# Patient Record
Sex: FEMALE | Race: BLACK OR AFRICAN AMERICAN | Employment: UNEMPLOYED | URBAN - METROPOLITAN AREA
[De-identification: names, ages, dates, MRNs, and addresses within clinical notes are randomized per-mention and may not be internally consistent; named-entity substitution may affect disease eponyms.]

---

## 2020-08-13 ENCOUNTER — HOSPITAL ENCOUNTER (EMERGENCY)
Age: 45
Discharge: HOME | End: 2020-08-13
Attending: EMERGENCY MEDICINE
Payer: MEDICAID

## 2020-08-13 ENCOUNTER — APPOINTMENT (OUTPATIENT)
Dept: GENERAL RADIOLOGY | Age: 45
End: 2020-08-13
Payer: MEDICAID

## 2020-08-13 VITALS
WEIGHT: 120 LBS | RESPIRATION RATE: 22 BRPM | TEMPERATURE: 99.1 F | DIASTOLIC BLOOD PRESSURE: 91 MMHG | SYSTOLIC BLOOD PRESSURE: 139 MMHG | HEIGHT: 66 IN | HEART RATE: 109 BPM | OXYGEN SATURATION: 100 % | BODY MASS INDEX: 19.29 KG/M2

## 2020-08-13 LAB
ANION GAP SERPL CALCULATED.3IONS-SCNC: 13 MMOL/L (ref 9–17)
BUN BLDV-MCNC: 8 MG/DL (ref 6–20)
BUN/CREAT BLD: 12 (ref 9–20)
CALCIUM SERPL-MCNC: 8.8 MG/DL (ref 8.6–10.4)
CHLORIDE BLD-SCNC: 104 MMOL/L (ref 98–107)
CO2: 22 MMOL/L (ref 20–31)
CREAT SERPL-MCNC: 0.65 MG/DL (ref 0.5–0.9)
D-DIMER QUANTITATIVE: 0.34 MG/L FEU (ref 0–0.59)
EKG ATRIAL RATE: 110 BPM
EKG P AXIS: 59 DEGREES
EKG P-R INTERVAL: 138 MS
EKG Q-T INTERVAL: 342 MS
EKG QRS DURATION: 64 MS
EKG QTC CALCULATION (BAZETT): 462 MS
EKG R AXIS: 20 DEGREES
EKG T AXIS: 46 DEGREES
EKG VENTRICULAR RATE: 110 BPM
GFR AFRICAN AMERICAN: >60 ML/MIN
GFR NON-AFRICAN AMERICAN: >60 ML/MIN
GFR SERPL CREATININE-BSD FRML MDRD: NORMAL ML/MIN/{1.73_M2}
GFR SERPL CREATININE-BSD FRML MDRD: NORMAL ML/MIN/{1.73_M2}
GLUCOSE BLD-MCNC: 90 MG/DL (ref 70–99)
HCG QUALITATIVE: NEGATIVE
POTASSIUM SERPL-SCNC: 3.9 MMOL/L (ref 3.7–5.3)
SODIUM BLD-SCNC: 139 MMOL/L (ref 135–144)
TROPONIN INTERP: NORMAL
TROPONIN T: NORMAL NG/ML
TROPONIN, HIGH SENSITIVITY: <6 NG/L (ref 0–14)

## 2020-08-13 PROCEDURE — 85379 FIBRIN DEGRADATION QUANT: CPT

## 2020-08-13 PROCEDURE — 80048 BASIC METABOLIC PNL TOTAL CA: CPT

## 2020-08-13 PROCEDURE — 84703 CHORIONIC GONADOTROPIN ASSAY: CPT

## 2020-08-13 PROCEDURE — 84484 ASSAY OF TROPONIN QUANT: CPT

## 2020-08-13 PROCEDURE — 93005 ELECTROCARDIOGRAM TRACING: CPT | Performed by: EMERGENCY MEDICINE

## 2020-08-13 PROCEDURE — 93010 ELECTROCARDIOGRAM REPORT: CPT | Performed by: INTERNAL MEDICINE

## 2020-08-13 PROCEDURE — 99285 EMERGENCY DEPT VISIT HI MDM: CPT

## 2020-08-13 PROCEDURE — 71045 X-RAY EXAM CHEST 1 VIEW: CPT

## 2020-08-13 PROCEDURE — 6370000000 HC RX 637 (ALT 250 FOR IP): Performed by: EMERGENCY MEDICINE

## 2020-08-13 RX ORDER — 0.9 % SODIUM CHLORIDE 0.9 %
1000 INTRAVENOUS SOLUTION INTRAVENOUS ONCE
Status: DISCONTINUED | OUTPATIENT
Start: 2020-08-13 | End: 2020-08-13 | Stop reason: HOSPADM

## 2020-08-13 RX ORDER — LORAZEPAM 1 MG/1
1 TABLET ORAL ONCE
Status: COMPLETED | OUTPATIENT
Start: 2020-08-13 | End: 2020-08-13

## 2020-08-13 RX ORDER — IBUPROFEN 800 MG/1
800 TABLET ORAL ONCE
Status: COMPLETED | OUTPATIENT
Start: 2020-08-13 | End: 2020-08-13

## 2020-08-13 RX ORDER — HYDROXYZINE HYDROCHLORIDE 25 MG/1
25 TABLET, FILM COATED ORAL EVERY 8 HOURS PRN
Qty: 20 TABLET | Refills: 0 | Status: SHIPPED | OUTPATIENT
Start: 2020-08-13 | End: 2020-08-23

## 2020-08-13 RX ADMIN — LORAZEPAM 1 MG: 1 TABLET ORAL at 16:03

## 2020-08-13 RX ADMIN — IBUPROFEN 800 MG: 800 TABLET, FILM COATED ORAL at 16:03

## 2020-08-13 ASSESSMENT — ENCOUNTER SYMPTOMS
SHORTNESS OF BREATH: 0
ABDOMINAL PAIN: 0
TROUBLE SWALLOWING: 0

## 2020-08-13 ASSESSMENT — PAIN SCALES - GENERAL
PAINLEVEL_OUTOF10: 10
PAINLEVEL_OUTOF10: 10
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
## 2020-12-31 ENCOUNTER — APPOINTMENT (OUTPATIENT)
Dept: CT IMAGING | Age: 45
DRG: 192 | End: 2020-12-31
Payer: MEDICAID

## 2020-12-31 ENCOUNTER — HOSPITAL ENCOUNTER (INPATIENT)
Age: 45
LOS: 6 days | Discharge: HOME | DRG: 192 | End: 2021-01-06
Attending: EMERGENCY MEDICINE | Admitting: INTERNAL MEDICINE
Payer: MEDICAID

## 2020-12-31 ENCOUNTER — APPOINTMENT (OUTPATIENT)
Dept: GENERAL RADIOLOGY | Age: 45
DRG: 192 | End: 2020-12-31
Payer: MEDICAID

## 2020-12-31 DIAGNOSIS — J81.0 ACUTE PULMONARY EDEMA (HCC): Primary | ICD-10-CM

## 2020-12-31 DIAGNOSIS — R07.9 CHEST PAIN, UNSPECIFIED TYPE: ICD-10-CM

## 2020-12-31 PROBLEM — I50.1 PULMONARY EDEMA, ACUTE, WITH CONGESTIVE HEART FAILURE (HCC): Status: ACTIVE | Noted: 2020-12-31

## 2020-12-31 PROBLEM — D50.9 MICROCYTIC ANEMIA: Status: ACTIVE | Noted: 2020-12-31

## 2020-12-31 PROBLEM — F19.11 HISTORY OF DRUG ABUSE (HCC): Status: ACTIVE | Noted: 2020-12-31

## 2020-12-31 PROBLEM — F41.9 ANXIETY: Status: ACTIVE | Noted: 2020-12-31

## 2020-12-31 LAB
-: ABNORMAL
ABSOLUTE EOS #: 0.06 K/UL (ref 0–0.44)
ABSOLUTE IMMATURE GRANULOCYTE: <0.03 K/UL (ref 0–0.3)
ABSOLUTE LYMPH #: 1.94 K/UL (ref 1.1–3.7)
ABSOLUTE MONO #: 0.3 K/UL (ref 0.1–1.2)
ADENOVIRUS PCR: NOT DETECTED
ALBUMIN SERPL-MCNC: 3.8 G/DL (ref 3.5–5.2)
ALBUMIN/GLOBULIN RATIO: 1.3 (ref 1–2.5)
ALP BLD-CCNC: 56 U/L (ref 35–104)
ALT SERPL-CCNC: 6 U/L (ref 5–33)
AMORPHOUS: ABNORMAL
AMPHETAMINE SCREEN URINE: NEGATIVE
ANION GAP SERPL CALCULATED.3IONS-SCNC: 10 MMOL/L (ref 9–17)
AST SERPL-CCNC: 13 U/L
BACTERIA: ABNORMAL
BARBITURATE SCREEN URINE: NEGATIVE
BASOPHILS # BLD: 1 % (ref 0–2)
BASOPHILS ABSOLUTE: 0.06 K/UL (ref 0–0.2)
BENZODIAZEPINE SCREEN, URINE: NEGATIVE
BILIRUB SERPL-MCNC: 0.46 MG/DL (ref 0.3–1.2)
BILIRUBIN DIRECT: 0.08 MG/DL
BILIRUBIN URINE: NEGATIVE
BNP INTERPRETATION: ABNORMAL
BORDETELLA PARAPERTUSSIS: NOT DETECTED
BORDETELLA PERTUSSIS PCR: NOT DETECTED
BUN BLDV-MCNC: 8 MG/DL (ref 6–20)
BUN/CREAT BLD: ABNORMAL (ref 9–20)
BUPRENORPHINE URINE: NORMAL
C-REACTIVE PROTEIN: <3 MG/L (ref 0–5)
CALCIUM SERPL-MCNC: 8.3 MG/DL (ref 8.6–10.4)
CANNABINOID SCREEN URINE: NEGATIVE
CASTS UA: ABNORMAL /LPF (ref 0–8)
CHLAMYDIA PNEUMONIAE BY PCR: NOT DETECTED
CHLORIDE BLD-SCNC: 108 MMOL/L (ref 98–107)
CO2: 24 MMOL/L (ref 20–31)
COCAINE METABOLITE, URINE: NEGATIVE
COLOR: YELLOW
CORONAVIRUS 229E PCR: NOT DETECTED
CORONAVIRUS HKU1 PCR: NOT DETECTED
CORONAVIRUS NL63 PCR: NOT DETECTED
CORONAVIRUS OC43 PCR: NOT DETECTED
CREAT SERPL-MCNC: 0.67 MG/DL (ref 0.5–0.9)
CRYSTALS, UA: ABNORMAL /HPF
D-DIMER QUANTITATIVE: 0.56 MG/L FEU
DIFFERENTIAL TYPE: ABNORMAL
EOSINOPHILS RELATIVE PERCENT: 1 % (ref 1–4)
EPITHELIAL CELLS UA: ABNORMAL /HPF (ref 0–5)
FERRITIN: 12 UG/L (ref 13–150)
FOLATE: 9.6 NG/ML
GFR AFRICAN AMERICAN: >60 ML/MIN
GFR NON-AFRICAN AMERICAN: >60 ML/MIN
GFR SERPL CREATININE-BSD FRML MDRD: ABNORMAL ML/MIN/{1.73_M2}
GFR SERPL CREATININE-BSD FRML MDRD: ABNORMAL ML/MIN/{1.73_M2}
GLUCOSE BLD-MCNC: 99 MG/DL (ref 70–99)
GLUCOSE URINE: NEGATIVE
HCG QUALITATIVE: NEGATIVE
HCT VFR BLD CALC: 34.8 % (ref 36.3–47.1)
HEMOGLOBIN: 10.7 G/DL (ref 11.9–15.1)
HUMAN METAPNEUMOVIRUS PCR: NOT DETECTED
IMMATURE GRANULOCYTES: 0 %
INFLUENZA A BY PCR: NOT DETECTED
INFLUENZA A H1 (2009) PCR: NORMAL
INFLUENZA A H1 PCR: NORMAL
INFLUENZA A H3 PCR: NORMAL
INFLUENZA B BY PCR: NOT DETECTED
IRON SATURATION: 8 % (ref 20–55)
IRON: 28 UG/DL (ref 37–145)
KETONES, URINE: NEGATIVE
LEUKOCYTE ESTERASE, URINE: ABNORMAL
LIPASE: 19 U/L (ref 13–60)
LYMPHOCYTES # BLD: 41 % (ref 24–43)
MCH RBC QN AUTO: 22.6 PG (ref 25.2–33.5)
MCHC RBC AUTO-ENTMCNC: 30.7 G/DL (ref 28.4–34.8)
MCV RBC AUTO: 73.4 FL (ref 82.6–102.9)
MDMA URINE: NORMAL
METHADONE SCREEN, URINE: NEGATIVE
METHAMPHETAMINE, URINE: NORMAL
MONOCYTES # BLD: 6 % (ref 3–12)
MUCUS: ABNORMAL
MYCOPLASMA PNEUMONIAE PCR: NOT DETECTED
NITRITE, URINE: NEGATIVE
NRBC AUTOMATED: 0 PER 100 WBC
OPIATES, URINE: NEGATIVE
OTHER OBSERVATIONS UA: ABNORMAL
OXYCODONE SCREEN URINE: NEGATIVE
PARAINFLUENZA 1 PCR: NOT DETECTED
PARAINFLUENZA 2 PCR: NOT DETECTED
PARAINFLUENZA 3 PCR: NOT DETECTED
PARAINFLUENZA 4 PCR: NOT DETECTED
PDW BLD-RTO: 15.8 % (ref 11.8–14.4)
PH UA: 5.5 (ref 5–8)
PHENCYCLIDINE, URINE: NEGATIVE
PLATELET # BLD: 430 K/UL (ref 138–453)
PLATELET ESTIMATE: ABNORMAL
PMV BLD AUTO: 9.9 FL (ref 8.1–13.5)
POTASSIUM SERPL-SCNC: 3.8 MMOL/L (ref 3.7–5.3)
PRO-BNP: 911 PG/ML
PROPOXYPHENE, URINE: NORMAL
PROTEIN UA: NEGATIVE
RBC # BLD: 4.74 M/UL (ref 3.95–5.11)
RBC # BLD: ABNORMAL 10*6/UL
RBC UA: ABNORMAL /HPF (ref 0–4)
RENAL EPITHELIAL, UA: ABNORMAL /HPF
RESP SYNCYTIAL VIRUS PCR: NOT DETECTED
RHEUMATOID FACTOR: 18.8 IU/ML
RHINO/ENTEROVIRUS PCR: NOT DETECTED
SARS-COV-2, PCR: NOT DETECTED
SARS-COV-2, RAPID: NOT DETECTED
SARS-COV-2: NORMAL
SARS-COV-2: NORMAL
SEG NEUTROPHILS: 51 % (ref 36–65)
SEGMENTED NEUTROPHILS ABSOLUTE COUNT: 2.35 K/UL (ref 1.5–8.1)
SODIUM BLD-SCNC: 142 MMOL/L (ref 135–144)
SOURCE: NORMAL
SPECIFIC GRAVITY UA: 1.01 (ref 1–1.03)
SPECIMEN DESCRIPTION: NORMAL
TEST INFORMATION: NORMAL
TOTAL IRON BINDING CAPACITY: 361 UG/DL (ref 250–450)
TOTAL PROTEIN: 6.8 G/DL (ref 6.4–8.3)
TRICHOMONAS: ABNORMAL
TRICYCLIC ANTIDEPRESSANTS, UR: NORMAL
TROPONIN INTERP: NORMAL
TROPONIN INTERP: NORMAL
TROPONIN T: NORMAL NG/ML
TROPONIN T: NORMAL NG/ML
TROPONIN, HIGH SENSITIVITY: 8 NG/L (ref 0–14)
TROPONIN, HIGH SENSITIVITY: 9 NG/L (ref 0–14)
TSH SERPL DL<=0.05 MIU/L-ACNC: 3.3 MIU/L (ref 0.3–5)
TURBIDITY: CLEAR
UNSATURATED IRON BINDING CAPACITY: 333 UG/DL (ref 112–347)
URINE HGB: NEGATIVE
UROBILINOGEN, URINE: NORMAL
VITAMIN B-12: 324 PG/ML (ref 232–1245)
WBC # BLD: 4.7 K/UL (ref 3.5–11.3)
WBC # BLD: ABNORMAL 10*3/UL
WBC UA: ABNORMAL /HPF (ref 0–5)
YEAST: ABNORMAL

## 2020-12-31 PROCEDURE — 86038 ANTINUCLEAR ANTIBODIES: CPT

## 2020-12-31 PROCEDURE — 82607 VITAMIN B-12: CPT

## 2020-12-31 PROCEDURE — 84484 ASSAY OF TROPONIN QUANT: CPT

## 2020-12-31 PROCEDURE — 85025 COMPLETE CBC W/AUTO DIFF WBC: CPT

## 2020-12-31 PROCEDURE — 81001 URINALYSIS AUTO W/SCOPE: CPT

## 2020-12-31 PROCEDURE — 82746 ASSAY OF FOLIC ACID SERUM: CPT

## 2020-12-31 PROCEDURE — 1200000000 HC SEMI PRIVATE

## 2020-12-31 PROCEDURE — 99284 EMERGENCY DEPT VISIT MOD MDM: CPT

## 2020-12-31 PROCEDURE — 84703 CHORIONIC GONADOTROPIN ASSAY: CPT

## 2020-12-31 PROCEDURE — 82248 BILIRUBIN DIRECT: CPT

## 2020-12-31 PROCEDURE — 80307 DRUG TEST PRSMV CHEM ANLYZR: CPT

## 2020-12-31 PROCEDURE — 0202U NFCT DS 22 TRGT SARS-COV-2: CPT

## 2020-12-31 PROCEDURE — 6370000000 HC RX 637 (ALT 250 FOR IP): Performed by: STUDENT IN AN ORGANIZED HEALTH CARE EDUCATION/TRAINING PROGRAM

## 2020-12-31 PROCEDURE — 2580000003 HC RX 258: Performed by: STUDENT IN AN ORGANIZED HEALTH CARE EDUCATION/TRAINING PROGRAM

## 2020-12-31 PROCEDURE — 86140 C-REACTIVE PROTEIN: CPT

## 2020-12-31 PROCEDURE — 6360000002 HC RX W HCPCS: Performed by: STUDENT IN AN ORGANIZED HEALTH CARE EDUCATION/TRAINING PROGRAM

## 2020-12-31 PROCEDURE — 71045 X-RAY EXAM CHEST 1 VIEW: CPT

## 2020-12-31 PROCEDURE — 6360000004 HC RX CONTRAST MEDICATION: Performed by: STUDENT IN AN ORGANIZED HEALTH CARE EDUCATION/TRAINING PROGRAM

## 2020-12-31 PROCEDURE — 80053 COMPREHEN METABOLIC PANEL: CPT

## 2020-12-31 PROCEDURE — 83036 HEMOGLOBIN GLYCOSYLATED A1C: CPT

## 2020-12-31 PROCEDURE — 82728 ASSAY OF FERRITIN: CPT

## 2020-12-31 PROCEDURE — 83550 IRON BINDING TEST: CPT

## 2020-12-31 PROCEDURE — 93005 ELECTROCARDIOGRAM TRACING: CPT | Performed by: STUDENT IN AN ORGANIZED HEALTH CARE EDUCATION/TRAINING PROGRAM

## 2020-12-31 PROCEDURE — U0002 COVID-19 LAB TEST NON-CDC: HCPCS

## 2020-12-31 PROCEDURE — 83690 ASSAY OF LIPASE: CPT

## 2020-12-31 PROCEDURE — 83540 ASSAY OF IRON: CPT

## 2020-12-31 PROCEDURE — 85379 FIBRIN DEGRADATION QUANT: CPT

## 2020-12-31 PROCEDURE — 99223 1ST HOSP IP/OBS HIGH 75: CPT | Performed by: INTERNAL MEDICINE

## 2020-12-31 PROCEDURE — 83880 ASSAY OF NATRIURETIC PEPTIDE: CPT

## 2020-12-31 PROCEDURE — 71260 CT THORAX DX C+: CPT

## 2020-12-31 PROCEDURE — 93005 ELECTROCARDIOGRAM TRACING: CPT | Performed by: EMERGENCY MEDICINE

## 2020-12-31 PROCEDURE — 84443 ASSAY THYROID STIM HORMONE: CPT

## 2020-12-31 PROCEDURE — 86431 RHEUMATOID FACTOR QUANT: CPT

## 2020-12-31 RX ORDER — ACETAMINOPHEN 500 MG
1000 TABLET ORAL ONCE
Status: COMPLETED | OUTPATIENT
Start: 2020-12-31 | End: 2020-12-31

## 2020-12-31 RX ORDER — ACETAMINOPHEN 325 MG/1
650 TABLET ORAL EVERY 6 HOURS PRN
Status: DISCONTINUED | OUTPATIENT
Start: 2020-12-31 | End: 2021-01-05

## 2020-12-31 RX ORDER — SODIUM CHLORIDE 0.9 % (FLUSH) 0.9 %
10 SYRINGE (ML) INJECTION PRN
Status: DISCONTINUED | OUTPATIENT
Start: 2020-12-31 | End: 2021-01-06 | Stop reason: HOSPADM

## 2020-12-31 RX ORDER — FLUOXETINE 10 MG/1
10 CAPSULE ORAL DAILY
Status: DISCONTINUED | OUTPATIENT
Start: 2020-12-31 | End: 2021-01-06 | Stop reason: HOSPADM

## 2020-12-31 RX ORDER — FUROSEMIDE 10 MG/ML
20 INJECTION INTRAMUSCULAR; INTRAVENOUS ONCE
Status: COMPLETED | OUTPATIENT
Start: 2020-12-31 | End: 2020-12-31

## 2020-12-31 RX ORDER — SODIUM CHLORIDE 0.9 % (FLUSH) 0.9 %
10 SYRINGE (ML) INJECTION EVERY 12 HOURS SCHEDULED
Status: DISCONTINUED | OUTPATIENT
Start: 2020-12-31 | End: 2021-01-06 | Stop reason: HOSPADM

## 2020-12-31 RX ORDER — ONDANSETRON 2 MG/ML
4 INJECTION INTRAMUSCULAR; INTRAVENOUS EVERY 6 HOURS PRN
Status: DISCONTINUED | OUTPATIENT
Start: 2020-12-31 | End: 2021-01-06 | Stop reason: HOSPADM

## 2020-12-31 RX ORDER — POLYETHYLENE GLYCOL 3350 17 G/17G
17 POWDER, FOR SOLUTION ORAL DAILY PRN
Status: DISCONTINUED | OUTPATIENT
Start: 2020-12-31 | End: 2021-01-06 | Stop reason: HOSPADM

## 2020-12-31 RX ORDER — PROMETHAZINE HYDROCHLORIDE 12.5 MG/1
12.5 TABLET ORAL EVERY 6 HOURS PRN
Status: DISCONTINUED | OUTPATIENT
Start: 2020-12-31 | End: 2021-01-06 | Stop reason: HOSPADM

## 2020-12-31 RX ORDER — FLUOXETINE 10 MG/1
CAPSULE ORAL DAILY
COMMUNITY
End: 2021-03-17

## 2020-12-31 RX ORDER — 0.9 % SODIUM CHLORIDE 0.9 %
1000 INTRAVENOUS SOLUTION INTRAVENOUS ONCE
Status: COMPLETED | OUTPATIENT
Start: 2020-12-31 | End: 2020-12-31

## 2020-12-31 RX ORDER — ACETAMINOPHEN 650 MG/1
650 SUPPOSITORY RECTAL EVERY 6 HOURS PRN
Status: DISCONTINUED | OUTPATIENT
Start: 2020-12-31 | End: 2021-01-06 | Stop reason: HOSPADM

## 2020-12-31 RX ADMIN — SODIUM CHLORIDE 1000 ML: 9 INJECTION, SOLUTION INTRAVENOUS at 05:14

## 2020-12-31 RX ADMIN — ACETAMINOPHEN 1000 MG: 500 TABLET ORAL at 12:08

## 2020-12-31 RX ADMIN — LURASIDONE HYDROCHLORIDE 40 MG: 40 TABLET, FILM COATED ORAL at 14:15

## 2020-12-31 RX ADMIN — FUROSEMIDE 20 MG: 10 INJECTION, SOLUTION INTRAMUSCULAR; INTRAVENOUS at 14:05

## 2020-12-31 RX ADMIN — IOPAMIDOL 75 ML: 755 INJECTION, SOLUTION INTRAVENOUS at 06:24

## 2020-12-31 RX ADMIN — ACETAMINOPHEN 650 MG: 325 TABLET ORAL at 23:13

## 2020-12-31 RX ADMIN — ENOXAPARIN SODIUM 40 MG: 40 INJECTION SUBCUTANEOUS at 14:05

## 2020-12-31 RX ADMIN — FLUOXETINE 10 MG: 10 CAPSULE ORAL at 14:14

## 2020-12-31 ASSESSMENT — PAIN DESCRIPTION - PAIN TYPE: TYPE: ACUTE PAIN

## 2020-12-31 ASSESSMENT — PAIN SCALES - GENERAL
PAINLEVEL_OUTOF10: 8
PAINLEVEL_OUTOF10: 6

## 2020-12-31 ASSESSMENT — PAIN DESCRIPTION - LOCATION: LOCATION: HEAD

## 2020-12-31 ASSESSMENT — PAIN DESCRIPTION - PROGRESSION: CLINICAL_PROGRESSION: GRADUALLY IMPROVING

## 2020-12-31 NOTE — ED PROVIDER NOTES
[facility] ED     Emergency Department     Faculty Attestation        I performed a history and physical examination of the patient and discussed management with the resident. I reviewed the residents note and agree with the documented findings and plan of care. Any areas of disagreement are noted on the chart. I was personally present for the key portions of any procedures. I have documented in the chart those procedures where I was not present during the key portions. I have reviewed the emergency nurses triage note. I agree with the chief complaint, past medical history, past surgical history, allergies, medications, social and family history as documented unless otherwise noted below. For mid-level providers such as nurse practitioners as well as physicians assistants:    I have personally seen and evaluated the patient. I find the patient's history and physical exam are consistent with NP/PA documentation. I agree with the care provided, treatment rendered, disposition, & follow-up plan. Additional findings are as noted. Vital Signs: BP (!) 144/107   Pulse 102   Resp 15   Ht 5' 6\" (1.676 m)   Wt 155 lb (70.3 kg)   SpO2 98%   BMI 25.02 kg/m²   PCP:  Sakina Gomez, APRN - CNP    Pertinent Comments:     Patient presents the emergency department for evaluation of chest pain she has had intermittently for 2 weeks she describes vague left-sided chest pain that sometimes will radiate to her left upper extremity. Some shortness of breath associated with it. Is nonpleuritic in nature she has been around other people who have tested positive for Covid.   Exam she is afebrile nontoxic slightly tachycardic will check EKG, CBC, BMP, D-dimer, troponin, chest x-ray      Critical Care  None          Harper Rome MD  Attending Emergency Medicine Physician              Randy Escobar MD  12/31/20 7876

## 2020-12-31 NOTE — ED PROVIDER NOTES
[address]  ED  Emergency Department  Emergency Medicine Resident Sign-out     Care of Mackenzie Saez was assumed from Dr. Lupe Mukherjee and is being seen for Chest Pain (x2wks intermittent) and Shortness of Breath  . The patient's initial evaluation and plan have been discussed with the prior provider who initially evaluated the patient. EMERGENCY DEPARTMENT COURSE / MEDICAL DECISION MAKING:       MEDICATIONS GIVEN:  Orders Placed This Encounter   Medications    0.9 % sodium chloride bolus    iopamidol (ISOVUE-370) 76 % injection 75 mL       LABS / RADIOLOGY:     Labs Reviewed   CBC WITH AUTO DIFFERENTIAL - Abnormal; Notable for the following components:       Result Value    Hemoglobin 10.7 (*)     Hematocrit 34.8 (*)     MCV 73.4 (*)     MCH 22.6 (*)     RDW 15.8 (*)     All other components within normal limits   COMPREHENSIVE METABOLIC PANEL - Abnormal; Notable for the following components:    Calcium 8.3 (*)     Chloride 108 (*)     All other components within normal limits   URINALYSIS WITH MICROSCOPIC - Abnormal; Notable for the following components:    Leukocyte Esterase, Urine TRACE (*)     All other components within normal limits   BRAIN NATRIURETIC PEPTIDE - Abnormal; Notable for the following components:    Pro- (*)     All other components within normal limits   LIPASE   D-DIMER, QUANTITATIVE   TROPONIN   TROPONIN   COVID-19   HCG, SERUM, QUALITATIVE       Ct Chest Pulmonary Embolism W Contrast    Result Date: 12/31/2020  EXAMINATION: CTA OF THE CHEST 12/31/2020 6:23 am TECHNIQUE: CTA of the chest was performed after the administration of intravenous contrast.  Multiplanar reformatted images are provided for review. MIP images are provided for review. Dose modulation, iterative reconstruction, and/or weight based adjustment of the mA/kV was utilized to reduce the radiation dose to as low as reasonably achievable.  COMPARISON: Chest single view August 13, 2020 HISTORY: ORDERING SYSTEM PROVIDED HISTORY: r/o PE TECHNOLOGIST PROVIDED HISTORY: r/o PE Reason for Exam: r/o PE FINDINGS: Pulmonary Arteries: Pulmonary arteries are suboptimally opacified for evaluation. No definitive evidence of intraluminal filling defect to suggest pulmonary embolism. Main pulmonary artery is normal in caliber. Mediastinum: No evidence of mediastinal lymphadenopathy. The heart is moderately enlarged with otherwise unremarkable configuration. No evidence of pericardial effusion is identified. .  There is no acute abnormality of the thoracic aorta. Lungs/pleura: Scattered interlobular septal thickening consistent with pulmonary interstitial edema is noted. Mild multifocal ill-defined consolidation is seen scattered throughout the bilateral lungs. Layering right-sided pleural effusion is noted which measures up to 7 mm in diameter. Francie Savant Upper Abdomen: Limited images of the upper abdomen are unremarkable. Soft Tissues/Bones: No acute bone or soft tissue abnormality. 1. Note: Study is limited by suboptimal volume of intravenous iodinated contrast within the pulmonary arterial system. 2. No definitive evidence of acute pulmonary embolism. 3. Moderate cardiomegaly with moderate pulmonary interstitial edema and suspected mild multifocal bilateral lung overlying alveolar edema. 4. Mild right-sided layering posterior pleural effusions. 5. Constellation of findings suggests congestive heart failure (CHF). RECENT VITALS:     Temp: 98.7 °F (37.1 °C),  Pulse: 118, Resp: 24, BP: (!) 110/91, SpO2: 92 %    This patient is a 39 y.o. Female with chest pain and shortness of breath. Had an increasingly having progressive chest pain shortness of breath approximate last week. Woke up this morning with severe chest pain her chest and shortness of breath primary care to the emergency department. Here work-up shows new pulmonary edema, cardiomegaly, and pleural effusion concerning for new onset CHF.   She did have an episode here where she came tachycardic as well as hypoxic down to the 70s but popped up after nasal cannula. Plan is for admission, internal medicine has been contacted and will come down to assess the patient. Plan is for admission to either observation unit or to medicine at the discretion of internal medicine. Medicine evaluated the patient, on evaluation there is concern for Covid despite the negative rapid swab based on the CT chest findings. Infectious disease consult who recommended the respiratory viral PCR panel for further differentiation. Panel came back Covid negative, infectious disease signed off. Internal medicine agreed to admit. OUTSTANDING TASKS / RECOMMENDATIONS:    1. Awaiting bed     FINAL IMPRESSION:     1. Acute pulmonary edema (HCC)    2. Chest pain, unspecified type        DISPOSITION:         DISPOSITION:  []  Discharge   []  Transfer -    [x]  Admission -   internal medicine   []  Against Medical Advice   []  Eloped   FOLLOW-UP: No follow-up provider specified.    DISCHARGE MEDICATIONS: New Prescriptions    No medications on file           Jennifer Tee MD  Emergency Medicine Resident  [facility]        Jennifer Tee MD  Resident  12/31/20 6501

## 2020-12-31 NOTE — ED PROVIDER NOTES
Food insecurity     Worry: Not on file     Inability: Not on file    Transportation needs     Medical: Not on file     Non-medical: Not on file   Tobacco Use    Smoking status: Current Every Day Smoker     Packs/day: 0.50     Types: Cigarettes    Smokeless tobacco: Never Used   Substance and Sexual Activity    Alcohol use: Not Currently     Comment: in rehab for ETOH abuse    Drug use: Not on file    Sexual activity: Not on file   Lifestyle    Physical activity     Days per week: Not on file     Minutes per session: Not on file    Stress: Not on file   Relationships    Social connections     Talks on phone: Not on file     Gets together: Not on file     Attends [employer] service: Not on file     Active member of club or organization: Not on file     Attends meetings of clubs or organizations: Not on file     Relationship status: Not on file    Intimate partner violence     Fear of current or ex partner: Not on file     Emotionally abused: Not on file     Physically abused: Not on file     Forced sexual activity: Not on file   Other Topics Concern    Not on file   Social History Narrative    Not on file       History reviewed. No pertinent family history. Allergies:  Antihistamines, chlorpheniramine-type and Hydroxyzine    Home Medications:  Prior to Admission medications    Medication Sig Start Date End Date Taking? Authorizing Provider   lurasidone (LATUDA) 40 MG TABS tablet Take by mouth daily   Yes Historical Provider, MD   FLUoxetine (PROZAC) 10 MG capsule Take by mouth daily   Yes Historical Provider, MD       REVIEW OF SYSTEMS    (2-9 systems for level 4, 10 or more for level 5)      Review of Systems   Constitutional: Negative for chills and fever. HENT: Negative for rhinorrhea and sore throat. Eyes: Negative for redness and itching. Respiratory: Positive for shortness of breath. Negative for cough. Cardiovascular: Positive for chest pain. Negative for leg swelling. Gastrointestinal: Positive for abdominal pain and diarrhea. Negative for blood in stool, nausea and vomiting. Genitourinary: Positive for dysuria and vaginal discharge. Negative for frequency, hematuria and vaginal bleeding. Musculoskeletal: Negative for arthralgias and myalgias. Skin: Negative for rash and wound. Allergic/Immunologic: Negative for environmental allergies and food allergies. Neurological: Positive for headaches. Negative for weakness and numbness. PHYSICAL EXAM   (up to 7 for level 4, 8 or more for level 5)      INITIAL VITALS:   BP (!) 110/91   Pulse 118   Temp 98.7 °F (37.1 °C) (Oral)   Resp 24   Ht 5' 6\" (1.676 m)   Wt 155 lb (70.3 kg)   SpO2 92%   BMI 25.02 kg/m²     Physical Exam  Vitals signs and nursing note reviewed. Constitutional:       General: She is not in acute distress. Appearance: Normal appearance. She is not ill-appearing, toxic-appearing or diaphoretic. HENT:      Head: Normocephalic and atraumatic. Eyes:      General: No scleral icterus. Conjunctiva/sclera: Conjunctivae normal.   Neck:      Musculoskeletal: Neck supple. Cardiovascular:      Rate and Rhythm: Normal rate and regular rhythm. Pulmonary:      Effort: Pulmonary effort is normal. No respiratory distress. Breath sounds: Normal breath sounds. No stridor. No wheezing, rhonchi or rales. Abdominal:      General: There is no distension. Palpations: Abdomen is soft. There is no mass. Tenderness: There is no abdominal tenderness (Minimal, diffuse). There is no right CVA tenderness, left CVA tenderness, guarding or rebound. Musculoskeletal:      Right lower leg: No edema. Left lower leg: No edema. Skin:     General: Skin is warm and dry. Findings: No rash (over exposed skin). Neurological:      General: No focal deficit present. Mental Status: She is alert and oriented to person, place, and time.    Psychiatric:         Mood and Affect: Mood normal. Behavior: Behavior normal.         DIAGNOSTICS     PLAN (LABS / IMAGING / EKG):  Orders Placed This Encounter   Procedures    CT CHEST PULMONARY EMBOLISM W CONTRAST    CBC WITH AUTO DIFFERENTIAL    COMPREHENSIVE METABOLIC PANEL    LIPASE    D-DIMER, QUANTITATIVE    Urinalysis with microscopic    Troponin    COVID-19    HCG Qualitative, Serum    Brain Natriuretic Peptide    Inpatient consult to Internal Medicine    EKG 12 Lead       MEDICATIONS ORDERED:  Orders Placed This Encounter   Medications    0.9 % sodium chloride bolus    iopamidol (ISOVUE-370) 76 % injection 75 mL       DIAGNOSTIC RESULTS / EMERGENCYDEPARTMENT COURSE / MDM     LABS:  Results for orders placed or performed during the hospital encounter of 12/31/20   CBC WITH AUTO DIFFERENTIAL   Result Value Ref Range    WBC 4.7 3.5 - 11.3 k/uL    RBC 4.74 3.95 - 5.11 m/uL    Hemoglobin 10.7 (L) 11.9 - 15.1 g/dL    Hematocrit 34.8 (L) 36.3 - 47.1 %    MCV 73.4 (L) 82.6 - 102.9 fL    MCH 22.6 (L) 25.2 - 33.5 pg    MCHC 30.7 28.4 - 34.8 g/dL    RDW 15.8 (H) 11.8 - 14.4 %    Platelets 713 845 - 282 k/uL    MPV 9.9 8.1 - 13.5 fL    NRBC Automated 0.0 0.0 per 100 WBC    Differential Type NOT REPORTED     Seg Neutrophils 51 36 - 65 %    Lymphocytes 41 24 - 43 %    Monocytes 6 3 - 12 %    Eosinophils % 1 1 - 4 %    Basophils 1 0 - 2 %    Immature Granulocytes 0 0 %    Segs Absolute 2.35 1.50 - 8.10 k/uL    Absolute Lymph # 1.94 1.10 - 3.70 k/uL    Absolute Mono # 0.30 0.10 - 1.20 k/uL    Absolute Eos # 0.06 0.00 - 0.44 k/uL    Basophils Absolute 0.06 0.00 - 0.20 k/uL    Absolute Immature Granulocyte <0.03 0.00 - 0.30 k/uL    WBC Morphology NOT REPORTED     RBC Morphology ANISOCYTOSIS PRESENT     Platelet Estimate NOT REPORTED    COMPREHENSIVE METABOLIC PANEL   Result Value Ref Range    Glucose 99 70 - 99 mg/dL    BUN 8 6 - 20 mg/dL    CREATININE 0.67 0.50 - 0.90 mg/dL    Bun/Cre Ratio NOT REPORTED 9 - 20    Calcium 8.3 (L) 8.6 - 10.4 mg/dL Other   Result Value Ref Range    SARS-CoV-2          SARS-CoV-2, Rapid Not Detected Not Detected    Source . NASOPHARYNGEAL SWAB     SARS-CoV-2         HCG Qualitative, Serum   Result Value Ref Range    hCG Qual NEGATIVE NEGATIVE   Brain Natriuretic Peptide   Result Value Ref Range    Pro- (H) <300 pg/mL    BNP Interpretation Pro-BNP Reference Range:    EKG 12 Lead   Result Value Ref Range    Ventricular Rate 97 BPM    Atrial Rate 97 BPM    P-R Interval 138 ms    QRS Duration 70 ms    Q-T Interval 370 ms    QTc Calculation (Bazett) 469 ms    P Axis 52 degrees    R Axis -4 degrees    T Axis 48 degrees       RADIOLOGY:  CT CHEST PULMONARY EMBOLISM W CONTRAST   Final Result   1. Note: Study is limited by suboptimal volume of intravenous iodinated   contrast within the pulmonary arterial system. 2. No definitive evidence of acute pulmonary embolism. 3. Moderate cardiomegaly with moderate pulmonary interstitial edema and   suspected mild multifocal bilateral lung overlying alveolar edema. 4. Mild right-sided layering posterior pleural effusions. 5. Constellation of findings suggests congestive heart failure (CHF). EKG  Rhythm: normal sinus   Rate: normal  Axis: normal  Ectopy: premature ventricular contractions (infrequent)  Conduction: normal  ST Segments: no acute change  T Waves: no acute change  Q Waves: none    Clinical Impression: Poor R wave progression. When compared to EKG dated 8/13/2020, no acute changes and non-specific EKG    Normal Interval Reference:  P-wave <110 ms  -200 ms  QRS <100 ms  QT <420 ms  QTc 330-470 ms    All EKG's are interpreted by the Emergency Department Physician who either signs or Co-signsthis chart in the absence of a cardiologist.    EMERGENCY DEPARTMENT COURSE:         MDM: 79-year-old female presenting with acute onset of chest pain shortness of breath. On exam she is nontoxic-appearing no acute distress. Vitals unremarkable.   Heart tachycardic rate rhythm, lungs are clear auscultation bilaterally. Abdomen soft with some mild tenderness to palpation throughout her abdomen. No rebound or guarding noted. No lower extremity edema noted. Differential diagnose includes ACS, CHF, pneumonia, pneumothorax, pulmonary embolism, UTI, among others. Plan is for cardiac work-up, dimer, belly labs, urinalysis. Offer patient a pelvic exam she did report that she has some vaginal discharge however she declined seeing that she would rather follow-up with her primary care physician for evaluation of this. During patient stay she did have episode where she suddenly came hypoxic down to the 70s as well as significantly more tachycardic in the 120s. She was placed on nasal cannula with resolution of her hypoxia. CT scan was negative for any PE but does show some new cardiomegaly, pulmonary edema and pleural effusions concerning for possible CHF. Renal blood work is essentially unremarkable. Discussed with internal medicine who will come and evaluate the patient prior to possible admission. Patient signed out to Dr. Quinn Hernández pending internal medicine evaluation. PROCEDURES:  None    CONSULTS:  IP CONSULT TO INTERNAL MEDICINE    FINAL IMPRESSION      1. Acute pulmonary edema (HCC)    2. Chest pain, unspecified type          DISPOSITION / PLAN     DISPOSITION Decision To Admit 12/31/2020 07:22:54 AM      PATIENT REFERRED TO:  No follow-up provider specified.     DISCHARGE MEDICATIONS:  New Prescriptions    No medications on file       Minh Smith DO  Emergency Medicine Resident  [facility]    (Please note that portions of this note were completed with a voice recognition program.  Efforts were made to edit thedictations but occasionally words are mis-transcribed.)      Minh Smith DO  Resident  12/31/20 0154

## 2020-12-31 NOTE — ED TRIAGE NOTES
Pt arrived via EMS ambulatory with even and steady gait, RR even and unlabored, NAD noted. Pt c/o chest pain intermittently for 2wks, reports the only reason she came in tonight was d/t SOB that awoke her from rest. 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 Pt placed on cardiac telemetry, SpO2, and BP monitor at this time. Pt resting in bed RR even and unlabored with personal belongings and call light in reach, NAD noted at this time. Will continue to monitor.

## 2020-12-31 NOTE — ED NOTES
Message sent to Pharmacy to send medication to ED     Michelle Calderón RN  12/31/20 [address] [facility], RN  12/31/20 4483

## 2020-12-31 NOTE — PLAN OF CARE
Continue to monitor your symptoms. If worse, seek further attention.   Flonase over the counter for symptoms.    covid PCR neg- Ct chest more like CHF  Not covid related illness    pls admit to non covid floors    Lakshmi Gottlieb MD. Infectious Diseases

## 2020-12-31 NOTE — ED NOTES
The following labs labeled with pt sticker and tubed:     [x] Lavender   [] on ice   [x] Blue   [x] Green/yellow  [x] Green/black [] on ice  [] Pink  [] Red  [x] Yellow     [] COVID-19 swab    [] Rapid     [] Urine Sample  [] Pelvic Cultures    [] Blood Cultures              [address] [facility]  12/31/20 1804

## 2020-12-31 NOTE — ED NOTES
Report given to Sami Locke RN. All questions answered at this time.       [address], RN  12/31/20 7915

## 2020-12-31 NOTE — ED NOTES
Pt reported sudden chest pain and SOB, upon entering room pt HR was elevated to 130's and O2 decreased. Writer placed pt on 3L NC and encouraged deep breathing, O2 increased to 95-98%. EKG obtained. Dr Karina Garvey and Dr Hina Liu updated. Pt now resting in bed, RR even and unlabored, NAD noted. Call light within reach. Will con't to monitor.      Cathy German RN  12/31/20 [id number]

## 2020-12-31 NOTE — H&P
[address]     Department of Internal Medicine - Staff Internal Medicine Teaching Service          ADMISSION NOTE/HISTORY AND PHYSICAL EXAMINATION   Date: 12/31/2020  Patient Name: Tammie Guallpa  Date of admission: 12/31/2020  4:12 AM  YOB: 1975  PCP: MAYRA Grissom CNP  History Obtained From:  patient    CHIEF COMPLAINT     Chief complaint: chest pain    HISTORY OF PRESENTING ILLNESS     The patient is a pleasant 39 y.o. female past medical history of anxiety presented to hospital with sudden onset severe left chest pain and SOB when she woke up in the morning. Patient reports that she is having intermittent chest pain for the past 1 week and was seen earlier this month at [facility] for similar complaint which was thought to be secondary to anxiety as the chest x-ray was normal.  On presentation to ED, patient was saturation on room air, BP elevated to 144/107, tachycardic /min but while in the ED she dropped saturation to 80s which came up with nasal cannula oxygen. Troponins 8 and 9 at 2 occurrences, proBNP 911, COVID-19 negative x2 (rapid and PCR). CT PE was negative for PE but did show moderate cardiomegaly with moderate pulmonary interstitial edema, suspected mild multifocal bilateral lung overlying alveolar edema, mild right-sided layering posteriorly pleural effusions suggestive of CHF. Patient received 1 L IV fluid bolus in the ED. Patient complained of some watery diarrhea with 1-2 episodes per day for the past 1 week associated with some nonspecific abdominal pain though denied nausea/vomiting. She reported that she does snore at night, have some morning headaches and usually needs afternoon naps. She also reported some morning joint stiffness especially in knee joints. She denied headache, vision changes, loss of smell/taste, palpitations, dysuria or burning micturition but have frequency of micturition.   She smokes 5-6 cigarette per PHYSICAL EXAMINATION:  Constitutional: This is a well developed, well nourished, 25-29.9 - Overweight 39y.o. year old female who is alert, oriented, cooperative and in no apparent distress. Head:normocephalic and atraumatic. EENT:  PERRLA. No conjunctival injections. Septum was midline, mucosa was without erythema, exudates or cobblestoning. No thrush was noted. Neck: Supple without thyromegaly. No elevated JVP. Trachea was midline. Respiratory: Chest was symmetrical without dullness to percussion. Breath sounds bilaterally were clear to auscultation. There were no wheezes, rhonchi or rales. There is no intercostal retraction or use of accessory muscles. No egophony noted. Cardiovascular: Regular without murmur, clicks, gallops or rubs. Abdomen: Slightly rounded and soft without organomegaly. Some lower abdominal tenderness  Lymphatic: No lymphadenopathy. Musculoskeletal: Normal curvature of the spine. No gross muscle weakness. Extremities:  No lower extremity edema, ulcerations, tenderness, varicosities or erythema. Muscle size, tone and strength are normal.  No involuntary movements are noted. Skin:  Warm and dry. Good color, turgor and pigmentation. No lesions or scars.   No cyanosis or clubbing  Neurological/Psychiatric: The patient's general behavior, level of consciousness, thought content and emotional status is normal.          INVESTIGATIONS     Laboratory Testing:     Recent Results (from the past 24 hour(s))   EKG 12 Lead    Collection Time: 12/31/20  4:23 AM   Result Value Ref Range    Ventricular Rate 97 BPM    Atrial Rate 97 BPM    P-R Interval 138 ms    QRS Duration 70 ms    Q-T Interval 370 ms    QTc Calculation (Bazett) 469 ms    P Axis 52 degrees    R Axis -4 degrees    T Axis 48 degrees   CBC WITH AUTO DIFFERENTIAL    Collection Time: 12/31/20  5:05 AM   Result Value Ref Range    WBC 4.7 3.5 - 11.3 k/uL    RBC 4.74 3.95 - 5.11 m/uL    Hemoglobin 10.7 (L) 11.9 - 15.1 g/dL    Hematocrit 34.8 (L) 36.3 - 47.1 %    MCV 73.4 (L) 82.6 - 102.9 fL    MCH 22.6 (L) 25.2 - 33.5 pg    MCHC 30.7 28.4 - 34.8 g/dL    RDW 15.8 (H) 11.8 - 14.4 %    Platelets 915 683 - 142 k/uL    MPV 9.9 8.1 - 13.5 fL    NRBC Automated 0.0 0.0 per 100 WBC    Differential Type NOT REPORTED     Seg Neutrophils 51 36 - 65 %    Lymphocytes 41 24 - 43 %    Monocytes 6 3 - 12 %    Eosinophils % 1 1 - 4 %    Basophils 1 0 - 2 %    Immature Granulocytes 0 0 %    Segs Absolute 2.35 1.50 - 8.10 k/uL    Absolute Lymph # 1.94 1.10 - 3.70 k/uL    Absolute Mono # 0.30 0.10 - 1.20 k/uL    Absolute Eos # 0.06 0.00 - 0.44 k/uL    Basophils Absolute 0.06 0.00 - 0.20 k/uL    Absolute Immature Granulocyte <0.03 0.00 - 0.30 k/uL    WBC Morphology NOT REPORTED     RBC Morphology ANISOCYTOSIS PRESENT     Platelet Estimate NOT REPORTED    COMPREHENSIVE METABOLIC PANEL    Collection Time: 12/31/20  5:05 AM   Result Value Ref Range    Glucose 99 70 - 99 mg/dL    BUN 8 6 - 20 mg/dL    CREATININE 0.67 0.50 - 0.90 mg/dL    Bun/Cre Ratio NOT REPORTED 9 - 20    Calcium 8.3 (L) 8.6 - 10.4 mg/dL    Sodium 142 135 - 144 mmol/L    Potassium 3.8 3.7 - 5.3 mmol/L    Chloride 108 (H) 98 - 107 mmol/L    CO2 24 20 - 31 mmol/L    Anion Gap 10 9 - 17 mmol/L    Alkaline Phosphatase 56 35 - 104 U/L    ALT 6 5 - 33 U/L    AST 13 <32 U/L    Total Bilirubin 0.46 0.3 - 1.2 mg/dL    Total Protein 6.8 6.4 - 8.3 g/dL    Alb 3.8 3.5 - 5.2 g/dL    Albumin/Globulin Ratio 1.3 1.0 - 2.5    GFR Non-African American >60 >60 mL/min    GFR African American >60 >60 mL/min    GFR Comment          GFR Staging NOT REPORTED    LIPASE    Collection Time: 12/31/20  5:05 AM   Result Value Ref Range    Lipase 19 13 - 60 U/L   D-DIMER, QUANTITATIVE    Collection Time: 12/31/20  5:05 AM   Result Value Ref Range    D-Dimer, Quant 0.56 mg/L FEU   Troponin    Collection Time: 12/31/20  5:05 AM   Result Value Ref Range    Troponin, High Sensitivity 9 0 - 14 ng/L    Troponin T NOT REPORTED <0.03 ng/mL    Troponin Interp NOT REPORTED    HCG Qualitative, Serum    Collection Time: 12/31/20  5:05 AM   Result Value Ref Range    hCG Qual NEGATIVE NEGATIVE   Brain Natriuretic Peptide    Collection Time: 12/31/20  5:05 AM   Result Value Ref Range    Pro- (H) <300 pg/mL    BNP Interpretation Pro-BNP Reference Range:    EKG 12 Lead    Collection Time: 12/31/20  5:49 AM   Result Value Ref Range    Ventricular Rate 116 BPM    Atrial Rate 116 BPM    P-R Interval 130 ms    QRS Duration 68 ms    Q-T Interval 344 ms    QTc Calculation (Bazett) 478 ms    P Axis 53 degrees    R Axis -6 degrees    T Axis 57 degrees   Troponin    Collection Time: 12/31/20  5:58 AM   Result Value Ref Range    Troponin, High Sensitivity 8 0 - 14 ng/L    Troponin T NOT REPORTED <0.03 ng/mL    Troponin Interp NOT REPORTED    C-Reactive Protein    Collection Time: 12/31/20  5:58 AM   Result Value Ref Range    CRP <3.0 0.0 - 5.0 mg/L   Urinalysis with microscopic    Collection Time: 12/31/20  6:23 AM   Result Value Ref Range    Color, UA YELLOW YELLOW    Turbidity UA CLEAR CLEAR    Glucose, Ur NEGATIVE NEGATIVE    Bilirubin Urine NEGATIVE NEGATIVE    Ketones, Urine NEGATIVE NEGATIVE    Specific Gravity, UA 1.011 1.005 - 1.030    Urine Hgb NEGATIVE NEGATIVE    pH, UA 5.5 5.0 - 8.0    Protein, UA NEGATIVE NEGATIVE    Urobilinogen, Urine Normal Normal    Nitrite, Urine NEGATIVE NEGATIVE    Leukocyte Esterase, Urine TRACE (A) NEGATIVE    -          WBC, UA 2 TO 5 0 - 5 /HPF    RBC, UA 2 TO 5 0 - 4 /HPF    Casts UA  0 - 8 /LPF     0 TO 2 HYALINE Reference range defined for non-centrifuged specimen. Crystals, UA NOT REPORTED None /HPF    Epithelial Cells UA 5 TO 10 0 - 5 /HPF    Renal Epithelial, UA NOT REPORTED 0 /HPF    Bacteria, UA NOT REPORTED None    Mucus, UA NOT REPORTED None    Trichomonas, UA NOT REPORTED None    Amorphous, UA NOT REPORTED None    Other Observations UA NOT REPORTED NOT REQ.     Yeast, UA NOT REPORTED None Urine Drug Screen    Collection Time: 12/31/20  6:23 AM   Result Value Ref Range    Amphetamine Screen, Ur NEGATIVE NEGATIVE    Barbiturate Screen, Ur NEGATIVE NEGATIVE    Benzodiazepine Screen, Urine NEGATIVE NEGATIVE    Cocaine Metabolite, Urine NEGATIVE NEGATIVE    Methadone Screen, Urine NEGATIVE NEGATIVE    Opiates, Urine NEGATIVE NEGATIVE    Phencyclidine, Urine NEGATIVE NEGATIVE    Propoxyphene, Urine NOT REPORTED NEGATIVE    Cannabinoid Scrn, Ur NEGATIVE NEGATIVE    Oxycodone Screen, Ur NEGATIVE NEGATIVE    Methamphetamine, Urine NOT REPORTED NEGATIVE    Tricyclic Antidepressants, Urine NOT REPORTED NEGATIVE    MDMA, Urine NOT REPORTED NEGATIVE    Buprenorphine Urine NOT REPORTED NEGATIVE    Test Information       Assay provides medical screening only. The absence of expected drug(s) and/or metabolite(s) may indicate diluted or adulterated urine, limitations of testing or timing of collection. COVID-19    Collection Time: 12/31/20  6:50 AM    Specimen: Other   Result Value Ref Range    SARS-CoV-2          SARS-CoV-2, Rapid Not Detected Not Detected    Source . NASOPHARYNGEAL SWAB     SARS-CoV-2         Respiratory Panel, Molecular, with COVID-19    Collection Time: 12/31/20 11:16 AM    Specimen: Nasopharyngeal Swab   Result Value Ref Range    Specimen Description . NASOPHARYNGEAL SWAB     Adenovirus PCR Not Detected Not Detected    Coronavirus 229E PCR Not Detected Not Detected    Coronavirus HKU1 PCR Not Detected Not Detected    Coronavirus NL63 PCR Not Detected Not Detected    Coronavirus OC43 PCR Not Detected Not Detected    SARS-CoV-2, PCR Not Detected Not Detected    Human Metapneumovirus PCR Not Detected Not Detected    Rhino/Enterovirus PCR Not Detected Not Detected    Influenza A by PCR Not Detected Not Detected    Influenza A H1 PCR NOT REPORTED Not Detected    Influenza A H1 (2009) PCR NOT REPORTED Not Detected    Influenza A H3 PCR NOT REPORTED Not Detected    Influenza B by PCR Not Detected Not Detected    Parainfluenza 1 PCR Not Detected Not Detected    Parainfluenza 2 PCR Not Detected Not Detected    Parainfluenza 3 PCR Not Detected Not Detected    Parainfluenza 4 PCR Not Detected Not Detected    Resp Syncytial Virus PCR Not Detected Not Detected    Bordetella Parapertussis Not Detected Not Detected    B Pertussis by PCR Not Detected Not Detected    Chlamydia pneumoniae By PCR Not Detected Not Detected    Mycoplasma pneumo by PCR Not Detected Not Detected       Imaging:   Ct Chest Pulmonary Embolism W Contrast    Result Date: 12/31/2020  1. Note: Study is limited by suboptimal volume of intravenous iodinated contrast within the pulmonary arterial system. 2. No definitive evidence of acute pulmonary embolism. 3. Moderate cardiomegaly with moderate pulmonary interstitial edema and suspected mild multifocal bilateral lung overlying alveolar edema. 4. Mild right-sided layering posterior pleural effusions. 5. Constellation of findings suggests congestive heart failure (CHF). ASSESSMENT & PLAN     Principal Problem:    Pulmonary edema, acute, with congestive heart failure (HCC)  Active Problems:    Anxiety    Microcytic anemia    History of drug abuse ([facility] [address]  Resolved Problems:    * No resolved hospital problems. *      IMPRESSION  This is a 39 y.o. female who presented with worsening left chest pain and shortness of breath and found to have interstitial pulmonary edema and trace pleural effusions on CT chest suggestive of CHF. PLAN:    1. Acute interstitial pulmonary edema - likely secondary to new onset CHF as suggested by CTPE. ProBNP 911. Received 1 L of IV fluid bolus in the ED. Will give a dose of Lasix 20 mg. We will continue to assess for volume status and diurese accordingly. Will follow up with ECHO tomorrow morning. Will do autoimmune work-up with NASRIN, RA, TSH, CRP. 2. Acute respiratory failure - resolved with nasal cannula oxygen. Likely secondary to #1 and #6  3.  Although no definite evidence of PE on CT scan but because study was limited by suboptimal volume of intravenous contrast, we will do lower extremity venous duplex to rule out DVT  4. Microcytic anemia: Check iron studies, folate and B12 levels. Check one-time LFTs, LDH and haptoglobin. Need to rule out vaginal bleed  5. Possibility of obstructive sleep apnea with STOP-BANG score of 3. Will follow with outpatient sleep study. Check nocturnal pulse oximetry. 6. Anxiety: Continue Prozac 10 and Latuda 40 daily    DVT ppx: Lovenox  GI ppx: None    PT/OT/SW: On board  Discharge Planning: On board    Francesco Courtney MD  Internal Medicine Resident, PGY-1  [facility]; [place], [place]  12/31/2020, 5:26 PM  Attending Physician Statement  I have discussed the care of [address] [place] [address] and I have examined the patient myselft and taken ros and hpi , including pertinent history and exam findings,  with the resident. I have reviewed the key elements of all parts of the encounter with the resident. I agree with the assessment, plan and orders as documented by the resident.       Electronically signed by Gardenia Reddy MD

## 2020-12-31 NOTE — ED NOTES
Labeled Respiratory Panel with COVID swab sent to lab via tube system     Loren Washington RN  12/31/20 0285

## 2020-12-31 NOTE — ED PROVIDER NOTES
[facility]  FACULTY HANDOFF       Handoff taken on the following patient from prior Attending Physician:  Pt Name: Addie Gordon  PCP:  MAYRA Fields CNP    Attestation  I was available and discussed any additional care issues that arose and coordinated the management plans with the resident(s) caring for the patient during my duty period. Any areas of disagreement with resident's documentation of care or procedures are noted on the chart. I was personally present for the key portions of any/all procedures during my duty period. I have documented in the chart those procedures where I was not present during the key portions.              Amos Gallegos MD  12/31/20 [id number]

## 2021-01-01 LAB
ABSOLUTE EOS #: 0.05 K/UL (ref 0–0.44)
ABSOLUTE IMMATURE GRANULOCYTE: <0.03 K/UL (ref 0–0.3)
ABSOLUTE LYMPH #: 1.38 K/UL (ref 1.1–3.7)
ABSOLUTE MONO #: 0.33 K/UL (ref 0.1–1.2)
ANION GAP SERPL CALCULATED.3IONS-SCNC: 9 MMOL/L (ref 9–17)
BASOPHILS # BLD: 1 % (ref 0–2)
BASOPHILS ABSOLUTE: 0.06 K/UL (ref 0–0.2)
BUN BLDV-MCNC: 8 MG/DL (ref 6–20)
BUN/CREAT BLD: ABNORMAL (ref 9–20)
CALCIUM SERPL-MCNC: 8.4 MG/DL (ref 8.6–10.4)
CHLORIDE BLD-SCNC: 103 MMOL/L (ref 98–107)
CO2: 25 MMOL/L (ref 20–31)
CREAT SERPL-MCNC: 0.59 MG/DL (ref 0.5–0.9)
DIFFERENTIAL TYPE: ABNORMAL
EKG ATRIAL RATE: 116 BPM
EKG ATRIAL RATE: 97 BPM
EKG P AXIS: 52 DEGREES
EKG P AXIS: 53 DEGREES
EKG P-R INTERVAL: 130 MS
EKG P-R INTERVAL: 138 MS
EKG Q-T INTERVAL: 344 MS
EKG Q-T INTERVAL: 370 MS
EKG QRS DURATION: 68 MS
EKG QRS DURATION: 70 MS
EKG QTC CALCULATION (BAZETT): 469 MS
EKG QTC CALCULATION (BAZETT): 478 MS
EKG R AXIS: -4 DEGREES
EKG R AXIS: -6 DEGREES
EKG T AXIS: 48 DEGREES
EKG T AXIS: 57 DEGREES
EKG VENTRICULAR RATE: 116 BPM
EKG VENTRICULAR RATE: 97 BPM
EOSINOPHILS RELATIVE PERCENT: 1 % (ref 1–4)
ESTIMATED AVERAGE GLUCOSE: 131 MG/DL
GFR AFRICAN AMERICAN: >60 ML/MIN
GFR NON-AFRICAN AMERICAN: >60 ML/MIN
GFR SERPL CREATININE-BSD FRML MDRD: ABNORMAL ML/MIN/{1.73_M2}
GFR SERPL CREATININE-BSD FRML MDRD: ABNORMAL ML/MIN/{1.73_M2}
GLUCOSE BLD-MCNC: 112 MG/DL (ref 70–99)
HBA1C MFR BLD: 6.2 % (ref 4–6)
HCT VFR BLD CALC: 32.9 % (ref 36.3–47.1)
HEMOGLOBIN: 10.5 G/DL (ref 11.9–15.1)
IMMATURE GRANULOCYTES: 0 %
LYMPHOCYTES # BLD: 33 % (ref 24–43)
MAGNESIUM: 1.9 MG/DL (ref 1.6–2.6)
MCH RBC QN AUTO: 23.1 PG (ref 25.2–33.5)
MCHC RBC AUTO-ENTMCNC: 31.9 G/DL (ref 28.4–34.8)
MCV RBC AUTO: 72.5 FL (ref 82.6–102.9)
MONOCYTES # BLD: 8 % (ref 3–12)
NRBC AUTOMATED: 0 PER 100 WBC
PDW BLD-RTO: 15.4 % (ref 11.8–14.4)
PLATELET # BLD: 388 K/UL (ref 138–453)
PLATELET ESTIMATE: ABNORMAL
PMV BLD AUTO: 9.4 FL (ref 8.1–13.5)
POTASSIUM SERPL-SCNC: 3.3 MMOL/L (ref 3.7–5.3)
RBC # BLD: 4.54 M/UL (ref 3.95–5.11)
RBC # BLD: ABNORMAL 10*6/UL
SEG NEUTROPHILS: 57 % (ref 36–65)
SEGMENTED NEUTROPHILS ABSOLUTE COUNT: 2.38 K/UL (ref 1.5–8.1)
SODIUM BLD-SCNC: 137 MMOL/L (ref 135–144)
WBC # BLD: 4.2 K/UL (ref 3.5–11.3)
WBC # BLD: ABNORMAL 10*3/UL

## 2021-01-01 PROCEDURE — 1200000000 HC SEMI PRIVATE

## 2021-01-01 PROCEDURE — 6370000000 HC RX 637 (ALT 250 FOR IP): Performed by: STUDENT IN AN ORGANIZED HEALTH CARE EDUCATION/TRAINING PROGRAM

## 2021-01-01 PROCEDURE — 6360000002 HC RX W HCPCS: Performed by: STUDENT IN AN ORGANIZED HEALTH CARE EDUCATION/TRAINING PROGRAM

## 2021-01-01 PROCEDURE — 2580000003 HC RX 258: Performed by: STUDENT IN AN ORGANIZED HEALTH CARE EDUCATION/TRAINING PROGRAM

## 2021-01-01 PROCEDURE — 93010 ELECTROCARDIOGRAM REPORT: CPT | Performed by: INTERNAL MEDICINE

## 2021-01-01 PROCEDURE — 85025 COMPLETE CBC W/AUTO DIFF WBC: CPT

## 2021-01-01 PROCEDURE — 80048 BASIC METABOLIC PNL TOTAL CA: CPT

## 2021-01-01 PROCEDURE — 99233 SBSQ HOSP IP/OBS HIGH 50: CPT | Performed by: INTERNAL MEDICINE

## 2021-01-01 PROCEDURE — 83735 ASSAY OF MAGNESIUM: CPT

## 2021-01-01 RX ORDER — MAGNESIUM SULFATE 1 G/100ML
1 INJECTION INTRAVENOUS ONCE
Status: COMPLETED | OUTPATIENT
Start: 2021-01-01 | End: 2021-01-01

## 2021-01-01 RX ORDER — POTASSIUM CHLORIDE 20 MEQ/1
40 TABLET, EXTENDED RELEASE ORAL ONCE
Status: COMPLETED | OUTPATIENT
Start: 2021-01-01 | End: 2021-01-01

## 2021-01-01 RX ADMIN — ACETAMINOPHEN 650 MG: 325 TABLET ORAL at 16:59

## 2021-01-01 RX ADMIN — SODIUM CHLORIDE, PRESERVATIVE FREE 10 ML: 5 INJECTION INTRAVENOUS at 00:01

## 2021-01-01 RX ADMIN — MAGNESIUM SULFATE HEPTAHYDRATE 1 G: 1 INJECTION, SOLUTION INTRAVENOUS at 10:22

## 2021-01-01 RX ADMIN — ACETAMINOPHEN 650 MG: 325 TABLET ORAL at 08:53

## 2021-01-01 RX ADMIN — IRON SUCROSE 300 MG: 20 INJECTION, SOLUTION INTRAVENOUS at 13:16

## 2021-01-01 RX ADMIN — SODIUM CHLORIDE, PRESERVATIVE FREE 10 ML: 5 INJECTION INTRAVENOUS at 08:43

## 2021-01-01 RX ADMIN — FLUOXETINE 10 MG: 10 CAPSULE ORAL at 10:01

## 2021-01-01 RX ADMIN — SODIUM CHLORIDE, PRESERVATIVE FREE 10 ML: 5 INJECTION INTRAVENOUS at 22:56

## 2021-01-01 RX ADMIN — ACETAMINOPHEN 650 MG: 325 TABLET ORAL at 22:56

## 2021-01-01 RX ADMIN — POTASSIUM CHLORIDE 40 MEQ: 1500 TABLET, EXTENDED RELEASE ORAL at 08:48

## 2021-01-01 RX ADMIN — LURASIDONE HYDROCHLORIDE 40 MG: 40 TABLET, FILM COATED ORAL at 10:01

## 2021-01-01 ASSESSMENT — PAIN SCALES - GENERAL
PAINLEVEL_OUTOF10: 6
PAINLEVEL_OUTOF10: 3

## 2021-01-01 NOTE — ED NOTES
Pt resting on cot. No signs of distress noted. Respirations unlabored. Pt denies needs at this time. Pt remains on O2 monitor. Call light within reach.  Will continue to monitor     Griselda Elise RN  01/01/21 4001

## 2021-01-01 NOTE — ED NOTES
Report from Cecy SILVEIRA.  No questions at this time     stephen [facility]  12/31/20 2059

## 2021-01-01 NOTE — PROGRESS NOTES
[facility]  Internal Medicine Teaching Residency Program  Inpatient Daily Progress Note  ______________________________________________________________________________    Patient: Jose Mascorro  YOB: 1975   GLW:[id number]    Acct: [de-identified]     Room: 32/29  Admit date: 12/31/2020  Today's date: 01/01/21  Number of days in the hospital: 1    SUBJECTIVE   Admitting Diagnosis: Pulmonary edema, acute, with congestive heart failure ([address]  CC: chest pain  Pt examined at bedside. Chart & results reviewed. No acute events overnight. Patient remained on room air, saturating well. Denies further episodes of shortness of breath or any chest pain. Hemodynamically stable. Labs with potassium 3.3, Hb 10.5 but otherwise stable. ROS:  Constitutional:  negative for chills, fevers, sweats  Respiratory:  negative for cough, dyspnea on exertion, hemoptysis, shortness of breath, wheezing  Cardiovascular:  negative for chest pain, chest pressure/discomfort, lower extremity edema, palpitations  Gastrointestinal:  negative for abdominal pain, constipation, diarrhea, nausea, vomiting  Neurological:  negative for dizziness, headache  BRIEF HISTORY     The patient is a pleasant 39 y.o. female past medical history of anxiety presented to hospital with sudden onset severe left chest pain and SOB when she woke up in the morning. Patient reports that she is having intermittent chest pain for the past 1 week and was seen earlier this month at [facility] for similar complaint which was thought to be secondary to anxiety as the chest x-ray was normal.  On presentation to ED, patient was saturation on room air, BP elevated to 144/107, tachycardic /min but while in the ED she dropped saturation to 80s which came up with nasal cannula oxygen. Troponins 8 and 9 at 2 occurrences, proBNP 911, COVID-19 negative x2 (rapid and PCR).   CT PE was negative for PE but did show moderate cardiomegaly with moderate pulmonary interstitial edema, suspected mild multifocal bilateral lung overlying alveolar edema, mild right-sided layering posteriorly pleural effusions suggestive of CHF. Patient received 1 L IV fluid bolus in the ED. Patient complained of some watery diarrhea with 1-2 episodes per day for the past 1 week associated with some nonspecific abdominal pain though denied nausea/vomiting. She reported that she does snore at night, have some morning headaches and usually needs afternoon naps. She also reported some morning joint stiffness especially in knee joints. She denied headache, vision changes, loss of smell/taste, palpitations, dysuria or burning micturition but have frequency of micturition. She smokes 5-6 cigarette per day, does not drink alcohol, but was using drugs in the past with most recent use in August when she use meth. She has used cocaine in the past, last use couple of years ago. OBJECTIVE     Vital Signs:  /69   Pulse 95   Temp 98.7 °F (37.1 °C) (Oral)   Resp 20   Ht 5' 6\" (1.676 m)   Wt 155 lb (70.3 kg)   SpO2 98%   BMI 25.02 kg/m²     Temp (24hrs), Av.7 °F (37.1 °C), Min:98.7 °F (37.1 °C), Max:98.7 °F (37.1 °C)    No intake/output data recorded. PHYSICAL EXAMINATION:  Constitutional: This is a well developed, well nourished, 25-29.9 - Overweight 39y.o. year old female who is alert, oriented, cooperative and in no apparent distress. Head:normocephalic and atraumatic. EENT:  PERRLA. No conjunctival injections. Septum was midline, mucosa was without erythema, exudates or cobblestoning. No thrush was noted. Neck: Supple without thyromegaly. No elevated JVP. Trachea was midline. Respiratory: Chest was symmetrical without dullness to percussion. Breath sounds bilaterally were clear to auscultation. There were no wheezes, rhonchi or rales. There is no intercostal retraction or use of accessory muscles. No egophony noted. Cardiovascular: Regular without murmur, clicks, gallops or rubs. Abdomen: Slightly rounded and soft without organomegaly. Some lower abdominal tenderness  Lymphatic: No lymphadenopathy. Musculoskeletal: Normal curvature of the spine. No gross muscle weakness. Extremities:  No lower extremity edema, ulcerations, tenderness, varicosities or erythema. Muscle size, tone and strength are normal.  No involuntary movements are noted. Skin:  Warm and dry. Good color, turgor and pigmentation. No lesions or scars. No cyanosis or clubbing  Neurological/Psychiatric: The patient's general behavior, level of consciousness, thought content and emotional status is normal.            Medications:  Scheduled Medications:    FLUoxetine  10 mg Oral Daily    lurasidone  40 mg Oral Daily    sodium chloride flush  10 mL Intravenous 2 times per day    enoxaparin  40 mg Subcutaneous Daily    iron sucrose  300 mg Intravenous Q24H     Continuous Infusions:   PRN Medications    sodium chloride flush, 10 mL, PRN      promethazine, 12.5 mg, Q6H PRN    Or      ondansetron, 4 mg, Q6H PRN      polyethylene glycol, 17 g, Daily PRN      acetaminophen, 650 mg, Q6H PRN    Or      acetaminophen, 650 mg, Q6H PRN        Diagnostic Labs:  CBC:   Recent Labs     12/31/20  0505   WBC 4.7   RBC 4.74   HGB 10.7*   HCT 34.8*   MCV 73.4*   RDW 15.8*        BMP:   Recent Labs     12/31/20  0505      K 3.8   *   CO2 24   BUN 8   CREATININE 0.67     BNP: No results for input(s): BNP in the last 72 hours. PT/INR: No results for input(s): PROTIME, INR in the last 72 hours. APTT: No results for input(s): APTT in the last 72 hours. CARDIAC ENZYMES: No results for input(s): CKMB, CKMBINDEX, TROPONINI in the last 72 hours.     Invalid input(s): CKTOTAL;3  FASTING LIPID PANEL:No results found for: CHOL, HDL, TRIG  LIVER PROFILE:   Recent Labs     12/31/20  0505   AST 13   ALT 6   BILIDIR 0.08   BILITOT 0.46   ALKPHOS 56 outpatient sleep study. Check nocturnal pulse oximetry. 6. Anxiety: Continue Prozac 10 and Latuda 40 daily     DVT ppx: Lovenox  GI ppx: None     PT/OT/SW: On board  Discharge Planning: On board    Beatriz Malloy MD  Internal Medicine Resident, PGY-1  [address]  1/1/2021, 2:04 AM    Attending Physician Statement  I have discussed the care of [address] [place] [address] and I have examined the patient myselft and taken ros and hpi , including pertinent history and exam findings,  with the resident. I have reviewed the key elements of all parts of the encounter with the resident. I agree with the assessment, plan and orders as documented by the resident.   Likely acute on chronic diastolic congestive heart failure  High-output congestive heart failure secondary to anemia echocardiogram to confirm the ejection fraction pulmonary edema due to CHF IV diuretics  Cardiology input    Electronically signed by You Camarena MD

## 2021-01-01 NOTE — ED NOTES
Pt resting on cot. No signs of distress noted. Respirations unlabored. Pt denies needs at this time. Pt remains on O2 monitor. Call light within reach.  Will continue to monitor     Bettye Barber RN  01/01/21 6845

## 2021-01-01 NOTE — ED NOTES
Pt resting on cot. No signs of distress noted. Respirations unlabored. Pt denies needs at this time. Pt remains on BP and O2 monitor. Call light within reach.  Will continue to monitor     Tea Loza RN  01/01/21 5660

## 2021-01-01 NOTE — ED NOTES
Pt resting comfortably in stretcher, no acute distress noted. Pt provided pillow for comfort.        Mellisa Vallejo RN  12/31/20 1940

## 2021-01-01 NOTE — ED NOTES
Pt resting on cot. No signs of distress noted. Respirations unlabored. Pt denies needs at this time. Pt remains on O2 monitor. Call light within reach.  Will continue to monitor     Isaac Walker RN  01/01/21 [id number]

## 2021-01-01 NOTE — ED NOTES
Pt resting on cot. No signs of distress noted. Respirations unlabored. Pt denies needs at this time. Pt remains on O2 monitor. Call light within reach.  Will continue to monitor     Shannon Clarke RN  12/31/20 3900

## 2021-01-01 NOTE — ED NOTES
Bed: 35  Expected date: 12/31/20  Expected time: 8:48 PM  Means of arrival:   Comments:  [address] Venkatesh Stanley RN  12/31/20 2050

## 2021-01-01 NOTE — PROGRESS NOTES
This is a 39 y.o. female admitted 12/31/2020 for Pulmonary edema, acute, with congestive heart failure ([facility] [I50.1]. See H&P of admitting/intern resident for more details. Patient is a 68-year-old female with history of anxiety, bipolar disorder on Latuda and Prozac  presented to the ER with complaints of chest pain and shortness of breath. Patient reported that she has been having chest pain since the past 1 week mainly left-sided, intermittent, no radiation, associated with nausea, no emesis, no diaphoresis, no relationship with food intake. She woke up this morning with shortness of breath. She also reported having diarrhea with 1-2 episodes since the past 1 week. Denies any fever, chills, cough, abdominal pain. Resides at a facility where few others have been tested Covid positive. Reports snoring at night, having daytime sleepiness, mild headaches. Reports using IV drugs last in July 2020. Smokes 5 to 6 cigarettes/day, denies any alcohol usage    The ER her blood pressure was 135/100, tachycardic in 100s, respiratory rate in 20s, saturating 98% on room air. CT PE-no definitive evidence of acute pulmonary embolism, moderate cardiomegaly with moderate pulmonary interstitial edema and suspected mild multifocal bilateral lung overlying alveolar edema. Mild right-sided layering posterior pleural effusion. COVID- negative  proBNP 911, EKG sinus tachycardia, troponin 8, 9, TSH 3.3, U tox negative, hemoglobin 10. 7( iron deficiency anemia from iron studies), UA negative for infection, LFT - WNL      BMP:   Recent Labs     12/31/20  0505      K 3.8   *   CO2 24   BUN 8   CREATININE 0.67   GLUCOSE 99     CBC: )  Recent Labs     12/31/20  0505   WBC 4.7   HGB 10.7*   HCT 34.8*             Assessment    Principal Problem:    Pulmonary edema, acute, with congestive heart failure (HCC)  Active Problems:    Anxiety    Microcytic anemia    History of drug abuse ([facility]  Resolved Problems:    * No resolved hospital problems. *        Plan     -Acute CHF? F/U echo. Received 1 dose of Lasix. Monitor intake and output.   Repeat chest x-ray in the a.m.    tsh- 3.3, utox- negative,    -Atypical chest pain-likely from anxiety, resume home meds  -Iron deficiency anemia- Iv venofer  -? CESILIA- stop bang score 2-3 - sleep study as outpatient      Bill Field MD            Department of Internal Medicine  [facility], [facility]         12/31/2020, 9:39 PM

## 2021-01-02 LAB
ABSOLUTE EOS #: 0.08 K/UL (ref 0–0.44)
ABSOLUTE IMMATURE GRANULOCYTE: <0.03 K/UL (ref 0–0.3)
ABSOLUTE LYMPH #: 1.68 K/UL (ref 1.1–3.7)
ABSOLUTE MONO #: 0.42 K/UL (ref 0.1–1.2)
ANION GAP SERPL CALCULATED.3IONS-SCNC: 8 MMOL/L (ref 9–17)
BASOPHILS # BLD: 1 % (ref 0–2)
BASOPHILS ABSOLUTE: 0.06 K/UL (ref 0–0.2)
BUN BLDV-MCNC: 12 MG/DL (ref 6–20)
BUN/CREAT BLD: ABNORMAL (ref 9–20)
CALCIUM SERPL-MCNC: 8.3 MG/DL (ref 8.6–10.4)
CHLORIDE BLD-SCNC: 108 MMOL/L (ref 98–107)
CO2: 23 MMOL/L (ref 20–31)
CREAT SERPL-MCNC: 0.65 MG/DL (ref 0.5–0.9)
DIFFERENTIAL TYPE: ABNORMAL
EOSINOPHILS RELATIVE PERCENT: 2 % (ref 1–4)
GFR AFRICAN AMERICAN: >60 ML/MIN
GFR NON-AFRICAN AMERICAN: >60 ML/MIN
GFR SERPL CREATININE-BSD FRML MDRD: ABNORMAL ML/MIN/{1.73_M2}
GFR SERPL CREATININE-BSD FRML MDRD: ABNORMAL ML/MIN/{1.73_M2}
GLUCOSE BLD-MCNC: 101 MG/DL (ref 70–99)
HCT VFR BLD CALC: 32.2 % (ref 36.3–47.1)
HEMOGLOBIN: 10.1 G/DL (ref 11.9–15.1)
IMMATURE GRANULOCYTES: 0 %
LV EF: 53 %
LVEF MODALITY: NORMAL
LYMPHOCYTES # BLD: 36 % (ref 24–43)
MCH RBC QN AUTO: 23.1 PG (ref 25.2–33.5)
MCHC RBC AUTO-ENTMCNC: 31.4 G/DL (ref 28.4–34.8)
MCV RBC AUTO: 73.5 FL (ref 82.6–102.9)
MONOCYTES # BLD: 9 % (ref 3–12)
NRBC AUTOMATED: 0 PER 100 WBC
PDW BLD-RTO: 15.8 % (ref 11.8–14.4)
PLATELET # BLD: 378 K/UL (ref 138–453)
PLATELET ESTIMATE: ABNORMAL
PMV BLD AUTO: 9.3 FL (ref 8.1–13.5)
POTASSIUM SERPL-SCNC: 3.8 MMOL/L (ref 3.7–5.3)
RBC # BLD: 4.38 M/UL (ref 3.95–5.11)
RBC # BLD: ABNORMAL 10*6/UL
SEG NEUTROPHILS: 52 % (ref 36–65)
SEGMENTED NEUTROPHILS ABSOLUTE COUNT: 2.4 K/UL (ref 1.5–8.1)
SODIUM BLD-SCNC: 139 MMOL/L (ref 135–144)
WBC # BLD: 4.7 K/UL (ref 3.5–11.3)
WBC # BLD: ABNORMAL 10*3/UL

## 2021-01-02 PROCEDURE — 6370000000 HC RX 637 (ALT 250 FOR IP): Performed by: STUDENT IN AN ORGANIZED HEALTH CARE EDUCATION/TRAINING PROGRAM

## 2021-01-02 PROCEDURE — 85025 COMPLETE CBC W/AUTO DIFF WBC: CPT

## 2021-01-02 PROCEDURE — 6360000002 HC RX W HCPCS: Performed by: STUDENT IN AN ORGANIZED HEALTH CARE EDUCATION/TRAINING PROGRAM

## 2021-01-02 PROCEDURE — 93306 TTE W/DOPPLER COMPLETE: CPT

## 2021-01-02 PROCEDURE — 36415 COLL VENOUS BLD VENIPUNCTURE: CPT

## 2021-01-02 PROCEDURE — 80048 BASIC METABOLIC PNL TOTAL CA: CPT

## 2021-01-02 PROCEDURE — 1200000000 HC SEMI PRIVATE

## 2021-01-02 PROCEDURE — 2580000003 HC RX 258: Performed by: STUDENT IN AN ORGANIZED HEALTH CARE EDUCATION/TRAINING PROGRAM

## 2021-01-02 PROCEDURE — 99239 HOSP IP/OBS DSCHRG MGMT >30: CPT | Performed by: INTERNAL MEDICINE

## 2021-01-02 PROCEDURE — 93970 EXTREMITY STUDY: CPT

## 2021-01-02 RX ORDER — FUROSEMIDE 10 MG/ML
20 INJECTION INTRAMUSCULAR; INTRAVENOUS ONCE
Status: COMPLETED | OUTPATIENT
Start: 2021-01-02 | End: 2021-01-02

## 2021-01-02 RX ADMIN — IRON SUCROSE 300 MG: 20 INJECTION, SOLUTION INTRAVENOUS at 08:24

## 2021-01-02 RX ADMIN — SODIUM CHLORIDE, PRESERVATIVE FREE 10 ML: 5 INJECTION INTRAVENOUS at 20:03

## 2021-01-02 RX ADMIN — FLUOXETINE 10 MG: 10 CAPSULE ORAL at 08:24

## 2021-01-02 RX ADMIN — ACETAMINOPHEN 650 MG: 325 TABLET ORAL at 10:36

## 2021-01-02 RX ADMIN — SODIUM CHLORIDE, PRESERVATIVE FREE 10 ML: 5 INJECTION INTRAVENOUS at 08:24

## 2021-01-02 RX ADMIN — LURASIDONE HYDROCHLORIDE 40 MG: 40 TABLET, FILM COATED ORAL at 08:24

## 2021-01-02 RX ADMIN — ENOXAPARIN SODIUM 40 MG: 40 INJECTION SUBCUTANEOUS at 08:24

## 2021-01-02 RX ADMIN — FUROSEMIDE 20 MG: 10 INJECTION, SOLUTION INTRAMUSCULAR; INTRAVENOUS at 11:57

## 2021-01-02 ASSESSMENT — PAIN SCALES - GENERAL: PAINLEVEL_OUTOF10: 3

## 2021-01-02 NOTE — PROGRESS NOTES
Pt c/o headache all day. Last received tylenol at 1700. Pt also has low grade fever of 100. Medicine resident notified.

## 2021-01-02 NOTE — PROGRESS NOTES
Physical Therapy   DATE: 2021    NAME: Ike Pineda  MRN: [id number]   : 1975    Patient not seen this date for Physical Therapy due to:  [] Blood transfusion in progress  [] Hemodialysis  [] Patient Declined  [] Spine Precautions   [] Strict Bedrest  [] Surgery/ Procedure  [] Testing      [] Other        [] PT is being discontinued at this time. Per nurse, patient independent. No further needs. [] PT is being discontinued at this time due to declining physical/ medical status. Therapy is not appropriate at this time.     Farhat Pablo, PT

## 2021-01-02 NOTE — PROGRESS NOTES
COVID-19 negative x2 (rapid and PCR).  CT PE was negative for PE but did show moderate cardiomegaly with moderate pulmonary interstitial edema, suspected mild multifocal bilateral lung overlying alveolar edema, mild right-sided layering posteriorly pleural effusions suggestive of CHF.  Patient received 1 L IV fluid bolus in the ED.  Patient complained of some watery diarrhea with 1-2 episodes per day for the past 1 week associated with some nonspecific abdominal pain though denied nausea/vomiting.  She reported that she does snore at night, have some morning headaches and usually needs afternoon naps.  She also reported some morning joint stiffness especially in knee joints. She denied headache, vision changes, loss of smell/taste, palpitations, dysuria or burning micturition but have frequency of micturition.  She smokes 5-6 cigarette per day, does not drink alcohol, but was using drugs in the past with most recent use in August when she use meth. Jesusita Mendez has used cocaine in the past, last use couple of years ago. OBJECTIVE     Vital Signs:  BP (!) 134/90   Pulse 98   Temp 100 °F (37.8 °C) (Oral)   Resp 18   Ht 5' 6\" (1.676 m)   Wt 155 lb (70.3 kg)   SpO2 98%   BMI 25.02 kg/m²     Temp (24hrs), Av.8 °F (37.1 °C), Min:98.2 °F (36.8 °C), Max:100 °F (37.8 °C)    In: 800   Out: -     Physical Exam:  Constitutional: This is a well developed, well nourished, 18.5-24.9 - Normal 39y.o. year old female who is alert, oriented, cooperative and in no apparent distress. Head:normocephalic and atraumatic. EENT:  PERRLA. No conjunctival injections. Septum was midline, mucosa was without erythema, exudates or cobblestoning. No thrush was noted. Neck: Supple without thyromegaly. No elevated JVP. Trachea was midline. Respiratory: Chest was symmetrical without dullness to percussion. Breath sounds bilaterally were clear to auscultation. There were no wheezes, rhonchi or rales.  There is no intercostal retraction or use of accessory muscles. No egophony noted. Cardiovascular: Regular without murmur, clicks, gallops or rubs. Abdomen: Slightly rounded and soft without organomegaly. No rebound, rigidity or guarding was appreciated. Lymphatic: No lymphadenopathy. Musculoskeletal: Normal curvature of the spine. No gross muscle weakness. Extremities:  No lower extremity edema, ulcerations, tenderness, varicosities or erythema. Muscle size, tone and strength are normal.  No involuntary movements are noted. Skin:  Warm and dry. Good color, turgor and pigmentation. No lesions or scars.   No cyanosis or clubbing  Neurological/Psychiatric: The patient's general behavior, level of consciousness, thought content and emotional status is normal.        Medications:  Scheduled Medications:    FLUoxetine  10 mg Oral Daily    lurasidone  40 mg Oral Daily    sodium chloride flush  10 mL Intravenous 2 times per day    enoxaparin  40 mg Subcutaneous Daily    iron sucrose  300 mg Intravenous Q24H     Continuous Infusions:   PRN Medications    sodium chloride flush, 10 mL, PRN      promethazine, 12.5 mg, Q6H PRN    Or      ondansetron, 4 mg, Q6H PRN      polyethylene glycol, 17 g, Daily PRN      acetaminophen, 650 mg, Q6H PRN    Or      acetaminophen, 650 mg, Q6H PRN        Diagnostic Labs:  CBC:   Recent Labs     12/31/20  0505 01/01/21  0613 01/02/21  0512   WBC 4.7 4.2 4.7   RBC 4.74 4.54 4.38   HGB 10.7* 10.5* 10.1*   HCT 34.8* 32.9* 32.2*   MCV 73.4* 72.5* 73.5*   RDW 15.8* 15.4* 15.8*    388 378     BMP:   Recent Labs     12/31/20  0505 01/01/21  0613 01/02/21  0512    137 139   K 3.8 3.3* 3.8   * 103 108*   CO2 24 25 23   BUN 8 8 12   CREATININE 0.67 0.59 0.65     LIVER PROFILE:   Recent Labs     12/31/20  0505   AST 13   ALT 6   BILIDIR 0.08   BILITOT 0.46   ALKPHOS 56      Imaging:    Xr Chest Portable    Result Date: 12/31/2020  Mild interstitial prominence may be related to crowding from low lung volumes versus mild pulmonary edema or an atypical infection. Ct Chest Pulmonary Embolism W Contrast    Result Date: 12/31/2020  1. Note: Study is limited by suboptimal volume of intravenous iodinated contrast within the pulmonary arterial system. 2. No definitive evidence of acute pulmonary embolism. 3. Moderate cardiomegaly with moderate pulmonary interstitial edema and suspected mild multifocal bilateral lung overlying alveolar edema. 4. Mild right-sided layering posterior pleural effusions. 5. Constellation of findings suggests congestive heart failure (CHF). ASSESSMENT & PLAN     ASSESSMENT / PLAN:     Principal Problem:    Pulmonary edema, acute, with congestive heart failure ([facility] [address]    Plan:   1. Acute interstitial pulmonary edema - likely secondary to new onset CHF as suggested by CTPE.  ProBNP 911. Not in overt fluid overload at this time. Continue assess volume status and diurese as needed. Follow up with ECHO. Normal TSH and CRP. Follow NASRIN. 2. Acute respiratory failure - resolved with nasal cannula oxygen.  Likely secondary to #1 and #6  3. Although no definite evidence of PE on CT scan but because study was limited by suboptimal volume of intravenous contrast, we will do lower extremity venous duplex to rule out DVT   4. Microcytic anemia due to iron deficiency: Continue IV Venofer 200 mg for 3 doses  5. Possibility of obstructive sleep apnea with STOP-BANG score of 2. Will follow with outpatient sleep study.  Check nocturnal pulse oximetry. 6. Anxiety: Continue Prozac 10 and Latuda 40 daily     DVT ppx: Lovenox  GI ppx: None     PT/OT/SW: On board  Discharge Planning: On board    Skyler Whitley MD  Internal Medicine Resident, PGY-1  [address], [place]  1/2/2021, 7:09 AM  Attending Physician Statement  I have discussed the care of Mackenzie Saez and I have examined the patient myselft and taken ros and hpi , including pertinent history and exam findings,  with the resident. I have reviewed the key elements of all parts of the encounter with the resident. I agree with the assessment, plan and orders as documented by the resident.       Electronically signed by Shameka Yang MD

## 2021-01-02 NOTE — PROGRESS NOTES
Perfect served medicine resident- patient states she is having chest tightening like she did when she came in. She states she is not SOB.

## 2021-01-02 NOTE — PROGRESS NOTES
Perfect Served medicine resident and cardiology to notify- Prelimiary results from Echo tech- mild to severe mitral valve regurgitation. Writer will continue to monitor.

## 2021-01-02 NOTE — FLOWSHEET NOTE
Assessment: Patient was reclining in her chair when  visited. Family was present at the time. Patient was in good spirit and seemed to appreciate the treatment and care she was receiving. When asked how she was feeling, patient responded; \"I am feeling better. \" Patient was raised [place] and a member of [employer]. Intervention:  maintain listening presence, offered support and prayed with patient. Outcome: Patient was very appreciative of the visit and support she received. Follow up visits recommended for more spiritual and emotional support. 01/02/21 1334   Encounter Summary   Services provided to: Patient   Support System Family members   Place of [address] [place] [address] Visiting   (01/02/2021)   Complexity of Encounter Moderate   Length of Encounter 30 minutes   Spiritual Assessment Completed Yes   Routine   Type Initial   Assessment Calm; Approachable; Hopeful   Intervention Active listening;Nurtured hope;Prayer;[place];Empowerment   Outcome Expressed gratitude   Spiritual/Orthodoxy   Type Spiritual support

## 2021-01-03 LAB
ABSOLUTE EOS #: 0.09 K/UL (ref 0–0.44)
ABSOLUTE IMMATURE GRANULOCYTE: <0.03 K/UL (ref 0–0.3)
ABSOLUTE LYMPH #: 1.79 K/UL (ref 1.1–3.7)
ABSOLUTE MONO #: 0.52 K/UL (ref 0.1–1.2)
ANION GAP SERPL CALCULATED.3IONS-SCNC: 10 MMOL/L (ref 9–17)
BASOPHILS # BLD: 1 % (ref 0–2)
BASOPHILS ABSOLUTE: 0.07 K/UL (ref 0–0.2)
BUN BLDV-MCNC: 10 MG/DL (ref 6–20)
BUN/CREAT BLD: ABNORMAL (ref 9–20)
CALCIUM SERPL-MCNC: 8.3 MG/DL (ref 8.6–10.4)
CHLORIDE BLD-SCNC: 105 MMOL/L (ref 98–107)
CO2: 24 MMOL/L (ref 20–31)
CREAT SERPL-MCNC: 0.65 MG/DL (ref 0.5–0.9)
DIFFERENTIAL TYPE: ABNORMAL
EOSINOPHILS RELATIVE PERCENT: 2 % (ref 1–4)
GFR AFRICAN AMERICAN: >60 ML/MIN
GFR NON-AFRICAN AMERICAN: >60 ML/MIN
GFR SERPL CREATININE-BSD FRML MDRD: ABNORMAL ML/MIN/{1.73_M2}
GFR SERPL CREATININE-BSD FRML MDRD: ABNORMAL ML/MIN/{1.73_M2}
GLUCOSE BLD-MCNC: 99 MG/DL (ref 70–99)
HCT VFR BLD CALC: 31.6 % (ref 36.3–47.1)
HEMOGLOBIN: 9.8 G/DL (ref 11.9–15.1)
IMMATURE GRANULOCYTES: 0 %
LYMPHOCYTES # BLD: 33 % (ref 24–43)
MCH RBC QN AUTO: 23 PG (ref 25.2–33.5)
MCHC RBC AUTO-ENTMCNC: 31 G/DL (ref 28.4–34.8)
MCV RBC AUTO: 74 FL (ref 82.6–102.9)
MONOCYTES # BLD: 10 % (ref 3–12)
NRBC AUTOMATED: 0 PER 100 WBC
PDW BLD-RTO: 15.8 % (ref 11.8–14.4)
PLATELET # BLD: 361 K/UL (ref 138–453)
PLATELET ESTIMATE: ABNORMAL
PMV BLD AUTO: 9.5 FL (ref 8.1–13.5)
POTASSIUM SERPL-SCNC: 3.8 MMOL/L (ref 3.7–5.3)
RBC # BLD: 4.27 M/UL (ref 3.95–5.11)
RBC # BLD: ABNORMAL 10*6/UL
SEG NEUTROPHILS: 54 % (ref 36–65)
SEGMENTED NEUTROPHILS ABSOLUTE COUNT: 2.88 K/UL (ref 1.5–8.1)
SODIUM BLD-SCNC: 139 MMOL/L (ref 135–144)
WBC # BLD: 5.4 K/UL (ref 3.5–11.3)
WBC # BLD: ABNORMAL 10*3/UL

## 2021-01-03 PROCEDURE — 6360000002 HC RX W HCPCS: Performed by: STUDENT IN AN ORGANIZED HEALTH CARE EDUCATION/TRAINING PROGRAM

## 2021-01-03 PROCEDURE — 1200000000 HC SEMI PRIVATE

## 2021-01-03 PROCEDURE — 99232 SBSQ HOSP IP/OBS MODERATE 35: CPT | Performed by: INTERNAL MEDICINE

## 2021-01-03 PROCEDURE — 85025 COMPLETE CBC W/AUTO DIFF WBC: CPT

## 2021-01-03 PROCEDURE — 6370000000 HC RX 637 (ALT 250 FOR IP): Performed by: STUDENT IN AN ORGANIZED HEALTH CARE EDUCATION/TRAINING PROGRAM

## 2021-01-03 PROCEDURE — 80048 BASIC METABOLIC PNL TOTAL CA: CPT

## 2021-01-03 PROCEDURE — 2580000003 HC RX 258: Performed by: STUDENT IN AN ORGANIZED HEALTH CARE EDUCATION/TRAINING PROGRAM

## 2021-01-03 PROCEDURE — 36415 COLL VENOUS BLD VENIPUNCTURE: CPT

## 2021-01-03 RX ORDER — FUROSEMIDE 20 MG/1
20 TABLET ORAL DAILY
Status: DISCONTINUED | OUTPATIENT
Start: 2021-01-03 | End: 2021-01-06 | Stop reason: HOSPADM

## 2021-01-03 RX ADMIN — SODIUM CHLORIDE, PRESERVATIVE FREE 10 ML: 5 INJECTION INTRAVENOUS at 12:14

## 2021-01-03 RX ADMIN — IRON SUCROSE 300 MG: 20 INJECTION, SOLUTION INTRAVENOUS at 12:14

## 2021-01-03 RX ADMIN — SODIUM CHLORIDE, PRESERVATIVE FREE 10 ML: 5 INJECTION INTRAVENOUS at 20:09

## 2021-01-03 RX ADMIN — ACETAMINOPHEN 650 MG: 325 TABLET ORAL at 08:50

## 2021-01-03 RX ADMIN — LURASIDONE HYDROCHLORIDE 40 MG: 40 TABLET, FILM COATED ORAL at 08:50

## 2021-01-03 RX ADMIN — ACETAMINOPHEN 650 MG: 325 TABLET ORAL at 20:09

## 2021-01-03 RX ADMIN — FLUOXETINE 10 MG: 10 CAPSULE ORAL at 08:50

## 2021-01-03 RX ADMIN — FUROSEMIDE 20 MG: 20 TABLET ORAL at 20:09

## 2021-01-03 ASSESSMENT — PAIN SCALES - GENERAL: PAINLEVEL_OUTOF10: 3

## 2021-01-03 NOTE — PROGRESS NOTES
Occupational Therapy    Occupational Therapy Not Seen Note    DATE: 1/3/2021  Name: Zander Patino  : 1975  MRN: [id number]    Patient not available for Occupational Therapy due to:    Pt independent with functional tasks and functional mobility. Pt with no OT acute care needs at this time, will defer OT eval. Pt reports no concerns with independently completing ADLs/IADLs or functional mobility at this time. Per RN, pt has been walking off unit to smoke. Pt encouraged to notify RN or MD if any concerns arise throughout hospitalization.     Electronically signed by PEG Ojeda on 1/3/2021 at 1:03 PM

## 2021-01-03 NOTE — CONSULTS
[facility]   Consult Note         Today's Date: 1/3/2021  Patient Name: Kiara Billingsley  Date of admission: 12/31/2020  4:12 AM  Patient's age: 39 y.o., 1975  Admission Dx: Pulmonary edema, acute, with congestive heart failure ([facility] [I50.1]    Reason for Consult:  Cardiac evaluation    Requesting Physician: Isabel Jaimes MD    REASON FOR CONSULT: Chest discomfort, pulmonary edema, new onset CHF    History Obtained From:  Patient, chart, staff, records    HISTORY OF PRESENT ILLNESS:      The patient is a 39 y.o. female with PMH significant for hypertension presented with complaint of sudden onset left-sided chest discomfort and tightness associated with shortness of breath. According to the patient, she has been having similar chest discomfort this past month, was earlier seen at [facility] for similar complaints which was thought to be an anxiety attack. Patient denied any dizziness, lightheadedness, diaphoresis. She denied smoking, alcohol, drug use. She denied family history of sudden cardiac death. CT PE was negative for PE but showed moderate by cardiomegaly with moderate pulmonary interstitial edema right-sided pleural effusion suggestive of CHF. Troponins 8->9, proBNP 911. CXR showed cardiomegaly but no pleural effusion. EKG was significant for sinus tachycardia with biatrial enlargement. Echo completed yesterday shows LVEF 50 to 55%, normal LV size and wall thickness, normal RV size and function. Moderate to severe MR, mild to moderate TR. Dilated LA and RA. Past Medical History:   has a past medical history of Anxiety and Hypertension. Past Surgical History:   has a past surgical history that includes hernia repair. Home Medications:    Prior to Admission medications    Medication Sig Start Date End Date Taking?  Authorizing Provider   lurasidone (LATUDA) 40 MG TABS tablet Take by mouth daily   Yes Historical Provider, MD   FLUoxetine (PROZAC) 10 MG capsule Take by mouth daily   Yes Historical Provider, MD       FLUoxetine, 10 mg, Oral, Daily    lurasidone, 40 mg, Oral, Daily    sodium chloride flush, 10 mL, Intravenous, 2 times per day    enoxaparin, 40 mg, Subcutaneous, Daily    iron sucrose, 300 mg, Intravenous, Q24H      Allergies:  Antihistamines, chlorpheniramine-type and Hydroxyzine    Social History:   reports that she has been smoking cigarettes. She has been smoking about 0.50 packs per day. She has never used smokeless tobacco. She reports previous alcohol use. Family History: family history is not on file. No h/o sudden cardiac death. REVIEW OF SYSTEMS:    · Constitutional: there has been no unanticipated weight loss. There's been No change in energy level, No change in activity level. · Eyes: No visual changes or diplopia. No scleral icterus. · ENT: No Headaches, hearing loss or vertigo. No mouth sores or sore throat. · Cardiovascular: per HPI  · Respiratory: per HPI  · Gastrointestinal: No abdominal pain, appetite loss, blood in stools. No change in bowel or bladder habits. · Genitourinary: No dysuria, trouble voiding, or hematuria. · Musculoskeletal:  No gait disturbance, No weakness or joint complaints. · Integumentary: No rash or pruritis. · Neurological: No headache, diplopia, change in muscle strength, numbness or tingling. No change in gait, balance, coordination, mood, affect, memory, mentation, behavior. · Psychiatric: No anxiety, or depression. · Endocrine: No temperature intolerance. No excessive thirst, fluid intake, or urination. No tremor. · Hematologic/Lymphatic: No abnormal bruising or bleeding, blood clots or swollen lymph nodes. · Allergic/Immunologic: No nasal congestion or hives.       PHYSICAL EXAM:      /72   Pulse 77   Temp 98 °F (36.7 °C) (Oral)   Resp 18   Ht 5' 6\" (1.676 m)   Wt 155 lb (70.3 kg)   SpO2 96%   BMI 25.02 kg/m²    Constitutional and General Appearance: alert, cooperative, no distress and appears stated age  HEENT: PERRL, no cervical lymphadenopathy. No masses palpable. Normal oral mucosa  Respiratory:  · Normal excursion and expansion without use of accessory muscles  · Resp Auscultation: Good respiratory effort. No for increased work of breathing. On auscultation: clear to auscultation bilaterally  Cardiovascular:  · The apical impulse is not displaced  · Heart tones are crisp and normal. regular S1 and S2.  · Jugular venous pulsation Normal  · The carotid upstroke is normal in amplitude and contour without delay or bruit  · Peripheral pulses are symmetrical and full   Abdomen:   · No masses or tenderness  · Bowel sounds present  Extremities:  ·  No Cyanosis or Clubbing  ·  Lower extremity edema: No  ·  Skin: Warm and dry  Neurological:  · Alert and oriented. · Moves all extremities well  · No abnormalities of mood, affect, memory, mentation, or behavior are noted        EKG:    Date: 01/03/21  Reading: Sinus tachycardia with biatrial enlargement. LAST ECHO:  Date: 01/02/21  Findings Summary: Normal LV size and wall thickness. Normal RV size and function. LVEF 50 to 55%. Moderately dilated LA and mildly dilated RA. Moderate to severe MR, mild to moderate TR. LAST Stress Test:   none    LAST Cardiac Angiography:.  none      Labs:     CBC:   Recent Labs     01/02/21  0512 01/03/21  0559   WBC 4.7 5.4   HGB 10.1* 9.8*   HCT 32.2* 31.6*    361     BMP:   Recent Labs     01/02/21  0512 01/03/21  0559    139   K 3.8 3.8   CO2 23 24   BUN 12 10   CREATININE 0.65 0.65   LABGLOM >60 >60   GLUCOSE 101* 99     BNP: No results for input(s): BNP in the last 72 hours. PT/INR: No results for input(s): PROTIME, INR in the last 72 hours. APTT:No results for input(s): APTT in the last 72 hours. CARDIAC ENZYMES:No results for input(s): CKTOTAL, CKMB, CKMBINDEX, TROPONINI in the last 72 hours.   FASTING LIPID PANEL:No results found for: HDL, LDLDIRECT, LDLCALC, TRIG  LIVER PROFILE:No results for input(s): AST, ALT, LABALBU in the last 72 hours. Troponins: Invalid input(s): TROPONIN     Other Current Problems  Patient Active Problem List   Diagnosis    Pulmonary edema, acute, with congestive heart failure (HCC)    Anxiety    Microcytic anemia    History of drug abuse ([facility]           IMPRESSION & Recommendations:    -Acute CHF   - Moderate to severe mitral regurgitation-secondary to rheumatic heart disease/ CAD/ infectious etiology? 1.  Plan for GEOVANY tomorrow to evaluate for MR.  2.  N.p.o. starting midnight  3. If patient becomes hypertensive, can start on losartan 25 mg once daily to reduce afterload. Currently SBP 100s. Discussed with patient, family, and Nurse. Electronically signed by Tate Orr MD on 1/3/2021 at 12:20 PM    Tate Orr MD  PGY-1, [address] [facility]. Attending Physician Statement  I have discussed the care of [facility], including pertinent history and exam findings,  with the cardiology fellow/resident. I have seen and examined the patient and the key elements of all parts of the encounter have been performed by me. I agree with the assessment, plan and orders as documented by the resident with additional recommendations as below:       New onset congestive heart failure, significantly improved after iv diuresis, no angina symptoms. TTE shows preserved LV systolic function with normal diastolic compliance but moderate to severe eccentric MR with LAE. Mitral leaflets appears to be thckened at the tips without prolapse. Recommend further evaluation with GEOVANY. Start po lasix 20 mg qd. Afterload reduction with low dose ARB if bp tolerates. Risks, benefits and alternatives of procedure discussed with patient in detail, she verbalizes understanding and willing to proceed.        Lavern Koch MD, [facility]

## 2021-01-03 NOTE — PROGRESS NOTES
ED she dropped saturation to 80s which came up with nasal cannula oxygen.  Troponins 8 and 9 at 2 occurrences, proBNP 911, COVID-19 negative x2 (rapid and PCR).  CT PE was negative for PE but did show moderate cardiomegaly with moderate pulmonary interstitial edema, suspected mild multifocal bilateral lung overlying alveolar edema, mild right-sided layering posteriorly pleural effusions suggestive of CHF.  Patient received 1 L IV fluid bolus in the ED.  Patient complained of some watery diarrhea with 1-2 episodes per day for the past 1 week associated with some nonspecific abdominal pain though denied nausea/vomiting.  She reported that she does snore at night, have some morning headaches and usually needs afternoon naps.  She also reported some morning joint stiffness especially in knee joints. She denied headache, vision changes, loss of smell/taste, palpitations, dysuria or burning micturition but have frequency of micturition.  She smokes 5-6 cigarette per day, does not drink alcohol, but was using drugs in the past with most recent use in August when she use meth. Arlette Stubbs has used cocaine in the past, last use couple of years ago. OBJECTIVE     Vital Signs:  /63   Pulse 94   Temp 97.9 °F (36.6 °C) (Oral)   Resp 18   Ht 5' 6\" (1.676 m)   Wt 155 lb (70.3 kg)   SpO2 98%   BMI 25.02 kg/m²     Temp (24hrs), Av.1 °F (36.7 °C), Min:97.9 °F (36.6 °C), Max:98.2 °F (36.8 °C)    No intake/output data recorded. Physical Exam:  Constitutional: This is a well developed, well nourished, 18.5-24.9 - Normal 39y.o. year old female who is alert, oriented, cooperative and in no apparent distress. Head:normocephalic and atraumatic. EENT:  PERRLA. No conjunctival injections. Septum was midline, mucosa was without erythema, exudates or cobblestoning. No thrush was noted. Neck: Supple without thyromegaly. No elevated JVP. Trachea was midline.   Respiratory: Chest was symmetrical without dullness to percussion. Breath sounds bilaterally were clear to auscultation. There were no wheezes, rhonchi or rales. There is no intercostal retraction or use of accessory muscles. No egophony noted. Cardiovascular: Regular without murmur, clicks, gallops or rubs. Abdomen: Slightly rounded and soft without organomegaly. No rebound, rigidity or guarding was appreciated. Lymphatic: No lymphadenopathy. Musculoskeletal: Normal curvature of the spine. No gross muscle weakness. Extremities:  No lower extremity edema, ulcerations, tenderness, varicosities or erythema. Muscle size, tone and strength are normal.  No involuntary movements are noted. Skin:  Warm and dry. Good color, turgor and pigmentation. No lesions or scars.   No cyanosis or clubbing  Neurological/Psychiatric: The patient's general behavior, level of consciousness, thought content and emotional status is normal.        Medications:  Scheduled Medications:    FLUoxetine  10 mg Oral Daily    lurasidone  40 mg Oral Daily    sodium chloride flush  10 mL Intravenous 2 times per day    enoxaparin  40 mg Subcutaneous Daily    iron sucrose  300 mg Intravenous Q24H     Continuous Infusions:   PRN Medications    sodium chloride flush, 10 mL, PRN      promethazine, 12.5 mg, Q6H PRN    Or      ondansetron, 4 mg, Q6H PRN      polyethylene glycol, 17 g, Daily PRN      acetaminophen, 650 mg, Q6H PRN    Or      acetaminophen, 650 mg, Q6H PRN        Diagnostic Labs:  CBC:   Recent Labs     01/01/21  0613 01/02/21  0512 01/03/21  0559   WBC 4.2 4.7 5.4   RBC 4.54 4.38 4.27   HGB 10.5* 10.1* 9.8*   HCT 32.9* 32.2* 31.6*   MCV 72.5* 73.5* 74.0*   RDW 15.4* 15.8* 15.8*    378 361     BMP:   Recent Labs     01/01/21  0613 01/02/21  0512 01/03/21  0559    139 139   K 3.3* 3.8 3.8    108* 105   CO2 25 23 24   BUN 8 12 10   CREATININE 0.59 0.65 0.65       Imaging:    Xr Chest Portable    Result Date: 12/31/2020  Mild interstitial prominence may be related to crowding from low lung volumes versus mild pulmonary edema or an atypical infection. Ct Chest Pulmonary Embolism W Contrast    Result Date: 12/31/2020  1. Note: Study is limited by suboptimal volume of intravenous iodinated contrast within the pulmonary arterial system. 2. No definitive evidence of acute pulmonary embolism. 3. Moderate cardiomegaly with moderate pulmonary interstitial edema and suspected mild multifocal bilateral lung overlying alveolar edema. 4. Mild right-sided layering posterior pleural effusions. 5. Constellation of findings suggests congestive heart failure (CHF). ASSESSMENT & PLAN     ASSESSMENT / PLAN:     Principal Problem:    Pulmonary edema, acute, with congestive heart failure ([facility] [address]    Plan:   1. Acute interstitial pulmonary edema - likely secondary to new onset CHF as suggested by CTPE.  ProBNP 911. Not in overt fluid overload at this time. Continue assess volume status and diurese as needed. Normal TSH and CRP. Follow NASRIN. Echo show low normal systolic function 29-26%. Moderate to severe MR. Moderately dilated LA. Plan to get GEOVANY   2. Acute respiratory failure - resolved with nasal cannula oxygen.  Likely secondary to #1 and #6  3. Although no definite evidence of PE on CT scan but because study was limited by suboptimal volume of intravenous contrast, we will do lower extremity venous duplex to rule out DVT   4. Microcytic anemia due to iron deficiency: Continue IV Venofer 200 mg for 3 doses  5. Possibility of obstructive sleep apnea with STOP-BANG score of 2. Will follow with outpatient sleep study.  Check nocturnal pulse oximetry. 6. Anxiety: Continue Prozac 10 and Latuda 40 daily     DVT ppx: Lovenox  GI ppx: None     PT/OT/SW: On board  Discharge Planning: On board    Telma Brown MD  Internal Medicine Resident, PGY-1  [facility]  1/3/2021, 7:28 AM    Attending Physician Statement  I have discussed the care of

## 2021-01-03 NOTE — CARE COORDINATION
Case Management Initial Discharge Plan  [facility],             Met with:patient to discuss discharge plans. Information verified: address, contacts, phone number, , insurance Yes    Emergency Contact/Next of Kin name & number: Mother, Rajni Mendez at [phone] or Genny Hicks, friend at [phone]    PCP: Lieutenant Justin, APRN - CNP  Date of last visit: 2 weeks ago, but wants a different provider. Given [facility] PCP list.     Insurance Provider: [address] [facility]    Discharge Planning    Living Arrangements:  Friends   Support Systems:  Parent, Twelve Step Group    Home has 2 stories  14 stairs to climb to get into front door,   Location of bedroom/bathroom in home main    Patient able to perform ADL's:Independent    Current Services (outpatient & in home) none  DME equipment: n/a  DME provider: n/a    Receiving oral anticoagulation therapy? No    If indicated:   Physician managing anticoagulation treatment: n/a  Where does patient obtain lab work for ATC treatment? n/a      Potential Assistance Needed:  Other (Comment)(Given [facility] PMD list of providers)    Patient agreeable to home care: No  Commercial Point of choice provided:  n/a    Prior SNF/Rehab Placement and Facility: [facility]  Agreeable to SNF/Rehab: Yes  Commercial Point of choice provided: no     Evaluation: no    Expected Discharge date:  21    Patient expects to be discharged to:  Back to inpatient drug rehab  Follow Up Appointment: Best Day/ Time:      Transportation provider: Worker from [facility]  Transportation arrangements needed for discharge: No    Readmission Risk              Risk of Unplanned Readmission:        14             Does patient have a readmission risk score greater than 14?: No  If yes, follow-up appointment must be made within 7 days of discharge. Goals of Care:       Discharge Plan: Back to [facility] rehab, address on demographic sheet.    Given PCP [facility] list.           Electronically signed by Florinda Colby RN on 1/3/21 at 5:45 PM EST

## 2021-01-04 ENCOUNTER — APPOINTMENT (OUTPATIENT)
Dept: CARDIAC CATH/INVASIVE PROCEDURES | Age: 46
DRG: 192 | End: 2021-01-04
Payer: MEDICAID

## 2021-01-04 PROBLEM — J81.0 ACUTE PULMONARY EDEMA (HCC): Status: ACTIVE | Noted: 2020-12-31

## 2021-01-04 LAB
ABSOLUTE EOS #: 0.07 K/UL (ref 0–0.44)
ABSOLUTE IMMATURE GRANULOCYTE: <0.03 K/UL (ref 0–0.3)
ABSOLUTE LYMPH #: 1.68 K/UL (ref 1.1–3.7)
ABSOLUTE MONO #: 0.47 K/UL (ref 0.1–1.2)
ANION GAP SERPL CALCULATED.3IONS-SCNC: 10 MMOL/L (ref 9–17)
ANTI-NUCLEAR ANTIBODY (ANA): NEGATIVE
BASOPHILS # BLD: 1 % (ref 0–2)
BASOPHILS ABSOLUTE: 0.06 K/UL (ref 0–0.2)
BUN BLDV-MCNC: 10 MG/DL (ref 6–20)
BUN/CREAT BLD: ABNORMAL (ref 9–20)
CALCIUM SERPL-MCNC: 8.4 MG/DL (ref 8.6–10.4)
CHLORIDE BLD-SCNC: 106 MMOL/L (ref 98–107)
CO2: 23 MMOL/L (ref 20–31)
CREAT SERPL-MCNC: 0.59 MG/DL (ref 0.5–0.9)
DIFFERENTIAL TYPE: ABNORMAL
EOSINOPHILS RELATIVE PERCENT: 2 % (ref 1–4)
GFR AFRICAN AMERICAN: >60 ML/MIN
GFR NON-AFRICAN AMERICAN: >60 ML/MIN
GFR SERPL CREATININE-BSD FRML MDRD: ABNORMAL ML/MIN/{1.73_M2}
GFR SERPL CREATININE-BSD FRML MDRD: ABNORMAL ML/MIN/{1.73_M2}
GLUCOSE BLD-MCNC: 90 MG/DL (ref 70–99)
HCT VFR BLD CALC: 31.8 % (ref 36.3–47.1)
HEMOGLOBIN: 9.8 G/DL (ref 11.9–15.1)
IMMATURE GRANULOCYTES: 0 %
LV EF: 45 %
LVEF MODALITY: NORMAL
LYMPHOCYTES # BLD: 36 % (ref 24–43)
MCH RBC QN AUTO: 23.1 PG (ref 25.2–33.5)
MCHC RBC AUTO-ENTMCNC: 30.8 G/DL (ref 28.4–34.8)
MCV RBC AUTO: 74.8 FL (ref 82.6–102.9)
MONOCYTES # BLD: 10 % (ref 3–12)
NRBC AUTOMATED: 0 PER 100 WBC
PDW BLD-RTO: 16.2 % (ref 11.8–14.4)
PLATELET # BLD: 363 K/UL (ref 138–453)
PLATELET ESTIMATE: ABNORMAL
PMV BLD AUTO: 9.6 FL (ref 8.1–13.5)
POTASSIUM SERPL-SCNC: 3.9 MMOL/L (ref 3.7–5.3)
RBC # BLD: 4.25 M/UL (ref 3.95–5.11)
RBC # BLD: ABNORMAL 10*6/UL
SEG NEUTROPHILS: 51 % (ref 36–65)
SEGMENTED NEUTROPHILS ABSOLUTE COUNT: 2.39 K/UL (ref 1.5–8.1)
SODIUM BLD-SCNC: 139 MMOL/L (ref 135–144)
WBC # BLD: 4.7 K/UL (ref 3.5–11.3)
WBC # BLD: ABNORMAL 10*3/UL

## 2021-01-04 PROCEDURE — 6370000000 HC RX 637 (ALT 250 FOR IP): Performed by: STUDENT IN AN ORGANIZED HEALTH CARE EDUCATION/TRAINING PROGRAM

## 2021-01-04 PROCEDURE — 1200000000 HC SEMI PRIVATE

## 2021-01-04 PROCEDURE — 76377 3D RENDER W/INTRP POSTPROCES: CPT

## 2021-01-04 PROCEDURE — 93325 DOPPLER ECHO COLOR FLOW MAPG: CPT

## 2021-01-04 PROCEDURE — 93320 DOPPLER ECHO COMPLETE: CPT

## 2021-01-04 PROCEDURE — 2580000003 HC RX 258: Performed by: STUDENT IN AN ORGANIZED HEALTH CARE EDUCATION/TRAINING PROGRAM

## 2021-01-04 PROCEDURE — 99233 SBSQ HOSP IP/OBS HIGH 50: CPT | Performed by: INTERNAL MEDICINE

## 2021-01-04 PROCEDURE — 85025 COMPLETE CBC W/AUTO DIFF WBC: CPT

## 2021-01-04 PROCEDURE — 2709999900 HC NON-CHARGEABLE SUPPLY

## 2021-01-04 PROCEDURE — 6360000002 HC RX W HCPCS

## 2021-01-04 PROCEDURE — 80048 BASIC METABOLIC PNL TOTAL CA: CPT

## 2021-01-04 PROCEDURE — 93312 ECHO TRANSESOPHAGEAL: CPT

## 2021-01-04 PROCEDURE — 36415 COLL VENOUS BLD VENIPUNCTURE: CPT

## 2021-01-04 PROCEDURE — B24BZZ4 ULTRASONOGRAPHY OF HEART WITH AORTA, TRANSESOPHAGEAL: ICD-10-PCS | Performed by: INTERNAL MEDICINE

## 2021-01-04 PROCEDURE — 6360000002 HC RX W HCPCS: Performed by: STUDENT IN AN ORGANIZED HEALTH CARE EDUCATION/TRAINING PROGRAM

## 2021-01-04 RX ADMIN — ACETAMINOPHEN 650 MG: 325 TABLET ORAL at 14:20

## 2021-01-04 RX ADMIN — FUROSEMIDE 20 MG: 20 TABLET ORAL at 14:16

## 2021-01-04 RX ADMIN — ENOXAPARIN SODIUM 40 MG: 40 INJECTION SUBCUTANEOUS at 14:15

## 2021-01-04 RX ADMIN — SODIUM CHLORIDE, PRESERVATIVE FREE 10 ML: 5 INJECTION INTRAVENOUS at 23:16

## 2021-01-04 RX ADMIN — FLUOXETINE 10 MG: 10 CAPSULE ORAL at 14:15

## 2021-01-04 RX ADMIN — SODIUM CHLORIDE, PRESERVATIVE FREE 10 ML: 5 INJECTION INTRAVENOUS at 14:16

## 2021-01-04 RX ADMIN — LURASIDONE HYDROCHLORIDE 40 MG: 40 TABLET, FILM COATED ORAL at 14:15

## 2021-01-04 ASSESSMENT — PAIN DESCRIPTION - LOCATION: LOCATION: HEAD

## 2021-01-04 ASSESSMENT — PAIN SCALES - GENERAL: PAINLEVEL_OUTOF10: 5

## 2021-01-04 NOTE — PROGRESS NOTES
Patient admitted, consent signed and questions answered. Patient ready for procedure. Call light to reach with side rails up 2 of 2  History and physical completed.

## 2021-01-04 NOTE — DISCHARGE INSTR - COC
Continuity of Care Form    Patient Name: Megan Saldana   :  1975  MRN:  [id number]    Admit date:  2020  Discharge date:  ***    Code Status Order: Full Code   Advance Directives:      Admitting Physician:  Josephine Arellano MD  PCP: MAYRA Dowd CNP    Discharging Nurse: [facility] Unit/Room#: 5433/1824-86  Discharging Unit Phone Number: ***    Emergency Contact:   Extended Emergency Contact Information  Primary Emergency Contact: Hu Govea  Home Phone: [phone]  Relation: Parent  Secondary Emergency Contact: Chad Howard 38. Phone: [phone]  Relation: Other    Past Surgical History:  Past Surgical History:   Procedure Laterality Date    HERNIA REPAIR         Immunization History: There is no immunization history on file for this patient.     Active Problems:  Patient Active Problem List   Diagnosis Code    Pulmonary edema, acute, with congestive heart failure (HCC) I50.1    Anxiety F41.9    Microcytic anemia D50.9    History of drug abuse ([facility] [address] F19.11       Isolation/Infection:   Isolation            No Isolation          Patient Infection Status       Infection Onset Added Last Indicated Last Indicated By Review Planned Expiration Resolved Resolved By    None active    Resolved    COVID-19 Rule Out 20 Respiratory Panel, Molecular, with COVID-19 (Ordered)   20 Rule-Out Test Resulted    COVID-19 Rule Out 20 COVID-19 (Ordered)   20 Rule-Out Test Resulted            Nurse Assessment:  Last Vital Signs: /66   Pulse 106   Temp 98.3 °F (36.8 °C) (Oral)   Resp 18   Ht 5' 6\" (1.676 m)   Wt 155 lb (70.3 kg)   SpO2 100%   BMI 25.02 kg/m²     Last documented pain score (0-10 scale): Pain Level: 3  Last Weight:   Wt Readings from Last 1 Encounters:   20 155 lb (70.3 kg)     Mental Status:  {IP PT MENTAL STATUS::::0}    IV Access:  { HORTENCIA IV ACCESS:044629668:::0}    Nursing Mobility/ADLs:  Walking   {CHP DME ADLs:277655591:::0}  Transfer  {CHP DME ADLs:280672948:::0}  Bathing  {CHP DME ADLs:367547445:::0}  Dressing  {CHP DME ADLs:193687772:::0}  Toileting  {CHP DME ADLs:064226496:::0}  Feeding  {CHP DME ADLs:936230038:::0}  Med Admin  {CHP DME ADLs:216292083:::0}  Med Delivery   { HORTENCIA MED Delivery:452050769:::0}    Wound Care Documentation and Therapy:        Elimination:  Continence: Bowel: {YES / U}  Bladder: {YES / IQ:56711}  Urinary Catheter: {Urinary Catheter:841865543:::0}   Colostomy/Ileostomy/Ileal Conduit: {YES / RV:32392}       Date of Last BM: ***  No intake or output data in the 24 hours ending 21 1128  No intake/output data recorded.     Safety Concerns:     [address] Safety Concerns:797850514:::0}    Impairments/Disabilities:      [address] Impairments/Disabilities:434662492:::0}    Nutrition Therapy:  Current Nutrition Therapy:   [address] Diet List:058465069:::0}    Routes of Feeding: {CHP DME Other Feedings:258166182:::0}  Liquids: {Slp liquid thickness:85428}  Daily Fluid Restriction: {CHP DME Yes amt example:654357815:::0}  Last Modified Barium Swallow with Video (Video Swallowing Test): {Done Not Done TOUR:716076263:::7}    Treatments at the Time of Hospital Discharge:   Respiratory Treatments: ***  Oxygen Therapy:  {Therapy; copd oxygen:91835:::0}  Ventilator:    { CC Vent List:922133051:::0}    Rehab Therapies: {THERAPEUTIC INTERVENTION:6766604860}  Weight Bearing Status/Restrictions: [address] Weight Bearin:::0}  Other Medical Equipment (for information only, NOT a DME order):  {EQUIPMENT:152422707}  Other Treatments: ***    Patient's personal belongings (please select all that are sent with patient):  {ULISES DME Belongings:549548379:::0}    RN SIGNATURE:  {Esignature:835464377:::0}    CASE MANAGEMENT/SOCIAL WORK SECTION    Inpatient Status Date: ***    Readmission Risk Assessment Score:  Readmission Risk              Risk of Unplanned Readmission:        15           Discharging to Facility/ Agency   Name:   Address:  Phone:  Fax:    Dialysis Facility (if applicable)   Name:  Address:  Dialysis Schedule:  Phone:  Fax:    / signature: {Aixaature:663640746:::0}    PHYSICIAN SECTION    Prognosis: Fair    Condition at Discharge: Stable    Rehab Potential (if transferring to Rehab): Fair    Recommended Labs or Other Treatments After Discharge: follow up with cardiothoracic surgery and cardiology in two weeks. Continue taking lasix and losartan    Physician Certification: I certify the above information and transfer of Natasha Munoz  is necessary for the continuing treatment of the diagnosis listed and that she requires Acute Rehab for greater 30 days.      Update Admission H&P: No change in H&P    PHYSICIAN SIGNATURE:  Electronically signed by Lurdes Carroll MD on 1/4/21 at 11:29 AM EST                                                               Electronically signed by Lurdes Carroll MD on 1/5/21 at 11:33 AM EST

## 2021-01-04 NOTE — PROGRESS NOTES
[facility]  Internal Medicine Teaching Residency Program  Inpatient Daily Progress Note  ______________________________________________________________________________    Patient: Harleen Nolen  YOB: 1975   REM:[id number]    Acct: [de-identified]     Room: 5/0-80  Admit date: 12/31/2020  Today's date: 01/04/21  Number of days in the hospital: 4    SUBJECTIVE   Admitting Diagnosis: Pulmonary edema, acute, with congestive heart failure ([address]  CC: Headache and chest tightness  Pt examined at bedside. Chart & results reviewed. No acute events evernight. Reports improvement. Denies chest pain , shortness of breath, fever, chills, nausea and vomiting     Vitally and hemodynamically stable     Plan to get GEOVANY today. ROS:  Constitutional:  negative for chills, fevers, sweats  Respiratory:  negative for cough, dyspnea on exertion, shortness of breath, wheezing  Cardiovascular:  negative for chest pain, chest pressure/discomfort, lower extremity edema, palpitations  Gastrointestinal:  negative for abdominal pain, constipation, diarrhea, nausea, vomiting  Neurological:  negative for dizziness, headache    BRIEF HISTORY     39 y.o. female PMH of anxiety presented to hospital with sudden onset severe left chest pain and SOB when she woke up in the morning.  Patient reports that she is having intermittent chest pain for the past 1 week and was seen earlier this month at [facility] for similar complaint which was thought to be secondary to anxiety as the chest x-ray was normal.  On presentation to ED, patient was saturation on room air, BP elevated to 144/107, tachycardic /min but while in the ED she dropped saturation to 80s which came up with nasal cannula oxygen.  Troponins 8 and 9 at 2 occurrences, proBNP 911, COVID-19 negative x2 (rapid and PCR).   CT PE was negative for PE but did show moderate cardiomegaly with moderate pulmonary interstitial edema, suspected mild multifocal bilateral lung overlying alveolar edema, mild right-sided layering posteriorly pleural effusions suggestive of CHF.  Patient received 1 L IV fluid bolus in the ED.  Patient complained of some watery diarrhea with 1-2 episodes per day for the past 1 week associated with some nonspecific abdominal pain though denied nausea/vomiting.  She reported that she does snore at night, have some morning headaches and usually needs afternoon naps.  She also reported some morning joint stiffness especially in knee joints. She denied headache, vision changes, loss of smell/taste, palpitations, dysuria or burning micturition but have frequency of micturition.  She smokes 5-6 cigarette per day, does not drink alcohol, but was using drugs in the past with most recent use in August when she use meth. Nilson Navarro has used cocaine in the past, last use couple of years ago. OBJECTIVE     Vital Signs:  /66   Pulse 106   Temp 98.3 °F (36.8 °C) (Oral)   Resp 18   Ht 5' 6\" (1.676 m)   Wt 155 lb (70.3 kg)   SpO2 100%   BMI 25.02 kg/m²     Temp (24hrs), Av.5 °F (36.9 °C), Min:98 °F (36.7 °C), Max:99.1 °F (37.3 °C)    No intake/output data recorded. Physical Exam:  Constitutional: This is a well developed, well nourished, 18.5-24.9 - Normal 39y.o. year old female who is alert, oriented, cooperative and in no apparent distress. Head:normocephalic and atraumatic. EENT:  PERRLA. No conjunctival injections. Septum was midline, mucosa was without erythema, exudates or cobblestoning. No thrush was noted. Neck: Supple without thyromegaly. No elevated JVP. Trachea was midline. Respiratory: Chest was symmetrical without dullness to percussion. Breath sounds bilaterally were clear to auscultation. There were no wheezes, rhonchi or rales. There is no intercostal retraction or use of accessory muscles. No egophony noted. Cardiovascular: Regular without murmur, clicks, gallops or rubs.    Abdomen: Slightly rounded and soft without organomegaly. No rebound, rigidity or guarding was appreciated. Lymphatic: No lymphadenopathy. Musculoskeletal: Normal curvature of the spine. No gross muscle weakness. Extremities:  No lower extremity edema, ulcerations, tenderness, varicosities or erythema. Muscle size, tone and strength are normal.  No involuntary movements are noted. Skin:  Warm and dry. Good color, turgor and pigmentation. No lesions or scars. No cyanosis or clubbing  Neurological/Psychiatric: The patient's general behavior, level of consciousness, thought content and emotional status is normal.        Medications:  Scheduled Medications:    furosemide  20 mg Oral Daily    FLUoxetine  10 mg Oral Daily    lurasidone  40 mg Oral Daily    sodium chloride flush  10 mL Intravenous 2 times per day    enoxaparin  40 mg Subcutaneous Daily     Continuous Infusions:   PRN Medications    sodium chloride flush, 10 mL, PRN      promethazine, 12.5 mg, Q6H PRN    Or      ondansetron, 4 mg, Q6H PRN      polyethylene glycol, 17 g, Daily PRN      acetaminophen, 650 mg, Q6H PRN    Or      acetaminophen, 650 mg, Q6H PRN        Diagnostic Labs:  CBC:   Recent Labs     01/02/21  0512 01/03/21  0559 01/04/21  0652   WBC 4.7 5.4 4.7   RBC 4.38 4.27 4.25   HGB 10.1* 9.8* 9.8*   HCT 32.2* 31.6* 31.8*   MCV 73.5* 74.0* 74.8*   RDW 15.8* 15.8* 16.2*    361 363     BMP:   Recent Labs     01/02/21  0512 01/03/21  0559    139   K 3.8 3.8   * 105   CO2 23 24   BUN 12 10   CREATININE 0.65 0.65       Imaging:    Xr Chest Portable    Result Date: 12/31/2020  Mild interstitial prominence may be related to crowding from low lung volumes versus mild pulmonary edema or an atypical infection. Ct Chest Pulmonary Embolism W Contrast    Result Date: 12/31/2020  1. Note: Study is limited by suboptimal volume of intravenous iodinated contrast within the pulmonary arterial system.  2. No definitive evidence of

## 2021-01-04 NOTE — PROCEDURES
[facility]  Transesophageal Echocardiogram        Today's Date:  1/4/2021  Ordering Physician:  Dr. aRnjeet Hernandez   Indication:   Evaluation of Mitral Regurgitation     Operators:  Primary:   Dr. Yadira Kemp (Attending Physician)  Assistant:   Anaid Villela MD (Cardiovascular Fellow)    Pre Procedure Conscious Sedation Data:    ASA Class:    [] I [] II [x] III [] IV    Mallampati Class:  [] I [] II [x] III [] IV    Patient seen and examined. History and Physical reviewed. Labs reviewed. After informed consent was obtained with explanation of the risks and benefits, the patient was brought to Cath lab. All sedation was administered by the cardiologist. The oropharynx was pre anesthetisized with cetacaine spray. GEOVANY:    Structures:    LA:  Enlarged   AURELIO:  No thrombus  RA:  Normal  RV:   Normal  LV:  Normal  Estimated LVEF:  45 %  Aorta:   Mild atheromatous disease arch  Pericardium: No pericardial effusion  Septum:  No intracardiac shunt via color Doppler. Valves:    Mitral Valve:  Structurally normal.  Severe Mitral  regurgitation is identified At least 3 different jets were noted. MR ERO 0.45, MR volume 72 ml  Aortic Valve: The aortic valve is trileaflet and opens adequately. Mild  regurgitiation is identified. Tricuspid valve: Structurally normal. No  regurgitation is identified. Pulmonary valve: Normal. No significant regurgitation    No valvular vegetations or thrombus identified. GEOVANY Summary:     1. A GEOVANY was performed without complications. 2. Severe Mitral regurgitation. At least 3 different jets noted. MR ERO 0.45, MR volume 72 ml consistent with severe MR.   3. Dilated Left atrium  4. LVEF around 45 %  5.  No thrombus or valvular vegetation identified       Recommendations   - Will plan for [facility] tomorrow to r/o Ischemic etiology for severe MR         Electronically signed by Anaid Villela MD on 1/4/2021 at 1:35 PM  Cardiovascular Diseases Fellow  [facility]      I have reviewed the case / procedure with resident / fellow  I have examined the patient personally  Patient agree with treatment plan as discussed before, final arrangement based on my evaluation and exam.    Risk and benefit of procedure planned were explained in details. Procedure was performed by me personally, with all aspect of the procedure being done using standard protocol. Note was modified based on my own assessment and treatment.     Luis Alberto Curry MD  [facility]

## 2021-01-04 NOTE — CARE COORDINATION
TRANSITIONAL CARE PLANNING/ 2 Rehab Deshawn Day: 4    Reason for Admission: Pulmonary edema, acute, with congestive heart failure ([facility] [address] [I50.1]     Treatment Plan of Care:     Tests/Procedures still needed: Adam today plan [facility] tomorrow r/o ischemic etiology for severe MR on ADAM    Barriers to Discharge: cardiac workup    Readmission Risk              Risk of Unplanned Readmission:        15            Patient goals/Treatment Preferences/Transitional Plan: pt goal is to return to in [facility]    Referrals Made:     Follow Up needed:     [facility]

## 2021-01-05 ENCOUNTER — APPOINTMENT (OUTPATIENT)
Dept: CARDIAC CATH/INVASIVE PROCEDURES | Age: 46
DRG: 192 | End: 2021-01-05
Payer: MEDICAID

## 2021-01-05 PROBLEM — I34.0 SEVERE MITRAL VALVE REGURGITATION: Status: ACTIVE | Noted: 2021-01-05

## 2021-01-05 LAB
ABSOLUTE EOS #: 0.06 K/UL (ref 0–0.44)
ABSOLUTE IMMATURE GRANULOCYTE: <0.03 K/UL (ref 0–0.3)
ABSOLUTE LYMPH #: 1.68 K/UL (ref 1.1–3.7)
ABSOLUTE MONO #: 0.48 K/UL (ref 0.1–1.2)
ANION GAP SERPL CALCULATED.3IONS-SCNC: 12 MMOL/L (ref 9–17)
BASOPHILS # BLD: 2 % (ref 0–2)
BASOPHILS ABSOLUTE: 0.09 K/UL (ref 0–0.2)
BUN BLDV-MCNC: 11 MG/DL (ref 6–20)
BUN/CREAT BLD: ABNORMAL (ref 9–20)
CALCIUM SERPL-MCNC: 8.5 MG/DL (ref 8.6–10.4)
CHLORIDE BLD-SCNC: 105 MMOL/L (ref 98–107)
CHOLESTEROL/HDL RATIO: 3
CHOLESTEROL: 164 MG/DL
CO2: 22 MMOL/L (ref 20–31)
CREAT SERPL-MCNC: 0.57 MG/DL (ref 0.5–0.9)
DIFFERENTIAL TYPE: ABNORMAL
EOSINOPHILS RELATIVE PERCENT: 1 % (ref 1–4)
GFR AFRICAN AMERICAN: >60 ML/MIN
GFR NON-AFRICAN AMERICAN: >60 ML/MIN
GFR SERPL CREATININE-BSD FRML MDRD: ABNORMAL ML/MIN/{1.73_M2}
GFR SERPL CREATININE-BSD FRML MDRD: ABNORMAL ML/MIN/{1.73_M2}
GLUCOSE BLD-MCNC: 92 MG/DL (ref 70–99)
HCT VFR BLD CALC: 31.8 % (ref 36.3–47.1)
HDLC SERPL-MCNC: 55 MG/DL
HEMOGLOBIN: 9.7 G/DL (ref 11.9–15.1)
IMMATURE GRANULOCYTES: 0 %
LDL CHOLESTEROL: 97 MG/DL (ref 0–130)
LYMPHOCYTES # BLD: 34 % (ref 24–43)
MCH RBC QN AUTO: 23.3 PG (ref 25.2–33.5)
MCHC RBC AUTO-ENTMCNC: 30.5 G/DL (ref 28.4–34.8)
MCV RBC AUTO: 76.4 FL (ref 82.6–102.9)
MONOCYTES # BLD: 10 % (ref 3–12)
NRBC AUTOMATED: 0 PER 100 WBC
PDW BLD-RTO: 16.4 % (ref 11.8–14.4)
PLATELET # BLD: 357 K/UL (ref 138–453)
PLATELET ESTIMATE: ABNORMAL
PMV BLD AUTO: 9.9 FL (ref 8.1–13.5)
POTASSIUM SERPL-SCNC: 4 MMOL/L (ref 3.7–5.3)
RBC # BLD: 4.16 M/UL (ref 3.95–5.11)
RBC # BLD: ABNORMAL 10*6/UL
SEG NEUTROPHILS: 53 % (ref 36–65)
SEGMENTED NEUTROPHILS ABSOLUTE COUNT: 2.58 K/UL (ref 1.5–8.1)
SODIUM BLD-SCNC: 139 MMOL/L (ref 135–144)
TRIGL SERPL-MCNC: 59 MG/DL
VLDLC SERPL CALC-MCNC: NORMAL MG/DL (ref 1–30)
WBC # BLD: 4.9 K/UL (ref 3.5–11.3)
WBC # BLD: ABNORMAL 10*3/UL

## 2021-01-05 PROCEDURE — C1769 GUIDE WIRE: HCPCS

## 2021-01-05 PROCEDURE — 93454 CORONARY ARTERY ANGIO S&I: CPT | Performed by: INTERNAL MEDICINE

## 2021-01-05 PROCEDURE — 36415 COLL VENOUS BLD VENIPUNCTURE: CPT

## 2021-01-05 PROCEDURE — 85025 COMPLETE CBC W/AUTO DIFF WBC: CPT

## 2021-01-05 PROCEDURE — 80061 LIPID PANEL: CPT

## 2021-01-05 PROCEDURE — 6360000004 HC RX CONTRAST MEDICATION

## 2021-01-05 PROCEDURE — 99221 1ST HOSP IP/OBS SF/LOW 40: CPT | Performed by: NURSE PRACTITIONER

## 2021-01-05 PROCEDURE — 6370000000 HC RX 637 (ALT 250 FOR IP)

## 2021-01-05 PROCEDURE — 99232 SBSQ HOSP IP/OBS MODERATE 35: CPT | Performed by: INTERNAL MEDICINE

## 2021-01-05 PROCEDURE — C1725 CATH, TRANSLUMIN NON-LASER: HCPCS

## 2021-01-05 PROCEDURE — 2709999900 HC NON-CHARGEABLE SUPPLY

## 2021-01-05 PROCEDURE — 1200000000 HC SEMI PRIVATE

## 2021-01-05 PROCEDURE — 80048 BASIC METABOLIC PNL TOTAL CA: CPT

## 2021-01-05 PROCEDURE — C1894 INTRO/SHEATH, NON-LASER: HCPCS

## 2021-01-05 PROCEDURE — 6370000000 HC RX 637 (ALT 250 FOR IP): Performed by: INTERNAL MEDICINE

## 2021-01-05 PROCEDURE — C1760 CLOSURE DEV, VASC: HCPCS

## 2021-01-05 PROCEDURE — B2111ZZ FLUOROSCOPY OF MULTIPLE CORONARY ARTERIES USING LOW OSMOLAR CONTRAST: ICD-10-PCS | Performed by: INTERNAL MEDICINE

## 2021-01-05 PROCEDURE — 2500000003 HC RX 250 WO HCPCS

## 2021-01-05 PROCEDURE — 2580000003 HC RX 258: Performed by: INTERNAL MEDICINE

## 2021-01-05 PROCEDURE — 6360000002 HC RX W HCPCS

## 2021-01-05 RX ORDER — SODIUM CHLORIDE 0.9 % (FLUSH) 0.9 %
10 SYRINGE (ML) INJECTION EVERY 12 HOURS SCHEDULED
Status: DISCONTINUED | OUTPATIENT
Start: 2021-01-05 | End: 2021-01-06 | Stop reason: HOSPADM

## 2021-01-05 RX ORDER — ACETAMINOPHEN 325 MG/1
650 TABLET ORAL EVERY 4 HOURS PRN
Status: DISCONTINUED | OUTPATIENT
Start: 2021-01-05 | End: 2021-01-06 | Stop reason: HOSPADM

## 2021-01-05 RX ORDER — LOSARTAN POTASSIUM 25 MG/1
25 TABLET ORAL DAILY
Status: DISCONTINUED | OUTPATIENT
Start: 2021-01-05 | End: 2021-01-06 | Stop reason: HOSPADM

## 2021-01-05 RX ORDER — SODIUM CHLORIDE 0.9 % (FLUSH) 0.9 %
10 SYRINGE (ML) INJECTION PRN
Status: DISCONTINUED | OUTPATIENT
Start: 2021-01-05 | End: 2021-01-06 | Stop reason: HOSPADM

## 2021-01-05 RX ADMIN — ACETAMINOPHEN 650 MG: 325 TABLET ORAL at 21:12

## 2021-01-05 RX ADMIN — LOSARTAN POTASSIUM 25 MG: 25 TABLET, FILM COATED ORAL at 16:07

## 2021-01-05 RX ADMIN — Medication 10 ML: at 21:13

## 2021-01-05 ASSESSMENT — PAIN SCALES - GENERAL
PAINLEVEL_OUTOF10: 0
PAINLEVEL_OUTOF10: 2
PAINLEVEL_OUTOF10: 0
PAINLEVEL_OUTOF10: 3

## 2021-01-05 ASSESSMENT — ENCOUNTER SYMPTOMS
SHORTNESS OF BREATH: 0
ABDOMINAL PAIN: 0
COLOR CHANGE: 0
CHEST TIGHTNESS: 0
CONSTIPATION: 0
DIARRHEA: 0

## 2021-01-05 ASSESSMENT — PAIN DESCRIPTION - PROGRESSION: CLINICAL_PROGRESSION: OTHER (COMMENT)

## 2021-01-05 NOTE — PROGRESS NOTES
Attestation and add on       I have discussed the care of Sabas Cole , including pertinent history and exam findings,    1/5/21   with the resident. I have seen and examined the patient and the key elements of all parts of the encounter have been performed by me . I agree with the assessment, plan and orders as documented by the resident. Principal Problem:    Acute pulmonary edema (HCC)  Active Problems:    Anxiety    Microcytic anemia    History of drug abuse (HCC)    Acute on chronic diastolic congestive heart failure (HCC)  Resolved Problems:    * No resolved hospital problems. *        --   CLINICAL COURSE ---          clinical course has improved,    -     Cardiac cath today    Condition    [x] ill ,     [x] high risk , [] critical ,        [] improved but still labile                                        [] delirium ,      [] -----,                 [] I----     Unit  [] ICU           [] PICU       [] MED_SRG             []  Other  Prognosis -              Medications: Allergies: Allergies   Allergen Reactions    Antihistamines, Chlorpheniramine-Type     Hydroxyzine Anxiety       Current Meds:   Scheduled Meds:    furosemide  20 mg Oral Daily    FLUoxetine  10 mg Oral Daily    lurasidone  40 mg Oral Daily    sodium chloride flush  10 mL Intravenous 2 times per day    enoxaparin  40 mg Subcutaneous Daily     Continuous Infusions:   PRN Meds: sodium chloride flush, promethazine **OR** ondansetron, polyethylene glycol, acetaminophen **OR** acetaminophen      ---- ;     [facility] Ezekiel Levin [address] [place] [address], [address] [place] [address].    Phone [phone]   Fax: [phone]  Answering Service: [phone]

## 2021-01-05 NOTE — PROGRESS NOTES
[facility]  Internal Medicine Teaching Residency Program  Inpatient Daily Progress Note  ______________________________________________________________________________    Patient: Jay Nagel  YOB: 1975   QCQ:[id number]    Acct: [de-identified]     Room: 5/0-80  Admit date: 12/31/2020  Today's date: 01/05/21  Number of days in the hospital: 5    SUBJECTIVE   Admitting Diagnosis: Acute pulmonary edema ([address]  CC: Headache and chest tightness    No acute events overnight. Afebrile, hemodynamically stable. Patient underwent GEOVANY yesterday, severe mitral regurgitation, dilated left atrium, LVEF around 45%, no thrombus or valvular vegetation identified. Plan for left heart cath today to rule out ischemic etiology for severe MR        ROS:  Constitutional:  negative for chills, fevers, sweats  Respiratory:  negative for cough, dyspnea on exertion, shortness of breath, wheezing  Cardiovascular:  negative for chest pain, chest pressure/discomfort, lower extremity edema, palpitations  Gastrointestinal:  negative for abdominal pain, constipation, diarrhea, nausea, vomiting  Neurological:  negative for dizziness, headache    BRIEF HISTORY     45 y.o. female PMH of anxiety presented to hospital with sudden onset severe left chest pain and SOB when she woke up in the morning.  Patient reports that she is having intermittent chest pain for the past 1 week and was seen earlier this month at [facility] for similar complaint which was thought to be secondary to anxiety as the chest x-ray was normal.  On presentation to ED, patient was saturation on room air, BP elevated to 144/107, tachycardic /min but while in the ED she dropped saturation to 80s which came up with nasal cannula oxygen.  Troponins 8 and 9 at 2 occurrences, proBNP 911, COVID-19 negative x2 (rapid and PCR).   CT PE was negative for PE but did show moderate cardiomegaly with moderate pulmonary interstitial edema, suspected mild multifocal bilateral lung overlying alveolar edema, mild right-sided layering posteriorly pleural effusions suggestive of CHF.  Patient received 1 L IV fluid bolus in the ED.  Patient complained of some watery diarrhea with 1-2 episodes per day for the past 1 week associated with some nonspecific abdominal pain though denied nausea/vomiting.  She reported that she does snore at night, have some morning headaches and usually needs afternoon naps.  She also reported some morning joint stiffness especially in knee joints. She denied headache, vision changes, loss of smell/taste, palpitations, dysuria or burning micturition but have frequency of micturition.  She smokes 5-6 cigarette per day, does not drink alcohol, but was using drugs in the past with most recent use in August when she use meth. Eleuterio Mejia has used cocaine in the past, last use couple of years ago. OBJECTIVE     Vital Signs:  /69   Pulse 88   Temp 98.3 °F (36.8 °C) (Oral)   Resp 19   Ht 5' 6\" (1.676 m)   Wt 155 lb (70.3 kg)   SpO2 99%   BMI 25.02 kg/m²     Temp (24hrs), Av.6 °F (37 °C), Min:98.3 °F (36.8 °C), Max:99.1 °F (37.3 °C)    No intake/output data recorded. Physical Exam:  Constitutional: This is a well developed, well nourished, 18.5-24.9 - Normal 39y.o. year old female who is alert, oriented, cooperative and in no apparent distress. Head:normocephalic and atraumatic. EENT:  PERRLA. No conjunctival injections. Septum was midline, mucosa was without erythema, exudates or cobblestoning. No thrush was noted. Neck: Supple without thyromegaly. No elevated JVP. Trachea was midline. Respiratory: Chest was symmetrical without dullness to percussion. Breath sounds bilaterally were clear to auscultation. There were no wheezes, rhonchi or rales. There is no intercostal retraction or use of accessory muscles. No egophony noted.    Cardiovascular: Regular without murmur, clicks, gallops or rubs.   Abdomen: Slightly rounded and soft without organomegaly. No rebound, rigidity or guarding was appreciated. Lymphatic: No lymphadenopathy. Musculoskeletal: Normal curvature of the spine. No gross muscle weakness. Extremities:  No lower extremity edema, ulcerations, tenderness, varicosities or erythema. Muscle size, tone and strength are normal.  No involuntary movements are noted. Skin:  Warm and dry. Good color, turgor and pigmentation. No lesions or scars. No cyanosis or clubbing  Neurological/Psychiatric: The patient's general behavior, level of consciousness, thought content and emotional status is normal.        Medications:  Scheduled Medications:    furosemide  20 mg Oral Daily    FLUoxetine  10 mg Oral Daily    lurasidone  40 mg Oral Daily    sodium chloride flush  10 mL Intravenous 2 times per day    enoxaparin  40 mg Subcutaneous Daily     Continuous Infusions:   PRN Medications    sodium chloride flush, 10 mL, PRN      promethazine, 12.5 mg, Q6H PRN    Or      ondansetron, 4 mg, Q6H PRN      polyethylene glycol, 17 g, Daily PRN      acetaminophen, 650 mg, Q6H PRN    Or      acetaminophen, 650 mg, Q6H PRN        Diagnostic Labs:  CBC:   Recent Labs     01/03/21  0559 01/04/21  0652 01/05/21  0556   WBC 5.4 4.7 4.9   RBC 4.27 4.25 4.16   HGB 9.8* 9.8* 9.7*   HCT 31.6* 31.8* 31.8*   MCV 74.0* 74.8* 76.4*   RDW 15.8* 16.2* 16.4*    363 357     BMP:   Recent Labs     01/03/21  0559 01/04/21  0652 01/05/21  0556    139 139   K 3.8 3.9 4.0    106 105   CO2 24 23 22   BUN 10 10 11   CREATININE 0.65 0.59 0.57       Imaging:    Xr Chest Portable    Result Date: 12/31/2020  Mild interstitial prominence may be related to crowding from low lung volumes versus mild pulmonary edema or an atypical infection. Ct Chest Pulmonary Embolism W Contrast    Result Date: 12/31/2020  1.  Note: Study is limited by suboptimal volume of intravenous iodinated contrast within the pulmonary arterial system. 2. No definitive evidence of acute pulmonary embolism. 3. Moderate cardiomegaly with moderate pulmonary interstitial edema and suspected mild multifocal bilateral lung overlying alveolar edema. 4. Mild right-sided layering posterior pleural effusions. 5. Constellation of findings suggests congestive heart failure (CHF). ASSESSMENT & PLAN     ASSESSMENT / PLAN:     Principal Problem:    Pulmonary edema, acute, with congestive heart failure ([address]    Plan:   1. Acute interstitial pulmonary edema - likely secondary to new onset CHF    GEOVANY- severe mitral regurgitation, dilated left atrium, LVEF around 45%, no thrombus or valvular vegetation identified. Plan for left heart cath today to rule out ischemic etiology for severe MR    2. Acute respiratory failure -   Likely secondary to #1   3. PE ruled out. Duplex and CT PE negative  4. Microcytic anemia due to iron deficiency: s/p IV [id number] mg 3 doses. Follow-up with GI and OB/GYN as an outpatient  5. Possibility of obstructive sleep apnea with STOP-BANG score of 2-3. Will follow with outpatient sleep study.    6. Anxiety: Continue Prozac 10 and Latuda 40 daily     DVT ppx: Lovenox  GI ppx: None     PT/OT/SW: On board  Discharge Planning: On board    Kera Dodge MD  Internal Medicine Resident, PGY-3  [facility];  [place], [place]  1/5/2021, 1:20 PM

## 2021-01-05 NOTE — PROGRESS NOTES
Patient admitted, consent signed, all questions answered. Pt ready for procedure. Bed in low position, call light to reach with side rails up 2 of 2. Bilateral groin hair clipped. No family at bedside with patient.

## 2021-01-05 NOTE — PROGRESS NOTES
Disaster charting initiated at 0700. Patient had GEOVANY performed today, plans for Mitral  Clips tomorrow. Patient tolerated procedure well.

## 2021-01-05 NOTE — OP NOTE
[facility]        Date:   1/5/2021  Patient name:  Wade Wilburn  Date of admission:  12/31/2020  4:12 AM  MRN:   [id number]  YOB: 1975    CARDIAC CATHETERIZATION      Operators:    YWYMPRS:KAYLYN Soares MD    CV Fellow:  Omid Flanagan M.D. Procedure performed:       [] Left Heart Catheterization. [] Graft Angiography.  [] Left Ventriculography. [] Right Heart Catheterization. [x] Coronary Angiography. [] Aortic Valve Studies. [] PCI:      [] Other:         Pre Procedure Conscious Sedation Data:    ASA Class:    [] I [x] II [] III [] IV    Mallampati Class:  [] I [x] II [] III [] IV        Indication:  [] STEMI      [] + Stress test  [] ACS      [] + EKG Changes  [] Non Q MI       [] Significant Risk Factors  [] Recurrent Angina             [] Diabetes Mellitus    [] New LBBB      [] Uncontrolled HTN. [] CHF / Low EF changes     [] Abnormal CTA / Ca Score  [x] Other: Severe MR      Procedure:  Access:  [x] Femoral  [x] Radial  artery       [x] Right  [] Left    After informed consent was obtained with explanation of the risks and benefits, patient was brought to the cath lab. The right wrist was prepped and draped in sterile fashion. 1% lidocaine was used for local block. The  right radial artery was cannulated with 6  Fr sheath with brisk arterial blood return. A premixed injection of Verapamil, Heparin and Nitrogycerin was injected through the side port. The side port was frequently flushed and aspirated with normal saline. RCA angiography was performed through R radial access after which patient developed spasm and right femoral artery was used for left coronary angiography      Findings:    LMCA: Normal 0% stenosis.     LAD: Normal 0% stenosis. The D1 is normal.     LCx: Normal 0% stenosis. The OM1 and OM2 are normal.     RCA: Normal 0% stenosis.      Coronary Tree      Dominance: Right  The LV gram was not performed     Estimated blood loss: 5 ml    Conclusions:  1. Normal Coronary arteries    Recommendations:  1. Medical Therapy. 2. Risk Factors Modification. 3. Post Cath Protocol        History and Risk Factors    [x] Hypertension     [] Family history of CAD  [] Hyperlipidemia     [] Cerebrovascular Disease   [] Prior MI       [] Peripheral Vascular disease   [] Prior PCI              [] Diabetes Mellitus    [] Left Main PCI. [] Currently on Dialysis. [] Prior CABG. [x] Currently smoker. [] Cardiac Arrest outside of healthcare facility. [] Yes    [x] No        Witnessed     [] Yes   [] No     Arrest after arrival of EMS  [] Yes   [] No     [] Cardiac Arrest at other Facility. [] Yes   [x] No    Pre-Procedure Information. Heart Failure       [x] Yes    [] No        Class  [] I      [] II  [x] III    [] IV. New Diagnosis    [x] Yes  [] No    HF Type      [] Systolic   [] Diastolic          [] Unknown. Diagnostic Test:   EKG       [x] Normal   [] Abnormal    New antiarrhythmia medications:    [] Yes   [] No   New onset atrial fibrillation / Flutter     [] Yes   [] No   ECG Abnormalities:      [] V. Fib   [] Rosaline V. Tach           [] NS V. T   [] New LBBB           [] T. Inv  []  ST dev > 0.5 mm         [] PVC's freq  [] PVC's infrequent    Stress Test Performed:   [] Yes    [x] No     Type:     [] Stress Echo   [] Exercise Stress Test (no imaging)      [] Stress Nuclear  [] Stress Imaging     Results   [] Negative   [] Positive        [] Indeterminate  [] Unavailable     If Positive/ Risk / Extent of Ischemia:       [] Low  [] Intermediate         [] High  [] Unavailable      Cardiac CTA Performed:  [] Yes    [x] No      Results   [] CAD   [] Non obstructive CAD      [] No CAD   [] Uncertain      [] Unknown   [] Structural Disease.      Pre Procedure Medications: [] Yes    [x] No         [] ASA  [] Beta Blockers      [] Nitrate  [] Ca Channel Blockers      [] Ranolazine  [] Statin       [] Plavix/Others antiplatelets      Mobasser Nico Anthony MD  Fellow, [address]      I was present during entire procedure and performed all critical elements of the procedure.     Stephania Retana MD

## 2021-01-05 NOTE — CARE COORDINATION
Provider called unit to request that post-cath orders in signed & held be released so appropriate protocol can be initiated.      Janeth Latif, APRN - CNP

## 2021-01-05 NOTE — CONSULTS
[facility] Cardioth[facility]acic Surgery  CONSULTATION      CC: Severe mitral regurgitation    HPI:  Ms. Starr Jacinto is a 39 y.o.  female who presents with severe mitral valve regurgitation. Patient reports that she has experienced 3 to 4 episodes of sudden onset shortness of breath accompanied by a frothy/productive cough, wheezing and chest pain. The first episode occurred in early fall. She was seen and evaluated multiple times with no evidence of cardiac causation, the episodes were thought to be anxiety driven. She arrived to the ER on 12/31/2020 and found to have acute pulmonary edema secondary to congestive heart failure. She was admitted for further evaluation and treatment. Pertinent medical history of anxiety, anemia and remote history of drug use (non-intravenous drug use). Cardiac workup has revealed severe mitral regurgitation, ,oderate tricuspid regurgitation and mildly reduced EF of 45% in the absence of CAD. Patient denies shortness of breath and chest pain at this time. She remains supine following cardiac cath. She has been referred for consideration of mitral valve repair vs replacement. PMH:   has a past medical history of Anxiety, Chest pain, Hypertension, and SOB (shortness of breath). PSH:   has a past surgical history that includes hernia repair; transesophageal echocardiogram (01/04/2021); and Cardiac catheterization (01/05/2021). Allergies:     Allergies   Allergen Reactions    Antihistamines, Chlorpheniramine-Type     Hydroxyzine Anxiety       Medications:  Current Facility-Administered Medications:     losartan (COZAAR) tablet 25 mg, 25 mg, Oral, Daily, Vashti Gomez MD    furosemide (LASIX) tablet 20 mg, 20 mg, Oral, Daily, Bobby Ott MD, 20 mg at 01/04/21 1416    FLUoxetine (PROZAC) capsule 10 mg, 10 mg, Oral, Daily, Barrington Hunt MD, 10 mg at 01/04/21 1415    lurasidone (LATUDA) tablet 40 mg, 40 mg, Oral, Daily, Barrington Hunt MD, 40 mg at 01/04/21 1415    sodium chloride flush 0.9 % injection 10 mL, 10 mL, Intravenous, 2 times per day, Hannah Hernandez MD, 10 mL at 01/04/21 2316    sodium chloride flush 0.9 % injection 10 mL, 10 mL, Intravenous, PRN, Hannah Hernandez MD    enoxaparin (LOVENOX) injection 40 mg, 40 mg, Subcutaneous, Daily, Hannah Hernandez MD, 40 mg at 01/04/21 1415    promethazine (PHENERGAN) tablet 12.5 mg, 12.5 mg, Oral, Q6H PRN **OR** ondansetron (ZOFRAN) injection 4 mg, 4 mg, Intravenous, Q6H PRN, Hannah Hernandez MD    polyethylene glycol (GLYCOLAX) packet 17 g, 17 g, Oral, Daily PRN, Hannah Hernandez MD    acetaminophen (TYLENOL) tablet 650 mg, 650 mg, Oral, Q6H PRN, 650 mg at 01/04/21 1420 **OR** acetaminophen (TYLENOL) suppository 650 mg, 650 mg, Rectal, Q6H PRN, Hannah Hernandez MD    Social Hx:   reports that she has been smoking cigarettes. She has been smoking about 0.50 packs per day. She has never used smokeless tobacco.    Family Hx:  family history is not on file. ROS:    Review of Systems   Constitutional: Negative for appetite change, chills, fatigue and fever. HENT: Negative for congestion. Eyes: Negative for visual disturbance. Respiratory: Negative for chest tightness and shortness of breath. Cardiovascular: Negative for chest pain and leg swelling. Gastrointestinal: Negative for abdominal pain, constipation and diarrhea. Genitourinary: Negative for difficulty urinating. Musculoskeletal: Negative for gait problem. Skin: Negative for color change. Neurological: Negative for dizziness, weakness and numbness. Psychiatric/Behavioral: Negative for self-injury. The patient is nervous/anxious. Chronic issues with anxiety and underlying panic disorder       Physical Examination    Vitals:  Vitals:    01/05/21 1030   BP:    Pulse: 88   Resp: 19   Temp:    SpO2:      Physical Exam  Vitals signs and nursing note reviewed.    Constitutional: General: She is not in acute distress. Appearance: Normal appearance. She is normal weight. She is not ill-appearing. HENT:      Head: Normocephalic. Nose: Nose normal.      Mouth/Throat:      Mouth: Mucous membranes are moist.      Pharynx: Oropharynx is clear. Eyes:      Extraocular Movements: Extraocular movements intact. Conjunctiva/sclera: Conjunctivae normal.      Pupils: Pupils are equal, round, and reactive to light. Neck:      Musculoskeletal: Normal range of motion. Cardiovascular:      Rate and Rhythm: Normal rate and regular rhythm. Pulses: Normal pulses. Heart sounds: Normal heart sounds. No murmur. Pulmonary:      Effort: Pulmonary effort is normal. No respiratory distress. Breath sounds: Normal breath sounds. No wheezing. Abdominal:      General: Abdomen is flat. Bowel sounds are normal.      Palpations: Abdomen is soft. Tenderness: There is no abdominal tenderness. Musculoskeletal:      Right lower leg: No edema. Left lower leg: No edema. Comments: Unable to assess d/t post cath restrictions on movement. Skin:     General: Skin is warm and dry. Comments: TR band in place to right wrist, bandaid to right groin site (soft, non-tender, no redness/edema/drainage noted)   Neurological:      Mental Status: She is alert. Labs:   CBC:   Recent Labs     01/03/21  0559 01/04/21  0652 01/05/21  0556   WBC 5.4 4.7 4.9   HGB 9.8* 9.8* 9.7*   HCT 31.6* 31.8* 31.8*   MCV 74.0* 74.8* 76.4*    363 357     BMP:  Recent Labs     01/03/21  0559 01/04/21  0652 01/05/21  0556    139 139   K 3.8 3.9 4.0    106 105   CO2 24 23 22   BUN 10 10 11   CREATININE 0.65 0.59 0.57     Accucheck Glucoses:No results for input(s): POCGLU in the last 72 hours. Cardiac Enzymes: No results for input(s): CKTOTAL, CKMB, CKMBINDEX, TROPONINI in the last 72 hours. PT/INR: No results for input(s): PROTIME, INR in the last 72 hours.   APTT: No results for input(s): APTT in the last 72 hours. Liver Profile:  Lab Results   Component Value Date    AST 13 12/31/2020    ALT 6 12/31/2020    BILIDIR 0.08 12/31/2020    BILITOT 0.46 12/31/2020    ALKPHOS 56 12/31/2020     Lab Results   Component Value Date    CHOL 164 01/05/2021    HDL 55 01/05/2021    TRIG 59 01/05/2021     TSH:   Lab Results   Component Value Date    TSH 3.30 12/31/2020     UA:   Lab Results   Component Value Date    COLORU YELLOW 12/31/2020    PHUR 5.5 12/31/2020    WBCUA 2 TO 5 12/31/2020    RBCUA 2 TO 5 12/31/2020    MUCUS NOT REPORTED 12/31/2020    TRICHOMONAS NOT REPORTED 12/31/2020    YEAST NOT REPORTED 12/31/2020    BACTERIA NOT REPORTED 12/31/2020    SPECGRAV 1.011 12/31/2020    LEUKOCYTESUR TRACE 12/31/2020    UROBILINOGEN Normal 12/31/2020    BILIRUBINUR NEGATIVE 12/31/2020    GLUCOSEU NEGATIVE 12/31/2020    AMORPHOUS NOT REPORTED 12/31/2020         Testing:  Cardiac cath:   Normal coronary arteries. Recommendations      Medical therapy as needed. Risk factor modification. Routine Post Diagnostic Cath orders. Signature      ----------------------------------------------------------------   Electronically signed by Raúl Diaz(Performing Physician)   on 01/05/2021 14:34   ----------------------------------------------------------------      Angiographic Findings: Cardiac Arteries and Lesion Findings     LMCA: Normal 0% stenosis. LAD: Normal 0% stenosis. The D1 is normal.   LCx: Normal 0% stenosis. The OM1 and OM2 are normal.   RCA: Normal 0% stenosis. Coronary Tree Dominance: Right      GEOVANY:  The study was performed by the attending and the sonographer. The study was performed under conscious sedation. Normal left ventricular chamber dimension. Mildly reduced systolic function, estimated EF 45%. Left atrial dilatation. Thickened mitral valve leaflets. MVA by 3D is 6.11cm2.   Severe Mitral regurgitation (multiple jets noted by 2D and 3D.) Pulmonary vein flow reversal mild pulmonary edema or an atypical infection. CT scan:   CTA OF THE CHEST 12/31/2020 6:23 am       TECHNIQUE:   CTA of the chest was performed after the administration of intravenous   contrast.  Multiplanar reformatted images are provided for review. MIP   images are provided for review. Dose modulation, iterative reconstruction,   and/or weight based adjustment of the mA/kV was utilized to reduce the   radiation dose to as low as reasonably achievable. COMPARISON:   Chest single view August 13, 2020       HISTORY:   ORDERING SYSTEM PROVIDED HISTORY: r/o PE   TECHNOLOGIST PROVIDED HISTORY:   r/o PE   Reason for Exam: r/o PE       FINDINGS:   Pulmonary Arteries: Pulmonary arteries are suboptimally opacified for   evaluation. No definitive evidence of intraluminal filling defect to suggest   pulmonary embolism. Main pulmonary artery is normal in caliber. Mediastinum: No evidence of mediastinal lymphadenopathy. The heart is   moderately enlarged with otherwise unremarkable configuration. No evidence   of pericardial effusion is identified. .  There is no acute abnormality of the   thoracic aorta. Lungs/pleura: Scattered interlobular septal thickening consistent with   pulmonary interstitial edema is noted. Mild multifocal ill-defined   consolidation is seen scattered throughout the bilateral lungs. Layering   right-sided pleural effusion is noted which measures up to 7 mm in diameter. Orrie Johns Upper Abdomen: Limited images of the upper abdomen are unremarkable. Soft Tissues/Bones: No acute bone or soft tissue abnormality. Impression   1. Note: Study is limited by suboptimal volume of intravenous iodinated   contrast within the pulmonary arterial system. 2. No definitive evidence of acute pulmonary embolism. 3. Moderate cardiomegaly with moderate pulmonary interstitial edema and   suspected mild multifocal bilateral lung overlying alveolar edema.    4. Mild right-sided layering posterior pleural effusions. 5. Constellation of findings suggests congestive heart failure (CHF). Imaging Studies:  I have personally reviewed the testing/imaging, and agree with the findings listed above. In summary, Ms. Deepali Maier is a 39y.o. year-old female with severe mitral regurgitation. Problem List:    Severe mitral regurgitation  o MR ERO 0.45, MR volume 72 ml   Acute pulmonary edema with congestive heart failure   Acute respiratory failure secondary to above   Microcytic anemia d/t iron deficiency   Suspected sleep apnea   Anxiety   Current everyday smoker     Recommendation:  I believe Ms. Castillo would benefit optimally from mitral valve repair vs replace. I have discussed the proposed procedure, along with its risk, benefits, and therapeutic alternatives. Specific risks discussedincluded death, stroke, mediastinitis, re-operation for bleeding, and atrial fibrillation. We have also discussed the potential need for blood transfusion, along with its risks and benefits. She appears to understand,and consents to proceed. Per discussion with Dr Cj Smallwood, patient to return to clinic in two weeks to meet with surgeon to allow time for Cardiology and Medicine teams to optimize patient medically including aggressive diuresis. Patient also encourage to quit smoking during this time. Thank you for including our team in the care of this patient.     Pat Warren, APRN - CNP

## 2021-01-06 VITALS
TEMPERATURE: 98.2 F | OXYGEN SATURATION: 100 % | DIASTOLIC BLOOD PRESSURE: 67 MMHG | BODY MASS INDEX: 24.65 KG/M2 | HEIGHT: 66 IN | HEART RATE: 89 BPM | RESPIRATION RATE: 16 BRPM | SYSTOLIC BLOOD PRESSURE: 115 MMHG | WEIGHT: 153.38 LBS

## 2021-01-06 LAB
ABSOLUTE EOS #: 0.04 K/UL (ref 0–0.44)
ABSOLUTE IMMATURE GRANULOCYTE: <0.03 K/UL (ref 0–0.3)
ABSOLUTE LYMPH #: 1.49 K/UL (ref 1.1–3.7)
ABSOLUTE MONO #: 0.5 K/UL (ref 0.1–1.2)
ANION GAP SERPL CALCULATED.3IONS-SCNC: 12 MMOL/L (ref 9–17)
BASOPHILS # BLD: 1 % (ref 0–2)
BASOPHILS ABSOLUTE: 0.06 K/UL (ref 0–0.2)
BUN BLDV-MCNC: 13 MG/DL (ref 6–20)
BUN/CREAT BLD: NORMAL (ref 9–20)
CALCIUM SERPL-MCNC: 8.9 MG/DL (ref 8.6–10.4)
CHLORIDE BLD-SCNC: 105 MMOL/L (ref 98–107)
CO2: 21 MMOL/L (ref 20–31)
CREAT SERPL-MCNC: 0.53 MG/DL (ref 0.5–0.9)
DIFFERENTIAL TYPE: ABNORMAL
EOSINOPHILS RELATIVE PERCENT: 1 % (ref 1–4)
GFR AFRICAN AMERICAN: >60 ML/MIN
GFR NON-AFRICAN AMERICAN: >60 ML/MIN
GFR SERPL CREATININE-BSD FRML MDRD: NORMAL ML/MIN/{1.73_M2}
GFR SERPL CREATININE-BSD FRML MDRD: NORMAL ML/MIN/{1.73_M2}
GLUCOSE BLD-MCNC: 99 MG/DL (ref 70–99)
HCT VFR BLD CALC: 32.4 % (ref 36.3–47.1)
HEMOGLOBIN: 10.2 G/DL (ref 11.9–15.1)
IMMATURE GRANULOCYTES: 0 %
LYMPHOCYTES # BLD: 26 % (ref 24–43)
MCH RBC QN AUTO: 23.5 PG (ref 25.2–33.5)
MCHC RBC AUTO-ENTMCNC: 31.5 G/DL (ref 28.4–34.8)
MCV RBC AUTO: 74.7 FL (ref 82.6–102.9)
MONOCYTES # BLD: 9 % (ref 3–12)
NRBC AUTOMATED: 0 PER 100 WBC
PDW BLD-RTO: 16.9 % (ref 11.8–14.4)
PLATELET # BLD: 354 K/UL (ref 138–453)
PLATELET ESTIMATE: ABNORMAL
PMV BLD AUTO: 9.6 FL (ref 8.1–13.5)
POTASSIUM SERPL-SCNC: 4 MMOL/L (ref 3.7–5.3)
RBC # BLD: 4.34 M/UL (ref 3.95–5.11)
RBC # BLD: ABNORMAL 10*6/UL
SEG NEUTROPHILS: 63 % (ref 36–65)
SEGMENTED NEUTROPHILS ABSOLUTE COUNT: 3.69 K/UL (ref 1.5–8.1)
SODIUM BLD-SCNC: 138 MMOL/L (ref 135–144)
WBC # BLD: 5.8 K/UL (ref 3.5–11.3)
WBC # BLD: ABNORMAL 10*3/UL

## 2021-01-06 PROCEDURE — 85025 COMPLETE CBC W/AUTO DIFF WBC: CPT

## 2021-01-06 PROCEDURE — 6370000000 HC RX 637 (ALT 250 FOR IP): Performed by: INTERNAL MEDICINE

## 2021-01-06 PROCEDURE — 6360000002 HC RX W HCPCS: Performed by: INTERNAL MEDICINE

## 2021-01-06 PROCEDURE — 2580000003 HC RX 258: Performed by: INTERNAL MEDICINE

## 2021-01-06 PROCEDURE — 36415 COLL VENOUS BLD VENIPUNCTURE: CPT

## 2021-01-06 PROCEDURE — 80048 BASIC METABOLIC PNL TOTAL CA: CPT

## 2021-01-06 PROCEDURE — 99232 SBSQ HOSP IP/OBS MODERATE 35: CPT | Performed by: INTERNAL MEDICINE

## 2021-01-06 RX ORDER — FUROSEMIDE 20 MG/1
20 TABLET ORAL DAILY
Qty: 60 TABLET | Refills: 3 | Status: ON HOLD | OUTPATIENT
Start: 2021-01-07 | End: 2021-03-04 | Stop reason: HOSPADM

## 2021-01-06 RX ORDER — LOSARTAN POTASSIUM 25 MG/1
25 TABLET ORAL DAILY
Qty: 30 TABLET | Refills: 3 | Status: ON HOLD | OUTPATIENT
Start: 2021-01-07 | End: 2021-03-04 | Stop reason: HOSPADM

## 2021-01-06 RX ADMIN — Medication 10 ML: at 08:54

## 2021-01-06 RX ADMIN — LURASIDONE HYDROCHLORIDE 40 MG: 40 TABLET, FILM COATED ORAL at 08:54

## 2021-01-06 RX ADMIN — FLUOXETINE 10 MG: 10 CAPSULE ORAL at 08:53

## 2021-01-06 RX ADMIN — LOSARTAN POTASSIUM 25 MG: 25 TABLET, FILM COATED ORAL at 08:53

## 2021-01-06 RX ADMIN — FUROSEMIDE 20 MG: 20 TABLET ORAL at 08:53

## 2021-01-06 NOTE — PROGRESS NOTES
[facility]  Progress Note                   Date:   1/6/2021  Patient name: Megan Saldana  Date of admission:  12/31/2020  4:12 AM  MRN:   [id number]  YOB: 1975  PCP: MAYRA Dowd CNP    Reason for Admission: Pulmonary edema, acute, with congestive heart failure ([facility] [address] [I50.1]    Subjective:       Clinical Changes /Abnormalities:  Pt. Seen & examined alone in room after discussing with RN. rosmery cute CV issues/concerns. Awaiting cardiology for discharge. Review of Systems    Medications:   Scheduled Meds:   sodium chloride flush  10 mL Intravenous 2 times per day    losartan  25 mg Oral Daily    sodium chloride flush  10 mL Intravenous 2 times per day    furosemide  20 mg Oral Daily    FLUoxetine  10 mg Oral Daily    lurasidone  40 mg Oral Daily    sodium chloride flush  10 mL Intravenous 2 times per day    enoxaparin  40 mg Subcutaneous Daily     Continuous Infusions:  CBC:   Recent Labs     01/04/21  0652 01/05/21  0556 01/06/21  0654   WBC 4.7 4.9 5.8   HGB 9.8* 9.7* 10.2*    357 354     BMP:    Recent Labs     01/04/21  0652 01/05/21  0556 01/06/21  0654    139 138   K 3.9 4.0 4.0    105 105   CO2 23 22 21   BUN 10 11 13   CREATININE 0.59 0.57 0.53   GLUCOSE 90 92 99     Hepatic:No results for input(s): AST, ALT, ALB, BILITOT, ALKPHOS in the last 72 hours. Troponin: No results for input(s): TROPHS in the last 72 hours. BNP: No results for input(s): BNP in the last 72 hours. Lipids:   Recent Labs     01/05/21  0556   CHOL 164   HDL 55     INR: No results for input(s): INR in the last 72 hours. Objective:   Vitals: /70   Pulse 89   Temp 98.2 °F (36.8 °C) (Oral)   Resp 16   Ht 5' 6\" (1.676 m)   Wt 153 lb 6 oz (69.6 kg)   SpO2 100%   BMI 24.76 kg/m²   General appearance: alert and cooperative with exam  HEENT: Head: Normocephalic, no lesions, without obvious abnormality.   Neck:no JVD, trachea midline, no adenopathy  Lungs: Clear to auscultation throughout  Heart: Regular rate and rhythm, s1/s2 auscultated, + murmurs, SR  Abdomen: soft, non-tender, bowel sounds active  Extremities: no edema  Neurologic: not done    Cardiac Cath 1/5/21  Findings:     LMCA: Normal 0% stenosis.     LAD: Normal 0% stenosis. The D1 is normal.     LCx: Normal 0% stenosis. The OM1 and OM2 are normal.     RCA: Normal 0% stenosis.      Coronary Tree      Dominance: Right  The LV gram was not performed      Estimated blood loss: 5 ml     Conclusions:  1. Normal Coronary arteries     Recommendations:  1. Medical Therapy. 2. Risk Factors Modification. 3. Post Cath Protocol    GEOVANY 1/4/2021:     Structures:     LA:       Enlarged   AURELIO:    No thrombus  RA:      Normal  RV:      Normal  LV:       Normal  Estimated LVEF:  45 %  Aorta:               Mild atheromatous disease arch  Pericardium:    No pericardial effusion  Septum:           No intracardiac shunt via color Doppler.      Valves:     Mitral Valve:    Structurally normal.  Severe Mitral  regurgitation is identified At least 3 different jets were noted. MR ERO 0.45, MR volume 72 ml  Aortic Valve: The aortic valve is trileaflet and opens adequately. Mild  regurgitiation is identified. Tricuspid valve: Structurally normal. No  regurgitation is identified. Pulmonary valve: Normal. No significant regurgitation     No valvular vegetations or thrombus identified.        GEOVANY Summary:      1. A GEOVANY was performed without complications. 2. Severe Mitral regurgitation. At least 3 different jets noted. MR ERO 0.45, MR volume 72 ml consistent with severe MR.   3. Dilated Left atrium  4. LVEF around 45 %  5. No thrombus or valvular vegetation identified        Recommendations   - Will plan for [facility] tomorrow to r/o Ischemic etiology for severe MR     Assessment / Acute Cardiac Problems:   1. Acute pulmonary edema  2. Acute CHF, systolic  3. Non-obstructive CAD  4. Hx of IVDA  5.  Severe MR on GEOVANY    Patient Active Problem List:     Acute pulmonary edema (HCC)     Anxiety     Microcytic anemia     History of drug abuse ([facility] [address]     Acute on chronic diastolic congestive heart failure (HCC)     Severe mitral valve regurgitation      Plan of Treatment:   1. Testing reviewed. CTS recommendations appreciated. Will plan OP follow-up. 2. Stable without pain or SOB. Continue PO Lasix, ARB. No BB d/t hypotension. Reassess as OP. Long discussion regarding med compliance, diet, exercise, and follow-up  3. OK for discharge from CV standpoint. F/U in clinic as scheduled with us and CTS.     Electronically signed by MAYRA Payton CNP on 1/6/2021 at 11:21 AM  [address]  [phone]

## 2021-01-06 NOTE — CARE COORDINATION
Consult received per 2C CM ,pt request to see SS  Met with pt this date states she is presently residing at [facility]. Pt plans to return there after d/c. Pt states she spoke with gemma at  [facility] [phone] this date and she will provide transportation. Pt also states she has been approved for [place] [address] apartment , located on [place] [address]. Property inspection scheduled on Friday 1/8/21. Pt also reports she was recently hired with [employer] for employment and was scheduled to begin orientation. However ,orientation has been postponed until after surgery. Pt reports surgery is scheduled in 2 weeks. Pt inquired about after needs after her surgery. Writer informed pt transitional planning will be evaluated after surgery. 2C CM informed of above and gemma ph# given also. Insurance is [employer].

## 2021-01-06 NOTE — PROGRESS NOTES
Patient given discharge paperwork and all questions answered. Patient discharged with all of her belongings and prescriptions.

## 2021-01-06 NOTE — PROGRESS NOTES
CLINICAL PHARMACY NOTE: MEDS TO [address] Select Patient?: No  Total # of Prescriptions Filled: 2   The following medications were delivered to the patient:  · Losartan 25 mg tab  · Furosemide 20 mg tab  Total # of Interventions Completed: 1  Time Spent (min): 60    Additional Documentation:Medications delivered to the patient in her room (238).   Ruthann Martinez

## 2021-01-06 NOTE — PROGRESS NOTES
[facility]  Internal Medicine Teaching Residency Program  Inpatient Daily Progress Note  ______________________________________________________________________________    Patient: August Hauser  YOB: 1975   W:[id number]    Acct: [de-identified]     Room: 5/0-80  Admit date: 12/31/2020  Today's date: 01/06/21  Number of days in the hospital: 6    SUBJECTIVE   Admitting Diagnosis: Acute pulmonary edema ([address]  CC: Headache and chest tightness    No acute events overnight. Afebrile, hemodynamically stable. Patient underwent left heart cath yesterday, normal coronary arteries. CT surgery consulted for possible mitral valve repair versus replacement. Recommended following up in 2 weeks after optimization of medical therapy. GEOVANY- severe mitral regurgitation, dilated left atrium, LVEF around 45%, no thrombus or valvular vegetation identified.           ROS:  Constitutional:  negative for chills, fevers, sweats  Respiratory:  negative for cough, dyspnea on exertion, shortness of breath, wheezing  Cardiovascular:  negative for chest pain, chest pressure/discomfort, lower extremity edema, palpitations  Gastrointestinal:  negative for abdominal pain, constipation, diarrhea, nausea, vomiting  Neurological:  negative for dizziness, headache    BRIEF HISTORY     45 y.o. female PMH of anxiety presented to hospital with sudden onset severe left chest pain and SOB when she woke up in the morning.  Patient reports that she is having intermittent chest pain for the past 1 week and was seen earlier this month at [facility] for similar complaint which was thought to be secondary to anxiety as the chest x-ray was normal.  On presentation to ED, patient was saturation on room air, BP elevated to 144/107, tachycardic /min but while in the ED she dropped saturation to 80s which came up with nasal cannula oxygen.  Troponins 8 and 9 at 2 occurrences, proBNP 911, COVID-19 negative x2 (rapid and PCR).  CT PE was negative for PE but did show moderate cardiomegaly with moderate pulmonary interstitial edema, suspected mild multifocal bilateral lung overlying alveolar edema, mild right-sided layering posteriorly pleural effusions suggestive of CHF.  Patient received 1 L IV fluid bolus in the ED.  Patient complained of some watery diarrhea with 1-2 episodes per day for the past 1 week associated with some nonspecific abdominal pain though denied nausea/vomiting.  She reported that she does snore at night, have some morning headaches and usually needs afternoon naps.  She also reported some morning joint stiffness especially in knee joints. She denied headache, vision changes, loss of smell/taste, palpitations, dysuria or burning micturition but have frequency of micturition.  She smokes 5-6 cigarette per day, does not drink alcohol, but was using drugs in the past with most recent use in August when she use meth. Ileana Barton has used cocaine in the past, last use couple of years ago. OBJECTIVE     Vital Signs:  /70   Pulse 89   Temp 98.2 °F (36.8 °C) (Oral)   Resp 16   Ht 5' 6\" (1.676 m)   Wt 153 lb 6 oz (69.6 kg)   SpO2 100%   BMI 24.76 kg/m²     Temp (24hrs), Av.4 °F (36.9 °C), Min:98.2 °F (36.8 °C), Max:98.6 °F (37 °C)    No intake/output data recorded. Physical Exam:  Constitutional: This is a well developed, well nourished, 18.5-24.9 - Normal 39y.o. year old female who is alert, oriented, cooperative and in no apparent distress. Head:normocephalic and atraumatic. EENT:  PERRLA. No conjunctival injections. Septum was midline, mucosa was without erythema, exudates or cobblestoning. No thrush was noted. Neck: Supple without thyromegaly. No elevated JVP. Trachea was midline. Respiratory: Chest was symmetrical without dullness to percussion. Breath sounds bilaterally were clear to auscultation. There were no wheezes, rhonchi or rales.  There is no intercostal retraction or use of accessory muscles. No egophony noted. Cardiovascular: Regular, +murmur   Abdomen: Slightly rounded and soft without organomegaly. No rebound, rigidity or guarding was appreciated. Lymphatic: No lymphadenopathy. Musculoskeletal: Normal curvature of the spine. No gross muscle weakness. Extremities:  No lower extremity edema, ulcerations, tenderness, varicosities or erythema. Muscle size, tone and strength are normal.  No involuntary movements are noted. Skin:  Warm and dry. Good color, turgor and pigmentation. No lesions or scars.   No cyanosis or clubbing  Neurological/Psychiatric: The patient's general behavior, level of consciousness, thought content and emotional status is normal.        Medications:  Scheduled Medications:    sodium chloride flush  10 mL Intravenous 2 times per day    losartan  25 mg Oral Daily    sodium chloride flush  10 mL Intravenous 2 times per day    furosemide  20 mg Oral Daily    FLUoxetine  10 mg Oral Daily    lurasidone  40 mg Oral Daily    sodium chloride flush  10 mL Intravenous 2 times per day    enoxaparin  40 mg Subcutaneous Daily     Continuous Infusions:   PRN Medications    sodium chloride flush, 10 mL, PRN      sodium chloride flush, 10 mL, PRN      acetaminophen, 650 mg, Q4H PRN      sodium chloride flush, 10 mL, PRN      promethazine, 12.5 mg, Q6H PRN    Or      ondansetron, 4 mg, Q6H PRN      polyethylene glycol, 17 g, Daily PRN      acetaminophen, 650 mg, Q6H PRN        Diagnostic Labs:  CBC:   Recent Labs     01/04/21  0652 01/05/21  0556 01/06/21  0654   WBC 4.7 4.9 5.8   RBC 4.25 4.16 4.34   HGB 9.8* 9.7* 10.2*   HCT 31.8* 31.8* 32.4*   MCV 74.8* 76.4* 74.7*   RDW 16.2* 16.4* 16.9*    357 354     BMP:   Recent Labs     01/04/21  0652 01/05/21  0556 01/06/21  0654    139 138   K 3.9 4.0 4.0    105 105   CO2 23 22 21   BUN 10 11 13   CREATININE 0.59 0.57 0.53       Imaging:    Xr Chest Portable    Result Date: 12/31/2020  Mild interstitial prominence may be related to crowding from low lung volumes versus mild pulmonary edema or an atypical infection. Ct Chest Pulmonary Embolism W Contrast    Result Date: 12/31/2020  1. Note: Study is limited by suboptimal volume of intravenous iodinated contrast within the pulmonary arterial system. 2. No definitive evidence of acute pulmonary embolism. 3. Moderate cardiomegaly with moderate pulmonary interstitial edema and suspected mild multifocal bilateral lung overlying alveolar edema. 4. Mild right-sided layering posterior pleural effusions. 5. Constellation of findings suggests congestive heart failure (CHF). ASSESSMENT & PLAN     ASSESSMENT / PLAN:     Principal Problem:    Pulmonary edema, acute, with congestive heart failure ([address]    Plan:   1. Acute interstitial pulmonary edema - likely secondary to new onset CHF    GEOVANY- severe mitral regurgitation, dilated left atrium, LVEF around 45%, no thrombus or valvular vegetation identified. Left heart cath 1/5/2021-normal coronary arteries. CT surgery consulted for possible mitral valve repair versus replacement. Plan to follow-up with CTS in 2 weeks    2. Acute respiratory failure -   Likely secondary to #1   3. PE ruled out. Duplex and CT PE negative  4. Microcytic anemia due to iron deficiency: s/p IV [id number] mg 3 doses. Follow-up with GI and OB/GYN as an outpatient  5. Possibility of obstructive sleep apnea with STOP-BANG score of 2-3. Will follow with outpatient sleep study.    6. Anxiety: Continue Prozac 10 and Latuda 40 daily     DVT ppx: Lovenox  GI ppx: None     PT/OT/SW: On board  Discharge Planning: On board    Marielos Knapp MD  Internal Medicine Resident, PGY-3  [facility];  [place], [place]  1/6/2021, 10:39 AM

## 2021-01-09 NOTE — DISCHARGE SUMMARY
[address]     Department of Internal Medicine - Staff Internal Medicine Teaching Service    INPATIENT DISCHARGE SUMMARY      Patient Identification:  Norma Anguiano is a 39 y.o. female. :  1975  MRN: [id number]     Acct: [de-identified]   PCP: MAYRA Carey CNP  Admit Date:  2020  Discharge date and time: 2021  1:47 PM   Attending Provider: No att. providers found                                     [address] Problem Lists:  Principal Problem:    Pulmonary edema, acute, with congestive heart failure ([facility] [address]  Active Problems:    Anxiety    Microcytic anemia    History of drug abuse ([facility] [address]    Acute on chronic diastolic congestive heart failure (HCC)    Severe mitral valve regurgitation  Resolved Problems:    * No resolved hospital problems. *      HOSPITAL STAY     Brief Inpatient course:    39 y.o. female PMH of anxiety presented to hospital with sudden onset severe left CP and SOB when she woke up in the morning. Patient reports that she is having intermittent chest pain for the past 1 week and was seen earlier this month at [facility] for similar complaint which was thought to be secondary to anxiety. On presentation to ED, pt was on room air, /107, tachycardic /min but while in the ED she dropped saturation to 80s which came up with nasal cannula oxygen. Troponins 8 and 9 at 2 occurrences, proBNP 911, COVID-19 negative x2 (rapid and PCR). CT PE was negative for PE but did show moderate cardiomegaly with moderate pulmonary interstitial edema, suspected mild multifocal bilateral lung overlying alveolar edema, mild right-sided layering posteriorly pleural effusions suggestive of CHF. Pt received 1 L IV fluid bolus in the ED. She also reported some morning joint stiffness especially in knee joints.  She denied headache, vision changes, loss of smell/taste, palpitations, dysuria or burning micturition but have frequency of Disposition: home    Patient Instructions:   Discharge Medication List as of 1/6/2021  1:09 PM      START taking these medications    Details   losartan (COZAAR) 25 MG tablet Take 1 tablet by mouth daily, Disp-30 tablet, R-3Normal      furosemide (LASIX) 20 MG tablet Take 1 tablet by mouth daily, Disp-60 tablet, R-3Normal         CONTINUE these medications which have NOT CHANGED    Details   lurasidone (LATUDA) 40 MG TABS tablet Take by mouth dailyHistorical Med      FLUoxetine (PROZAC) 10 MG capsule Take by mouth dailyHistorical Med             Activity: activity as tolerated    Diet: regular diet    Follow-up:    James Lozano MD  [address] [place] [address]  [phone]      microcytic anemia, evaluation for colonoscopy    [employer], [address] [address] [place] [address]  [phone]      Microcytic anemia, H/o heavy mentrual bleeding    Yeny Warner MD  [facility]  [place] [address]  [phone]    Go on 1/20/2021  Please come to our clinic on Wednesday, 1/20/2021 at 9:45 am to meet with Dr Ramon Virgen. Yuval Ansari, MAYRA - NP  [address]  [place] [address] [place] [address]  [phone]    On 1/18/2021  at 11:15  f/u cath no PCI  CTS for MVR TBS      Patient Instructions:   1. Follow up with cardiothoracic surgery and cardiology in 2 weeks. 2.Continue taking Lasix 20 mg once a day, losartan 25 mg once a day  3. Follow-up with OB/GYN and GI specialist as an outpatient to know the caus      Note that over 30 minutes was spent in preparing discharge papers, discussing discharge with patient, medication review, etc.      Thomas Blackwell MD, MD  Internal Medicine Resident, PGY-1  [address], [place]  1/9/2021, 12:18 PM

## 2021-01-18 NOTE — PATIENT INSTRUCTIONS
Patient Education        Mitral Valve Repair: Before Your Surgery  What is mitral valve repair? Mitral valve repair is an open-heart surgery that repairs a mitral valve that is not working as it should. The mitral valve opens and closes to keep blood flowing in the proper direction through your heart. If the mitral valve doesn't close properly, it's called mitral valve regurgitation. If the valve is very tight and narrow, it's called mitral valve stenosis. In both of these cases, blood doesn't flow through the heart the right way. The doctor will make a cut in the skin over your breastbone (sternum). This cut is called an incision. Then the doctor will cut through your sternum to reach your heart. The doctor will connect you to a heart-lung bypass machine. It adds oxygen to your blood and moves the blood through your body. This machine will allow the doctor to stop your heartbeat while working on your heart. How the repair is done depends on how the mitral valve is damaged. Your doctor can tell you how your mitral valve will be repaired. After repairing the valve, the doctor will restart your heartbeat. Then the doctor may use wire to put your sternum back together. Your incision will be closed with stitches or staples. The wire will stay in your chest. The incision will leave a scar that will fade with time. You may stay in the hospital for 3 to 8 days after surgery. How do you prepare for surgery? Surgery can be stressful. This information will help you understand what you can expect. And it will help you safely prepare for surgery. Preparing for surgery    · Be sure you have someone to take you home.  Anesthesia and pain medicine will make it unsafe for you to drive or get home on your own.     · Understand exactly what surgery is planned, along with the risks, benefits, and other options.     · If you take aspirin or some other blood thinner, ask your doctor if you should stop taking it before your surgery. Make sure that you understand exactly what your doctor wants you to do. These medicines increase the risk of bleeding.     · Tell your doctor ALL the medicines, vitamins, supplements, and herbal remedies you take. Some may increase the risk of problems during your surgery. Your doctor will tell you if you should stop taking any of them before the surgery and how soon to do it.     · Make sure your doctor and the hospital have a copy of your advance directive. If you don't have one, you may want to prepare one. It lets others know your health care wishes. It's a good thing to have before any type of surgery or procedure. What happens on the day of surgery? · Follow the instructions exactly about when to stop eating and drinking. If you don't, your surgery may be canceled. If your doctor told you to take your medicines on the day of surgery, take them with only a sip of water.     · Take a bath or shower before you come in for your surgery. Do not apply lotions, perfumes, deodorants, or nail polish.     · Do not shave the surgical site yourself.     · Take off all jewelry and piercings. And take out contact lenses, if you wear them. At the hospital or surgery center   · Bring a picture ID.     · The area for surgery is often marked to make sure there are no errors.     · You will be kept comfortable and safe by your anesthesia provider. You will be asleep during the surgery.     · The surgery will take about 3 to 5 hours. After surgery    · You will go to the intensive care unit (ICU) right after surgery. You will probably stay in the ICU for 1 or 2 days before you go to your regular hospital room.     · You will have a breathing tube down your throat. This is usually removed within 6 hours after surgery. You will not be able to talk or drink liquids while the tube is in your throat. After the tube is removed, your throat will feel dry and scratchy.  Your nurse will tell you when it is safe to drink liquids again.     · As you wake up in the ICU, the nurse will check to be sure you are stable and comfortable. It is important for you to tell your doctor and nurse how you feel and ask questions about any concerns you may have.     · You will have a thin plastic tube in a vein in your neck. This tube is called a catheter. It is used to keep track of how well your heart is working. This is usually removed in 1 to 3 days.     · You will have chest tubes to drain fluid and blood after surgery. The fluid and extra blood are normal. They usually last for only a few days. The chest tubes are usually removed in 1 or 2 days.     · You will have several thin wires coming out of your chest near your incision. These wires can help keep your heartbeat steady after surgery. They will be removed before you go home. When should you call your doctor? · You have questions or concerns.     · You don't understand how to prepare for your surgery.     · You become ill before the surgery (such as fever, flu, or a cold).     · You need to reschedule or have changed your mind about having the surgery. Where can you learn more? Go to https://[employer]. org and sign in to your "[employer] account. Enter D234 in the [employer] box to learn more about \"Mitral Valve Repair: Before Your Surgery. \"     If you do not have an account, please click on the \"Sign Up Now\" link. Current as of: June 2, 2020               Content Version: 12.6  © 7460-4184 [employer]. Care instructions adapted under license by [facility] ([facility]). If you have questions about a medical condition or this instruction, always ask your healthcare professional. [place] [address] any warranty or liability for your use of this information.

## 2021-01-20 ENCOUNTER — OFFICE VISIT (OUTPATIENT)
Dept: CARDIOTHORACIC SURGERY | Age: 46
End: 2021-01-20
Payer: MEDICAID

## 2021-01-20 VITALS
WEIGHT: 149 LBS | OXYGEN SATURATION: 100 % | RESPIRATION RATE: 18 BRPM | DIASTOLIC BLOOD PRESSURE: 82 MMHG | TEMPERATURE: 97 F | BODY MASS INDEX: 23.39 KG/M2 | HEIGHT: 67 IN | HEART RATE: 108 BPM | SYSTOLIC BLOOD PRESSURE: 119 MMHG

## 2021-01-20 DIAGNOSIS — I34.0 SEVERE MITRAL VALVE REGURGITATION: Primary | ICD-10-CM

## 2021-01-20 PROCEDURE — 4004F PT TOBACCO SCREEN RCVD TLK: CPT | Performed by: PHYSICIAN ASSISTANT

## 2021-01-20 PROCEDURE — 99212 OFFICE O/P EST SF 10 MIN: CPT | Performed by: PHYSICIAN ASSISTANT

## 2021-01-20 PROCEDURE — G8484 FLU IMMUNIZE NO ADMIN: HCPCS | Performed by: PHYSICIAN ASSISTANT

## 2021-01-20 PROCEDURE — 1111F DSCHRG MED/CURRENT MED MERGE: CPT | Performed by: PHYSICIAN ASSISTANT

## 2021-01-20 PROCEDURE — G8420 CALC BMI NORM PARAMETERS: HCPCS | Performed by: PHYSICIAN ASSISTANT

## 2021-01-20 PROCEDURE — G8427 DOCREV CUR MEDS BY ELIG CLIN: HCPCS | Performed by: PHYSICIAN ASSISTANT

## 2021-01-20 RX ORDER — CARVEDILOL 3.12 MG/1
3.12 TABLET ORAL DAILY
Status: ON HOLD | COMMUNITY
Start: 2021-01-19 | End: 2021-03-04 | Stop reason: HOSPADM

## 2021-01-20 NOTE — PROGRESS NOTES
Subjective:     Harleen Nolen returns today for mitral valve surgical evaluation. ECHO shows severe mitral valve regurgitation. Patient complains of worsening shortness of breath, chest pain, and lower extremity edema. Denies orthopnea, syncope, or PND. Objective:     /82 (Site: Left Upper Arm, Position: Sitting, Cuff Size: Large Adult)   Pulse 108   Temp 97 °F (36.1 °C) (Temporal)   Resp 18   Ht 5' 7\" (1.702 m)   Wt 149 lb (67.6 kg)   SpO2 100%   BMI 23.34 kg/m²   CV: RR + 2/6 systolic murmur @ LLSB  Lungs: cleat to IPPA posteriorly  ABd: s/nt  Lower extremities: trace bilateral edema    Medications:     Current Outpatient Medications on File Prior to Visit   Medication Sig Dispense Refill    carvedilol (COREG) 3.125 MG tablet Take 3.125 mg by mouth daily      losartan (COZAAR) 25 MG tablet Take 1 tablet by mouth daily 30 tablet 3    furosemide (LASIX) 20 MG tablet Take 1 tablet by mouth daily 60 tablet 3    lurasidone (LATUDA) 40 MG TABS tablet Take by mouth daily      FLUoxetine (PROZAC) 10 MG capsule Take by mouth daily       No current facility-administered medications on file prior to visit. Assessment:     Mitral valve regurgitation with pseudprolapse anterior leaflet. TIBC  Htn     Plan:     Schedule for mitral valve repair possible replacement end of Febuary  Pre op labs and covid ordered.   No CT scan necessary    Erenest Master

## 2021-02-02 ENCOUNTER — TELEPHONE (OUTPATIENT)
Dept: GASTROENTEROLOGY | Age: 46
End: 2021-02-02

## 2021-02-11 RX ORDER — SODIUM CHLORIDE, SODIUM LACTATE, POTASSIUM CHLORIDE, CALCIUM CHLORIDE 600; 310; 30; 20 MG/100ML; MG/100ML; MG/100ML; MG/100ML
1000 INJECTION, SOLUTION INTRAVENOUS CONTINUOUS
Status: CANCELLED | OUTPATIENT
Start: 2021-02-11

## 2021-02-15 ENCOUNTER — HOSPITAL ENCOUNTER (OUTPATIENT)
Dept: PREADMISSION TESTING | Age: 46
Discharge: HOME | End: 2021-02-19
Payer: MEDICAID

## 2021-02-15 ENCOUNTER — HOSPITAL ENCOUNTER (OUTPATIENT)
Dept: GENERAL RADIOLOGY | Age: 46
Discharge: HOME | End: 2021-02-17
Payer: MEDICAID

## 2021-02-15 VITALS
WEIGHT: 143 LBS | RESPIRATION RATE: 18 BRPM | HEART RATE: 114 BPM | BODY MASS INDEX: 22.44 KG/M2 | SYSTOLIC BLOOD PRESSURE: 123 MMHG | DIASTOLIC BLOOD PRESSURE: 87 MMHG | TEMPERATURE: 97.3 F | OXYGEN SATURATION: 99 % | HEIGHT: 67 IN

## 2021-02-15 LAB
-: ABNORMAL
ABSOLUTE EOS #: <0.03 K/UL (ref 0–0.44)
ABSOLUTE IMMATURE GRANULOCYTE: <0.03 K/UL (ref 0–0.3)
ABSOLUTE LYMPH #: 1.03 K/UL (ref 1.1–3.7)
ABSOLUTE MONO #: 0.39 K/UL (ref 0.1–1.2)
ALBUMIN SERPL-MCNC: 4.3 G/DL (ref 3.5–5.2)
ALBUMIN/GLOBULIN RATIO: 1.4 (ref 1–2.5)
ALLEN TEST: POSITIVE
ALP BLD-CCNC: 68 U/L (ref 35–104)
ALT SERPL-CCNC: 35 U/L (ref 5–33)
AMORPHOUS: ABNORMAL
ANION GAP SERPL CALCULATED.3IONS-SCNC: 14 MMOL/L (ref 9–17)
AST SERPL-CCNC: 46 U/L
BACTERIA: ABNORMAL
BASOPHILS # BLD: 1 % (ref 0–2)
BASOPHILS ABSOLUTE: 0.06 K/UL (ref 0–0.2)
BILIRUB SERPL-MCNC: 0.47 MG/DL (ref 0.3–1.2)
BILIRUBIN URINE: NEGATIVE
BUN BLDV-MCNC: 8 MG/DL (ref 6–20)
BUN/CREAT BLD: ABNORMAL (ref 9–20)
CALCIUM SERPL-MCNC: 8.7 MG/DL (ref 8.6–10.4)
CASTS UA: ABNORMAL /LPF (ref 0–2)
CASTS UA: ABNORMAL /LPF (ref 0–2)
CHLORIDE BLD-SCNC: 101 MMOL/L (ref 98–107)
CO2: 21 MMOL/L (ref 20–31)
COLOR: ABNORMAL
COMMENT UA: ABNORMAL
CREAT SERPL-MCNC: 0.56 MG/DL (ref 0.5–0.9)
CRYSTALS, UA: ABNORMAL /HPF
DIFFERENTIAL TYPE: ABNORMAL
DIRECT EXAM: NORMAL
EOSINOPHILS RELATIVE PERCENT: 0 % (ref 1–4)
EPITHELIAL CELLS UA: ABNORMAL /HPF (ref 0–5)
FIO2: 21
GFR AFRICAN AMERICAN: >60 ML/MIN
GFR NON-AFRICAN AMERICAN: >60 ML/MIN
GFR SERPL CREATININE-BSD FRML MDRD: ABNORMAL ML/MIN/{1.73_M2}
GFR SERPL CREATININE-BSD FRML MDRD: ABNORMAL ML/MIN/{1.73_M2}
GLUCOSE BLD-MCNC: 106 MG/DL (ref 70–99)
GLUCOSE URINE: NEGATIVE
HCG(URINE) PREGNANCY TEST: NEGATIVE
HCT VFR BLD CALC: 39.1 % (ref 36.3–47.1)
HEMOGLOBIN: 13.1 G/DL (ref 11.9–15.1)
IMMATURE GRANULOCYTES: 0 %
INR BLD: 0.9
KETONES, URINE: ABNORMAL
LEUKOCYTE ESTERASE, URINE: ABNORMAL
LYMPHOCYTES # BLD: 15 % (ref 24–43)
Lab: NORMAL
MCH RBC QN AUTO: 25.5 PG (ref 25.2–33.5)
MCHC RBC AUTO-ENTMCNC: 33.5 G/DL (ref 28.4–34.8)
MCV RBC AUTO: 76.1 FL (ref 82.6–102.9)
MODE: ABNORMAL
MONOCYTES # BLD: 6 % (ref 3–12)
MUCUS: ABNORMAL
NEGATIVE BASE EXCESS, ART: 3 (ref 0–2)
NITRITE, URINE: NEGATIVE
NRBC AUTOMATED: 0 PER 100 WBC
O2 DEVICE/FLOW/%: ABNORMAL
OTHER OBSERVATIONS UA: ABNORMAL
PARTIAL THROMBOPLASTIN TIME: 22.9 SEC (ref 20.5–30.5)
PATIENT TEMP: ABNORMAL
PDW BLD-RTO: 22.7 % (ref 11.8–14.4)
PH UA: 5.5 (ref 5–8)
PLATELET # BLD: 376 K/UL (ref 138–453)
PLATELET ESTIMATE: ABNORMAL
PMV BLD AUTO: 8.5 FL (ref 8.1–13.5)
POC HCO3: 20.7 MMOL/L (ref 21–28)
POC O2 SATURATION: 97 % (ref 94–98)
POC PCO2 TEMP: ABNORMAL MM HG
POC PCO2: 33.5 MM HG (ref 35–48)
POC PH TEMP: ABNORMAL
POC PH: 7.4 (ref 7.35–7.45)
POC PO2 TEMP: ABNORMAL MM HG
POC PO2: 87.6 MM HG (ref 83–108)
POSITIVE BASE EXCESS, ART: ABNORMAL (ref 0–3)
POTASSIUM SERPL-SCNC: 3.9 MMOL/L (ref 3.7–5.3)
PROTEIN UA: ABNORMAL
PROTHROMBIN TIME: 9.7 SEC (ref 9.1–12.3)
RBC # BLD: 5.14 M/UL (ref 3.95–5.11)
RBC # BLD: ABNORMAL 10*6/UL
RBC UA: ABNORMAL /HPF (ref 0–2)
RENAL EPITHELIAL, UA: ABNORMAL /HPF
SAMPLE SITE: ABNORMAL
SEG NEUTROPHILS: 78 % (ref 36–65)
SEGMENTED NEUTROPHILS ABSOLUTE COUNT: 5.21 K/UL (ref 1.5–8.1)
SODIUM BLD-SCNC: 136 MMOL/L (ref 135–144)
SPECIFIC GRAVITY UA: 1.02 (ref 1–1.03)
SPECIMEN DESCRIPTION: NORMAL
TCO2 (CALC), ART: 22 MMOL/L (ref 22–29)
TOTAL PROTEIN: 7.4 G/DL (ref 6.4–8.3)
TRICHOMONAS: ABNORMAL
TURBIDITY: ABNORMAL
URINE HGB: ABNORMAL
UROBILINOGEN, URINE: NORMAL
WBC # BLD: 6.7 K/UL (ref 3.5–11.3)
WBC # BLD: ABNORMAL 10*3/UL
WBC UA: ABNORMAL /HPF (ref 0–5)
YEAST: ABNORMAL

## 2021-02-15 PROCEDURE — 87086 URINE CULTURE/COLONY COUNT: CPT

## 2021-02-15 PROCEDURE — 36415 COLL VENOUS BLD VENIPUNCTURE: CPT

## 2021-02-15 PROCEDURE — 86901 BLOOD TYPING SEROLOGIC RH(D): CPT

## 2021-02-15 PROCEDURE — 86920 COMPATIBILITY TEST SPIN: CPT

## 2021-02-15 PROCEDURE — 85610 PROTHROMBIN TIME: CPT

## 2021-02-15 PROCEDURE — 71046 X-RAY EXAM CHEST 2 VIEWS: CPT

## 2021-02-15 PROCEDURE — 80053 COMPREHEN METABOLIC PANEL: CPT

## 2021-02-15 PROCEDURE — 82803 BLOOD GASES ANY COMBINATION: CPT

## 2021-02-15 PROCEDURE — 93005 ELECTROCARDIOGRAM TRACING: CPT | Performed by: THORACIC SURGERY (CARDIOTHORACIC VASCULAR SURGERY)

## 2021-02-15 PROCEDURE — 85025 COMPLETE CBC W/AUTO DIFF WBC: CPT

## 2021-02-15 PROCEDURE — 81001 URINALYSIS AUTO W/SCOPE: CPT

## 2021-02-15 PROCEDURE — 87641 MR-STAPH DNA AMP PROBE: CPT

## 2021-02-15 PROCEDURE — 85730 THROMBOPLASTIN TIME PARTIAL: CPT

## 2021-02-15 PROCEDURE — 86850 RBC ANTIBODY SCREEN: CPT

## 2021-02-15 PROCEDURE — 81025 URINE PREGNANCY TEST: CPT

## 2021-02-15 PROCEDURE — 86900 BLOOD TYPING SEROLOGIC ABO: CPT

## 2021-02-15 RX ORDER — SODIUM CHLORIDE 9 MG/ML
INJECTION, SOLUTION INTRAVENOUS PRN
Status: CANCELLED | OUTPATIENT
Start: 2021-02-15

## 2021-02-15 RX ORDER — OMEPRAZOLE 20 MG/1
20 CAPSULE, DELAYED RELEASE ORAL DAILY
COMMUNITY
End: 2022-05-12 | Stop reason: SDUPTHER

## 2021-02-15 NOTE — PROGRESS NOTES
Anesthesia Focused Assessment    Has patient ever tested positive for COVID? No    STOP-BANG Sleep Apnea Questionnaire    SNORE loudly (heard through closed doors)? No  TIRED, fatigued, sleepy during daytime? No  OBSERVED stopping breathing during sleep? No  High blood PRESSURE being treated? Yes    BMI over 35? No  AGE over 48? No  NECK circumference over 16\"? No  GENDER (male)? No             Total 1  High risk 5-8  Intermediate risk 3-4  Low risk 0-2    Obstructive Sleep Apnea: denies  If YES, machine used: no     Type 1 DM:   no  T2DM:  no    Coronary Artery Disease:  No, cath 2021, \"normal coronary arteries\"  Hypertension:  yes    Active smoker:  1/2 ppd for 20 years. Drinks Alcohol:  Quit, history of usage    Dentition: benign    Defib / AICD / Pacemaker: no      Renal Failure/dialysis:  no    Patient was evaluated in PAT & anesthesia guidelines were applied. NPO guidelines, medication instructions and scheduled arrival time were reviewed with patient.     Hx of anesthesia complications:  no  Family hx of anesthesia complications:  no                                                                                                                     Anesthesia contacted:   no  Medical or cardiac clearance ordered: no    PATRICIA GODINEZ PA-C  2/15/21  10:41 AM

## 2021-02-16 LAB
CULTURE: NORMAL
EKG ATRIAL RATE: 101 BPM
EKG P AXIS: 55 DEGREES
EKG P-R INTERVAL: 132 MS
EKG Q-T INTERVAL: 364 MS
EKG QRS DURATION: 70 MS
EKG QTC CALCULATION (BAZETT): 471 MS
EKG R AXIS: 27 DEGREES
EKG T AXIS: 44 DEGREES
EKG VENTRICULAR RATE: 101 BPM
Lab: NORMAL
MRSA, DNA, NASAL: NORMAL
SPECIMEN DESCRIPTION: NORMAL
SPECIMEN DESCRIPTION: NORMAL

## 2021-02-16 PROCEDURE — 93010 ELECTROCARDIOGRAM REPORT: CPT | Performed by: INTERNAL MEDICINE

## 2021-02-22 ENCOUNTER — HOSPITAL ENCOUNTER (OUTPATIENT)
Dept: LAB | Age: 46
Setting detail: SPECIMEN
Discharge: HOME | End: 2021-02-22
Payer: MEDICAID

## 2021-02-22 DIAGNOSIS — Z20.822 COVID-19 RULED OUT BY LABORATORY TESTING: Primary | ICD-10-CM

## 2021-02-22 DIAGNOSIS — N39.0 URINARY TRACT INFECTION WITHOUT HEMATURIA, SITE UNSPECIFIED: Primary | ICD-10-CM

## 2021-02-22 PROCEDURE — U0003 INFECTIOUS AGENT DETECTION BY NUCLEIC ACID (DNA OR RNA); SEVERE ACUTE RESPIRATORY SYNDROME CORONAVIRUS 2 (SARS-COV-2) (CORONAVIRUS DISEASE [COVID-19]), AMPLIFIED PROBE TECHNIQUE, MAKING USE OF HIGH THROUGHPUT TECHNOLOGIES AS DESCRIBED BY CMS-2020-01-R: HCPCS

## 2021-02-22 PROCEDURE — U0005 INFEC AGEN DETEC AMPLI PROBE: HCPCS

## 2021-02-22 RX ORDER — CIPROFLOXACIN 500 MG/1
500 TABLET, FILM COATED ORAL 2 TIMES DAILY
Qty: 10 TABLET | Refills: 0 | Status: ON HOLD | OUTPATIENT
Start: 2021-02-22 | End: 2021-02-26

## 2021-02-22 NOTE — PROGRESS NOTES
I called patient and left a message about picking up her Cipro for preventative UTI. Her urine showed leukoesterase and she has an upcoming valve surgery therefore it is advised to start her on an antibiotic as a preventative measure. I left a voicemail for her to pick it up. I left a number for her to call back. We will check back tomorrow a.m.

## 2021-02-23 ENCOUNTER — TELEPHONE (OUTPATIENT)
Dept: CARDIOTHORACIC SURGERY | Age: 46
End: 2021-02-23

## 2021-02-23 DIAGNOSIS — F41.0 PANIC ATTACK: Primary | ICD-10-CM

## 2021-02-23 LAB
SARS-COV-2: NORMAL
SARS-COV-2: NOT DETECTED
SOURCE: NORMAL

## 2021-02-23 RX ORDER — LORAZEPAM 0.5 MG/1
0.5 TABLET ORAL EVERY 12 HOURS
Qty: 4 TABLET | Refills: 0 | Status: SHIPPED | OUTPATIENT
Start: 2021-02-23 | End: 2021-02-25

## 2021-02-23 NOTE — TELEPHONE ENCOUNTER
Pt called floor having panic attacks regarding surgery Friday. She hasent slept. She feels sick to stomach because of nerves. Some family members told her \"heart surgery is a death sentence\" Patient was in tears when she called. She already has consent on file. I wrote for 4 pills of 0.5mg ativan to help her sleep and relax next few days. I also reminded her to get cipro filled and start asap. I told her not drive while on these meds.

## 2021-02-24 ENCOUNTER — TELEPHONE (OUTPATIENT)
Dept: PRIMARY CARE CLINIC | Age: 46
End: 2021-02-24

## 2021-02-26 ENCOUNTER — ANESTHESIA (OUTPATIENT)
Dept: OPERATING ROOM | Age: 46
DRG: 163 | End: 2021-02-26
Payer: MEDICAID

## 2021-02-26 ENCOUNTER — ANESTHESIA EVENT (OUTPATIENT)
Dept: OPERATING ROOM | Age: 46
DRG: 163 | End: 2021-02-26
Payer: MEDICAID

## 2021-02-26 ENCOUNTER — APPOINTMENT (OUTPATIENT)
Dept: GENERAL RADIOLOGY | Age: 46
DRG: 163 | End: 2021-02-26
Attending: THORACIC SURGERY (CARDIOTHORACIC VASCULAR SURGERY)
Payer: MEDICAID

## 2021-02-26 ENCOUNTER — HOSPITAL ENCOUNTER (INPATIENT)
Age: 46
LOS: 6 days | Discharge: HOME HEALTH | DRG: 163 | End: 2021-03-04
Attending: THORACIC SURGERY (CARDIOTHORACIC VASCULAR SURGERY) | Admitting: THORACIC SURGERY (CARDIOTHORACIC VASCULAR SURGERY)
Payer: MEDICAID

## 2021-02-26 VITALS — TEMPERATURE: 96.8 F | OXYGEN SATURATION: 100 % | DIASTOLIC BLOOD PRESSURE: 86 MMHG | SYSTOLIC BLOOD PRESSURE: 120 MMHG

## 2021-02-26 DIAGNOSIS — I34.0 SEVERE MITRAL VALVE REGURGITATION: Primary | ICD-10-CM

## 2021-02-26 DIAGNOSIS — I34.0 SEVERE MITRAL REGURGITATION BY PRIOR ECHOCARDIOGRAM: ICD-10-CM

## 2021-02-26 LAB
ALLEN TEST: ABNORMAL
ANION GAP SERPL CALCULATED.3IONS-SCNC: 9 MMOL/L (ref 9–17)
ANION GAP: 12 MMOL/L (ref 7–16)
BUN BLDV-MCNC: 4 MG/DL (ref 6–20)
BUN/CREAT BLD: ABNORMAL (ref 9–20)
CALCIUM IONIZED: 1.14 MMOL/L (ref 1.13–1.33)
CALCIUM IONIZED: 1.26 MMOL/L (ref 1.13–1.33)
CALCIUM SERPL-MCNC: 7.9 MG/DL (ref 8.6–10.4)
CHLORIDE BLD-SCNC: 107 MMOL/L (ref 98–107)
CO2: 21 MMOL/L (ref 20–31)
CREAT SERPL-MCNC: 0.41 MG/DL (ref 0.5–0.9)
FIO2: 100
FIO2: 35
FIO2: 35
FIO2: 4
FIO2: 40
FIO2: ABNORMAL
GFR AFRICAN AMERICAN: >60 ML/MIN
GFR NON-AFRICAN AMERICAN: >60 ML/MIN
GFR SERPL CREATININE-BSD FRML MDRD: ABNORMAL ML/MIN/{1.73_M2}
GFR SERPL CREATININE-BSD FRML MDRD: ABNORMAL ML/MIN/{1.73_M2}
GLUCOSE BLD-MCNC: 101 MG/DL (ref 74–100)
GLUCOSE BLD-MCNC: 151 MG/DL (ref 65–105)
GLUCOSE BLD-MCNC: 156 MG/DL (ref 74–100)
GLUCOSE BLD-MCNC: 158 MG/DL (ref 74–100)
GLUCOSE BLD-MCNC: 160 MG/DL (ref 74–100)
GLUCOSE BLD-MCNC: 75 MG/DL (ref 74–100)
GLUCOSE BLD-MCNC: 87 MG/DL (ref 70–99)
GLUCOSE BLD-MCNC: 90 MG/DL (ref 65–105)
GLUCOSE BLD-MCNC: 90 MG/DL (ref 74–100)
GLUCOSE BLD-MCNC: 93 MG/DL (ref 74–100)
GLUCOSE BLD-MCNC: 98 MG/DL (ref 74–100)
HCG(URINE) PREGNANCY TEST: NEGATIVE
HCT VFR BLD CALC: 30.9 % (ref 36.3–47.1)
HEMOGLOBIN: 10.5 G/DL (ref 11.9–15.1)
INR BLD: 1.1
MAGNESIUM: 2.9 MG/DL (ref 1.6–2.6)
MCH RBC QN AUTO: 26.2 PG (ref 25.2–33.5)
MCHC RBC AUTO-ENTMCNC: 34 G/DL (ref 28.4–34.8)
MCV RBC AUTO: 77.1 FL (ref 82.6–102.9)
MODE: ABNORMAL
NEGATIVE BASE EXCESS, ART: 1 (ref 0–2)
NEGATIVE BASE EXCESS, ART: 1 (ref 0–2)
NEGATIVE BASE EXCESS, ART: 2 (ref 0–2)
NEGATIVE BASE EXCESS, ART: 3 (ref 0–2)
NEGATIVE BASE EXCESS, ART: 4 (ref 0–2)
NEGATIVE BASE EXCESS, ART: 4 (ref 0–2)
NRBC AUTOMATED: 0 PER 100 WBC
O2 DEVICE/FLOW/%: ABNORMAL
PARTIAL THROMBOPLASTIN TIME: 29 SEC (ref 20.5–30.5)
PATIENT TEMP: ABNORMAL
PDW BLD-RTO: 22.5 % (ref 11.8–14.4)
PLATELET # BLD: 143 K/UL (ref 138–453)
PMV BLD AUTO: 8.5 FL (ref 8.1–13.5)
POC CHLORIDE: 105 MMOL/L (ref 98–107)
POC HCO3: 20.5 MMOL/L (ref 21–28)
POC HCO3: 21.1 MMOL/L (ref 21–28)
POC HCO3: 21.4 MMOL/L (ref 21–28)
POC HCO3: 21.4 MMOL/L (ref 21–28)
POC HCO3: 21.5 MMOL/L (ref 21–28)
POC HCO3: 21.5 MMOL/L (ref 21–28)
POC HCO3: 21.9 MMOL/L (ref 21–28)
POC HCO3: 22.1 MMOL/L (ref 21–28)
POC HCO3: 22.8 MMOL/L (ref 21–28)
POC HCO3: 23 MMOL/L (ref 21–28)
POC HEMATOCRIT: 26 % (ref 36–46)
POC HEMATOCRIT: 27 % (ref 36–46)
POC HEMATOCRIT: 27 % (ref 36–46)
POC HEMATOCRIT: 31 % (ref 36–46)
POC HEMATOCRIT: 34 % (ref 36–46)
POC HEMATOCRIT: 36 % (ref 36–46)
POC HEMOGLOBIN: 10.4 G/DL (ref 12–16)
POC HEMOGLOBIN: 11.5 G/DL (ref 12–16)
POC HEMOGLOBIN: 12.3 G/DL (ref 12–16)
POC HEMOGLOBIN: 8.7 G/DL (ref 12–16)
POC HEMOGLOBIN: 9.1 G/DL (ref 12–16)
POC HEMOGLOBIN: 9.2 G/DL (ref 12–16)
POC IONIZED CALCIUM: 0.94 MMOL/L (ref 1.15–1.33)
POC IONIZED CALCIUM: 0.94 MMOL/L (ref 1.15–1.33)
POC IONIZED CALCIUM: 1.06 MMOL/L (ref 1.15–1.33)
POC IONIZED CALCIUM: 1.1 MMOL/L (ref 1.15–1.33)
POC IONIZED CALCIUM: 1.22 MMOL/L (ref 1.15–1.33)
POC IONIZED CALCIUM: 1.23 MMOL/L (ref 1.15–1.33)
POC O2 SATURATION: 100 % (ref 94–98)
POC O2 SATURATION: 99 % (ref 94–98)
POC PCO2 TEMP: ABNORMAL MM HG
POC PCO2: 29.2 MM HG (ref 35–48)
POC PCO2: 31.2 MM HG (ref 35–48)
POC PCO2: 32.9 MM HG (ref 35–48)
POC PCO2: 33.4 MM HG (ref 35–48)
POC PCO2: 33.4 MM HG (ref 35–48)
POC PCO2: 34.2 MM HG (ref 35–48)
POC PCO2: 34.6 MM HG (ref 35–48)
POC PCO2: 35.3 MM HG (ref 35–48)
POC PCO2: 40.8 MM HG (ref 35–48)
POC PCO2: 43.2 MM HG (ref 35–48)
POC PH TEMP: ABNORMAL
POC PH: 7.33 (ref 7.35–7.45)
POC PH: 7.33 (ref 7.35–7.45)
POC PH: 7.37 (ref 7.35–7.45)
POC PH: 7.4 (ref 7.35–7.45)
POC PH: 7.42 (ref 7.35–7.45)
POC PH: 7.42 (ref 7.35–7.45)
POC PH: 7.43 (ref 7.35–7.45)
POC PH: 7.44 (ref 7.35–7.45)
POC PH: 7.45 (ref 7.35–7.45)
POC PH: 7.47 (ref 7.35–7.45)
POC PO2 TEMP: ABNORMAL MM HG
POC PO2: 115.7 MM HG (ref 83–108)
POC PO2: 116.7 MM HG (ref 83–108)
POC PO2: 122.7 MM HG (ref 83–108)
POC PO2: 129.6 MM HG (ref 83–108)
POC PO2: 132 MM HG (ref 83–108)
POC PO2: 184.1 MM HG (ref 83–108)
POC PO2: 300.8 MM HG (ref 83–108)
POC PO2: 305.9 MM HG (ref 83–108)
POC PO2: 324.3 MM HG (ref 83–108)
POC PO2: 454.9 MM HG (ref 83–108)
POC POTASSIUM: 3.1 MMOL/L (ref 3.5–4.5)
POC POTASSIUM: 3.2 MMOL/L (ref 3.5–4.5)
POC POTASSIUM: 3.4 MMOL/L (ref 3.5–4.5)
POC POTASSIUM: 3.6 MMOL/L (ref 3.5–4.5)
POC POTASSIUM: 3.8 MMOL/L (ref 3.5–4.5)
POC POTASSIUM: 3.9 MMOL/L (ref 3.5–4.5)
POC SODIUM: 140 MMOL/L (ref 138–146)
POSITIVE BASE EXCESS, ART: ABNORMAL (ref 0–3)
POTASSIUM SERPL-SCNC: 3.3 MMOL/L (ref 3.7–5.3)
POTASSIUM SERPL-SCNC: 4.3 MMOL/L (ref 3.7–5.3)
PROTHROMBIN TIME: 12.1 SEC (ref 9.1–12.3)
RBC # BLD: 4.01 M/UL (ref 3.95–5.11)
SAMPLE SITE: ABNORMAL
SODIUM BLD-SCNC: 137 MMOL/L (ref 135–144)
TCO2 (CALC), ART: 22 MMOL/L (ref 22–29)
TCO2 (CALC), ART: 23 MMOL/L (ref 22–29)
TCO2 (CALC), ART: 24 MMOL/L (ref 22–29)
TCO2 (CALC), ART: 24 MMOL/L (ref 22–29)
WBC # BLD: 10.3 K/UL (ref 3.5–11.3)

## 2021-02-26 PROCEDURE — 82947 ASSAY GLUCOSE BLOOD QUANT: CPT

## 2021-02-26 PROCEDURE — P9041 ALBUMIN (HUMAN),5%, 50ML: HCPCS | Performed by: NURSE PRACTITIONER

## 2021-02-26 PROCEDURE — 6360000002 HC RX W HCPCS: Performed by: NURSE ANESTHETIST, CERTIFIED REGISTERED

## 2021-02-26 PROCEDURE — 2500000003 HC RX 250 WO HCPCS: Performed by: THORACIC SURGERY (CARDIOTHORACIC VASCULAR SURGERY)

## 2021-02-26 PROCEDURE — 88305 TISSUE EXAM BY PATHOLOGIST: CPT

## 2021-02-26 PROCEDURE — 82435 ASSAY OF BLOOD CHLORIDE: CPT

## 2021-02-26 PROCEDURE — 7100000000 HC PACU RECOVERY - FIRST 15 MIN

## 2021-02-26 PROCEDURE — 6370000000 HC RX 637 (ALT 250 FOR IP): Performed by: NURSE PRACTITIONER

## 2021-02-26 PROCEDURE — 85730 THROMBOPLASTIN TIME PARTIAL: CPT

## 2021-02-26 PROCEDURE — 2580000003 HC RX 258: Performed by: ANESTHESIOLOGY

## 2021-02-26 PROCEDURE — C1889 IMPLANT/INSERT DEVICE, NOC: HCPCS | Performed by: THORACIC SURGERY (CARDIOTHORACIC VASCULAR SURGERY)

## 2021-02-26 PROCEDURE — 2580000003 HC RX 258: Performed by: NURSE PRACTITIONER

## 2021-02-26 PROCEDURE — C9113 INJ PANTOPRAZOLE SODIUM, VIA: HCPCS | Performed by: NURSE PRACTITIONER

## 2021-02-26 PROCEDURE — 85390 FIBRINOLYSINS SCREEN I&R: CPT

## 2021-02-26 PROCEDURE — 81025 URINE PREGNANCY TEST: CPT

## 2021-02-26 PROCEDURE — 3700000001 HC ADD 15 MINUTES (ANESTHESIA): Performed by: THORACIC SURGERY (CARDIOTHORACIC VASCULAR SURGERY)

## 2021-02-26 PROCEDURE — 87086 URINE CULTURE/COLONY COUNT: CPT

## 2021-02-26 PROCEDURE — 82803 BLOOD GASES ANY COMBINATION: CPT

## 2021-02-26 PROCEDURE — 7100000001 HC PACU RECOVERY - ADDTL 15 MIN

## 2021-02-26 PROCEDURE — 99222 1ST HOSP IP/OBS MODERATE 55: CPT | Performed by: NURSE PRACTITIONER

## 2021-02-26 PROCEDURE — 85347 COAGULATION TIME ACTIVATED: CPT

## 2021-02-26 PROCEDURE — 3600000008 HC SURGERY OHS BASE: Performed by: THORACIC SURGERY (CARDIOTHORACIC VASCULAR SURGERY)

## 2021-02-26 PROCEDURE — 2780000006 HC MISC HEART VALVE: Performed by: THORACIC SURGERY (CARDIOTHORACIC VASCULAR SURGERY)

## 2021-02-26 PROCEDURE — 94761 N-INVAS EAR/PLS OXIMETRY MLT: CPT

## 2021-02-26 PROCEDURE — 6360000002 HC RX W HCPCS: Performed by: THORACIC SURGERY (CARDIOTHORACIC VASCULAR SURGERY)

## 2021-02-26 PROCEDURE — 2580000003 HC RX 258: Performed by: NURSE ANESTHETIST, CERTIFIED REGISTERED

## 2021-02-26 PROCEDURE — C1713 ANCHOR/SCREW BN/BN,TIS/BN: HCPCS | Performed by: THORACIC SURGERY (CARDIOTHORACIC VASCULAR SURGERY)

## 2021-02-26 PROCEDURE — 83735 ASSAY OF MAGNESIUM: CPT

## 2021-02-26 PROCEDURE — 71045 X-RAY EXAM CHEST 1 VIEW: CPT

## 2021-02-26 PROCEDURE — 6360000002 HC RX W HCPCS: Performed by: ANESTHESIOLOGY

## 2021-02-26 PROCEDURE — 3700000000 HC ANESTHESIA ATTENDED CARE: Performed by: THORACIC SURGERY (CARDIOTHORACIC VASCULAR SURGERY)

## 2021-02-26 PROCEDURE — 2100000001 HC CVICU R&B

## 2021-02-26 PROCEDURE — 33430 REPLACEMENT OF MITRAL VALVE: CPT | Performed by: THORACIC SURGERY (CARDIOTHORACIC VASCULAR SURGERY)

## 2021-02-26 PROCEDURE — 82330 ASSAY OF CALCIUM: CPT

## 2021-02-26 PROCEDURE — 6360000002 HC RX W HCPCS: Performed by: NURSE PRACTITIONER

## 2021-02-26 PROCEDURE — 94002 VENT MGMT INPAT INIT DAY: CPT

## 2021-02-26 PROCEDURE — 6370000000 HC RX 637 (ALT 250 FOR IP): Performed by: NURSE ANESTHETIST, CERTIFIED REGISTERED

## 2021-02-26 PROCEDURE — 2709999900 HC NON-CHARGEABLE SUPPLY: Performed by: THORACIC SURGERY (CARDIOTHORACIC VASCULAR SURGERY)

## 2021-02-26 PROCEDURE — 2720000010 HC SURG SUPPLY STERILE: Performed by: THORACIC SURGERY (CARDIOTHORACIC VASCULAR SURGERY)

## 2021-02-26 PROCEDURE — 6370000000 HC RX 637 (ALT 250 FOR IP): Performed by: THORACIC SURGERY (CARDIOTHORACIC VASCULAR SURGERY)

## 2021-02-26 PROCEDURE — 2500000003 HC RX 250 WO HCPCS: Performed by: NURSE ANESTHETIST, CERTIFIED REGISTERED

## 2021-02-26 PROCEDURE — 85610 PROTHROMBIN TIME: CPT

## 2021-02-26 PROCEDURE — 3600000018 HC SURGERY OHS ADDTL 15MIN: Performed by: THORACIC SURGERY (CARDIOTHORACIC VASCULAR SURGERY)

## 2021-02-26 PROCEDURE — 93005 ELECTROCARDIOGRAM TRACING: CPT | Performed by: NURSE PRACTITIONER

## 2021-02-26 PROCEDURE — 84295 ASSAY OF SERUM SODIUM: CPT

## 2021-02-26 PROCEDURE — 85027 COMPLETE CBC AUTOMATED: CPT

## 2021-02-26 PROCEDURE — 85384 FIBRINOGEN ACTIVITY: CPT

## 2021-02-26 PROCEDURE — 84132 ASSAY OF SERUM POTASSIUM: CPT

## 2021-02-26 PROCEDURE — 80048 BASIC METABOLIC PNL TOTAL CA: CPT

## 2021-02-26 PROCEDURE — 85014 HEMATOCRIT: CPT

## 2021-02-26 PROCEDURE — 2700000000 HC OXYGEN THERAPY PER DAY

## 2021-02-26 PROCEDURE — 2580000003 HC RX 258: Performed by: THORACIC SURGERY (CARDIOTHORACIC VASCULAR SURGERY)

## 2021-02-26 PROCEDURE — 85576 BLOOD PLATELET AGGREGATION: CPT

## 2021-02-26 PROCEDURE — 2500000003 HC RX 250 WO HCPCS: Performed by: NURSE PRACTITIONER

## 2021-02-26 DEVICE — Z INACTIVE USE 2424445 DEVICE OCCL CLP L50MM SM FOOTPRINT 1 HND APPL SUTURELESS W/: Type: IMPLANTABLE DEVICE | Site: HEART | Status: FUNCTIONAL

## 2021-02-26 DEVICE — VALVE MI SZ 27/29 H17.8MM DIA23.4MM TISS ANNULUS DIA27-29MM: Type: IMPLANTABLE DEVICE | Site: HEART | Status: FUNCTIONAL

## 2021-02-26 RX ORDER — SODIUM CHLORIDE 0.9 % (FLUSH) 0.9 %
10 SYRINGE (ML) INJECTION PRN
Status: DISCONTINUED | OUTPATIENT
Start: 2021-02-26 | End: 2021-03-04 | Stop reason: HOSPADM

## 2021-02-26 RX ORDER — SODIUM CHLORIDE, SODIUM LACTATE, POTASSIUM CHLORIDE, CALCIUM CHLORIDE 600; 310; 30; 20 MG/100ML; MG/100ML; MG/100ML; MG/100ML
1000 INJECTION, SOLUTION INTRAVENOUS CONTINUOUS
Status: DISCONTINUED | OUTPATIENT
Start: 2021-02-26 | End: 2021-03-04 | Stop reason: HOSPADM

## 2021-02-26 RX ORDER — CHLORHEXIDINE GLUCONATE 0.12 MG/ML
15 RINSE ORAL 2 TIMES DAILY
Status: DISCONTINUED | OUTPATIENT
Start: 2021-02-26 | End: 2021-03-04 | Stop reason: HOSPADM

## 2021-02-26 RX ORDER — LISINOPRIL 2.5 MG/1
2.5 TABLET ORAL DAILY
Status: DISCONTINUED | OUTPATIENT
Start: 2021-02-27 | End: 2021-02-27

## 2021-02-26 RX ORDER — NICOTINE POLACRILEX 4 MG
15 LOZENGE BUCCAL PRN
Status: DISCONTINUED | OUTPATIENT
Start: 2021-02-26 | End: 2021-03-04 | Stop reason: HOSPADM

## 2021-02-26 RX ORDER — ATORVASTATIN CALCIUM 40 MG/1
40 TABLET, FILM COATED ORAL NIGHTLY
Status: DISCONTINUED | OUTPATIENT
Start: 2021-02-27 | End: 2021-03-02

## 2021-02-26 RX ORDER — ROCURONIUM BROMIDE 10 MG/ML
INJECTION, SOLUTION INTRAVENOUS PRN
Status: DISCONTINUED | OUTPATIENT
Start: 2021-02-26 | End: 2021-02-26 | Stop reason: SDUPTHER

## 2021-02-26 RX ORDER — VANCOMYCIN HYDROCHLORIDE 1 G/200ML
1000 INJECTION, SOLUTION INTRAVENOUS EVERY 12 HOURS
Status: DISCONTINUED | OUTPATIENT
Start: 2021-02-26 | End: 2021-03-04 | Stop reason: HOSPADM

## 2021-02-26 RX ORDER — PROPOFOL 10 MG/ML
5-50 INJECTION, EMULSION INTRAVENOUS
Status: DISCONTINUED | OUTPATIENT
Start: 2021-02-26 | End: 2021-03-02

## 2021-02-26 RX ORDER — PROTAMINE SULFATE 10 MG/ML
INJECTION, SOLUTION INTRAVENOUS
Status: COMPLETED
Start: 2021-02-26 | End: 2021-02-26

## 2021-02-26 RX ORDER — PROPOFOL 10 MG/ML
INJECTION, EMULSION INTRAVENOUS
Status: COMPLETED
Start: 2021-02-26 | End: 2021-02-26

## 2021-02-26 RX ORDER — PROTAMINE SULFATE 10 MG/ML
INJECTION, SOLUTION INTRAVENOUS PRN
Status: DISCONTINUED | OUTPATIENT
Start: 2021-02-26 | End: 2021-02-26 | Stop reason: SDUPTHER

## 2021-02-26 RX ORDER — MIDAZOLAM HYDROCHLORIDE 1 MG/ML
INJECTION INTRAMUSCULAR; INTRAVENOUS PRN
Status: DISCONTINUED | OUTPATIENT
Start: 2021-02-26 | End: 2021-02-26 | Stop reason: SDUPTHER

## 2021-02-26 RX ORDER — ACETAMINOPHEN 650 MG/1
650 SUPPOSITORY RECTAL EVERY 4 HOURS PRN
Status: DISCONTINUED | OUTPATIENT
Start: 2021-02-26 | End: 2021-03-04 | Stop reason: HOSPADM

## 2021-02-26 RX ORDER — FENTANYL CITRATE 0.05 MG/ML
INJECTION, SOLUTION INTRAMUSCULAR; INTRAVENOUS PRN
Status: DISCONTINUED | OUTPATIENT
Start: 2021-02-26 | End: 2021-02-26 | Stop reason: SDUPTHER

## 2021-02-26 RX ORDER — OXYCODONE HYDROCHLORIDE AND ACETAMINOPHEN 5; 325 MG/1; MG/1
2 TABLET ORAL EVERY 4 HOURS PRN
Status: DISCONTINUED | OUTPATIENT
Start: 2021-02-26 | End: 2021-03-04 | Stop reason: HOSPADM

## 2021-02-26 RX ORDER — DIPHENHYDRAMINE HCL 25 MG
25 TABLET ORAL NIGHTLY PRN
Status: DISCONTINUED | OUTPATIENT
Start: 2021-02-27 | End: 2021-02-28

## 2021-02-26 RX ORDER — VANCOMYCIN HYDROCHLORIDE 1 G/20ML
INJECTION, POWDER, LYOPHILIZED, FOR SOLUTION INTRAVENOUS
Status: DISPENSED
Start: 2021-02-26 | End: 2021-02-26

## 2021-02-26 RX ORDER — OXYCODONE HYDROCHLORIDE AND ACETAMINOPHEN 5; 325 MG/1; MG/1
1 TABLET ORAL EVERY 4 HOURS PRN
Status: DISCONTINUED | OUTPATIENT
Start: 2021-02-26 | End: 2021-03-04 | Stop reason: HOSPADM

## 2021-02-26 RX ORDER — POLYETHYLENE GLYCOL 3350 17 G/17G
17 POWDER, FOR SOLUTION ORAL DAILY
Status: DISCONTINUED | OUTPATIENT
Start: 2021-02-26 | End: 2021-03-04 | Stop reason: HOSPADM

## 2021-02-26 RX ORDER — ASPIRIN 81 MG/1
81 TABLET ORAL DAILY
Status: DISCONTINUED | OUTPATIENT
Start: 2021-02-26 | End: 2021-03-04 | Stop reason: HOSPADM

## 2021-02-26 RX ORDER — HYDRALAZINE HYDROCHLORIDE 20 MG/ML
5 INJECTION INTRAMUSCULAR; INTRAVENOUS EVERY 5 MIN PRN
Status: DISCONTINUED | OUTPATIENT
Start: 2021-02-26 | End: 2021-03-04 | Stop reason: HOSPADM

## 2021-02-26 RX ORDER — DEXTROSE MONOHYDRATE 50 MG/ML
100 INJECTION, SOLUTION INTRAVENOUS PRN
Status: DISCONTINUED | OUTPATIENT
Start: 2021-02-26 | End: 2021-03-04 | Stop reason: HOSPADM

## 2021-02-26 RX ORDER — PHENYLEPHRINE HCL IN 0.9% NACL 0.5 MG/5ML
SYRINGE (ML) INTRAVENOUS PRN
Status: DISCONTINUED | OUTPATIENT
Start: 2021-02-26 | End: 2021-02-26 | Stop reason: SDUPTHER

## 2021-02-26 RX ORDER — NOREPINEPHRINE BIT/0.9 % NACL 16MG/250ML
0.01 INFUSION BOTTLE (ML) INTRAVENOUS CONTINUOUS
Status: DISCONTINUED | OUTPATIENT
Start: 2021-02-26 | End: 2021-03-03

## 2021-02-26 RX ORDER — ACETAMINOPHEN 325 MG/1
650 TABLET ORAL EVERY 4 HOURS PRN
Status: DISCONTINUED | OUTPATIENT
Start: 2021-02-26 | End: 2021-03-04 | Stop reason: HOSPADM

## 2021-02-26 RX ORDER — SODIUM CHLORIDE 9 MG/ML
INJECTION, SOLUTION INTRAVENOUS PRN
Status: DISCONTINUED | OUTPATIENT
Start: 2021-02-26 | End: 2021-03-04 | Stop reason: HOSPADM

## 2021-02-26 RX ORDER — FENTANYL CITRATE 50 UG/ML
25 INJECTION, SOLUTION INTRAMUSCULAR; INTRAVENOUS
Status: DISCONTINUED | OUTPATIENT
Start: 2021-02-26 | End: 2021-03-04 | Stop reason: HOSPADM

## 2021-02-26 RX ORDER — ASPIRIN 81 MG/1
81 TABLET ORAL ONCE
Status: COMPLETED | OUTPATIENT
Start: 2021-02-26 | End: 2021-02-26

## 2021-02-26 RX ORDER — PROTAMINE SULFATE 10 MG/ML
50 INJECTION, SOLUTION INTRAVENOUS
Status: ACTIVE | OUTPATIENT
Start: 2021-02-26 | End: 2021-02-26

## 2021-02-26 RX ORDER — PROPOFOL 10 MG/ML
INJECTION, EMULSION INTRAVENOUS PRN
Status: DISCONTINUED | OUTPATIENT
Start: 2021-02-26 | End: 2021-02-26 | Stop reason: SDUPTHER

## 2021-02-26 RX ORDER — DEXTROSE MONOHYDRATE 25 G/50ML
12.5 INJECTION, SOLUTION INTRAVENOUS PRN
Status: DISCONTINUED | OUTPATIENT
Start: 2021-02-26 | End: 2021-03-04 | Stop reason: HOSPADM

## 2021-02-26 RX ORDER — NITROGLYCERIN 20 MG/100ML
INJECTION INTRAVENOUS PRN
Status: DISCONTINUED | OUTPATIENT
Start: 2021-02-26 | End: 2021-02-26 | Stop reason: SDUPTHER

## 2021-02-26 RX ORDER — PROPOFOL 10 MG/ML
INJECTION, EMULSION INTRAVENOUS CONTINUOUS PRN
Status: DISCONTINUED | OUTPATIENT
Start: 2021-02-26 | End: 2021-02-26 | Stop reason: SDUPTHER

## 2021-02-26 RX ORDER — CLOPIDOGREL BISULFATE 75 MG/1
75 TABLET ORAL DAILY
Status: DISCONTINUED | OUTPATIENT
Start: 2021-02-27 | End: 2021-02-27

## 2021-02-26 RX ORDER — AMIODARONE HYDROCHLORIDE 200 MG/1
200 TABLET ORAL 2 TIMES DAILY
Status: DISCONTINUED | OUTPATIENT
Start: 2021-02-26 | End: 2021-03-04 | Stop reason: HOSPADM

## 2021-02-26 RX ORDER — ONDANSETRON 2 MG/ML
4 INJECTION INTRAMUSCULAR; INTRAVENOUS EVERY 8 HOURS PRN
Status: DISCONTINUED | OUTPATIENT
Start: 2021-02-26 | End: 2021-03-04 | Stop reason: HOSPADM

## 2021-02-26 RX ORDER — MIDAZOLAM HYDROCHLORIDE 2 MG/2ML
2 INJECTION, SOLUTION INTRAMUSCULAR; INTRAVENOUS ONCE
Status: COMPLETED | OUTPATIENT
Start: 2021-02-26 | End: 2021-02-26

## 2021-02-26 RX ORDER — SENNA AND DOCUSATE SODIUM 50; 8.6 MG/1; MG/1
1 TABLET, FILM COATED ORAL 2 TIMES DAILY
Status: DISCONTINUED | OUTPATIENT
Start: 2021-02-26 | End: 2021-03-04 | Stop reason: HOSPADM

## 2021-02-26 RX ORDER — SODIUM CHLORIDE 0.9 % (FLUSH) 0.9 %
10 SYRINGE (ML) INJECTION EVERY 12 HOURS SCHEDULED
Status: DISCONTINUED | OUTPATIENT
Start: 2021-02-26 | End: 2021-03-04 | Stop reason: HOSPADM

## 2021-02-26 RX ORDER — MAGNESIUM HYDROXIDE 1200 MG/15ML
LIQUID ORAL CONTINUOUS PRN
Status: COMPLETED | OUTPATIENT
Start: 2021-02-26 | End: 2021-02-26

## 2021-02-26 RX ORDER — PANTOPRAZOLE SODIUM 40 MG/10ML
40 INJECTION, POWDER, LYOPHILIZED, FOR SOLUTION INTRAVENOUS 2 TIMES DAILY
Status: DISCONTINUED | OUTPATIENT
Start: 2021-02-26 | End: 2021-03-04 | Stop reason: HOSPADM

## 2021-02-26 RX ORDER — SODIUM CHLORIDE 9 MG/ML
INJECTION, SOLUTION INTRAVENOUS CONTINUOUS
Status: DISCONTINUED | OUTPATIENT
Start: 2021-02-26 | End: 2021-03-02

## 2021-02-26 RX ORDER — MAGNESIUM SULFATE 1 G/100ML
1000 INJECTION INTRAVENOUS PRN
Status: DISCONTINUED | OUTPATIENT
Start: 2021-02-26 | End: 2021-03-04 | Stop reason: HOSPADM

## 2021-02-26 RX ORDER — FENTANYL CITRATE 50 UG/ML
50 INJECTION, SOLUTION INTRAMUSCULAR; INTRAVENOUS
Status: DISCONTINUED | OUTPATIENT
Start: 2021-02-26 | End: 2021-03-04 | Stop reason: HOSPADM

## 2021-02-26 RX ORDER — MEPERIDINE HYDROCHLORIDE 50 MG/ML
25 INJECTION INTRAMUSCULAR; INTRAVENOUS; SUBCUTANEOUS
Status: ACTIVE | OUTPATIENT
Start: 2021-02-26 | End: 2021-02-26

## 2021-02-26 RX ORDER — LIDOCAINE HYDROCHLORIDE 10 MG/ML
INJECTION, SOLUTION EPIDURAL; INFILTRATION; INTRACAUDAL; PERINEURAL PRN
Status: DISCONTINUED | OUTPATIENT
Start: 2021-02-26 | End: 2021-02-26 | Stop reason: SDUPTHER

## 2021-02-26 RX ORDER — BISACODYL 10 MG
10 SUPPOSITORY, RECTAL RECTAL DAILY PRN
Status: DISCONTINUED | OUTPATIENT
Start: 2021-02-26 | End: 2021-03-04 | Stop reason: HOSPADM

## 2021-02-26 RX ORDER — SODIUM CHLORIDE, SODIUM LACTATE, POTASSIUM CHLORIDE, CALCIUM CHLORIDE 600; 310; 30; 20 MG/100ML; MG/100ML; MG/100ML; MG/100ML
INJECTION, SOLUTION INTRAVENOUS CONTINUOUS PRN
Status: DISCONTINUED | OUTPATIENT
Start: 2021-02-26 | End: 2021-02-26 | Stop reason: SDUPTHER

## 2021-02-26 RX ORDER — INSULIN GLARGINE 100 [IU]/ML
0.15 INJECTION, SOLUTION SUBCUTANEOUS NIGHTLY
Status: DISCONTINUED | OUTPATIENT
Start: 2021-02-27 | End: 2021-03-04

## 2021-02-26 RX ORDER — POTASSIUM CHLORIDE 29.8 MG/ML
20 INJECTION INTRAVENOUS PRN
Status: DISCONTINUED | OUTPATIENT
Start: 2021-02-26 | End: 2021-03-01

## 2021-02-26 RX ORDER — ALBUMIN, HUMAN INJ 5% 5 %
25 SOLUTION INTRAVENOUS PRN
Status: DISCONTINUED | OUTPATIENT
Start: 2021-02-26 | End: 2021-03-04 | Stop reason: HOSPADM

## 2021-02-26 RX ORDER — FLUOXETINE 10 MG/1
10 CAPSULE ORAL DAILY
Status: CANCELLED | OUTPATIENT
Start: 2021-02-27

## 2021-02-26 RX ORDER — SODIUM CHLORIDE 0.9 % (FLUSH) 0.9 %
10 SYRINGE (ML) INJECTION EVERY 12 HOURS SCHEDULED
Status: DISCONTINUED | OUTPATIENT
Start: 2021-02-26 | End: 2021-02-26 | Stop reason: HOSPADM

## 2021-02-26 RX ORDER — SODIUM CHLORIDE 0.9 % (FLUSH) 0.9 %
10 SYRINGE (ML) INJECTION PRN
Status: DISCONTINUED | OUTPATIENT
Start: 2021-02-26 | End: 2021-02-26 | Stop reason: HOSPADM

## 2021-02-26 RX ORDER — M-VIT,TX,IRON,MINS/CALC/FOLIC 27MG-0.4MG
1 TABLET ORAL
Status: DISCONTINUED | OUTPATIENT
Start: 2021-02-27 | End: 2021-03-04 | Stop reason: HOSPADM

## 2021-02-26 RX ORDER — HEPARIN SODIUM 1000 [USP'U]/ML
INJECTION, SOLUTION INTRAVENOUS; SUBCUTANEOUS PRN
Status: DISCONTINUED | OUTPATIENT
Start: 2021-02-26 | End: 2021-02-26 | Stop reason: SDUPTHER

## 2021-02-26 RX ORDER — SODIUM CHLORIDE 9 MG/ML
10 INJECTION INTRAVENOUS 2 TIMES DAILY
Status: DISCONTINUED | OUTPATIENT
Start: 2021-02-26 | End: 2021-03-04 | Stop reason: HOSPADM

## 2021-02-26 RX ORDER — VANCOMYCIN HYDROCHLORIDE 1 G/200ML
1000 INJECTION, SOLUTION INTRAVENOUS EVERY 12 HOURS
Status: COMPLETED | OUTPATIENT
Start: 2021-02-26 | End: 2021-02-27

## 2021-02-26 RX ADMIN — ONDANSETRON 4 MG: 2 INJECTION INTRAMUSCULAR; INTRAVENOUS at 18:48

## 2021-02-26 RX ADMIN — VANCOMYCIN HYDROCHLORIDE 1 G: 1 INJECTION, SOLUTION INTRAVENOUS at 09:17

## 2021-02-26 RX ADMIN — AMINOCAPROIC ACID 10 G/HR: 250 INJECTION, SOLUTION INTRAVENOUS at 08:55

## 2021-02-26 RX ADMIN — OXYCODONE HYDROCHLORIDE AND ACETAMINOPHEN 2 TABLET: 5; 325 TABLET ORAL at 21:50

## 2021-02-26 RX ADMIN — FENTANYL CITRATE 150 MCG: 50 INJECTION INTRAVENOUS at 09:39

## 2021-02-26 RX ADMIN — OXYCODONE HYDROCHLORIDE AND ACETAMINOPHEN 2 TABLET: 5; 325 TABLET ORAL at 13:50

## 2021-02-26 RX ADMIN — ALBUMIN (HUMAN) 25 G: 12.5 INJECTION, SOLUTION INTRAVENOUS at 13:51

## 2021-02-26 RX ADMIN — ROCURONIUM BROMIDE 50 MG: 10 INJECTION INTRAVENOUS at 08:41

## 2021-02-26 RX ADMIN — CHLORHEXIDINE GLUCONATE 0.12% ORAL RINSE 15 ML: 1.2 LIQUID ORAL at 14:38

## 2021-02-26 RX ADMIN — SODIUM CHLORIDE, POTASSIUM CHLORIDE, SODIUM LACTATE AND CALCIUM CHLORIDE: 600; 310; 30; 20 INJECTION, SOLUTION INTRAVENOUS at 08:53

## 2021-02-26 RX ADMIN — Medication 81 MG: at 15:22

## 2021-02-26 RX ADMIN — SODIUM CHLORIDE, POTASSIUM CHLORIDE, SODIUM LACTATE AND CALCIUM CHLORIDE: 600; 310; 30; 20 INJECTION, SOLUTION INTRAVENOUS at 08:59

## 2021-02-26 RX ADMIN — FENTANYL CITRATE 50 MCG: 50 INJECTION, SOLUTION INTRAMUSCULAR; INTRAVENOUS at 13:46

## 2021-02-26 RX ADMIN — CEFAZOLIN 1 G: 10 INJECTION, POWDER, FOR SOLUTION INTRAVENOUS at 09:24

## 2021-02-26 RX ADMIN — FENTANYL CITRATE 250 MCG: 50 INJECTION INTRAVENOUS at 09:03

## 2021-02-26 RX ADMIN — Medication 0.02 MCG/KG/MIN: at 14:45

## 2021-02-26 RX ADMIN — AMIODARONE HYDROCHLORIDE 200 MG: 200 TABLET ORAL at 15:22

## 2021-02-26 RX ADMIN — ALBUMIN (HUMAN) 25 G: 12.5 INJECTION, SOLUTION INTRAVENOUS at 23:13

## 2021-02-26 RX ADMIN — SODIUM CHLORIDE, PRESERVATIVE FREE 10 ML: 5 INJECTION INTRAVENOUS at 20:04

## 2021-02-26 RX ADMIN — LIDOCAINE HYDROCHLORIDE 50 MG: 10 INJECTION, SOLUTION EPIDURAL; INFILTRATION; INTRACAUDAL; PERINEURAL at 08:41

## 2021-02-26 RX ADMIN — ROCURONIUM BROMIDE 50 MG: 10 INJECTION INTRAVENOUS at 11:54

## 2021-02-26 RX ADMIN — MIDAZOLAM HYDROCHLORIDE 2 MG: 1 INJECTION, SOLUTION INTRAMUSCULAR; INTRAVENOUS at 09:14

## 2021-02-26 RX ADMIN — POTASSIUM CHLORIDE 20 MEQ: 400 INJECTION, SOLUTION INTRAVENOUS at 13:11

## 2021-02-26 RX ADMIN — PROPOFOL 110 MG: 10 INJECTION, EMULSION INTRAVENOUS at 08:41

## 2021-02-26 RX ADMIN — FENTANYL CITRATE 100 MCG: 50 INJECTION INTRAVENOUS at 09:37

## 2021-02-26 RX ADMIN — SODIUM CHLORIDE 2 UNITS/HR: 9 INJECTION, SOLUTION INTRAVENOUS at 10:45

## 2021-02-26 RX ADMIN — VANCOMYCIN HYDROCHLORIDE 1000 MG: 1 INJECTION, SOLUTION INTRAVENOUS at 15:41

## 2021-02-26 RX ADMIN — CEFAZOLIN 2000 MG: 10 INJECTION, POWDER, FOR SOLUTION INTRAVENOUS at 16:53

## 2021-02-26 RX ADMIN — FENTANYL CITRATE 250 MCG: 50 INJECTION INTRAVENOUS at 08:41

## 2021-02-26 RX ADMIN — SODIUM CHLORIDE, POTASSIUM CHLORIDE, SODIUM LACTATE AND CALCIUM CHLORIDE 1000 ML: 600; 310; 30; 20 INJECTION, SOLUTION INTRAVENOUS at 08:03

## 2021-02-26 RX ADMIN — DOCUSATE SODIUM 50MG AND SENNOSIDES 8.6MG 1 TABLET: 8.6; 5 TABLET, FILM COATED ORAL at 20:03

## 2021-02-26 RX ADMIN — OXYCODONE HYDROCHLORIDE AND ACETAMINOPHEN 2 TABLET: 5; 325 TABLET ORAL at 17:39

## 2021-02-26 RX ADMIN — FENTANYL CITRATE 50 MCG: 50 INJECTION, SOLUTION INTRAMUSCULAR; INTRAVENOUS at 19:40

## 2021-02-26 RX ADMIN — CALCIUM CHLORIDE 1000 MG: 100 INJECTION, SOLUTION INTRAVENOUS; INTRAVENTRICULAR at 15:34

## 2021-02-26 RX ADMIN — FENTANYL CITRATE 100 MCG: 50 INJECTION INTRAVENOUS at 09:47

## 2021-02-26 RX ADMIN — MIDAZOLAM HYDROCHLORIDE 2 MG: 1 INJECTION, SOLUTION INTRAMUSCULAR; INTRAVENOUS at 08:00

## 2021-02-26 RX ADMIN — Medication 81 MG: at 06:33

## 2021-02-26 RX ADMIN — PROPOFOL 40 MCG/KG/MIN: 10 INJECTION, EMULSION INTRAVENOUS at 12:48

## 2021-02-26 RX ADMIN — FENTANYL CITRATE 250 MCG: 50 INJECTION INTRAVENOUS at 09:20

## 2021-02-26 RX ADMIN — SODIUM BICARBONATE 50 MEQ: 84 INJECTION, SOLUTION INTRAVENOUS at 17:37

## 2021-02-26 RX ADMIN — FENTANYL CITRATE 100 MCG: 50 INJECTION INTRAVENOUS at 11:54

## 2021-02-26 RX ADMIN — FENTANYL CITRATE 50 MCG: 50 INJECTION, SOLUTION INTRAMUSCULAR; INTRAVENOUS at 16:44

## 2021-02-26 RX ADMIN — Medication 100 MCG: at 08:43

## 2021-02-26 RX ADMIN — AMIODARONE HYDROCHLORIDE 200 MG: 200 TABLET ORAL at 20:03

## 2021-02-26 RX ADMIN — POTASSIUM CHLORIDE 20 MEQ: 400 INJECTION, SOLUTION INTRAVENOUS at 14:31

## 2021-02-26 RX ADMIN — PROPOFOL 15 MCG/KG/MIN: 10 INJECTION, EMULSION INTRAVENOUS at 12:03

## 2021-02-26 RX ADMIN — MUPIROCIN: 20 OINTMENT TOPICAL at 20:03

## 2021-02-26 RX ADMIN — ROCURONIUM BROMIDE 25 MG: 10 INJECTION INTRAVENOUS at 09:25

## 2021-02-26 RX ADMIN — ROCURONIUM BROMIDE 25 MG: 10 INJECTION INTRAVENOUS at 09:44

## 2021-02-26 RX ADMIN — ALBUMIN (HUMAN) 25 G: 12.5 INJECTION, SOLUTION INTRAVENOUS at 12:55

## 2021-02-26 RX ADMIN — PANTOPRAZOLE SODIUM 40 MG: 40 INJECTION, POWDER, FOR SOLUTION INTRAVENOUS at 20:03

## 2021-02-26 RX ADMIN — PROTAMINE SULFATE 250 MG: 10 INJECTION, SOLUTION INTRAVENOUS at 11:39

## 2021-02-26 RX ADMIN — POTASSIUM CHLORIDE 20 MEQ: 400 INJECTION, SOLUTION INTRAVENOUS at 18:12

## 2021-02-26 RX ADMIN — HEPARIN SODIUM 23000 UNITS: 1000 INJECTION INTRAVENOUS; SUBCUTANEOUS at 09:49

## 2021-02-26 RX ADMIN — ROCURONIUM BROMIDE 50 MG: 10 INJECTION INTRAVENOUS at 10:47

## 2021-02-26 RX ADMIN — SODIUM BICARBONATE 50 MEQ: 84 INJECTION, SOLUTION INTRAVENOUS at 14:49

## 2021-02-26 RX ADMIN — SODIUM CHLORIDE, POTASSIUM CHLORIDE, SODIUM LACTATE AND CALCIUM CHLORIDE: 600; 310; 30; 20 INJECTION, SOLUTION INTRAVENOUS at 12:00

## 2021-02-26 RX ADMIN — MIDAZOLAM HYDROCHLORIDE 2 MG: 1 INJECTION, SOLUTION INTRAMUSCULAR; INTRAVENOUS at 11:54

## 2021-02-26 RX ADMIN — SODIUM CHLORIDE: 9 INJECTION, SOLUTION INTRAVENOUS at 12:48

## 2021-02-26 RX ADMIN — CEFAZOLIN 2000 MG: 10 INJECTION, POWDER, FOR SOLUTION INTRAVENOUS at 20:50

## 2021-02-26 RX ADMIN — MUPIROCIN: 20 OINTMENT TOPICAL at 14:38

## 2021-02-26 RX ADMIN — METOPROLOL TARTRATE 12.5 MG: 25 TABLET, FILM COATED ORAL at 06:34

## 2021-02-26 RX ADMIN — FENTANYL CITRATE 150 MCG: 50 INJECTION INTRAVENOUS at 09:43

## 2021-02-26 RX ADMIN — Medication 0.01 MCG/KG/MIN: at 23:16

## 2021-02-26 RX ADMIN — NITROGLYCERIN 40 MCG: 20 INJECTION INTRAVENOUS at 09:53

## 2021-02-26 ASSESSMENT — PULMONARY FUNCTION TESTS
PIF_VALUE: 1
PIF_VALUE: 1
PIF_VALUE: 0
PIF_VALUE: 0
PIF_VALUE: 15
PIF_VALUE: 16
PIF_VALUE: 16
PIF_VALUE: 15
PIF_VALUE: 1
PIF_VALUE: 16
PIF_VALUE: 17
PIF_VALUE: 17
PIF_VALUE: 1
PIF_VALUE: 17
PIF_VALUE: 17
PIF_VALUE: 19
PIF_VALUE: 2
PIF_VALUE: 1
PIF_VALUE: 15
PIF_VALUE: 17
PIF_VALUE: 16
PIF_VALUE: 16
PIF_VALUE: 1
PIF_VALUE: 15
PIF_VALUE: 16
PIF_VALUE: 16
PIF_VALUE: 1
PIF_VALUE: 16
PIF_VALUE: 17
PIF_VALUE: 15
PIF_VALUE: 1
PIF_VALUE: 15
PIF_VALUE: 15
PIF_VALUE: 16
PIF_VALUE: 0
PIF_VALUE: 15
PIF_VALUE: 1
PIF_VALUE: 16
PIF_VALUE: 1
PIF_VALUE: 0
PIF_VALUE: 15
PIF_VALUE: 8
PIF_VALUE: 16
PIF_VALUE: 16
PIF_VALUE: 1
PIF_VALUE: 15
PIF_VALUE: 1
PIF_VALUE: 15
PIF_VALUE: 16
PIF_VALUE: 1
PIF_VALUE: 20
PIF_VALUE: 15
PIF_VALUE: 15
PIF_VALUE: 0
PIF_VALUE: 15
PIF_VALUE: 16
PIF_VALUE: 15
PIF_VALUE: 16
PIF_VALUE: 16
PIF_VALUE: 1
PIF_VALUE: 16
PIF_VALUE: 16
PIF_VALUE: 17
PIF_VALUE: 1
PIF_VALUE: 16
PIF_VALUE: 1
PIF_VALUE: 18
PIF_VALUE: 1
PIF_VALUE: 16
PIF_VALUE: 26
PIF_VALUE: 1
PIF_VALUE: 14
PIF_VALUE: 16
PIF_VALUE: 15
PIF_VALUE: 16
PIF_VALUE: 15
PIF_VALUE: 1
PIF_VALUE: 16
PIF_VALUE: 17
PIF_VALUE: 17
PIF_VALUE: 15
PIF_VALUE: 1
PIF_VALUE: 1
PIF_VALUE: 15
PIF_VALUE: 17
PIF_VALUE: 15
PIF_VALUE: 16
PIF_VALUE: 1
PIF_VALUE: 0
PIF_VALUE: 16
PIF_VALUE: 1
PIF_VALUE: 17
PIF_VALUE: 15
PIF_VALUE: 0
PIF_VALUE: 1
PIF_VALUE: 16
PIF_VALUE: 1
PIF_VALUE: 16
PIF_VALUE: 1
PIF_VALUE: 1
PIF_VALUE: 15
PIF_VALUE: 15
PIF_VALUE: 16
PIF_VALUE: 1
PIF_VALUE: 15
PIF_VALUE: 15
PIF_VALUE: 1
PIF_VALUE: 15
PIF_VALUE: 18
PIF_VALUE: 15
PIF_VALUE: 1
PIF_VALUE: 16
PIF_VALUE: 1
PIF_VALUE: 0
PIF_VALUE: 1
PIF_VALUE: 16
PIF_VALUE: 16
PIF_VALUE: 15
PIF_VALUE: 16
PIF_VALUE: 1
PIF_VALUE: 17
PIF_VALUE: 1
PIF_VALUE: 15
PIF_VALUE: 15
PIF_VALUE: 17
PIF_VALUE: 1
PIF_VALUE: 16
PIF_VALUE: 1
PIF_VALUE: 15
PIF_VALUE: 1

## 2021-02-26 ASSESSMENT — PAIN SCALES - GENERAL: PAINLEVEL_OUTOF10: 10

## 2021-02-26 ASSESSMENT — LIFESTYLE VARIABLES: SMOKING_STATUS: 1

## 2021-02-26 ASSESSMENT — ENCOUNTER SYMPTOMS: SHORTNESS OF BREATH: 1

## 2021-02-26 NOTE — FLOWSHEET NOTE
02/26/21 0936   Family Communication    Relationship to Patient Parent    Phone Number Joseph Grace [phone]   Family/Significant Other Update Called   Delivery Origin Nurse   Message Disposition Family present - message delivered   Update Given Yes   Family Communication   Family Update Message Procedure started;Surgeon working; Will update in 1-2 hours; Patient stable

## 2021-02-26 NOTE — ANESTHESIA PROCEDURE NOTES
Arterial Line:    An arterial line was placed using ultrasound guidance, in the OR for the following indication(s): continuous blood pressure monitoring and blood sampling needed. A (size), 1 and 3/4 inch (length), Arrow (type) catheter was placed, Seldinger technique used, into the right radial artery, secured by tape and Tegaderm. Anesthesia type: General    Events:  patient tolerated procedure well with no complications.     Additional notes:  Surgical team made first attempts at arterial line x 2.   2/26/2021 8:59 AM2/26/2021 9:01 AM  Anesthesiologist: Alexus De Luna MD  Performed: Anesthesiologist   Preanesthetic Checklist  Completed: patient identified, IV checked, site marked, risks and benefits discussed, surgical consent, monitors and equipment checked, pre-op evaluation, timeout performed, anesthesia consent given, oxygen available and patient being monitored

## 2021-02-26 NOTE — ANESTHESIA PROCEDURE NOTES
Procedure Performed: GEOVANY      Start Time:  2/26/2021 9:13 AM       End Time:      Preanesthesia Checklist:  Patient identified, IV assessed, risks and benefits discussed, monitors and equipment assessed, procedure being performed at surgeon's request and anesthesia consent obtained. General Procedure Information  Diagnostic Indications for Echo:  assessment of ascending aorta, assessment of surgical repair, hemodynamic monitoring and assessment of valve function  Physician Requesting Echo: William Jara MD  Location performed:  OR  Intubated  Bite block placed  Heart visualized  Probe Insertion:  Easy  Probe Type:  3D and mulitplane  Modalities:  3D, color flow mapping, continuous wave Doppler and pulse wave Doppler    Echocardiographic and Doppler Measurements    Ventricles    Right Ventricle:  Cavity size normal.  Global function normal.    Left Ventricle:  Cavity size normal.  Global Function normal.  Ejection Fraction 55%. Other Ventricular Findings:       EF 4C Brand's 59%  EF 2C Brand's 48%        Valves    Aortic Valve: Annulus normal.  Stenosis not present. Mean Gradient: 2 mmHg. Regurgitation none. Leaflets normal.  Leaflet motions normal.      Mitral Valve: Annulus normal.  Stenosis not present. Vena Contracta Width: 0.32 cm. Regurgitation severe. Leaflets normal.  Leaflet motions prolapsed and restricted. Specific leaflet segments with abnormal motions are described in the following comments:       A2 prolapse, P1 restriction    Tricuspid Valve: Annulus dilated. Regurgitation moderate. Leaflets normal.  Leaflet motions normal.    Pulmonic Valve: Annulus normal.  Regurgitation none. Other Valve Findings:       MR jet is a severe eccentric jet directed posterior.      Aorta    Ascending Aorta:  Size normal.    Aortic Arch:  Size normal.    Descending Aorta:  Size normal.    Other Aortic Findings:       LVOT diameter 16 mm  Aortic sinus diameter 25 mm      Atria    Right Atrium:  Size dilated. Left Atrium:  Size dilated. Left atrial appendage normal.      Septa    Atrial Septum:  Intra-atrial septal morphology normal.      Ventricular Septum:  Intra-ventricular septum morphology normal.          Other Findings  Pericardium:  normal  Pleural Effusion:  left  Pulmonary Arteries:  normal  Pulmonary Venous Flow:  blunted (decreased) systolic flow    Anesthesia Information  Performed Personally  Anesthesiologist:  Arleth Florian MD      Echocardiogram Comments:       TDI lateral E' 11.4 cm/s  No signs of diastolic dysfunction    Summary:     1. A postop GEOVANY was performed without complications. 2. LVEF 55 % postop  3. Post-op patient has underwent an Danny mechanical MVR. Well seated, all leaflets move easily. Washing jets noted and normal, no other MR. No MS. Mean gradient is 3 mmHg. 4. No Aortic dissection  5. Significant findings were communicated to CTS.     Electronically signed by Arleth Florian MD on 2/26/2021 at 12:36 PM

## 2021-02-26 NOTE — ANESTHESIA POSTPROCEDURE EVALUATION
Department of Anesthesiology  Postprocedure Note    Patient: Harleen Nolen  MRN: [id number]  YOB: 1975  Date of evaluation: 2/26/2021  Time:  1:33 PM     Procedure Summary     Date: 02/26/21 Room / Location: [address] [place] [address]    Anesthesia Start: 2068 Anesthesia Stop: 1253    Procedure: MITRAL VALVE REPLACE  ON-X 27/29 WITH LEFT ATRIAL APPENDAGE LIGATION WITH 50MM ATRICLIP. (N/A ) Diagnosis: (MITRAL VALVE REGURGE)    Surgeons: Joellen Ang MD Responsible Provider: Wyatt Ramirez MD    Anesthesia Type: general ASA Status: 4          Anesthesia Type: general    Maribeth Phase I:      Maribeth Phase II:      Last vitals: Reviewed and per EMR flowsheets. Anesthesia Post Evaluation    Patient location during evaluation: ICU  Patient participation: complete - patient cannot participate  Level of consciousness: sedated and ventilated  Pain scale: Sedated.   Airway patency: patent  Nausea & Vomiting: no nausea and no vomiting  Complications: no  Cardiovascular status: hemodynamically stable and blood pressure returned to baseline  Respiratory status: acceptable, ventilator and intubated  Hydration status: euvolemic

## 2021-02-26 NOTE — PROGRESS NOTES
Order obtained for extubation. SpO2 of 100 on 35% FiO2. Patient extubated and placed on 4 liters/min via nasal cannula. Post extubation SpO2 is 100% with HR  112 bpm and RR 22 breaths/min. Patient had mild cough that was non-productive. Extubation Well tolerated by patient. .   Breath Sounds: clear    JEWEL WOODSON   5:45 PM

## 2021-02-26 NOTE — PLAN OF CARE
Problem: OXYGENATION/RESPIRATORY FUNCTION  Goal: Patient will maintain patent airway  2/26/2021 1417 by Yoana Dempsey RN  Outcome: Ongoing  2/26/2021 1255 by Eusebio Nolan RCP  Outcome: Ongoing  Goal: Patient will achieve/maintain normal respiratory rate/effort  Description: Respiratory rate and effort will be within normal limits for the patient  2/26/2021 1417 by Yoana Dempsey RN  Outcome: Ongoing  2/26/2021 1255 by Eusebio Nolan RCP  Outcome: Ongoing     Problem: MECHANICAL VENTILATION  Goal: Patient will maintain patent airway  2/26/2021 1417 by Yoana Dmepsey RN  Outcome: Ongoing  2/26/2021 1255 by Eusebio Nolan RCP  Outcome: Ongoing  Goal: Oral health is maintained or improved  2/26/2021 1417 by Yoana Dempsey RN  Outcome: Ongoing  2/26/2021 1255 by Eusebio Nolan RCP  Outcome: Ongoing  Goal: ET tube will be managed safely  2/26/2021 1417 by Yoana Dempsey RN  Outcome: Ongoing  2/26/2021 1255 by Eusebio Nolan RCP  Outcome: Ongoing  Goal: Ability to express needs and understand communication  2/26/2021 1417 by Yoana Dempsey RN  Outcome: Ongoing  2/26/2021 1255 by Eusebio Nolan RCP  Outcome: Ongoing  Goal: Mobility/activity is maintained at optimum level for patient  2/26/2021 1417 by Yoana Dempsey RN  Outcome: Ongoing  2/26/2021 1255 by Eusebio Nolan RCP  Outcome: Ongoing     Problem: Non-Violent Restraints  Goal: Removal from restraints as soon as assessed to be safe  Outcome: Ongoing  Goal: No harm/injury to patient while restraints in use  Outcome: Ongoing  Goal: Patient's dignity will be maintained  Outcome: Ongoing     Problem: Discharge Planning:  Goal: Discharged to appropriate level of care  Description: Discharged to appropriate level of care  Outcome: Ongoing     Problem: Cardiac Output - Decreased:  Goal: Cardiac output within specified parameters  Description: Cardiac output within specified parameters  Outcome: Ongoing     Problem: Infection - Surgical Site:  Goal: Will show no infection signs and symptoms  Description: Will show no infection signs and symptoms  Outcome: Ongoing     Problem: Pain - Acute:  Goal: Pain level will decrease  Description: Pain level will decrease  Outcome: Ongoing     Problem: Venous Thromboembolism:  Goal: Will show no signs or symptoms of venous thromboembolism  Description: Will show no signs or symptoms of venous thromboembolism  Outcome: Ongoing  Goal: Absence of signs or symptoms of impaired coagulation  Description: Absence of signs or symptoms of impaired coagulation  Outcome: Ongoing

## 2021-02-26 NOTE — PROGRESS NOTES
Writer at bedside with RT for extubation. Pt extubated at [phone], placed on 4L NC, SpO2 100%.

## 2021-02-26 NOTE — H&P
[facility] Cardiothoracic Surgery  History andPhysical    Patient's Name/Date of Birth: Mackenzie Saez / 1975 (89 y.o.)    Date: February 26, 2021     Chief Complaint: No chief complaint on file. HPI: Mackenzie Saez is a 39 y.o.  Ms. Rylee Carcamo is a 39 y.o.  female who presents with severe mitral valve regurgitation. Patient reports that she has experienced 3 to 4 episodes of sudden onset shortness of breath accompanied by a frothy/productive cough, wheezing and chest pain. The first episode occurred in early fall. She was seen and evaluated multiple times with no evidence of cardiac causation, the episodes were thought to be anxiety driven. She arrived to the ER on 12/31/2020 and found to have acute pulmonary edema secondary to congestive heart failure. She was admitted for further evaluation and treatment. Pertinent medical history of anxiety, anemia and remote history of drug use (non-intravenous drug use). Cardiac workup has revealed severe mitral regurgitation, ,oderate tricuspid regurgitation and mildly reduced EF of 45% in the absence of CAD. Patient denies shortness of breath and chest pain at this time. She remains supine following cardiac cath. She has been referred for consideration of mitral valve repair vs replacement. ROS:  Review of Systems  Review of Systems   Constitutional: Negative for appetite change, chills, fatigue and fever. HENT: Negative for congestion. Eyes: Negative for visual disturbance. Respiratory: Negative for chest tightness and shortness of breath. Cardiovascular: Negative for chest pain and leg swelling. Gastrointestinal: Negative for abdominal pain, constipation and diarrhea. Genitourinary: Negative for difficulty urinating. Musculoskeletal: Negative for gait problem. Skin: Negative for color change. Neurological: Negative for dizziness, weakness and numbness. Psychiatric/Behavioral: Negative for self-injury.  The patient is nervous/anxious.          Chronic issues with anxiety and underlying panic disorder   Past Medical History:   Diagnosis Date    Anxiety     [facility]     GERD (gastroesophageal reflux disease)     History of chest pain     now resolved    History of echocardiogram 01/02/2021    mod-severe MR; mild-mod TR; EF 50-55%    Hypertension     Leg swelling     Migraine     Mitral regurgitation     moderated to severe on echo    Mitral valve disease     SOB (shortness of breath)     Stomach ulcer     Wellness examination     [facility]-last visit feb 2021    Wellness examination     no current cardiologist     Past Surgical History:   Procedure Laterality Date    CARDIAC CATHETERIZATION  01/05/2021    COSMETIC SURGERY      forehead after traumatic injury    ENDOSCOPY, COLON, DIAGNOSTIC      HERNIA REPAIR      TRANSESOPHAGEAL ECHOCARDIOGRAM  01/04/2021    severe mitral regurgitation     Allergies   Allergen Reactions    Antihistamines, Chlorpheniramine-Type Rash    Hydroxyzine Anxiety     Family History   Problem Relation Age of Onset    Diabetes Mother     High Blood Pressure Mother     No Known Problems Father      Social History     Socioeconomic History    Marital status: Single     Spouse name: Not on file    Number of children: Not on file    Years of education: Not on file    Highest education level: Not on file   Occupational History    Not on file   Social Needs    Financial resource strain: Not on file    Food insecurity     Worry: Not on file     Inability: Not on file    Transportation needs     Medical: Not on file     Non-medical: Not on file   Tobacco Use    Smoking status: Current Every Day Smoker     Packs/day: 0.50     Types: Cigarettes    Smokeless tobacco: Never Used   Substance and Sexual Activity    Alcohol use: Not Currently     Comment: in rehab for ETOH abuse    Drug use: Not Currently    Sexual activity: Not on file Lifestyle    Physical activity     Days per week: Not on file     Minutes per session: Not on file    Stress: Not on file   Relationships    Social connections     Talks on phone: Not on file     Gets together: Not on file     Attends Anabaptism service: Not on file     Active member of club or organization: Not on file     Attends meetings of clubs or organizations: Not on file     Relationship status: Not on file    Intimate partner violence     Fear of current or ex partner: Not on file     Emotionally abused: Not on file     Physically abused: Not on file     Forced sexual activity: Not on file   Other Topics Concern    Not on file   Social History Narrative    Not on file       Current Facility-Administered Medications   Medication Dose Route Frequency Provider Last Rate Last Admin    sodium chloride flush 0.9 % injection 10 mL  10 mL Intravenous 2 times per day Davis Israel MD        sodium chloride flush 0.9 % injection 10 mL  10 mL Intravenous PRN Davis Israel MD        ceFAZolin (ANCEF) 2000 mg in dextrose 5 % 50 mL IVPB  2,000 mg Intravenous On Call to [address] [place] [address], MD        lactated ringers infusion 1,000 mL  1,000 mL Intravenous Continuous Marquetta Crigler, MD        0.9 % sodium chloride infusion   Intravenous PRN Davis Israel MD        vancomycin (VANCOCIN) 1000 mg in dextrose 5% 200 mL IVPB  1,000 mg Intravenous Q12H MAYRA Moore NP        midazolam PF (VERSED) injection 2 mg  2 mg Intravenous Once Esther Gonzalez MD        vancomycin (VANCOCIN) 1 g injection             sterile water injection             protamine 10 MG/ML injection             propofol 1000 MG/100ML injection                Physical Exam:  Vitals:    02/26/21 0712   BP: 102/86   Pulse: 88   Resp: 14   Temp: 98.1 °F (36.7 °C)   SpO2: 99%     Weight: Weight: 136 lb 3.9 oz (61.8 kg)    Weight: 136 lb 3.9 oz (61.8 kg)    No intake/output data recorded.     Physical Exam   Physical Exam  Vitals signs and nursing note reviewed. Constitutional:       General: She is not in acute distress. Appearance: Normal appearance. She is normal weight. She is not ill-appearing. HENT:      Head: Normocephalic. Nose: Nose normal.      Mouth/Throat:      Mouth: Mucous membranes are moist.      Pharynx: Oropharynx is clear. Eyes:      Extraocular Movements: Extraocular movements intact. Conjunctiva/sclera: Conjunctivae normal.      Pupils: Pupils are equal, round, and reactive to light. Neck:      Musculoskeletal: Normal range of motion. Cardiovascular:      Rate and Rhythm: Normal rate and regular rhythm. Pulses: Normal pulses. Heart sounds: Normal heart sounds. No murmur. Pulmonary:      Effort: Pulmonary effort is normal. No respiratory distress. Breath sounds: Normal breath sounds. No wheezing. Abdominal:      General: Abdomen is flat. Bowel sounds are normal.      Palpations: Abdomen is soft. Tenderness: There is no abdominal tenderness. Musculoskeletal:      Right lower leg: No edema. Left lower leg: No edema. Comments: Unable to assess d/t post cath restrictions on movement. Skin:     General: Skin is warm and dry. Comments: TR band in place to right wrist, bandaid to right groin site (soft, non-tender, no redness/edema/drainage noted)   Neurological:      Mental Status: She is alert.         Data:    CBC: No results for input(s): WBC, HGB, HCT, MCV, PLT in the last 72 hours. BMP: No results for input(s): NA, K, CL, CO2, PHOS, BUN, CREATININE, MG in the last 72 hours. Invalid input(s): CA  Accucheck Glucoses:   No results for input(s): POCGLU in the last 72 hours. Cardiac Enzymes: No results for input(s): CKTOTAL, CKMB, CKMBINDEX, TROPONINI in the last 72 hours. PTT/PT/INR:  No results for input(s): PROTIME, INR in the last 72 hours. No results for input(s): APTT in the last 72 hours.   Liver Profile:  Lab Results   Component Value Date    AST 46 02/15/2021    ALT 35 02/15/2021    BILIDIR 0.08 12/31/2020    BILITOT 0.47 02/15/2021    ALKPHOS 68 02/15/2021     Lab Results   Component Value Date    CHOL 164 01/05/2021    HDL 55 01/05/2021    TRIG 59 01/05/2021     TSH:   Lab Results   Component Value Date    TSH 3.30 12/31/2020     UA:  Lab Results   Component Value Date    COLORU DARK YELLOW 02/15/2021    PHUR 5.5 02/15/2021    WBCUA 10 TO 20 02/15/2021    RBCUA TOO NUMEROUS TO COUNT 02/15/2021    MUCUS 2+ 02/15/2021    TRICHOMONAS NOT REPORTED 02/15/2021    YEAST NOT REPORTED 02/15/2021    BACTERIA NOT REPORTED 02/15/2021    SPECGRAV 1.019 02/15/2021    LEUKOCYTESUR SMALL 02/15/2021    UROBILINOGEN Normal 02/15/2021    BILIRUBINUR NEGATIVE 02/15/2021    GLUCOSEU NEGATIVE 02/15/2021    AMORPHOUS NOT REPORTED 02/15/2021        Assessment & Plan:  Patient Active Problem List   Diagnosis    Pulmonary edema, acute, with congestive heart failure (HCC)    Anxiety    Microcytic anemia    History of drug abuse ([facility] [address]    Acute on chronic diastolic congestive heart failure (HCC)    Severe mitral valve regurgitation    Severe mitral regurgitation by prior echocardiogram       PLAN  New consent obtained. Surgery today/ NPO since last night.      MAYRA Kulkarni CNP

## 2021-02-26 NOTE — CARE COORDINATION
Received message from Mercedez Ramires with [facility] regarding pt needing admission order placed. PS to Emi Anaya NP with CTS regarding admission order.

## 2021-02-26 NOTE — FLOWSHEET NOTE
Patient had surgery today and is still intubated at this time. Chaplains will check in tomorrow.         02/26/21 1548   Encounter Summary   Services provided to: Patient not available   Referral/Consult From: Multi-disciplinary team   Continue Visiting   (02/26/21)   Complexity of Encounter Low   Length of Encounter 15 minutes   Spiritual Assessment Completed Yes   Routine   Type Post-procedure   Assessment Unable to respond   Intervention Sustaining presence/ Ministry of presence   Outcome Did not respond

## 2021-02-26 NOTE — ANESTHESIA PRE PROCEDURE
Department of Anesthesiology  Preprocedure Note       Name:  Malika Herrera   Age:  39 y.o.  :  1975                                          MRN:  [id number]         Date:  2021      Surgeon: Paramjit Kramer):  Britany Chan MD    Procedure: Procedure(s):  MITRAL VALVE REPAIR, POSSIBLE REPLACE, ON PUMP SWAN IRWIN, GEOVANY    Medications prior to admission:   Prior to Admission medications    Medication Sig Start Date End Date Taking? Authorizing Provider   ciprofloxacin (CIPRO) 500 MG tablet Take 1 tablet by mouth 2 times daily for 5 days 21  MAYRA Anderson - NP   omeprazole (PRILOSEC) 20 MG delayed release capsule Take 20 mg by mouth daily    Historical Provider, MD   carvedilol (COREG) 3.125 MG tablet Take 3.125 mg by mouth daily 21   Historical Provider, MD   losartan (COZAAR) 25 MG tablet Take 1 tablet by mouth daily 21   Aravind Byrne MD   furosemide (LASIX) 20 MG tablet Take 1 tablet by mouth daily 21   Aravind Byrne MD   lurasidone (LATUDA) 40 MG TABS tablet Take by mouth daily    Historical Provider, MD   FLUoxetine (PROZAC) 10 MG capsule Take by mouth daily    Historical Provider, MD       Current medications:    Current Facility-Administered Medications   Medication Dose Route Frequency Provider Last Rate Last Admin    sodium chloride flush 0.9 % injection 10 mL  10 mL Intravenous 2 times per day Britany Chan MD        sodium chloride flush 0.9 % injection 10 mL  10 mL Intravenous PRN Britany Chan MD        ceFAZolin (ANCEF) 2000 mg in dextrose 5 % 50 mL IVPB  2,000 mg Intravenous On Call to Kael Denis MD        lactated ringers infusion 1,000 mL  1,000 mL Intravenous Continuous Ajit Jang MD        0.9 % sodium chloride infusion   Intravenous PRN Britany Chan MD           Allergies:     Allergies   Allergen Reactions    Antihistamines, Chlorpheniramine-Type Rash    Hydroxyzine Anxiety       Problem List:    Patient Active Problem List   Diagnosis Code    Pulmonary edema, acute, with congestive heart failure (HCC) I50.1    Anxiety F41.9    Microcytic anemia D50.9    History of drug abuse ([facility] [address] F19.11    Acute on chronic diastolic congestive heart failure (HCC) I50.33    Severe mitral valve regurgitation I34.0    Severe mitral regurgitation by prior echocardiogram I34.0       Past Medical History:        Diagnosis Date    Anxiety     CHF (congestive heart failure) (HCC)     GERD (gastroesophageal reflux disease)     History of chest pain     now resolved    History of echocardiogram 01/02/2021    mod-severe MR; mild-mod TR; EF 50-55%    Hypertension     Leg swelling     Migraine     Mitral regurgitation     moderated to severe on echo    Mitral valve disease     SOB (shortness of breath)     Stomach ulcer     Wellness examination     [facility]-last visit feb 2021    Wellness examination     no current cardiologist       Past Surgical History:        Procedure Laterality Date    CARDIAC CATHETERIZATION  01/05/2021    COSMETIC SURGERY      forehead after traumatic injury    ENDOSCOPY, COLON, DIAGNOSTIC      HERNIA REPAIR      TRANSESOPHAGEAL ECHOCARDIOGRAM  01/04/2021    severe mitral regurgitation       Social History:    Social History     Tobacco Use    Smoking status: Current Every Day Smoker     Packs/day: 0.50     Types: Cigarettes    Smokeless tobacco: Never Used   Substance Use Topics    Alcohol use: Not Currently     Comment: in rehab for ETOH abuse                                Ready to quit: Not Answered  Counseling given: Not Answered      Vital Signs (Current):   Vitals:    02/26/21 0610   BP: (!) 140/78   Pulse: 109   Resp: 18   Temp: 98.3 °F (36.8 °C)   TempSrc: Oral   SpO2: 99%   Weight: 136 lb 3.9 oz (61.8 kg)   Height: 5' 7\" (1.702 m)                                              BP Readings from Last 3 Encounters:   02/26/21 (!) 140/78   02/15/21 123/87   01/20/21 119/82 NPO Status: Time of last liquid consumption: 2100                        Time of last solid consumption: 0500                        Date of last liquid consumption: 02/25/21                        Date of last solid food consumption: 02/25/21    BMI:   Wt Readings from Last 3 Encounters:   02/26/21 136 lb 3.9 oz (61.8 kg)   02/15/21 143 lb (64.9 kg)   01/20/21 149 lb (67.6 kg)     Body mass index is 21.34 kg/m². CBC:   Lab Results   Component Value Date    WBC 6.7 02/15/2021    RBC 5.14 02/15/2021    HGB 13.1 02/15/2021    HCT 39.1 02/15/2021    MCV 76.1 02/15/2021    RDW 22.7 02/15/2021     02/15/2021       CMP:   Lab Results   Component Value Date     02/15/2021    K 3.9 02/15/2021     02/15/2021    CO2 21 02/15/2021    BUN 8 02/15/2021    CREATININE 0.56 02/15/2021    GFRAA >60 02/15/2021    LABGLOM >60 02/15/2021    GLUCOSE 106 02/15/2021    PROT 7.4 02/15/2021    CALCIUM 8.7 02/15/2021    BILITOT 0.47 02/15/2021    ALKPHOS 68 02/15/2021    AST 46 02/15/2021    ALT 35 02/15/2021       POC Tests: No results for input(s): POCGLU, POCNA, POCK, POCCL, POCBUN, POCHEMO, POCHCT in the last 72 hours.     Coags:   Lab Results   Component Value Date    PROTIME 9.7 02/15/2021    INR 0.9 02/15/2021    APTT 22.9 02/15/2021       HCG (If Applicable):   Lab Results   Component Value Date    PREGTESTUR NEGATIVE 02/15/2021        ABGs: No results found for: PHART, PO2ART, BFA6FRE, CKZ0RCE, BEART, W3PUCJQH     Type & Screen (If Applicable):  No results found for: LABABO, LABRH    Drug/Infectious Status (If Applicable):  No results found for: HIV, HEPCAB    COVID-19 Screening (If Applicable):   Lab Results   Component Value Date    COVID19 Not Detected 02/22/2021    COVID19 Not Detected 12/31/2020         Anesthesia Evaluation  Patient summary reviewed and Nursing notes reviewed no history of anesthetic complications:   Airway: Mallampati: II  TM distance: >3 FB   Neck ROM: full  Mouth opening: > = 3 FB Dental: normal exam         Pulmonary:normal exam  breath sounds clear to auscultation  (+) shortness of breath:  current smoker    (-) COPD, asthma, recent URI and sleep apnea                           Cardiovascular:    (+) hypertension:, valvular problems/murmurs (Severe MR, mod TR): MR, CHF (reduced EF of 21% per GEOVANY): systolic, SMITH:, murmur, pulmonary hypertension:,     (-) past MI, CAD, CABG/stent,  angina and orthopnea      Rhythm: regular  Rate: normal                    Neuro/Psych:   (+) headaches: migraine headaches, depression/anxiety    (-) seizures, TIA and CVA            ROS comment: Hx drug abuse GI/Hepatic/Renal:   (+) GERD:, PUD,           Endo/Other: Negative Endo/Other ROS       (-) diabetes mellitus               Abdominal:           Vascular: negative vascular ROS. Cath 1/5/2021  Conclusions      Procedure Summary      Normal coronary arteries. GEOVANY 1/4/2021  Normal left ventricular chamber dimension. Mildly reduced systolic function, estimated EF 45%. Left atrial dilatation. Thickened mitral valve leaflets. MVA by 3D is 6.11cm2. Severe Mitral regurgitation (multiple jets noted by 2D and 3D.) Pulmonary  vein flow reversal noted. MR radius 1.2cm  MR Volume 72mL  Moderate tricuspid regurgitation. No pericardial effusion. TTE 1/2/2021  Normal LV size and wall thickness. No obvious wall motion abnormality seen. Low Normal LV systolic function with LVEF 50-55%. Normal RV size and function. RV systolic pressure 46 mmHg  Moderately dilated LA and RA appears mildly dilated. No obvious significant structural valvular abnormality noted. Moderate to severe MR. Mild to moderate TR  Normal aortic root dimension. No significant pericardial effusion noted. yumiko  No obvious intra-cardiac mass or shunt noted.   IVC normal diameter and inspiratory collapse indicating normal RA pressure     Anesthesia Plan      general     ASA 4       Induction: intravenous. arterial line, BIS, central line, CVP and GEOVANY  MIPS: Postoperative opioids intended, Prophylactic antiemetics administered and Postoperative ventilation. Anesthetic plan and risks discussed with patient. Use of blood products discussed with patient whom consented to blood products.    Plan discussed with CRNA and surgical team.                  You Bee MD   2/26/2021

## 2021-02-26 NOTE — ANESTHESIA PROCEDURE NOTES
Central Venous Line:    A central venous line was placed using ultrasound guidance, in the OR for the following indication(s): central venous access and CVP monitoring. 2/26/2021 8:46 AM2/26/2021 8:53 AM    Sterility preparation included the following: hand hygiene performed prior to procedure, maximum sterile barriers used and sterile technique used to drape from head to toe. The patient was placed in Trendelenburg position. The right internal jugular vein was prepped. The site was prepped with Chloraprep. A 9 Fr (size), 11.5 (length), introducer (MAC Cordis) double lumen was placed. During the procedure, the following specific steps were taken: target vein identified, needle advanced into vein and blood aspirated and guidewire advanced into vein. Intravenous verification was obtained by ultrasound and venous blood return. Post insertion care included: all ports aspirated, all ports flushed easily, guidewire removed intact, Biopatch applied, line sutured in place and dressing applied. During the procedure the patient experienced: patient tolerated procedure well with no complications. Anesthesia type: general.. No    Additional notes:  Triple lumen infusion catheter place via cordis port    Staffing  Performed: Anesthesiologist   Anesthesiologist: Melani Wright MD  Preanesthetic Checklist  Completed: patient identified, IV checked, risks and benefits discussed, monitors and equipment checked, pre-op evaluation, timeout performed, anesthesia consent given, oxygen available and patient being monitored

## 2021-02-27 ENCOUNTER — APPOINTMENT (OUTPATIENT)
Dept: GENERAL RADIOLOGY | Age: 46
DRG: 163 | End: 2021-02-27
Attending: THORACIC SURGERY (CARDIOTHORACIC VASCULAR SURGERY)
Payer: MEDICAID

## 2021-02-27 LAB
ANION GAP SERPL CALCULATED.3IONS-SCNC: 10 MMOL/L (ref 9–17)
BUN BLDV-MCNC: 5 MG/DL (ref 6–20)
BUN/CREAT BLD: ABNORMAL (ref 9–20)
CALCIUM IONIZED: 1.11 MMOL/L (ref 1.13–1.33)
CALCIUM SERPL-MCNC: 7.8 MG/DL (ref 8.6–10.4)
CHLORIDE BLD-SCNC: 108 MMOL/L (ref 98–107)
CO2: 20 MMOL/L (ref 20–31)
CREAT SERPL-MCNC: 0.46 MG/DL (ref 0.5–0.9)
CULTURE: NO GROWTH
EKG ATRIAL RATE: 110 BPM
EKG P AXIS: 66 DEGREES
EKG P-R INTERVAL: 148 MS
EKG Q-T INTERVAL: 366 MS
EKG QRS DURATION: 68 MS
EKG QTC CALCULATION (BAZETT): 495 MS
EKG R AXIS: 27 DEGREES
EKG T AXIS: 28 DEGREES
EKG VENTRICULAR RATE: 110 BPM
GFR AFRICAN AMERICAN: >60 ML/MIN
GFR NON-AFRICAN AMERICAN: >60 ML/MIN
GFR SERPL CREATININE-BSD FRML MDRD: ABNORMAL ML/MIN/{1.73_M2}
GFR SERPL CREATININE-BSD FRML MDRD: ABNORMAL ML/MIN/{1.73_M2}
GLUCOSE BLD-MCNC: 216 MG/DL (ref 70–99)
HCT VFR BLD CALC: 24.4 % (ref 36.3–47.1)
HCT VFR BLD CALC: 26.3 % (ref 36.3–47.1)
HEMOGLOBIN: 7.8 G/DL (ref 11.9–15.1)
HEMOGLOBIN: 8.6 G/DL (ref 11.9–15.1)
INR BLD: 1.1
Lab: NORMAL
MAGNESIUM: 1.8 MG/DL (ref 1.6–2.6)
MCH RBC QN AUTO: 26 PG (ref 25.2–33.5)
MCHC RBC AUTO-ENTMCNC: 32 G/DL (ref 28.4–34.8)
MCV RBC AUTO: 81.3 FL (ref 82.6–102.9)
NRBC AUTOMATED: 0 PER 100 WBC
PDW BLD-RTO: 23.4 % (ref 11.8–14.4)
PLATELET # BLD: 122 K/UL (ref 138–453)
PMV BLD AUTO: 9.2 FL (ref 8.1–13.5)
POTASSIUM SERPL-SCNC: 4.6 MMOL/L (ref 3.7–5.3)
PROTHROMBIN TIME: 11.9 SEC (ref 9.1–12.3)
RBC # BLD: 3 M/UL (ref 3.95–5.11)
SODIUM BLD-SCNC: 138 MMOL/L (ref 135–144)
SPECIMEN DESCRIPTION: NORMAL
WBC # BLD: 11.4 K/UL (ref 3.5–11.3)

## 2021-02-27 PROCEDURE — 6370000000 HC RX 637 (ALT 250 FOR IP): Performed by: NURSE PRACTITIONER

## 2021-02-27 PROCEDURE — 82947 ASSAY GLUCOSE BLOOD QUANT: CPT

## 2021-02-27 PROCEDURE — 2580000003 HC RX 258: Performed by: NURSE PRACTITIONER

## 2021-02-27 PROCEDURE — 80048 BASIC METABOLIC PNL TOTAL CA: CPT

## 2021-02-27 PROCEDURE — 6360000002 HC RX W HCPCS: Performed by: NURSE PRACTITIONER

## 2021-02-27 PROCEDURE — 82330 ASSAY OF CALCIUM: CPT

## 2021-02-27 PROCEDURE — 97162 PT EVAL MOD COMPLEX 30 MIN: CPT

## 2021-02-27 PROCEDURE — 97530 THERAPEUTIC ACTIVITIES: CPT

## 2021-02-27 PROCEDURE — 36415 COLL VENOUS BLD VENIPUNCTURE: CPT

## 2021-02-27 PROCEDURE — C9113 INJ PANTOPRAZOLE SODIUM, VIA: HCPCS | Performed by: NURSE PRACTITIONER

## 2021-02-27 PROCEDURE — 97535 SELF CARE MNGMENT TRAINING: CPT

## 2021-02-27 PROCEDURE — 6360000002 HC RX W HCPCS

## 2021-02-27 PROCEDURE — 85610 PROTHROMBIN TIME: CPT

## 2021-02-27 PROCEDURE — APPSS45 APP SPLIT SHARED TIME 31-45 MINUTES: Performed by: NURSE PRACTITIONER

## 2021-02-27 PROCEDURE — 97166 OT EVAL MOD COMPLEX 45 MIN: CPT

## 2021-02-27 PROCEDURE — 2700000000 HC OXYGEN THERAPY PER DAY

## 2021-02-27 PROCEDURE — 71045 X-RAY EXAM CHEST 1 VIEW: CPT

## 2021-02-27 PROCEDURE — 94669 MECHANICAL CHEST WALL OSCILL: CPT

## 2021-02-27 PROCEDURE — 85018 HEMOGLOBIN: CPT

## 2021-02-27 PROCEDURE — 6360000002 HC RX W HCPCS: Performed by: STUDENT IN AN ORGANIZED HEALTH CARE EDUCATION/TRAINING PROGRAM

## 2021-02-27 PROCEDURE — 85027 COMPLETE CBC AUTOMATED: CPT

## 2021-02-27 PROCEDURE — 83735 ASSAY OF MAGNESIUM: CPT

## 2021-02-27 PROCEDURE — 94761 N-INVAS EAR/PLS OXIMETRY MLT: CPT

## 2021-02-27 PROCEDURE — 2100000001 HC CVICU R&B

## 2021-02-27 PROCEDURE — 85014 HEMATOCRIT: CPT

## 2021-02-27 RX ORDER — KETOROLAC TROMETHAMINE 30 MG/ML
INJECTION, SOLUTION INTRAMUSCULAR; INTRAVENOUS
Status: COMPLETED
Start: 2021-02-27 | End: 2021-02-27

## 2021-02-27 RX ORDER — KETOROLAC TROMETHAMINE 15 MG/ML
15 INJECTION, SOLUTION INTRAMUSCULAR; INTRAVENOUS EVERY 6 HOURS PRN
Status: COMPLETED | OUTPATIENT
Start: 2021-02-27 | End: 2021-03-01

## 2021-02-27 RX ORDER — FUROSEMIDE 10 MG/ML
20 INJECTION INTRAMUSCULAR; INTRAVENOUS 2 TIMES DAILY
Status: DISCONTINUED | OUTPATIENT
Start: 2021-02-27 | End: 2021-02-28

## 2021-02-27 RX ORDER — WARFARIN SODIUM 2.5 MG/1
2.5 TABLET ORAL DAILY
Status: DISCONTINUED | OUTPATIENT
Start: 2021-02-28 | End: 2021-03-02

## 2021-02-27 RX ORDER — FUROSEMIDE 10 MG/ML
INJECTION INTRAMUSCULAR; INTRAVENOUS
Status: COMPLETED
Start: 2021-02-27 | End: 2021-02-27

## 2021-02-27 RX ORDER — WARFARIN SODIUM 2.5 MG/1
2.5 TABLET ORAL DAILY
Status: DISCONTINUED | OUTPATIENT
Start: 2021-02-27 | End: 2021-02-27

## 2021-02-27 RX ADMIN — KETOROLAC TROMETHAMINE 15 MG: 15 INJECTION, SOLUTION INTRAMUSCULAR; INTRAVENOUS at 19:00

## 2021-02-27 RX ADMIN — KETOROLAC TROMETHAMINE 30 MG: 30 INJECTION, SOLUTION INTRAMUSCULAR; INTRAVENOUS at 09:31

## 2021-02-27 RX ADMIN — METOPROLOL TARTRATE 12.5 MG: 25 TABLET ORAL at 10:05

## 2021-02-27 RX ADMIN — AMIODARONE HYDROCHLORIDE 200 MG: 200 TABLET ORAL at 21:21

## 2021-02-27 RX ADMIN — PANTOPRAZOLE SODIUM 40 MG: 40 INJECTION, POWDER, FOR SOLUTION INTRAVENOUS at 20:46

## 2021-02-27 RX ADMIN — VANCOMYCIN HYDROCHLORIDE 1000 MG: 1 INJECTION, SOLUTION INTRAVENOUS at 15:00

## 2021-02-27 RX ADMIN — VANCOMYCIN HYDROCHLORIDE 1000 MG: 1 INJECTION, SOLUTION INTRAVENOUS at 02:25

## 2021-02-27 RX ADMIN — MUPIROCIN: 20 OINTMENT TOPICAL at 09:33

## 2021-02-27 RX ADMIN — Medication 81 MG: at 09:20

## 2021-02-27 RX ADMIN — OXYCODONE HYDROCHLORIDE AND ACETAMINOPHEN 2 TABLET: 5; 325 TABLET ORAL at 11:07

## 2021-02-27 RX ADMIN — CHLORHEXIDINE GLUCONATE 0.12% ORAL RINSE 15 ML: 1.2 LIQUID ORAL at 20:46

## 2021-02-27 RX ADMIN — SODIUM CHLORIDE, PRESERVATIVE FREE 10 ML: 5 INJECTION INTRAVENOUS at 13:56

## 2021-02-27 RX ADMIN — PANTOPRAZOLE SODIUM 40 MG: 40 INJECTION, POWDER, FOR SOLUTION INTRAVENOUS at 09:34

## 2021-02-27 RX ADMIN — POLYETHYLENE GLYCOL 3350 17 G: 17 POWDER, FOR SOLUTION ORAL at 09:09

## 2021-02-27 RX ADMIN — DOCUSATE SODIUM 50MG AND SENNOSIDES 8.6MG 1 TABLET: 8.6; 5 TABLET, FILM COATED ORAL at 20:45

## 2021-02-27 RX ADMIN — DOCUSATE SODIUM 50MG AND SENNOSIDES 8.6MG 1 TABLET: 8.6; 5 TABLET, FILM COATED ORAL at 09:19

## 2021-02-27 RX ADMIN — MAGNESIUM SULFATE HEPTAHYDRATE 1000 MG: 1 INJECTION, SOLUTION INTRAVENOUS at 04:57

## 2021-02-27 RX ADMIN — INSULIN GLARGINE 9 UNITS: 100 INJECTION, SOLUTION SUBCUTANEOUS at 20:52

## 2021-02-27 RX ADMIN — OXYCODONE HYDROCHLORIDE AND ACETAMINOPHEN 2 TABLET: 5; 325 TABLET ORAL at 20:47

## 2021-02-27 RX ADMIN — CEFAZOLIN 2000 MG: 10 INJECTION, POWDER, FOR SOLUTION INTRAVENOUS at 13:41

## 2021-02-27 RX ADMIN — DESMOPRESSIN ACETATE 40 MG: 0.2 TABLET ORAL at 20:46

## 2021-02-27 RX ADMIN — ONDANSETRON 4 MG: 2 INJECTION INTRAMUSCULAR; INTRAVENOUS at 09:09

## 2021-02-27 RX ADMIN — OXYCODONE HYDROCHLORIDE AND ACETAMINOPHEN 2 TABLET: 5; 325 TABLET ORAL at 02:23

## 2021-02-27 RX ADMIN — FUROSEMIDE 20 MG: 10 INJECTION, SOLUTION INTRAMUSCULAR; INTRAVENOUS at 09:31

## 2021-02-27 RX ADMIN — MUPIROCIN: 20 OINTMENT TOPICAL at 20:46

## 2021-02-27 RX ADMIN — Medication 1 TABLET: at 09:19

## 2021-02-27 RX ADMIN — FUROSEMIDE 20 MG: 10 INJECTION, SOLUTION INTRAMUSCULAR; INTRAVENOUS at 18:29

## 2021-02-27 RX ADMIN — CEFAZOLIN 2000 MG: 10 INJECTION, POWDER, FOR SOLUTION INTRAVENOUS at 04:51

## 2021-02-27 RX ADMIN — SODIUM CHLORIDE, PRESERVATIVE FREE 10 ML: 5 INJECTION INTRAVENOUS at 20:46

## 2021-02-27 RX ADMIN — OXYCODONE HYDROCHLORIDE AND ACETAMINOPHEN 2 TABLET: 5; 325 TABLET ORAL at 06:37

## 2021-02-27 RX ADMIN — ONDANSETRON 4 MG: 2 INJECTION INTRAMUSCULAR; INTRAVENOUS at 20:46

## 2021-02-27 RX ADMIN — CHLORHEXIDINE GLUCONATE 0.12% ORAL RINSE 15 ML: 1.2 LIQUID ORAL at 09:09

## 2021-02-27 RX ADMIN — AMIODARONE HYDROCHLORIDE 200 MG: 200 TABLET ORAL at 09:19

## 2021-02-27 RX ADMIN — SODIUM CHLORIDE, PRESERVATIVE FREE 10 ML: 5 INJECTION INTRAVENOUS at 09:49

## 2021-02-27 RX ADMIN — CEFAZOLIN 2000 MG: 10 INJECTION, POWDER, FOR SOLUTION INTRAVENOUS at 20:45

## 2021-02-27 ASSESSMENT — PAIN DESCRIPTION - PAIN TYPE: TYPE: ACUTE PAIN;SURGICAL PAIN

## 2021-02-27 ASSESSMENT — PAIN SCALES - GENERAL
PAINLEVEL_OUTOF10: 9
PAINLEVEL_OUTOF10: 6
PAINLEVEL_OUTOF10: 10

## 2021-02-27 ASSESSMENT — PAIN DESCRIPTION - LOCATION: LOCATION: CHEST

## 2021-02-27 ASSESSMENT — PAIN DESCRIPTION - DESCRIPTORS: DESCRIPTORS: DISCOMFORT

## 2021-02-27 NOTE — PROGRESS NOTES
Dr. Segun Choi at bedside. Updated on patient current status and urine output average 30ml/hour. No new orders at this time.

## 2021-02-27 NOTE — PROGRESS NOTES
Pulling, 5# Lifting Restrictions  Other position/activity restrictions: S/p MITRAL VALVE REPLACEMENT (2/26/21); amb pt.; up in chair; up for meals  Vision/Hearing  Vision: Impaired  Vision Exceptions: Wears glasses for reading  Hearing: Within functional limits     Subjective  General  Patient assessed for rehabilitation services?: Yes  Response To Previous Treatment: Not applicable  Family / Caregiver Present: Yes(pt's mother at bedside at end of session)  Follows Commands: Within Functional Limits  Subjective  Subjective: RN and pt in agreement for PT eval; pt seated in bedside chair upon writer's arrival, pt on 3L NC, pt pleasant and cooperative throughout  Pain Screening  Patient Currently in Pain: Yes  Pain Assessment  Pain Assessment: 0-10  Pain Level: 6  Pain Type: Acute pain;Surgical pain  Pain Location: Chest  Pain Descriptors: Discomfort  Non-Pharmaceutical Pain Intervention(s): Ambulation/Increased Activity;Repositioned  Response to Pain Intervention: Patient Satisfied  Multiple Pain Sites: No  Vital Signs  Patient Currently in Pain: Yes       Orientation  Orientation  Overall Orientation Status: Within Functional Limits  Social/Functional History  Social/Functional History  Lives With: Alone  Type of Home: Apartment  Home Layout: One level  Home Access: Stairs to enter with rails  Entrance Stairs - Number of Steps: 18  Entrance Stairs - Rails: Right  Bathroom Shower/Tub: Tub/Shower unit  Bathroom Toilet: Standard  ADL Assistance: Independent  Homemaking Assistance: Independent  Homemaking Responsibilities: Yes  Ambulation Assistance: Independent(pt independently ambulates with no AD at baseline)  Transfer Assistance: Independent  Active : No  Mode of Transportation: Friends, Family  Occupation: Unemployed  Additional Comments: Pt reports mother is able to provide prn assist upon discharge.   Cognition   Cognition  Overall Cognitive Status: WFL    Objective  Joint Mobility  Spine: WFL  ROM RLE: WFL  ROM LLE: WFL  ROM RUE: WFL  ROM LUE: WFL  Strength RLE  Strength RLE: WFL  Strength LLE  Strength LLE: WFL  Strength RUE  Strength RUE: WFL  Strength LUE  Strength LUE: WFL  Motor Control  Gross Motor?: WFL  Sensation  Overall Sensation Status: WFL(pt denies numbness/tingling)  Bed mobility  Supine to Sit: (Did not assess- pt seated in bedside chair upon writer's arrival)  Sit to Supine: Moderate assistance(Assist provided for BLE progression)  Comment: HOB elevated to ~30 degrees. Pt required verbal cueing for proper breathing technique with good return demo. Transfers  Sit to Stand: Contact guard assistance  Stand to sit: Contact guard assistance  Comment: Pt required verbal cueing for proper hand placement with good return demo.   Ambulation  Ambulation?: Yes  Ambulation 1  Surface: level tile  Device: Rolling Walker  Other Apparatus: O2(3L)  Assistance: Contact guard assistance  Quality of Gait: Forward flexed posture  Gait Deviations: Slow Denice;Decreased step length;Decreased step height  Distance: 90ft  Stairs/Curb  Stairs?: No     Balance  Posture: Fair  Sitting - Static: Good  Sitting - Dynamic: Good;-  Standing - Static: Good;-  Standing - Dynamic: Fair;+  Comments: Standing balance assessed w/ RW; pt able to sit EOB SBA        Plan   Plan  Times per week: 6-7x/week; 1-2/day  Current Treatment Recommendations: Strengthening, Transfer Training, Endurance Training, Patient/Caregiver Education & Training, ROM, Balance Training, Gait Training, Home Exercise Program, Functional Mobility Training, Stair training, Safety Education & Training  Safety Devices  Type of devices: Left in bed, Bed alarm in place, Call light within reach, Gait belt, Nurse notified  Restraints  Initially in place: No  AM-PAC Score     AM-PAC Inpatient Mobility without Stair Climbing Raw Score : 15 (02/27/21 1458)  AM-PAC Inpatient without Stair Climbing T-Scale Score : 43.03 (02/27/21 1458)  Mobility Inpatient CMS 0-100% Score: 47.43 (02/27/21 1458)  Mobility Inpatient without Stair CMS G-Code Modifier : CK (02/27/21 1458)       Goals  Short term goals  Time Frame for Short term goals: 14  Short term goal 1: Pt to perform bed mobility and functional transfers independently  Short term goal 2: Ambulate 300ft w/ no AD independently  Short term goal 3: Ascend/descend 18 stairs with R rail SBA to simulate home enviroment  Short term goal 4: Tolerate 30 minutes of therapy to demo increased endurance  Short term goal 5: Demonstrate independence in performance of cardiac rehab HEP  Patient Goals   Patient goals :  To go home       Therapy Time   Individual Concurrent Group Co-treatment   Time In 1226         Time Out 1249         Minutes 23         Timed Code Treatment Minutes: 8 Minutes       Pankaj Orellana, PT

## 2021-02-27 NOTE — FLOWSHEET NOTE
Assessment:  Patient is a 39year old female in room 1004. Patient was sitting up in chair. Patient expressed that she was in discomfort. Nurse in room preparing pain medication. Patient expressed great confidence in the care she was receiving. Patient expressed cheikh in God. Intervention:   was ministry of presence.  provided active compassionate listening.  offered prayer for patient. Outcome:  Patient expressed gratitude. Plan:  Chaplains will remain available for spiritual and emotional support as needed. 02/27/21 314   Encounter Summary   Services provided to: Patient   Referral/Consult From: Patient   Continue Visiting   (2/27/2021)   Complexity of Encounter Moderate   Length of Encounter 15 minutes   Spiritual Assessment Completed Yes   Routine   Type Follow up   Assessment Anxious; Coping   Intervention Active listening;Prayer;Sustaining presence/ Ministry of presence   Outcome Expressed gratitude

## 2021-02-27 NOTE — CONSULTS
[facility]    Inpatient Consultation Note               Today's Date: 2/27/2021  Patient Name: August Hauser  Date of admission: 2/26/2021  6:06 AM  Patient's age: 39 y.o., 1975  Admission Dx: Severe mitral regurgitation by prior echocardiogram [I34.0]    Reason for  Consult:  Post operative care for zarina BOWERS     Requesting Physician: Alice Sahu MD    CHIEF COMPLAINT:   Post surgery  No chief complaint on file. History Obtained From:  Patient, Electronic medical record. HISTORY OF PRESENT ILLNESS:      The patient is a 39 y.o. female who is admitted to the hospital for severe mitral regurgitation surgery. Patient underwent surgery on 26 February. She is doing well. She is extubated and off pressors. Later in December patient was admitted for acute pulmonary edema and cardiac work-up revealed severe mitral regurgitation, moderate tricuspid regurgitation and mildly reduced EF with ejection fraction of 45%. Past Medical History:   has a past medical history of Anxiety, CHF (congestive heart failure) ([address] GERD (gastroesophageal reflux disease), History of chest pain, History of echocardiogram, Hypertension, Leg swelling, Migraine, Mitral regurgitation, Mitral valve disease, SOB (shortness of breath), Stomach ulcer, Wellness examination, and Wellness examination. Past Surgical History:   has a past surgical history that includes hernia repair; transesophageal echocardiogram (01/04/2021); Cardiac catheterization (01/05/2021); Endoscopy, colon, diagnostic; and Cosmetic surgery. Home Medications:    Prior to Admission medications    Medication Sig Start Date End Date Taking?  Authorizing Provider   omeprazole (PRILOSEC) 20 MG delayed release capsule Take 20 mg by mouth daily    Historical Provider, MD   carvedilol (COREG) 3.125 MG tablet Take 3.125 mg by mouth daily 1/19/21   Historical Provider, MD   losartan (COZAAR) 25 MG tablet Take 1 tablet by mouth daily 1/7/21   Haydee Courtney MD   furosemide (LASIX) 20 MG tablet Take 1 tablet by mouth daily 1/7/21   Haydee Courtney MD   lurasidone (LATUDA) 40 MG TABS tablet Take by mouth daily    Historical Provider, MD   FLUoxetine (PROZAC) 10 MG capsule Take by mouth daily    Historical Provider, MD        Current Facility-Administered Medications: [MAR Hold] lactated ringers infusion 1,000 mL, 1,000 mL, Intravenous, Continuous  [MAR Hold] 0.9 % sodium chloride infusion, , Intravenous, PRN  [MAR Hold] vancomycin (VANCOCIN) 1000 mg in dextrose 5% 200 mL IVPB, 1,000 mg, Intravenous, Q12H  0.9 % sodium chloride infusion, , Intravenous, Continuous  sodium chloride flush 0.9 % injection 10 mL, 10 mL, Intravenous, 2 times per day  sodium chloride flush 0.9 % injection 10 mL, 10 mL, Intravenous, PRN  ondansetron (ZOFRAN) injection 4 mg, 4 mg, Intravenous, Q8H PRN  aspirin EC tablet 81 mg, 81 mg, Oral, Daily  clopidogrel (PLAVIX) tablet 75 mg, 75 mg, Oral, Daily  acetaminophen (TYLENOL) tablet 650 mg, 650 mg, Oral, Q4H PRN  acetaminophen (TYLENOL) suppository 650 mg, 650 mg, Rectal, Q4H PRN  oxyCODONE-acetaminophen (PERCOCET) 5-325 MG per tablet 1 tablet, 1 tablet, Oral, Q4H PRN **OR** oxyCODONE-acetaminophen (PERCOCET) 5-325 MG per tablet 2 tablet, 2 tablet, Oral, Q4H PRN  fentaNYL (SUBLIMAZE) injection 25 mcg, 25 mcg, Intravenous, Q1H PRN **OR** fentaNYL (SUBLIMAZE) injection 50 mcg, 50 mcg, Intravenous, Q1H PRN  amiodarone (CORDARONE) tablet 200 mg, 200 mg, Oral, BID  chlorhexidine (PERIDEX) 0.12 % solution 15 mL, 15 mL, Mouth/Throat, BID  hydrALAZINE (APRESOLINE) injection 5 mg, 5 mg, Intravenous, Q5 Min PRN  mupirocin (BACTROBAN) 2 % ointment, , Nasal, BID  therapeutic multivitamin-minerals 1 tablet, 1 tablet, Oral, Daily with breakfast  diphenhydrAMINE (BENADRYL) tablet 25 mg, 25 mg, Oral, Nightly PRN  polyethylene glycol (GLYCOLAX) packet 17 g, 17 g, Oral, Daily  sennosides-docusate sodium (SENOKOT-S) 8.6-50 MG tablet 1 tablet, 1 tablet, Oral, BID  magnesium hydroxide (MILK OF MAGNESIA) 400 MG/5ML suspension 30 mL, 30 mL, Oral, Daily PRN  bisacodyl (DULCOLAX) suppository 10 mg, 10 mg, Rectal, Daily PRN  metoprolol tartrate (LOPRESSOR) tablet 25 mg, 25 mg, Oral, BID  lisinopril (PRINIVIL;ZESTRIL) tablet 2.5 mg, 2.5 mg, Oral, Daily  atorvastatin (LIPITOR) tablet 40 mg, 40 mg, Oral, Nightly  ceFAZolin (ANCEF) 2000 mg in dextrose 5 % 50 mL IVPB, 2,000 mg, Intravenous, Q8H  vancomycin (VANCOCIN) 1000 mg in dextrose 5% 200 mL IVPB, 1,000 mg, Intravenous, Q12H  calcium chloride 1,000 mg in sodium chloride 0.9 % 100 mL IVPB, 1,000 mg, Intravenous, PRN  magnesium sulfate 1000 mg in dextrose 5% 100 mL IVPB, 1,000 mg, Intravenous, PRN  potassium chloride 20 mEq/50 mL IVPB (Central Line), 20 mEq, Intravenous, PRN  albumin human 5 % IV solution 25 g, 25 g, Intravenous, PRN  insulin regular (HUMULIN R;NOVOLIN R) 100 Units in sodium chloride 0.9 % 100 mL infusion, 1 Units/hr, Intravenous, Continuous  insulin glargine (LANTUS) injection vial 9 Units, 0.15 Units/kg, Subcutaneous, Nightly  glucose (GLUTOSE) 40 % oral gel 15 g, 15 g, Oral, PRN  dextrose 50 % IV solution, 12.5 g, Intravenous, PRN  glucagon (rDNA) injection 1 mg, 1 mg, Intramuscular, PRN  dextrose 5 % solution, 100 mL/hr, Intravenous, PRN  propofol injection, 5-50 mcg/kg/min, Intravenous, Titrated  norepinephrine (LEVOPHED) 16 mg in sodium chloride 0.9 % 250 mL infusion, 0.01 mcg/kg/min, Intravenous, Continuous  pantoprazole (PROTONIX) injection 40 mg, 40 mg, Intravenous, BID **AND** sodium chloride (PF) 0.9 % injection 10 mL, 10 mL, Intravenous, BID    Allergies:  Antihistamines, chlorpheniramine-type and Hydroxyzine    Social History:   reports that she has been smoking cigarettes. She has been smoking about 0.50 packs per day. She has never used smokeless tobacco. She reports previous alcohol use. She reports previous drug use.      Family History: family history includes Diabetes in her mother; High Blood Pressure in her mother; No Known Problems in her father. REVIEW OF SYSTEMS:      · Constitutional: there has been no unanticipated weight loss. · Eyes: No visual changes   · ENT: No Headaches  · Cardiovascular:  Remaining as above  · Respiratory: No cough  · Gastrointestinal: No abdominal pain. No change in bowel or bladder habits. · Genitourinary: No dysuria, trouble voiding, or hematuria. · Musculoskeletal: No joint complaints. · Neurological: No headache  · Hematologic/Lymphatic: No abnormal bruising or bleeding      PHYSICAL EXAM:      /69   Pulse 93   Temp 98.1 °F (36.7 °C) (Oral)   Resp 22   Ht 5' 7\" (1.702 m)   Wt 154 lb 12.2 oz (70.2 kg)   LMP 02/15/2021   SpO2 100%   BMI 24.24 kg/m²      Intake/Output Summary (Last 24 hours) at 2/27/2021 0716  Last data filed at 2/27/2021 0600  Gross per 24 hour   Intake 8474 ml   Output 3142 ml   Net 5332 ml         Chest: chest tubes:yes  CV: pericardial rub noted. Lungs: mild rales heard diffusely throughout both lungs  Abd:  Soft  Lower Extremities: Trace edema  Saph Incison: covered  · Sternal Incison: covered    DATA:    Diagnostics:      ECHO:  1/4/2021  Mitral Valve:    Structurally normal.  Severe Mitral  regurgitation is identified At least 3 different jets were noted. MR ERO 0.45, MR volume 72 ml  Aortic Valve: The aortic valve is trileaflet and opens adequately. Mild  regurgitiation is identified. Tricuspid valve: Structurally normal. No  regurgitation is identified. Pulmonary valve: Normal. No significant regurgitation     No valvular vegetations or thrombus identified. Cardiac Angiography:      Cardiac Cath 1/5/21  Findings:     LMCA: Normal 0% stenosis.     LAD: Normal 0% stenosis. The D1 is normal.     LCx: Normal 0% stenosis. The OM1 and OM2 are normal.     RCA: Normal 0% stenosis.      Coronary Tree      Dominance: Right  The LV gram was not performed      Estimated blood loss: 5 ml     Conclusions:  1.  Normal Coronary arteries    Labs:     CBC:   Recent Labs     02/26/21  1300 02/27/21  0341   WBC 10.3 11.4*   HGB 10.5* 7.8*   HCT 30.9* 24.4*    122*     BMP:   Recent Labs     02/26/21  1300 02/26/21  1742 02/27/21  0341     --  138   K 3.3* 4.3 4.6   CO2 21  --  20   BUN 4*  --  5*   CREATININE 0.41*  --  0.46*   LABGLOM >60  --  >60   GLUCOSE 87  --  216*       Recent Labs     02/26/21  1300 02/27/21  0341   PROTIME 12.1 11.9   INR 1.1 1.1     APTT:  Recent Labs     02/26/21  1300   APTT 29.0       FASTING LIPID PANEL:  Lab Results   Component Value Date    HDL 55 01/05/2021    TRIG 59 01/05/2021       Patient's Active Problem List  Active Problems:    Severe mitral regurgitation by prior echocardiogram  Resolved Problems:    * No resolved hospital problems. *        IMPRESSION:      1. Severe mitral regurgitation s/p surgery. Op note is pending. 2/26/2021  2. History of essential hypertension  3. Acute on chronic systolic heart failure   4. Nonobstructive CAD    RECOMMENDATIONS:    Anticoagulation with coumadin when ok with CT surgery. Monitor INR. Pharmacy to dose coumadin  Start Aspirin 81 mg  Keep K>4, Mag>2   Will repeat echocardiogram in 2 weeks   Post opertave care per CT Surgery  Continue incentive spirometry, pulmonary toilet, aspiration precautions and bronchodilators  Continue to monitor I/O with a goal of even/negative fluid balance. IV lasix 20 mg BID. Thank you for allowing us to participate in the care of [facility]. If you have any questions or concerns, please do not hesitate to contact us. Discussed with patient and Nurse. Rita Farfan M.D. Fellow, [address] [place] [address]        Please note that part of this chart were generated using voice recognition  dictation software.   Although every effort was made to ensure the accuracy of this automated transcription, some errors in transcription may have occurred. Attestation signed by      Attending Physician Statement:    I have discussed the care of  [address] [place] [address] , including pertinent history and exam findings, with the Cardiology fellow/resident. I have seen and examined the patient and the key elements of all parts of the encounter have been performed by me. I agree with the assessment, plan and orders as documented by the fellow/resident, after I modified exam findings and plan of treatments, and the final version is my approved version of the assessment. Additional Comments: S/p mechanical MVR. Start anticoagulation ASAP. Pericardial rub noted.     Reji Vernon MD

## 2021-02-27 NOTE — PROGRESS NOTES
Occupational Therapy   Occupational Therapy Initial Assessment  Date: 2021   Patient Name: Kiara Billingsley  MRN: [id number]     : 1975     Note copied from CTS NP:  Kiara Billingsley is a 39 y.o.  Ms. Castillo is a 39 y.o.  female who presents with severe mitral valve regurgitation.  Patient reports that she has experienced 3 to 4 episodes of sudden onset shortness of breath accompanied by a frothy/productive cough, wheezing and chest pain. The first episode occurred in early fall. She was seen and evaluated multiple times with no evidence of cardiac causation, the episodes were thought to be anxiety driven. She arrived to the ER on 2020 and found to have acute pulmonary edema secondary to congestive heart failure. She was admitted for further evaluation and treatment. Pertinent medical history of anxiety, anemia and remote history of drug use (non-intravenous drug use). Cardiac workup has revealed severe mitral regurgitation, ,oderate tricuspid regurgitation and mildly reduced EF of 45% in the absence of CAD.  Patient denies shortness of breath and chest pain at this time. She remains supine following cardiac cath.  She has been referred for consideration of mitral valve repair vs replacement. Date of Service: 2021    Discharge Recommendations:  Patient would benefit from continued therapy after discharge  OT Equipment Recommendations  Equipment Needed: Yes  Mobility Devices: Rosalynd Perico; ADL Assistive Devices  Walker: Rolling  ADL Assistive Devices: Shower Chair with back    Assessment   Performance deficits / Impairments: Decreased functional mobility ; Decreased ADL status; Decreased safe awareness;Decreased endurance;Decreased balance;Decreased high-level IADLs  Assessment: Pt agreeable to OT eval this date. Pt completed functional transfers and mobility with CGA and RW use. Pt educated thoroughly on pursed lip breathing, sternal precautions, and EC/WC.  Pt requires Min A for UB dressing task this date. Pt retired to supine in bed, requiring Mod A for BLE and trunk progression. Pt to benefit from continued OT services to increase safety, independence, and endurance for ADL and functional mobility performance  Prognosis: Good  Decision Making: Medium Complexity  Patient Education: Pt ed on OT role, OT POC, safety awareness, sternal prec, pursed lip breathing, EC/WS, and importance of continued OT. Good return  REQUIRES OT FOLLOW UP: Yes  Activity Tolerance  Activity Tolerance: Patient Tolerated treatment well  Safety Devices  Safety Devices in place: Yes  Type of devices: Nurse notified; Left in bed;Gait belt;Call light within reach; Bed alarm in place  Restraints  Initially in place: No           Patient Diagnosis(es): There were no encounter diagnoses. has a past medical history of Anxiety, CHF (congestive heart failure) ([address] GERD (gastroesophageal reflux disease), History of chest pain, History of echocardiogram, Hypertension, Leg swelling, Migraine, Mitral regurgitation, Mitral valve disease, SOB (shortness of breath), Stomach ulcer, Wellness examination, and Wellness examination. has a past surgical history that includes hernia repair; transesophageal echocardiogram (01/04/2021); Cardiac catheterization (01/05/2021); Endoscopy, colon, diagnostic; and Cosmetic surgery. Restrictions  Restrictions/Precautions  Restrictions/Precautions: Cardiac  Required Braces or Orthoses?: No  Position Activity Restriction  Sternal Precautions: No Pushing, No Pulling, 5# Lifting Restrictions  Other position/activity restrictions: S/p MITRAL VALVE REPLACEMENT (2/26/21); amb pt.; up in chair; up for meals    Subjective   General  Patient assessed for rehabilitation services?: Yes  Family / Caregiver Present: Yes(mother present at end; very helpful)  General Comment  Comments: RN ok'd pt for therapy.  Pt agreeable  Patient Currently in Pain: Yes  Pain Assessment  Pain Assessment: 0-10  Pain Level: assistance;Setup; Increased time to complete  Toileting: Minimal assistance;Setup; Increased time to complete  Tone RUE  RUE Tone: Normotonic  Tone LUE  LUE Tone: Normotonic  Coordination  Movements Are Fluid And Coordinated: Yes    Bed mobility  Sit to Supine:  Moderate assistance(for BLE and trunk progression)  Comment: pt in chair at beginning of session; retired to supine in bed  Transfers  Sit to stand: Contact guard assistance  Stand to sit: Contact guard assistance  Transfer Comments: with VCs to exhale with exertion and for hand placement    Cognition  Overall Cognitive Status: WFL       Sensation  Overall Sensation Status: WFL(pt denies numbness/tingling)        LUE AROM (degrees)  LUE AROM : WFL  Left Hand AROM (degrees)  Left Hand AROM: WFL  RUE AROM (degrees)  RUE AROM : WFL  Right Hand AROM (degrees)  Right Hand AROM: WFL  LUE Strength  Gross LUE Strength: WFL  LUE Strength Comment: based on observation, not formally assessed  RUE Strength  Gross RUE Strength: WFL                   Plan   Plan  Times per week: 3-5 x/wk  Current Treatment Recommendations: Balance Training, Functional Mobility Training, Endurance Training, Safety Education & Training, Self-Care / ADL, Pain Management, Equipment Evaluation, Education, & procurement, Patient/Caregiver Education & Training, Home Management Training    AM-PAC Score        AM-PAC Inpatient Daily Activity Raw Score: 18 (02/27/21 1500)  AM-PAC Inpatient ADL T-Scale Score : 38.66 (02/27/21 1500)  ADL Inpatient CMS 0-100% Score: 46.65 (02/27/21 1500)  ADL Inpatient CMS G-Code Modifier : CK (02/27/21 1500)    Goals  Short term goals  Time Frame for Short term goals: By discharge, pt will:  Short term goal 1: Demo Mod I with use of LRD PRN for functional transfers and functional mobility  Short term goal 2: Demo I with setup for UB ADLs and grooming tasks  Short term goal 3: Demo Min A for LB ADLs with setup and use of AE PRN  Short term goal 4: Demo 10+ minutes dynamic standing task to increase safety and independence with ADLs/IADLs  Short term goal 5:  Increase activity tolerance to 30+ min to increase endurance for functional performance       Therapy Time   Individual Concurrent Group Co-treatment   Time In 1226         Time Out 1249         Minutes 23         Timed Code Treatment Minutes: 9 Minutes       Verito Moore OTR/L

## 2021-02-27 NOTE — PROGRESS NOTES
Small air leak noted in chest tube. Air leak noted to stop when chest tubes are clamped. Patient respiratory status is unchanged no signs of distress. Messaged Ai Mosley NP to update previous statement. Cummings Santa stated to continue to monitor and see what chest xray reads in am. Will continue to monitor.

## 2021-02-27 NOTE — CARE COORDINATION
Attempted to assess patient for intake. Pt very lethargic at this time and asked that I talk to her mother. Attempted to contact the pt's mother, Chelsea Yanez, at [phone], no answer. LM req call back.

## 2021-02-27 NOTE — PROGRESS NOTES
[facility] Cardiothoracic Surgery  Progress Note    2/27/2021 9:09 AM  Surgeon:nadia WOLFE  POD # 1  S/P: MVR     Subjective:  Ms. Angelica Ruiz   Resting in bed. No distress. No CP or SOB     Objective:  /70   Pulse 94   Temp 98.1 °F (36.7 °C) (Oral)   Resp 26   Ht 5' 7\" (1.702 m)   Wt 154 lb 12.2 oz (70.2 kg)   LMP 02/15/2021   SpO2 99%   BMI 24.24 kg/m²   Chest: pacing wires: yes, chest tubes:yes, air leak no, 2 +  CV: no murmur noted, Normal S1, S2,Lungs: clear to auscultation, no wheezes, rales, or rhonchi  Abd: normal bowel sounds   Lower Extremities: Trace edema  CXR- no pneumos noted. Small basilar effusions. Mild pulm edema. Labs:   CBC:   Recent Labs     02/26/21  1300 02/27/21  0341   WBC 10.3 11.4*   HGB 10.5* 7.8*   HCT 30.9* 24.4*   MCV 77.1* 81.3*    122*     BMP:   Recent Labs     02/26/21  1300 02/26/21  1742 02/27/21  0341     --  138   K 3.3* 4.3 4.6     --  108*   CO2 21  --  20   BUN 4*  --  5*   CREATININE 0.41*  --  0.46*       I/O: I/O last 3 completed shifts: In: 18 [P. O.:600;  I.V.:7314; Blood:500; NG/GT:60]  Out: 8302 [Urine:2002; Emesis/NG output:150; Blood:400; Chest Tube:590]  Scheduled Meds:   furosemide  20 mg Intravenous BID    [MAR Hold] vancomycin  1,000 mg Intravenous Q12H    sodium chloride flush  10 mL Intravenous 2 times per day    aspirin  81 mg Oral Daily    amiodarone  200 mg Oral BID    chlorhexidine  15 mL Mouth/Throat BID    mupirocin   Nasal BID    therapeutic multivitamin-minerals  1 tablet Oral Daily with breakfast    polyethylene glycol  17 g Oral Daily    sennosides-docusate sodium  1 tablet Oral BID    atorvastatin  40 mg Oral Nightly    ceFAZolin (ANCEF) IVPB  2,000 mg Intravenous Q8H    vancomycin (VANCOCIN) IV  1,000 mg Intravenous Q12H    insulin glargine  0.15 Units/kg Subcutaneous Nightly    pantoprazole  40 mg Intravenous BID    And    sodium chloride (PF)  10 mL Intravenous BID     Continuous Infusions:  [facility] Hold] lactated ringers 1,000 mL (02/26/21 0803)    [MAR Hold] sodium chloride      sodium chloride 10 mL/hr at 02/27/21 0752    insulin Stopped (02/26/21 1333)    dextrose      propofol Stopped (02/26/21 1550)    norepinephrine Stopped (02/27/21 0643)     PRN Meds:[MAR Hold] sodium chloride, sodium chloride flush, ondansetron, acetaminophen, acetaminophen, oxyCODONE-acetaminophen **OR** oxyCODONE-acetaminophen, fentanNYL **OR** fentanNYL, hydrALAZINE, diphenhydrAMINE, magnesium hydroxide, bisacodyl, calcium chloride IVPB, magnesium sulfate, potassium chloride, albumin human, glucose, dextrose, glucagon (rDNA), dextrose    Beta- Blocker: Yes  Aspirin: Yes  Lovenox: No  GI: Yes  Plavix: No  Coumadin: Yes-start 2.5mg tomorrow  Statin: Yes    Assessment/ Plan:  Trend hemoglobin at 12pm  Strict chest tube monitoring. IS 20X/hr around the clock. acapella use  PT evaluation and ambulate TID  Remove art line and yee   DC planning in progress. Daily labs and CXR      On this date 1/20/2021 I have spent 30 minutes reviewing previous notes, test results and face to face with the patient discussing the diagnosis and importance of compliance with the treatment plan as well as documenting on the day of the visit.   MAYRA Bojorquez - NP

## 2021-02-28 ENCOUNTER — APPOINTMENT (OUTPATIENT)
Dept: GENERAL RADIOLOGY | Age: 46
DRG: 163 | End: 2021-02-28
Attending: THORACIC SURGERY (CARDIOTHORACIC VASCULAR SURGERY)
Payer: MEDICAID

## 2021-02-28 LAB
ABO/RH: NORMAL
ANION GAP SERPL CALCULATED.3IONS-SCNC: 9 MMOL/L (ref 9–17)
ANTIBODY SCREEN: NEGATIVE
ARM BAND NUMBER: NORMAL
BLD PROD TYP BPU: NORMAL
BLD PROD TYP BPU: NORMAL
BUN BLDV-MCNC: 10 MG/DL (ref 6–20)
BUN/CREAT BLD: ABNORMAL (ref 9–20)
CALCIUM SERPL-MCNC: 7.8 MG/DL (ref 8.6–10.4)
CHLORIDE BLD-SCNC: 102 MMOL/L (ref 98–107)
CO2: 23 MMOL/L (ref 20–31)
CREAT SERPL-MCNC: 0.71 MG/DL (ref 0.5–0.9)
CROSSMATCH RESULT: NORMAL
CROSSMATCH RESULT: NORMAL
DISPENSE STATUS BLOOD BANK: NORMAL
DISPENSE STATUS BLOOD BANK: NORMAL
EXPIRATION DATE: NORMAL
GFR AFRICAN AMERICAN: >60 ML/MIN
GFR NON-AFRICAN AMERICAN: >60 ML/MIN
GFR SERPL CREATININE-BSD FRML MDRD: ABNORMAL ML/MIN/{1.73_M2}
GFR SERPL CREATININE-BSD FRML MDRD: ABNORMAL ML/MIN/{1.73_M2}
GLUCOSE BLD-MCNC: 93 MG/DL (ref 65–105)
GLUCOSE BLD-MCNC: 94 MG/DL (ref 70–99)
HCT VFR BLD CALC: 27.2 % (ref 36.3–47.1)
HCT VFR BLD CALC: 27.8 % (ref 36.3–47.1)
HCT VFR BLD CALC: 29.3 % (ref 36.3–47.1)
HEMOGLOBIN: 8.6 G/DL (ref 11.9–15.1)
HEMOGLOBIN: 9 G/DL (ref 11.9–15.1)
HEMOGLOBIN: 9.2 G/DL (ref 11.9–15.1)
INR BLD: 1.5
MAGNESIUM: 1.9 MG/DL (ref 1.6–2.6)
MCH RBC QN AUTO: 26.5 PG (ref 25.2–33.5)
MCHC RBC AUTO-ENTMCNC: 32.4 G/DL (ref 28.4–34.8)
MCV RBC AUTO: 81.8 FL (ref 82.6–102.9)
NRBC AUTOMATED: 0 PER 100 WBC
PDW BLD-RTO: 23.7 % (ref 11.8–14.4)
PLATELET # BLD: 106 K/UL (ref 138–453)
PMV BLD AUTO: 9.5 FL (ref 8.1–13.5)
POTASSIUM SERPL-SCNC: 3.9 MMOL/L (ref 3.7–5.3)
PROTHROMBIN TIME: 15.2 SEC (ref 9.1–12.3)
RBC # BLD: 3.4 M/UL (ref 3.95–5.11)
SODIUM BLD-SCNC: 134 MMOL/L (ref 135–144)
TRANSFUSION STATUS: NORMAL
TRANSFUSION STATUS: NORMAL
UNIT DIVISION: 0
UNIT DIVISION: 0
UNIT NUMBER: NORMAL
UNIT NUMBER: NORMAL
WBC # BLD: 11.1 K/UL (ref 3.5–11.3)

## 2021-02-28 PROCEDURE — 85018 HEMOGLOBIN: CPT

## 2021-02-28 PROCEDURE — 6360000002 HC RX W HCPCS: Performed by: NURSE PRACTITIONER

## 2021-02-28 PROCEDURE — 6370000000 HC RX 637 (ALT 250 FOR IP): Performed by: NURSE PRACTITIONER

## 2021-02-28 PROCEDURE — 85027 COMPLETE CBC AUTOMATED: CPT

## 2021-02-28 PROCEDURE — 83735 ASSAY OF MAGNESIUM: CPT

## 2021-02-28 PROCEDURE — 97110 THERAPEUTIC EXERCISES: CPT

## 2021-02-28 PROCEDURE — 85014 HEMATOCRIT: CPT

## 2021-02-28 PROCEDURE — 2100000001 HC CVICU R&B

## 2021-02-28 PROCEDURE — 71045 X-RAY EXAM CHEST 1 VIEW: CPT

## 2021-02-28 PROCEDURE — C9113 INJ PANTOPRAZOLE SODIUM, VIA: HCPCS | Performed by: NURSE PRACTITIONER

## 2021-02-28 PROCEDURE — APPSS45 APP SPLIT SHARED TIME 31-45 MINUTES: Performed by: NURSE PRACTITIONER

## 2021-02-28 PROCEDURE — 85610 PROTHROMBIN TIME: CPT

## 2021-02-28 PROCEDURE — 94761 N-INVAS EAR/PLS OXIMETRY MLT: CPT

## 2021-02-28 PROCEDURE — 94669 MECHANICAL CHEST WALL OSCILL: CPT

## 2021-02-28 PROCEDURE — 2580000003 HC RX 258: Performed by: NURSE PRACTITIONER

## 2021-02-28 PROCEDURE — 82947 ASSAY GLUCOSE BLOOD QUANT: CPT

## 2021-02-28 PROCEDURE — 2700000000 HC OXYGEN THERAPY PER DAY

## 2021-02-28 PROCEDURE — P9041 ALBUMIN (HUMAN),5%, 50ML: HCPCS | Performed by: NURSE PRACTITIONER

## 2021-02-28 PROCEDURE — 36415 COLL VENOUS BLD VENIPUNCTURE: CPT

## 2021-02-28 PROCEDURE — 97116 GAIT TRAINING THERAPY: CPT

## 2021-02-28 PROCEDURE — 80048 BASIC METABOLIC PNL TOTAL CA: CPT

## 2021-02-28 RX ORDER — LORAZEPAM 0.5 MG/1
0.5 TABLET ORAL ONCE
Status: COMPLETED | OUTPATIENT
Start: 2021-02-28 | End: 2021-02-28

## 2021-02-28 RX ORDER — LANOLIN ALCOHOL/MO/W.PET/CERES
3 CREAM (GRAM) TOPICAL NIGHTLY PRN
Status: DISCONTINUED | OUTPATIENT
Start: 2021-02-28 | End: 2021-03-04 | Stop reason: HOSPADM

## 2021-02-28 RX ORDER — FLUCONAZOLE 200 MG/1
200 TABLET ORAL ONCE
Status: COMPLETED | OUTPATIENT
Start: 2021-02-28 | End: 2021-02-28

## 2021-02-28 RX ORDER — FUROSEMIDE 10 MG/ML
40 INJECTION INTRAMUSCULAR; INTRAVENOUS 2 TIMES DAILY
Status: DISCONTINUED | OUTPATIENT
Start: 2021-02-28 | End: 2021-03-02

## 2021-02-28 RX ADMIN — ONDANSETRON 4 MG: 2 INJECTION INTRAMUSCULAR; INTRAVENOUS at 08:52

## 2021-02-28 RX ADMIN — KETOROLAC TROMETHAMINE 15 MG: 15 INJECTION, SOLUTION INTRAMUSCULAR; INTRAVENOUS at 05:50

## 2021-02-28 RX ADMIN — CHLORHEXIDINE GLUCONATE 0.12% ORAL RINSE 15 ML: 1.2 LIQUID ORAL at 20:45

## 2021-02-28 RX ADMIN — DESMOPRESSIN ACETATE 40 MG: 0.2 TABLET ORAL at 20:44

## 2021-02-28 RX ADMIN — Medication 1 TABLET: at 09:42

## 2021-02-28 RX ADMIN — MAGNESIUM SULFATE HEPTAHYDRATE 1000 MG: 1 INJECTION, SOLUTION INTRAVENOUS at 11:57

## 2021-02-28 RX ADMIN — PANTOPRAZOLE SODIUM 40 MG: 40 INJECTION, POWDER, FOR SOLUTION INTRAVENOUS at 09:42

## 2021-02-28 RX ADMIN — AMIODARONE HYDROCHLORIDE 200 MG: 200 TABLET ORAL at 09:42

## 2021-02-28 RX ADMIN — ALBUMIN (HUMAN) 25 G: 12.5 INJECTION, SOLUTION INTRAVENOUS at 11:37

## 2021-02-28 RX ADMIN — POTASSIUM CHLORIDE 20 MEQ: 400 INJECTION, SOLUTION INTRAVENOUS at 09:01

## 2021-02-28 RX ADMIN — Medication 3 MG: at 20:45

## 2021-02-28 RX ADMIN — Medication 81 MG: at 09:42

## 2021-02-28 RX ADMIN — MUPIROCIN: 20 OINTMENT TOPICAL at 20:46

## 2021-02-28 RX ADMIN — OXYCODONE HYDROCHLORIDE AND ACETAMINOPHEN 2 TABLET: 5; 325 TABLET ORAL at 20:45

## 2021-02-28 RX ADMIN — FLUCONAZOLE 200 MG: 200 TABLET ORAL at 20:45

## 2021-02-28 RX ADMIN — MAGNESIUM SULFATE HEPTAHYDRATE 1000 MG: 1 INJECTION, SOLUTION INTRAVENOUS at 13:14

## 2021-02-28 RX ADMIN — LORAZEPAM 0.5 MG: 0.5 TABLET ORAL at 20:44

## 2021-02-28 RX ADMIN — POTASSIUM CHLORIDE 20 MEQ: 400 INJECTION, SOLUTION INTRAVENOUS at 07:07

## 2021-02-28 RX ADMIN — ENOXAPARIN SODIUM 70 MG: 80 INJECTION SUBCUTANEOUS at 10:05

## 2021-02-28 RX ADMIN — MUPIROCIN: 20 OINTMENT TOPICAL at 09:42

## 2021-02-28 RX ADMIN — DOCUSATE SODIUM 50MG AND SENNOSIDES 8.6MG 1 TABLET: 8.6; 5 TABLET, FILM COATED ORAL at 09:42

## 2021-02-28 RX ADMIN — ENOXAPARIN SODIUM 70 MG: 80 INJECTION SUBCUTANEOUS at 20:44

## 2021-02-28 RX ADMIN — PANTOPRAZOLE SODIUM 40 MG: 40 INJECTION, POWDER, FOR SOLUTION INTRAVENOUS at 20:46

## 2021-02-28 RX ADMIN — SODIUM CHLORIDE, PRESERVATIVE FREE 10 ML: 5 INJECTION INTRAVENOUS at 20:51

## 2021-02-28 RX ADMIN — SODIUM CHLORIDE, PRESERVATIVE FREE 10 ML: 5 INJECTION INTRAVENOUS at 09:00

## 2021-02-28 RX ADMIN — SODIUM CHLORIDE, PRESERVATIVE FREE 10 ML: 5 INJECTION INTRAVENOUS at 10:26

## 2021-02-28 RX ADMIN — OXYCODONE HYDROCHLORIDE AND ACETAMINOPHEN 2 TABLET: 5; 325 TABLET ORAL at 02:26

## 2021-02-28 RX ADMIN — POLYETHYLENE GLYCOL 3350 17 G: 17 POWDER, FOR SOLUTION ORAL at 09:22

## 2021-02-28 RX ADMIN — AMIODARONE HYDROCHLORIDE 200 MG: 200 TABLET ORAL at 20:44

## 2021-02-28 RX ADMIN — CHLORHEXIDINE GLUCONATE 0.12% ORAL RINSE 15 ML: 1.2 LIQUID ORAL at 09:42

## 2021-02-28 RX ADMIN — FUROSEMIDE 40 MG: 10 INJECTION, SOLUTION INTRAMUSCULAR; INTRAVENOUS at 16:25

## 2021-02-28 RX ADMIN — OXYCODONE HYDROCHLORIDE AND ACETAMINOPHEN 2 TABLET: 5; 325 TABLET ORAL at 08:48

## 2021-02-28 RX ADMIN — KETOROLAC TROMETHAMINE 15 MG: 15 INJECTION, SOLUTION INTRAMUSCULAR; INTRAVENOUS at 16:53

## 2021-02-28 RX ADMIN — DOCUSATE SODIUM 50MG AND SENNOSIDES 8.6MG 1 TABLET: 8.6; 5 TABLET, FILM COATED ORAL at 20:44

## 2021-02-28 RX ADMIN — WARFARIN SODIUM 2.5 MG: 2.5 TABLET ORAL at 19:03

## 2021-02-28 RX ADMIN — OXYCODONE HYDROCHLORIDE AND ACETAMINOPHEN 2 TABLET: 5; 325 TABLET ORAL at 15:10

## 2021-02-28 ASSESSMENT — PAIN SCALES - GENERAL
PAINLEVEL_OUTOF10: 9
PAINLEVEL_OUTOF10: 3
PAINLEVEL_OUTOF10: 8
PAINLEVEL_OUTOF10: 9
PAINLEVEL_OUTOF10: 8
PAINLEVEL_OUTOF10: 9

## 2021-02-28 NOTE — PLAN OF CARE
Problem: OXYGENATION/RESPIRATORY FUNCTION  Goal: Patient will maintain patent airway  2/27/2021 2131 by Dianne Dean RCP  Outcome: Ongoing     Problem: OXYGENATION/RESPIRATORY FUNCTION  Goal: Patient will achieve/maintain normal respiratory rate/effort  Description: Respiratory rate and effort will be within normal limits for the patient  2/27/2021 2131 by Dianne Dean RCP  Outcome: Ongoing

## 2021-02-28 NOTE — PROGRESS NOTES
[facility]   Progress Note                    Date:   2/28/2021  Patient name:  Addie Gordon  Date of admission:  2/26/2021  6:06 AM  MRN:   [id number]  YOB: 1975  PCP:    MAYRA Fields CNP    Reason for Admission:  Severe mitral regurgitation by prior echocardiogram [I34.0]    Subjective:          Clinical Changes / Abnormalities:    Patient seen and examined at bedside. No acute events overnight. No chest pain or shortness of breath.   Net  + 4         Medications:   Scheduled Meds:   furosemide  20 mg Intravenous BID    metoprolol tartrate  12.5 mg Oral BID    warfarin  2.5 mg Oral Daily    [MAR Hold] vancomycin  1,000 mg Intravenous Q12H    sodium chloride flush  10 mL Intravenous 2 times per day    aspirin  81 mg Oral Daily    amiodarone  200 mg Oral BID    chlorhexidine  15 mL Mouth/Throat BID    mupirocin   Nasal BID    therapeutic multivitamin-minerals  1 tablet Oral Daily with breakfast    polyethylene glycol  17 g Oral Daily    sennosides-docusate sodium  1 tablet Oral BID    atorvastatin  40 mg Oral Nightly    insulin glargine  0.15 Units/kg Subcutaneous Nightly    pantoprazole  40 mg Intravenous BID    And    sodium chloride (PF)  10 mL Intravenous BID     Continuous Infusions:   [MAR Hold] lactated ringers 1,000 mL (02/26/21 0803)    [MAR Hold] sodium chloride      sodium chloride 10 mL/hr at 02/27/21 0752    insulin Stopped (02/26/21 1333)    dextrose      propofol Stopped (02/26/21 1550)    norepinephrine Stopped (02/27/21 0643)     CBC:   Recent Labs     02/26/21  1300 02/27/21  0341 02/27/21  1210 02/28/21  0600   WBC 10.3 11.4*  --  11.1   HGB 10.5* 7.8* 8.6* 9.0*    122*  --  106*     BMP:    Recent Labs     02/26/21  1300 02/26/21  1742 02/27/21  0341 02/28/21  0600     --  138 134*   K 3.3* 4.3 4.6 3.9     --  108* 102   CO2 21  --  20 23   BUN 4*  --  5* 10   CREATININE 0.41*  --  0.46* 0.71   GLUCOSE 87  -- 216* 94     INR:   Recent Labs     02/26/21  1300 02/27/21  0341 02/28/21  0600   INR 1.1 1.1 1.5       Objective:   Vitals: BP (!) 98/56   Pulse 95   Temp 97.6 °F (36.4 °C) (Oral)   Resp 30   Ht 5' 7\" (1.702 m)   Wt 156 lb 12 oz (71.1 kg)   LMP 02/15/2021   SpO2 91%   BMI 24.55 kg/m²    Last Recorded Weight:  [unfilled]    Constitutional and General Appearance:    alert, cooperative, no distress and appears stated age  Respiratory:  · No for increased work of breathing. · On auscultation: clear to auscultation bilaterally  Cardiovascular:  · Pericardial rub  · Mechanical Click  · No murmurs    Abdomen:   · No masses or tenderness  · Bowel sounds present  Extremities:  ·  No Cyanosis or Clubbing  ·  Lower extremity edema: No  ·  Skin: Warm and dry  Neurological:  · Alert and oriented. · Moves all extremities well    Diagnostic Studies:     ECHO:  1/4/2021  Mitral Valve:    Structurally normal.  Severe Mitral  regurgitation is identified At least 3 different jets were noted. MR ERO 0.45, MR volume 72 ml  Aortic Valve: The aortic valve is trileaflet and opens adequately. Mild  regurgitiation is identified. Tricuspid valve: Structurally normal. No  regurgitation is identified. Pulmonary valve: Normal. No significant regurgitation     No valvular vegetations or thrombus identified.     Cardiac Angiography:      Cardiac Cath 1/5/21  Findings:     LMCA: Normal 0% stenosis.     LAD: Normal 0% stenosis. The D1 is normal.     LCx: Normal 0% stenosis. The OM1 and OM2 are normal.     RCA: Normal 0% stenosis.      Coronary Tree      Dominance: Right  The LV gram was not performed      Estimated blood loss: 5 ml     Conclusions:  1.  Normal Coronary arteries       Patient Active Problem List:     Pulmonary edema, acute, with congestive heart failure (HCC)     Anxiety     Microcytic anemia     History of drug abuse (HCC)     Acute on chronic diastolic congestive heart failure (HCC)     Severe mitral valve

## 2021-02-28 NOTE — CARE COORDINATION
Met with patient to discuss transitional planning. Patient relates that she plans to go home and mother will stay with her. She also relates that she feels very weak.   We discussed possible SNF, she states she will consider

## 2021-02-28 NOTE — PROGRESS NOTES
Continuous Infusions:   [MAR Hold] lactated ringers 1,000 mL (02/26/21 0803)    [MAR Hold] sodium chloride      sodium chloride 10 mL/hr at 02/27/21 0752    insulin Stopped (02/26/21 1333)    dextrose      propofol Stopped (02/26/21 1550)    norepinephrine Stopped (02/27/21 0643)     PRN Meds:ketorolac, [CHEYANNE Hold] sodium chloride, sodium chloride flush, ondansetron, acetaminophen, acetaminophen, oxyCODONE-acetaminophen **OR** oxyCODONE-acetaminophen, fentanNYL **OR** fentanNYL, hydrALAZINE, diphenhydrAMINE, magnesium hydroxide, bisacodyl, calcium chloride IVPB, magnesium sulfate, potassium chloride, albumin human, glucose, dextrose, glucagon (rDNA), dextrose    Beta- Blocker: Yes  Aspirin: Yes  Lovenox:Yes bridging  GI: Yes  Plavix: No  Coumadin: Yes-start 2.5mg today  Statin: Yes    Assessment/ Plan:  Hemoglobin stable  Chest tube output 180ml/12hr strict chest tube monitoring. Evaluate for chest tube removal tomorrow  Patient started on 2.5 mg of Coumadin today INR 1.5 we will keep the same dose started today  Waterseal chest tubes  IS 20X/hr around the clock. acapella use  Lasix 20mg BID-hold if BP soft  P[lease give albumin 250ml  To elevate BP  PT evaluation and ambulate TID   Lovenox and SCDs as DVT prophylaxis  Daily labs and CXR  DC planning in progress. Pt needs a lot of encouragement to walk and do task    On this date 1/20/2021 I have spent 30 minutes reviewing previous notes, test results and face to face with the patient discussing the diagnosis and importance of compliance with the treatment plan as well as documenting on the day of the visit.   MAYRA Luque - NP

## 2021-02-28 NOTE — PROGRESS NOTES
Nutrition Education    · Verbally reviewed information with Patient  · Educated on heart healthy diet  · Written educational materials provided. · Contact name and number provided. · Refer to Patient Education activity for more details.     Electronically signed by Nasra Doe DTR on 2/28/21 at 9:34 AM EST

## 2021-02-28 NOTE — FLOWSHEET NOTE
Mom at bedside and updated. Art line DCd earlier.  Hicks DCD  And has voided    Needs much encouragement to eat and move

## 2021-02-28 NOTE — PLAN OF CARE
Problem: OXYGENATION/RESPIRATORY FUNCTION  Goal: Patient will maintain patent airway  Outcome: Ongoing  Goal: Patient will achieve/maintain normal respiratory rate/effort  Description: Respiratory rate and effort will be within normal limits for the patient  Outcome: Ongoing     Problem: MECHANICAL VENTILATION  Goal: Patient will maintain patent airway  Outcome: Ongoing  Goal: Oral health is maintained or improved  Outcome: Ongoing  Goal: ET tube will be managed safely  Outcome: Ongoing  Goal: Ability to express needs and understand communication  Outcome: Ongoing  Goal: Mobility/activity is maintained at optimum level for patient  Outcome: Ongoing     Problem: Discharge Planning:  Goal: Discharged to appropriate level of care  Description: Discharged to appropriate level of care  Outcome: Ongoing     Problem: Cardiac Output - Decreased:  Goal: Cardiac output within specified parameters  Description: Cardiac output within specified parameters  Outcome: Ongoing     Problem: Infection - Surgical Site:  Goal: Will show no infection signs and symptoms  Description: Will show no infection signs and symptoms  Outcome: Ongoing     Problem: Pain - Acute:  Goal: Pain level will decrease  Description: Pain level will decrease  Outcome: Ongoing     Problem: Venous Thromboembolism:  Goal: Will show no signs or symptoms of venous thromboembolism  Description: Will show no signs or symptoms of venous thromboembolism  Outcome: Ongoing  Goal: Absence of signs or symptoms of impaired coagulation  Description: Absence of signs or symptoms of impaired coagulation  Outcome: Ongoing     Problem: Falls - Risk of:  Goal: Will remain free from falls  Description: Will remain free from falls  Outcome: Ongoing  Goal: Absence of physical injury  Description: Absence of physical injury  Outcome: Ongoing     Problem: Pain:  Goal: Pain level will decrease  Description: Pain level will decrease  Outcome: Ongoing  Goal: Control of acute pain  Description: Control of acute pain  Outcome: Ongoing  Goal: Control of chronic pain  Description: Control of chronic pain  Outcome: Ongoing

## 2021-02-28 NOTE — PLAN OF CARE
Problem: OXYGENATION/RESPIRATORY FUNCTION  Goal: Patient will maintain patent airway  2/28/2021 1613 by Gia Rosas RN  Outcome: Ongoing  2/28/2021 1059 by Gai Rosas RN  Outcome: Ongoing  Goal: Patient will achieve/maintain normal respiratory rate/effort  Description: Respiratory rate and effort will be within normal limits for the patient  2/28/2021 1613 by Gai Rosas RN  Outcome: Ongoing  2/28/2021 1059 by Gia Rosas RN  Outcome: Ongoing

## 2021-02-28 NOTE — PLAN OF CARE
Problem: OXYGENATION/RESPIRATORY FUNCTION  Goal: Patient will maintain patent airway  2/28/2021 1059 by Veronica Oseguera RN  Outcome: Ongoing  2/27/2021 2131 by Kishore Johnson RCP  Outcome: Ongoing  Goal: Patient will achieve/maintain normal respiratory rate/effort  Description: Respiratory rate and effort will be within normal limits for the patient  2/28/2021 1059 by Veronica Oseguera RN  Outcome: Ongoing  2/27/2021 2131 by Kishore Johnson RCP  Outcome: Ongoing

## 2021-02-28 NOTE — PROGRESS NOTES
Physical Therapy  Facility/Department: [facility] CAR 1  Daily Treatment Note  NAME: Margarita Holder  : 1975  MRN: [id number]    Date of Service: 2021    Discharge Recommendations:  Patient would benefit from continued therapy after discharge  Equipment: RW      Assessment   Body structures, Functions, Activity limitations: Decreased functional mobility ; Decreased balance;Decreased ROM; Decreased endurance;Decreased strength;Decreased posture; Increased pain  Assessment: Pt ambulated 100ft w/ RW CGA, pt's tolerance to functional mobility limited due to endurance deficits increased incision pain and low BP 96/56 . Pt would benefit from 24hr support upon discharge due to these deficits. woulod benefit from continued skilled physical to address endurance deficits and functional mobility deficits as pt has 18 stairs to enter home. Prognosis: Good  REQUIRES PT FOLLOW UP: Yes  Activity Tolerance  Activity Tolerance: Patient limited by endurance; Patient Tolerated treatment well     Patient Diagnosis(es): There were no encounter diagnoses. has a past medical history of Anxiety, CHF (congestive heart failure) ([address] GERD (gastroesophageal reflux disease), History of chest pain, History of echocardiogram, Hypertension, Leg swelling, Migraine, Mitral regurgitation, Mitral valve disease, SOB (shortness of breath), Stomach ulcer, Wellness examination, and Wellness examination. has a past surgical history that includes hernia repair; transesophageal echocardiogram (2021); Cardiac catheterization (2021); Endoscopy, colon, diagnostic; and Cosmetic surgery.     Restrictions  Restrictions/Precautions  Restrictions/Precautions: Cardiac  Required Braces or Orthoses?: No  Position Activity Restriction  Sternal Precautions: No Pushing, No Pulling, 5# Lifting Restrictions  Other position/activity restrictions: S/p MITRAL VALVE REPLACEMENT (21); amb pt.; up in chair; up for meals  Subjective   General  Chart home    Plan    Plan  Times per week: 6-7x/week; 1-2/day  Current Treatment Recommendations: Strengthening, Transfer Training, Endurance Training, Patient/Caregiver Education & Training, ROM, Balance Training, Gait Training, Home Exercise Program, Functional Mobility Training, Stair training, Safety Education & Training  Safety Devices  Type of devices: Left in bed, Bed alarm in place, Call light within reach, Gait belt, Nurse notified  Restraints  Initially in place: No     Therapy Time   Individual Concurrent Group Co-treatment   Time In 1146         Time Out 1210         Minutes 24         Timed Code Treatment Minutes: 24 Minutes       Vidhi Valentino, PTA

## 2021-03-01 ENCOUNTER — APPOINTMENT (OUTPATIENT)
Dept: GENERAL RADIOLOGY | Age: 46
DRG: 163 | End: 2021-03-01
Attending: THORACIC SURGERY (CARDIOTHORACIC VASCULAR SURGERY)
Payer: MEDICAID

## 2021-03-01 LAB
ANION GAP SERPL CALCULATED.3IONS-SCNC: 11 MMOL/L (ref 9–17)
BUN BLDV-MCNC: 11 MG/DL (ref 6–20)
BUN/CREAT BLD: ABNORMAL (ref 9–20)
CALCIUM SERPL-MCNC: 7.4 MG/DL (ref 8.6–10.4)
CHLORIDE BLD-SCNC: 101 MMOL/L (ref 98–107)
CO2: 21 MMOL/L (ref 20–31)
CREAT SERPL-MCNC: 0.52 MG/DL (ref 0.5–0.9)
GFR AFRICAN AMERICAN: >60 ML/MIN
GFR NON-AFRICAN AMERICAN: >60 ML/MIN
GFR SERPL CREATININE-BSD FRML MDRD: ABNORMAL ML/MIN/{1.73_M2}
GFR SERPL CREATININE-BSD FRML MDRD: ABNORMAL ML/MIN/{1.73_M2}
GLUCOSE BLD-MCNC: 119 MG/DL (ref 65–105)
GLUCOSE BLD-MCNC: 75 MG/DL (ref 65–105)
GLUCOSE BLD-MCNC: 76 MG/DL (ref 70–99)
HCT VFR BLD CALC: 25.5 % (ref 36.3–47.1)
HCT VFR BLD CALC: 25.6 % (ref 36.3–47.1)
HCT VFR BLD CALC: 27.8 % (ref 36.3–47.1)
HEMOGLOBIN: 8 G/DL (ref 11.9–15.1)
HEMOGLOBIN: 8.8 G/DL (ref 11.9–15.1)
HEMOGLOBIN: 8.9 G/DL (ref 11.9–15.1)
INR BLD: 3.3
MAGNESIUM: 2.3 MG/DL (ref 1.6–2.6)
MCH RBC QN AUTO: 25.9 PG (ref 25.2–33.5)
MCHC RBC AUTO-ENTMCNC: 31.3 G/DL (ref 28.4–34.8)
MCV RBC AUTO: 82.8 FL (ref 82.6–102.9)
NRBC AUTOMATED: 0 PER 100 WBC
PDW BLD-RTO: 24.1 % (ref 11.8–14.4)
PLATELET # BLD: 103 K/UL (ref 138–453)
PMV BLD AUTO: 9.7 FL (ref 8.1–13.5)
POTASSIUM SERPL-SCNC: 3.7 MMOL/L (ref 3.7–5.3)
PROTHROMBIN TIME: 32.1 SEC (ref 9.1–12.3)
RBC # BLD: 3.09 M/UL (ref 3.95–5.11)
SODIUM BLD-SCNC: 133 MMOL/L (ref 135–144)
WBC # BLD: 6.7 K/UL (ref 3.5–11.3)

## 2021-03-01 PROCEDURE — 36415 COLL VENOUS BLD VENIPUNCTURE: CPT

## 2021-03-01 PROCEDURE — 85014 HEMATOCRIT: CPT

## 2021-03-01 PROCEDURE — 2580000003 HC RX 258: Performed by: NURSE PRACTITIONER

## 2021-03-01 PROCEDURE — 83735 ASSAY OF MAGNESIUM: CPT

## 2021-03-01 PROCEDURE — 6360000002 HC RX W HCPCS: Performed by: NURSE PRACTITIONER

## 2021-03-01 PROCEDURE — 5A1221Z PERFORMANCE OF CARDIAC OUTPUT, CONTINUOUS: ICD-10-PCS | Performed by: THORACIC SURGERY (CARDIOTHORACIC VASCULAR SURGERY)

## 2021-03-01 PROCEDURE — 6370000000 HC RX 637 (ALT 250 FOR IP): Performed by: NURSE PRACTITIONER

## 2021-03-01 PROCEDURE — 85018 HEMOGLOBIN: CPT

## 2021-03-01 PROCEDURE — 2700000000 HC OXYGEN THERAPY PER DAY

## 2021-03-01 PROCEDURE — C9113 INJ PANTOPRAZOLE SODIUM, VIA: HCPCS | Performed by: NURSE PRACTITIONER

## 2021-03-01 PROCEDURE — 94669 MECHANICAL CHEST WALL OSCILL: CPT

## 2021-03-01 PROCEDURE — 02RG0JZ REPLACEMENT OF MITRAL VALVE WITH SYNTHETIC SUBSTITUTE, OPEN APPROACH: ICD-10-PCS | Performed by: THORACIC SURGERY (CARDIOTHORACIC VASCULAR SURGERY)

## 2021-03-01 PROCEDURE — 85027 COMPLETE CBC AUTOMATED: CPT

## 2021-03-01 PROCEDURE — 80048 BASIC METABOLIC PNL TOTAL CA: CPT

## 2021-03-01 PROCEDURE — 02L70CK OCCLUSION OF LEFT ATRIAL APPENDAGE WITH EXTRALUMINAL DEVICE, OPEN APPROACH: ICD-10-PCS | Performed by: THORACIC SURGERY (CARDIOTHORACIC VASCULAR SURGERY)

## 2021-03-01 PROCEDURE — 97116 GAIT TRAINING THERAPY: CPT

## 2021-03-01 PROCEDURE — 97530 THERAPEUTIC ACTIVITIES: CPT

## 2021-03-01 PROCEDURE — 97535 SELF CARE MNGMENT TRAINING: CPT

## 2021-03-01 PROCEDURE — 99024 POSTOP FOLLOW-UP VISIT: CPT | Performed by: PHYSICIAN ASSISTANT

## 2021-03-01 PROCEDURE — 82947 ASSAY GLUCOSE BLOOD QUANT: CPT

## 2021-03-01 PROCEDURE — 2140000001 HC CVICU INTERMEDIATE R&B

## 2021-03-01 PROCEDURE — 71045 X-RAY EXAM CHEST 1 VIEW: CPT

## 2021-03-01 PROCEDURE — 94761 N-INVAS EAR/PLS OXIMETRY MLT: CPT

## 2021-03-01 PROCEDURE — 85610 PROTHROMBIN TIME: CPT

## 2021-03-01 PROCEDURE — 97110 THERAPEUTIC EXERCISES: CPT

## 2021-03-01 RX ORDER — POTASSIUM CHLORIDE 20 MEQ/1
40 TABLET, EXTENDED RELEASE ORAL PRN
Status: DISCONTINUED | OUTPATIENT
Start: 2021-03-01 | End: 2021-03-04 | Stop reason: HOSPADM

## 2021-03-01 RX ORDER — POTASSIUM CHLORIDE 7.45 MG/ML
10 INJECTION INTRAVENOUS PRN
Status: DISCONTINUED | OUTPATIENT
Start: 2021-03-01 | End: 2021-03-04 | Stop reason: HOSPADM

## 2021-03-01 RX ADMIN — FUROSEMIDE 40 MG: 10 INJECTION, SOLUTION INTRAMUSCULAR; INTRAVENOUS at 08:01

## 2021-03-01 RX ADMIN — OXYCODONE HYDROCHLORIDE AND ACETAMINOPHEN 2 TABLET: 5; 325 TABLET ORAL at 20:07

## 2021-03-01 RX ADMIN — AMIODARONE HYDROCHLORIDE 200 MG: 200 TABLET ORAL at 20:07

## 2021-03-01 RX ADMIN — OXYCODONE HYDROCHLORIDE AND ACETAMINOPHEN 2 TABLET: 5; 325 TABLET ORAL at 02:46

## 2021-03-01 RX ADMIN — SODIUM CHLORIDE, PRESERVATIVE FREE 10 ML: 5 INJECTION INTRAVENOUS at 08:01

## 2021-03-01 RX ADMIN — PANTOPRAZOLE SODIUM 40 MG: 40 INJECTION, POWDER, FOR SOLUTION INTRAVENOUS at 08:01

## 2021-03-01 RX ADMIN — CHLORHEXIDINE GLUCONATE 0.12% ORAL RINSE 15 ML: 1.2 LIQUID ORAL at 20:07

## 2021-03-01 RX ADMIN — MUPIROCIN: 20 OINTMENT TOPICAL at 08:02

## 2021-03-01 RX ADMIN — POLYETHYLENE GLYCOL 3350 17 G: 17 POWDER, FOR SOLUTION ORAL at 08:00

## 2021-03-01 RX ADMIN — POTASSIUM BICARBONATE 30 MEQ: 782 TABLET, EFFERVESCENT ORAL at 10:28

## 2021-03-01 RX ADMIN — OXYCODONE HYDROCHLORIDE AND ACETAMINOPHEN 2 TABLET: 5; 325 TABLET ORAL at 07:54

## 2021-03-01 RX ADMIN — INSULIN GLARGINE 9 UNITS: 100 INJECTION, SOLUTION SUBCUTANEOUS at 22:20

## 2021-03-01 RX ADMIN — Medication 81 MG: at 08:00

## 2021-03-01 RX ADMIN — PANTOPRAZOLE SODIUM 40 MG: 40 INJECTION, POWDER, FOR SOLUTION INTRAVENOUS at 20:07

## 2021-03-01 RX ADMIN — SODIUM CHLORIDE, PRESERVATIVE FREE 10 ML: 5 INJECTION INTRAVENOUS at 08:02

## 2021-03-01 RX ADMIN — Medication 3 MG: at 20:07

## 2021-03-01 RX ADMIN — MUPIROCIN: 20 OINTMENT TOPICAL at 20:07

## 2021-03-01 RX ADMIN — KETOROLAC TROMETHAMINE 15 MG: 15 INJECTION, SOLUTION INTRAMUSCULAR; INTRAVENOUS at 10:29

## 2021-03-01 RX ADMIN — SODIUM CHLORIDE, PRESERVATIVE FREE 10 ML: 5 INJECTION INTRAVENOUS at 20:08

## 2021-03-01 RX ADMIN — FUROSEMIDE 40 MG: 10 INJECTION, SOLUTION INTRAMUSCULAR; INTRAVENOUS at 18:20

## 2021-03-01 RX ADMIN — AMIODARONE HYDROCHLORIDE 200 MG: 200 TABLET ORAL at 08:00

## 2021-03-01 RX ADMIN — KETOROLAC TROMETHAMINE 15 MG: 15 INJECTION, SOLUTION INTRAMUSCULAR; INTRAVENOUS at 02:46

## 2021-03-01 RX ADMIN — CHLORHEXIDINE GLUCONATE 0.12% ORAL RINSE 15 ML: 1.2 LIQUID ORAL at 08:01

## 2021-03-01 RX ADMIN — OXYCODONE HYDROCHLORIDE AND ACETAMINOPHEN 2 TABLET: 5; 325 TABLET ORAL at 15:52

## 2021-03-01 RX ADMIN — Medication 1 TABLET: at 08:00

## 2021-03-01 RX ADMIN — DESMOPRESSIN ACETATE 40 MG: 0.2 TABLET ORAL at 20:07

## 2021-03-01 RX ADMIN — DOCUSATE SODIUM 50MG AND SENNOSIDES 8.6MG 1 TABLET: 8.6; 5 TABLET, FILM COATED ORAL at 08:00

## 2021-03-01 RX ADMIN — SODIUM CHLORIDE, PRESERVATIVE FREE 10 ML: 5 INJECTION INTRAVENOUS at 20:07

## 2021-03-01 RX ADMIN — DOCUSATE SODIUM 50MG AND SENNOSIDES 8.6MG 1 TABLET: 8.6; 5 TABLET, FILM COATED ORAL at 20:07

## 2021-03-01 ASSESSMENT — PAIN SCALES - GENERAL
PAINLEVEL_OUTOF10: 9
PAINLEVEL_OUTOF10: 10
PAINLEVEL_OUTOF10: 9
PAINLEVEL_OUTOF10: 9
PAINLEVEL_OUTOF10: 10
PAINLEVEL_OUTOF10: 6
PAINLEVEL_OUTOF10: 8

## 2021-03-01 ASSESSMENT — PAIN DESCRIPTION - FREQUENCY: FREQUENCY: CONTINUOUS

## 2021-03-01 ASSESSMENT — PAIN DESCRIPTION - DESCRIPTORS: DESCRIPTORS: DISCOMFORT

## 2021-03-01 ASSESSMENT — PAIN DESCRIPTION - PAIN TYPE: TYPE: ACUTE PAIN;SURGICAL PAIN

## 2021-03-01 NOTE — OP NOTE
on  several occasions and I told her that a repair while may be possible  would probably result in a relatively significant residual regurgitation  and then a replacement would probably be in order. She agreed that she  only wanted to have one operation for definitive treatment and she  therefore elected that if a placement would have to be done it should be  done with a mechanical On-X-type prosthesis such that she could  potentially just have one operation in the valve that could potentially  last her a life-time. I did tell her that I would inspect the valve  with the intention of repairing it initially, but the things were too  complicated that a definitive replacement would be performed such that  this could be a chance for her to have only one surgery, and this was  consistent with her wishes. She did not have any history of atrial  fibrillation but did have a dilated left atrium and therefore I told her  that I would occlude her left atrial appendage with a 50-mm AtriClip  device in order to reduce her long-term stroke risk should she develop  atrial fibrillation in the future. FINDINGS AT SURGERY:  Once I opened the heart through Waterston's  groove, I inspected the valve and spent a good 10 minutes sizing up the  situation. The anterior leaflet was prolapsing from both the A2 and the  A3 segments, and there was evidence of chordal elongation about A2 and  A3. There were also some restriction at the P1/P2 level, which was  consistent with the findings on the 3D intraoperative transesophageal  echocardiogram as performed by Dr. Sandra Paniagua. Secondary to this, I  elected to replace the valve, which I did and found that it sized for a  29/31-mm On-X mechanical mitral valve. This went well, and the  postoperative transesophageal echocardiogram revealed a well-seated  valve with no perivalvular leak and no significant residual gradient as  per Dr. Susana Blair examination.   Satisfied with this, I thus placed a  50-mm left atrial appendage clip on the left atrial appendage and  concluded the procedure. Her postoperative ejection fraction was  normal, and the procedure was a success. SURGERY IN DETAIL:  The patient was identified in her bed in the CVICU  and was transported to the operating room, where she was induced for  general endotracheal anesthesia without difficulty. This included an  uneventful endotracheal intubation and the appropriate placement of  lines and access for cardiac surgery. A time-out was performed and  documented, and she was prepped and draped in a normal sterile fashion. The operation then began with the performance of a midline median  sternotomy. A pericardial well was created, and a preliminary  dissection was performed in order to achieve aorto-bicaval  cardiopulmonary bypass, which was achieved with excellent flows and  drainage after appropriate heparinization. Caval tourniquets were  applied to the SVC and the IVC, and Waterston's groove was dissected. An ascending aorta cardioplegia needle was placed in the ascending  aorta. A cross-clamp was applied, and a dose of cold [place] Nido solution  was given to achieve an adequate diastolic arrest.  I opened Waterston's  groove and I placed a Devin-Coughlin retractor which provided  excellent exposure to the mitral valve. I tested the valve multiple  times, and the above-mentioned findings were found. I did not think  that the performance of Paige-Enrique chordae would result in a durable  repair for this valve of complex etiology and I therefore replaced it  with what sized to be a 29/31-mm On-X mechanical valve. This appeared  to be well seated and I closed Waterston's groove. The heart was  de-aired, the cross-clamp was removed, and there was the eventual return  of normal sinus rhythm. A ventricular wire was placed.   I was able to  wean the patient from cardiopulmonary bypass with good hemodynamics on  minimal inotropic support. I thus decannulated and administered  protamine after the postoperative transesophageal echo findings as above  were read officially. Hemostasis was achieved and verified, and chest  tubes were placed. Ultimately, her chest was closed with my double  stainless steel wire technique, and the remainder of the incisions was  closed in the usual layered fashion. She tolerated the procedure well  and was transported to the CVICU in stable condition with no evidence of  bleeding. There were no adequately trained or available residents for assistance  in this operation, and the presence of Steven Durbin was critical for  the first assistance necessary to complete the operation. DONNA Jenkins MD    D: 02/28/2021 22:54:12       T: 03/01/2021 3:45:06     [email or web address]  Job#: [id number]     Doc#: [id number]    CC:

## 2021-03-01 NOTE — PROGRESS NOTES
VALVE REPLACEMENT (2/26/21); amb pt.; up in chair; up for meals  Subjective    Pt and RN agreeable to PT. Pt in chair upon arrival.  Pt c/o of chest pain at 8/10. RN notified. Orientation   WFL  Objective   Transfers  Sit to Stand: SBA  Stand to sit: CGA  Comment: Verbal cues for hand placement during transfers with good return. STS x4  Ambulation  Ambulation?: Yes  Ambulation 1  Surface: level tile  Device: Rolling Walker  Other Apparatus: O2(2L), IV pole  Assistance: Contact guard assistance  Quality of Gait: Forward flexed posture  Gait Deviations: Slow Denice;Decreased step length;Decreased step height  Distance: ~200ft  Comments: Tolerated amb well, SOB toward end of amb. Pt educated on pursed lip breathing. Pt ~4 min seated break to catch her breath. Pt O2 >90% throughout PT. Pt requested O2 turned up. O2 turned up to 3L, RN approved. Stairs/Curb  Stairs?: Yes  # of Stairs: 5 up/down on a single step  Railling: RW used as railing on left side  Assistance: CGA  Comment: Pt weary of trying stairs. Pt completed stairs with encouragement and safety assurance. Balance  Posture: Fair  Sitting - Static: Good  Sitting - Dynamic: Good;-  Standing - Static: Good;-  Standing - Dynamic: Fair;+  Comments: Standing balance assessed w/ RW  Exercises  Seated LE exercise program: Long Arc Quads, hip abduction/adduction, heel/toe raises, and marches. Reps: x10  Goals  Short term goals  Time Frame for Short term goals: 15  Short term goal 1: Pt to perform bed mobility and functional transfers independently  Short term goal 2: Ambulate 300ft w/ no AD independently  Short term goal 3: Ascend/descend 18 stairs with R rail SBA to simulate home enviroment  Short term goal 4: Tolerate 30 minutes of therapy to demo increased endurance  Short term goal 5: Demonstrate independence in performance of cardiac rehab HEP  Patient Goals   Patient goals :  To go home    Plan    Plan  Times per week: 6-7x/week; 1-2/day  Current Treatment

## 2021-03-01 NOTE — PROGRESS NOTES
Occupational Therapy  Facility/Department: [facility] CAR 1  Daily Treatment Note  NAME: Margarita Holder  : 1975  MRN: [id number]    Date of Service: 3/1/2021    Discharge Recommendations:  Patient would benefit from continued therapy after discharge       Assessment   Performance deficits / Impairments: Decreased functional mobility ; Decreased ADL status; Decreased safe awareness;Decreased endurance;Decreased balance;Decreased high-level IADLs  Assessment: Pt to benefit from continued OT services to increase safety, independence, and endurance for ADL and functional mobility performance. Treatment Diagnosis: MVR 21  Prognosis: Good  REQUIRES OT FOLLOW UP: Yes  Activity Tolerance  Activity Tolerance: Patient Tolerated treatment well;Patient limited by fatigue;Patient limited by pain  Safety Devices  Safety Devices in place: Yes  Type of devices: Call light within reach;Gait belt;Left in chair;Nurse notified         Patient Diagnosis(es): There were no encounter diagnoses. has a past medical history of Anxiety, CHF (congestive heart failure) ([facility] [address] GERD (gastroesophageal reflux disease), History of chest pain, History of echocardiogram, Hypertension, Leg swelling, Migraine, Mitral regurgitation, Mitral valve disease, SOB (shortness of breath), Stomach ulcer, Wellness examination, and Wellness examination. has a past surgical history that includes hernia repair; transesophageal echocardiogram (2021); Cardiac catheterization (2021); Endoscopy, colon, diagnostic; Cosmetic surgery; and Mitral valve repair (N/A, 2021).     Restrictions  Restrictions/Precautions  Restrictions/Precautions: Cardiac, Surgical Protocols, Fall Risk, Up as Tolerated  Required Braces or Orthoses?: (Surgical Bra)  Position Activity Restriction  Sternal Precautions: No Pushing, No Pulling, 5# Lifting Restrictions  Sternal Precautions: MVR 21  Other position/activity restrictions: O2 @3L NC, CT  Subjective guard assistance  Sit to stand: Contact guard assistance  Stand to sit: Contact guard assistance  Transfer Comments: vc's for hand placement, No LOB      Plan   Plan  Times per week: 3-5 x/wk  Current Treatment Recommendations: Balance Training, Functional Mobility Training, Endurance Training, Safety Education & Training, Self-Care / ADL, Pain Management, Equipment Evaluation, Education, & procurement, Patient/Caregiver Education & Training, Home Management Training    Goals  Short term goals  Time Frame for Short term goals: By discharge, pt will:  Short term goal 1: Demo Mod I with use of LRD PRN for functional transfers and functional mobility  Short term goal 2: Demo I with setup for UB ADLs and grooming tasks  Short term goal 3: Demo Min A for LB ADLs with setup and use of AE PRN  Short term goal 4: Demo 10+ minutes dynamic standing task to increase safety and independence with ADLs/IADLs  Short term goal 5:  Increase activity tolerance to 30+ min to increase endurance for functional performance     Therapy Time   Individual Concurrent Group Co-treatment   Time In  1130         Time Out  1204         Minutes  34 total tx time                 KAREY ROBERSON/SANDRA

## 2021-03-01 NOTE — PLAN OF CARE
Problem: OXYGENATION/RESPIRATORY FUNCTION  Goal: Patient will maintain patent airway  2/28/2021 1908 by Anderson Preciado RN  Outcome: Ongoing  2/28/2021 1613 by Crystal Martinez RN  Outcome: Ongoing  2/28/2021 1059 by Crystal Martinez RN  Outcome: Ongoing  Goal: Patient will achieve/maintain normal respiratory rate/effort  Description: Respiratory rate and effort will be within normal limits for the patient  2/28/2021 1908 by Anderson Preciado RN  Outcome: Ongoing  2/28/2021 1613 by Crystal Martinez RN  Outcome: Ongoing  2/28/2021 1059 by Crystal Martinez RN  Outcome: Ongoing     Problem: MECHANICAL VENTILATION  Goal: Patient will maintain patent airway  2/28/2021 1908 by Anderson Preciado RN  Outcome: Ongoing  2/28/2021 1613 by Crystal Martinez RN  Outcome: Ongoing  2/28/2021 1059 by Crystal Martinez RN  Outcome: Ongoing  Goal: Oral health is maintained or improved  2/28/2021 1908 by Anderson Preciado RN  Outcome: Ongoing  2/28/2021 1613 by Crystal Martinez RN  Outcome: Ongoing  2/28/2021 1059 by Crystal Martinez RN  Outcome: Ongoing  Goal: ET tube will be managed safely  2/28/2021 1908 by Anderson Preciado RN  Outcome: Ongoing  2/28/2021 1613 by Crystal Martinez RN  Outcome: Ongoing  2/28/2021 1059 by Crystal Martinez RN  Outcome: Ongoing  Goal: Ability to express needs and understand communication  2/28/2021 1908 by Anderson Preciado RN  Outcome: Ongoing  2/28/2021 1613 by Crystal Martinez RN  Outcome: Ongoing  2/28/2021 1059 by Crystal Martinez RN  Outcome: Ongoing  Goal: Mobility/activity is maintained at optimum level for patient  2/28/2021 1908 by Anderson Preciado RN  Outcome: Ongoing  2/28/2021 1613 by Crystal Martinez RN  Outcome: Ongoing  2/28/2021 1059 by Crystal Martinez RN  Outcome: Ongoing     Problem: Discharge Planning:  Goal: Discharged to appropriate level of care  Description: Discharged to appropriate level of care  2/28/2021 1908 by Anderson Preciado RN  Outcome: Ongoing  2/28/2021 1613 by Crystal Martinez, RN  Outcome: Ongoing  2/28/2021 1059 by Crystal Mratinez RN  Outcome: Ongoing     Problem: Cardiac Output - Decreased:  Goal: Cardiac output within specified parameters  Description: Cardiac output within specified parameters  2/28/2021 1908 by Anderson Preciado RN  Outcome: Ongoing  2/28/2021 1613 by Crystal Martinez RN  Outcome: Ongoing  2/28/2021 1059 by Crystal Martinez RN  Outcome: Ongoing     Problem: Infection - Surgical Site:  Goal: Will show no infection signs and symptoms  Description: Will show no infection signs and symptoms  2/28/2021 1908 by Anderson Preciado RN  Outcome: Ongoing  2/28/2021 1613 by Crystal Martinez RN  Outcome: Ongoing  2/28/2021 1059 by Crystal Martinez RN  Outcome: Ongoing     Problem: Pain - Acute:  Goal: Pain level will decrease  Description: Pain level will decrease  2/28/2021 1908 by Anderson Preciado RN  Outcome: Ongoing  2/28/2021 1613 by Crystal Martinez RN  Outcome: Ongoing  2/28/2021 1059 by Crystal Martinez RN  Outcome: Ongoing     Problem: Venous Thromboembolism:  Goal: Will show no signs or symptoms of venous thromboembolism  Description: Will show no signs or symptoms of venous thromboembolism  2/28/2021 1908 by Anderson Preciado RN  Outcome: Ongoing  2/28/2021 1613 by Crystal Martinez RN  Outcome: Ongoing  2/28/2021 1059 by Crystal Martinez RN  Outcome: Ongoing  Goal: Absence of signs or symptoms of impaired coagulation  Description: Absence of signs or symptoms of impaired coagulation  2/28/2021 1908 by Anderson Preciado RN  Outcome: Ongoing  2/28/2021 1613 by Crystal Martinez RN  Outcome: Ongoing  2/28/2021 1059 by Crystal Martinez RN  Outcome: Ongoing     Problem: Falls - Risk of:  Goal: Will remain free from falls  Description: Will remain free from falls  2/28/2021 1908 by Anderson Preciado RN  Outcome: Ongoing  2/28/2021 1613 by Crystal Martinez RN  Outcome: Ongoing  2/28/2021 1059 by Crystal Martinez RN  Outcome: Ongoing  Goal: Absence of physical injury  Description: Absence of physical injury  2/28/2021 1908 by Handy Yang RN  Outcome: Ongoing  2/28/2021 1613 by Stephanie Reed RN  Outcome: Ongoing  2/28/2021 1059 by Stephanie Reed RN  Outcome: Ongoing     Problem: Pain:  Goal: Pain level will decrease  Description: Pain level will decrease  2/28/2021 1908 by Handy Yang RN  Outcome: Ongoing  2/28/2021 1613 by Stephanie Reed RN  Outcome: Ongoing  2/28/2021 1059 by Stephanie Reed RN  Outcome: Ongoing  Goal: Control of acute pain  Description: Control of acute pain  2/28/2021 1908 by Handy Yang RN  Outcome: Ongoing  2/28/2021 1613 by Stephanie Reed RN  Outcome: Ongoing  2/28/2021 1059 by Stephanie Reed RN  Outcome: Ongoing  Goal: Control of chronic pain  Description: Control of chronic pain  2/28/2021 1908 by Handy Yang RN  Outcome: Ongoing  2/28/2021 1613 by Stephanie Reed RN  Outcome: Ongoing  2/28/2021 1059 by Stephanie Reed RN  Outcome: Ongoing     Problem: Skin Integrity:  Goal: Will show no infection signs and symptoms  Description: Will show no infection signs and symptoms  2/28/2021 1908 by Handy Yang RN  Outcome: Ongoing  2/28/2021 1613 by Stephanie Reed RN  Outcome: Ongoing  Goal: Absence of new skin breakdown  Description: Absence of new skin breakdown  2/28/2021 1908 by Handy Yang RN  Outcome: Ongoing  2/28/2021 1613 by Stephanie Reed RN  Outcome: Ongoing

## 2021-03-01 NOTE — PROGRESS NOTES
[facility]   Progress Note                    Date:   3/1/2021  Patient name:  Jose Mascorro  Date of admission:  2/26/2021  6:06 AM  MRN:   [id number]  YOB: 1975  PCP:    MAYRA Dorantes CNP    Reason for Admission:  Severe mitral regurgitation by prior echocardiogram [I34.0]    Subjective:      Clinical Changes / Abnormalities:    Patient seen and examined at bedside. No acute events overnight. No chest pain or shortness of breath. Reports no specific complaints this morning except for some vague discomfort at site of incision which she reports well controlled with her current pain regiment. Chest tubes still in place, management per CV surgery. Encouraged IS use.     Urine output in the last 24 hours:     Intake/Output Summary (Last 24 hours) at 3/1/2021 0700  Last data filed at 3/1/2021 0400  Gross per 24 hour   Intake 2110 ml   Output 3610 ml   Net -1500 ml     I/O since admission: +2.9 liters      Medications:   Scheduled Meds:   [Held by provider] enoxaparin  1 mg/kg Subcutaneous BID    furosemide  40 mg Intravenous BID    metoprolol tartrate  12.5 mg Oral BID    warfarin  2.5 mg Oral Daily    [MAR Hold] vancomycin  1,000 mg Intravenous Q12H    sodium chloride flush  10 mL Intravenous 2 times per day    aspirin  81 mg Oral Daily    amiodarone  200 mg Oral BID    chlorhexidine  15 mL Mouth/Throat BID    mupirocin   Nasal BID    therapeutic multivitamin-minerals  1 tablet Oral Daily with breakfast    polyethylene glycol  17 g Oral Daily    sennosides-docusate sodium  1 tablet Oral BID    atorvastatin  40 mg Oral Nightly    insulin glargine  0.15 Units/kg Subcutaneous Nightly    pantoprazole  40 mg Intravenous BID    And    sodium chloride (PF)  10 mL Intravenous BID     Continuous Infusions:   [MAR Hold] lactated ringers 1,000 mL (02/26/21 0803)    [MAR Hold] sodium chloride      sodium chloride 10 mL/hr at 02/27/21 0752    insulin Stopped (02/26/21 1333)    dextrose      propofol Stopped (02/26/21 1550)    norepinephrine Stopped (02/27/21 6941)     CBC:   Recent Labs     02/27/21  0341 02/27/21  0341 02/28/21  0600 02/28/21  1149 02/28/21 2052 03/01/21  0353   WBC 11.4*  --  11.1  --   --  6.7   HGB 7.8*   < > 9.0* 9.2* 8.6* 8.0*   *  --  106*  --   --  103*    < > = values in this interval not displayed. BMP:    Recent Labs     02/27/21  0341 02/28/21  0600 03/01/21  0353    134* 133*   K 4.6 3.9 3.7   * 102 101   CO2 20 23 21   BUN 5* 10 11   CREATININE 0.46* 0.71 0.52   GLUCOSE 216* 94 76     Hepatic: No results for input(s): AST, ALT, ALB, BILITOT, ALKPHOS in the last 72 hours. Troponin: No results for input(s): TROPONINI in the last 72 hours. No results for input(s): TROPONINT in the last 72 hours. BNP: No results for input(s): PROBNP in the last 72 hours. No results for input(s): BNP in the last 72 hours. Lipids: No results for input(s): CHOL, HDL in the last 72 hours. Invalid input(s): LDLCALCU  INR:   Recent Labs     02/27/21  0341 02/28/21  0600 03/01/21  0353   INR 1.1 1.5 3.3       Objective:   Vitals: BP (!) 85/59   Pulse 86   Temp 98.4 °F (36.9 °C) (Oral)   Resp 21   Ht 5' 7\" (1.702 m)   Wt 154 lb 5.2 oz (70 kg)   LMP 02/15/2021   SpO2 100%   BMI 24.17 kg/m²    Constitutional and General Appearance:   · alert, cooperative, no distress and appears stated age  Respiratory:  · No for increased work of breathing. · On auscultation: clear to auscultation bilaterally  Cardiovascular:  · Pericardial rub  · Mechanical Click  · No murmurs    Abdomen:   · No masses or tenderness  · Bowel sounds present  Extremities:  ·  No Cyanosis or Clubbing  ·  Lower extremity edema: No  ·  Skin: Warm and dry  Neurological:  · Alert and oriented.   · Moves all extremities well    Diagnostic Studies:     ECHO:  1/4/2021  Mitral Valve:    Structurally normal.  Severe Mitral  regurgitation is identified At least 3 different jets were noted. MR ERO 0.45, MR volume 72 ml  Aortic Valve: The aortic valve is trileaflet and opens adequately. Mild  regurgitiation is identified. Tricuspid valve: Structurally normal. No  regurgitation is identified. Pulmonary valve: Normal. No significant regurgitation     No valvular vegetations or thrombus identified.     Cardiac Angiography:      Cardiac Cath 1/5/21  Findings:     LMCA: Normal 0% stenosis.     LAD: Normal 0% stenosis. The D1 is normal.     LCx: Normal 0% stenosis. The OM1 and OM2 are normal.     RCA: Normal 0% stenosis.      Coronary Tree      Dominance: Right  The LV gram was not performed      Estimated blood loss: 5 ml     Conclusions:  1.  Normal Coronary arteries      Assessment / Acute Cardiac Problems:   1. Severe MR s/p MV replacement with On-X 27/29 along with Left atrial appendage ligation w/ 50 mm AtriClip (Op report pending) on 2/26/21  2. Essential HTN  3. Acute on chronic systolic Heart failure (EF 45%)  4. Non-obstructive CAD    Plan of Treatment:   1. Continue coumadin. Hold therapeutic Lovenox dose as patients INR is therapeutic this morning. Goal INR 2.5-3.5. 2. Continue ASA  3. Continue Lipitor 40 mg daily  4. Continue amiodarone 200 BID   5. Continue lasix 40 IV BID  6. Plan to repeat 2D ECHO in 2 weeks  7. K>4, Mg>2  8. Post op management per CV surgery. Doni Edwards MD  Fellow, [address] [place] [address]      Attending note,   The patient was seen and examined agree with above, doing well. Continue current medications. Routine post op orders. Will follow as outpatient.

## 2021-03-01 NOTE — PLAN OF CARE
Problem: OXYGENATION/RESPIRATORY FUNCTION  Goal: Patient will maintain patent airway  Outcome: Ongoing     Problem: OXYGENATION/RESPIRATORY FUNCTION  Goal: Patient will achieve/maintain normal respiratory rate/effort  Description: Respiratory rate and effort will be within normal limits for the patient  Outcome: Ongoing     Problem: Discharge Planning:  Goal: Discharged to appropriate level of care  Description: Discharged to appropriate level of care  Outcome: Ongoing     Problem: Cardiac Output - Decreased:  Goal: Cardiac output within specified parameters  Description: Cardiac output within specified parameters  Outcome: Ongoing     Problem: Infection - Surgical Site:  Goal: Will show no infection signs and symptoms  Description: Will show no infection signs and symptoms  Outcome: Ongoing     Problem: Pain - Acute:  Goal: Pain level will decrease  Description: Pain level will decrease  Outcome: Ongoing     Problem: Pain:  Description: Pain management should include both nonpharmacologic and pharmacologic interventions.   Goal: Pain level will decrease  Description: Pain level will decrease  Outcome: Ongoing

## 2021-03-01 NOTE — CARE COORDINATION
Transitional planning-talked with patient-plan is to go home with her mother and have HC-wanting this RN to call her mother.  Left message with Gretchen Garza [phone] mother Christa called [facility] is for her to go to her daughters apartment-have home care-she has list-will decide

## 2021-03-01 NOTE — PROGRESS NOTES
insulin Stopped (02/26/21 1333)    dextrose      propofol Stopped (02/26/21 1550)    norepinephrine Stopped (02/27/21 0643)     PRN Meds:melatonin, ketorolac, [MAR Hold] sodium chloride, sodium chloride flush, ondansetron, acetaminophen, acetaminophen, oxyCODONE-acetaminophen **OR** oxyCODONE-acetaminophen, fentanNYL **OR** fentanNYL, hydrALAZINE, magnesium hydroxide, bisacodyl, calcium chloride IVPB, magnesium sulfate, potassium chloride, albumin human, glucose, dextrose, glucagon (rDNA), dextrose    Beta- Blocker: Yes  Aspirin: Yes  GI: Yes  Plavix: No  Coumadin: Hold  Statin: Yes    Assessment/ Plan:  POD #3, S/P Mechanical MVR  Neuro: intact  HD NSR, BP soft, on beta  Pulmonary wean off O2 IS/PT CXR okay ateleactasis  GI belly soft, + gas, needs BM on protocol   yee out diuresing well on Lasix 40mg IV bid decrease to somg due to BP. Creatine 0.52  Lines and drains: ambulate then d/c tubes today  ABLA 8.0  INR 3.3 hold coumadin coumadin today.  D/C lovenox  D/C planning      Cassie Olmedo

## 2021-03-02 ENCOUNTER — APPOINTMENT (OUTPATIENT)
Dept: GENERAL RADIOLOGY | Age: 46
DRG: 163 | End: 2021-03-02
Attending: THORACIC SURGERY (CARDIOTHORACIC VASCULAR SURGERY)
Payer: MEDICAID

## 2021-03-02 LAB
ANION GAP SERPL CALCULATED.3IONS-SCNC: 8 MMOL/L (ref 9–17)
BUN BLDV-MCNC: 10 MG/DL (ref 6–20)
BUN/CREAT BLD: ABNORMAL (ref 9–20)
CALCIUM SERPL-MCNC: 7.4 MG/DL (ref 8.6–10.4)
CHLORIDE BLD-SCNC: 99 MMOL/L (ref 98–107)
CO2: 27 MMOL/L (ref 20–31)
CREAT SERPL-MCNC: 0.52 MG/DL (ref 0.5–0.9)
GFR AFRICAN AMERICAN: >60 ML/MIN
GFR NON-AFRICAN AMERICAN: >60 ML/MIN
GFR SERPL CREATININE-BSD FRML MDRD: ABNORMAL ML/MIN/{1.73_M2}
GFR SERPL CREATININE-BSD FRML MDRD: ABNORMAL ML/MIN/{1.73_M2}
GLUCOSE BLD-MCNC: 105 MG/DL (ref 70–99)
GLUCOSE BLD-MCNC: 115 MG/DL (ref 65–105)
GLUCOSE BLD-MCNC: 124 MG/DL (ref 65–105)
GLUCOSE BLD-MCNC: 139 MG/DL (ref 65–105)
GLUCOSE BLD-MCNC: 158 MG/DL (ref 65–105)
HCT VFR BLD CALC: 23.9 % (ref 36.3–47.1)
HCT VFR BLD CALC: 25.2 % (ref 36.3–47.1)
HCT VFR BLD CALC: 25.4 % (ref 36.3–47.1)
HEMOGLOBIN: 7.9 G/DL (ref 11.9–15.1)
HEMOGLOBIN: 8.1 G/DL (ref 11.9–15.1)
HEMOGLOBIN: 8.3 G/DL (ref 11.9–15.1)
INR BLD: 4.9
INR BLD: 6.4
MAGNESIUM: 2 MG/DL (ref 1.6–2.6)
MCH RBC QN AUTO: 26.7 PG (ref 25.2–33.5)
MCHC RBC AUTO-ENTMCNC: 33.1 G/DL (ref 28.4–34.8)
MCV RBC AUTO: 80.7 FL (ref 82.6–102.9)
NRBC AUTOMATED: 0 PER 100 WBC
PDW BLD-RTO: 23.8 % (ref 11.8–14.4)
PLATELET # BLD: ABNORMAL K/UL (ref 138–453)
PLATELET, FLUORESCENCE: 143 K/UL (ref 138–453)
PLATELET, IMMATURE FRACTION: 4.8 % (ref 1.1–10.3)
PMV BLD AUTO: ABNORMAL FL (ref 8.1–13.5)
POC ANGLE TEG W HEP: 59.6 DEG (ref 59–74)
POC ANGLE TEG W HEP: 73.9 DEG (ref 59–74)
POC ANGLE TEG: 62.4 DEG (ref 59–74)
POC ANGLE TEG: 75.9 DEG (ref 59–74)
POC EPL TEG W/HEP: 2.2 % (ref 0–15)
POC EPL TEG W/HEP: NORMAL % (ref 0–15)
POC EPL TEG: 2.5 % (ref 0–15)
POC EPL TEG: NORMAL % (ref 0–15)
POC KINETICS TEG W HEP: 1.1 MIN (ref 1–3)
POC KINETICS TEG W HEP: 2.3 MIN (ref 1–3)
POC KINETICS TEG: 1 MIN (ref 1–3)
POC KINETICS TEG: 2.1 MIN (ref 1–3)
POC LY30(LYSIS) TEG W HEP: 2.2 % (ref 0–8)
POC LY30(LYSIS) TEG W HEP: NORMAL % (ref 0–8)
POC LY30(LYSIS) TEG: 2.5 % (ref 0–8)
POC LY30(LYSIS) TEG: NORMAL % (ref 0–8)
POC MA(MAX CLOT) TEG: 62.5 MM (ref 55–74)
POC MA(MAX CLOT) TEG: 76.2 MM (ref 55–74)
POC MAX CLOT TEG W HEP: 57.9 MM (ref 55–74)
POC MAX CLOT TEG W HEP: 73.8 MM (ref 55–74)
POC REACTION TIME TEG W HEP: 4.8 MIN (ref 4–9)
POC REACTION TIME TEG W HEP: 8.5 MIN (ref 4–9)
POC REACTION TIME TEG: 5 MIN (ref 4–9)
POC REACTION TIME TEG: 9 MIN (ref 4–9)
POTASSIUM SERPL-SCNC: 3.5 MMOL/L (ref 3.7–5.3)
PROTHROMBIN TIME: 45.7 SEC (ref 9.1–12.3)
PROTHROMBIN TIME: 58.5 SEC (ref 9.1–12.3)
RBC # BLD: 2.96 M/UL (ref 3.95–5.11)
SODIUM BLD-SCNC: 134 MMOL/L (ref 135–144)
SURGICAL PATHOLOGY REPORT: NORMAL
TEG COMMENT: ABNORMAL
TEG COMMENT: NORMAL
WBC # BLD: 6.4 K/UL (ref 3.5–11.3)

## 2021-03-02 PROCEDURE — 36415 COLL VENOUS BLD VENIPUNCTURE: CPT

## 2021-03-02 PROCEDURE — 71045 X-RAY EXAM CHEST 1 VIEW: CPT

## 2021-03-02 PROCEDURE — 85610 PROTHROMBIN TIME: CPT

## 2021-03-02 PROCEDURE — 6370000000 HC RX 637 (ALT 250 FOR IP): Performed by: NURSE PRACTITIONER

## 2021-03-02 PROCEDURE — 99024 POSTOP FOLLOW-UP VISIT: CPT | Performed by: PHYSICIAN ASSISTANT

## 2021-03-02 PROCEDURE — 85014 HEMATOCRIT: CPT

## 2021-03-02 PROCEDURE — 83735 ASSAY OF MAGNESIUM: CPT

## 2021-03-02 PROCEDURE — 85027 COMPLETE CBC AUTOMATED: CPT

## 2021-03-02 PROCEDURE — 6360000002 HC RX W HCPCS: Performed by: NURSE PRACTITIONER

## 2021-03-02 PROCEDURE — C9113 INJ PANTOPRAZOLE SODIUM, VIA: HCPCS | Performed by: NURSE PRACTITIONER

## 2021-03-02 PROCEDURE — 2700000000 HC OXYGEN THERAPY PER DAY

## 2021-03-02 PROCEDURE — 2580000003 HC RX 258: Performed by: NURSE PRACTITIONER

## 2021-03-02 PROCEDURE — 80048 BASIC METABOLIC PNL TOTAL CA: CPT

## 2021-03-02 PROCEDURE — 97116 GAIT TRAINING THERAPY: CPT

## 2021-03-02 PROCEDURE — 97535 SELF CARE MNGMENT TRAINING: CPT

## 2021-03-02 PROCEDURE — 82947 ASSAY GLUCOSE BLOOD QUANT: CPT

## 2021-03-02 PROCEDURE — P9041 ALBUMIN (HUMAN),5%, 50ML: HCPCS | Performed by: NURSE PRACTITIONER

## 2021-03-02 PROCEDURE — 85055 RETICULATED PLATELET ASSAY: CPT

## 2021-03-02 PROCEDURE — 85018 HEMOGLOBIN: CPT

## 2021-03-02 PROCEDURE — 97530 THERAPEUTIC ACTIVITIES: CPT

## 2021-03-02 PROCEDURE — 94761 N-INVAS EAR/PLS OXIMETRY MLT: CPT

## 2021-03-02 PROCEDURE — 2140000001 HC CVICU INTERMEDIATE R&B

## 2021-03-02 RX ADMIN — AMIODARONE HYDROCHLORIDE 200 MG: 200 TABLET ORAL at 22:01

## 2021-03-02 RX ADMIN — Medication 1 TABLET: at 08:40

## 2021-03-02 RX ADMIN — OXYCODONE HYDROCHLORIDE AND ACETAMINOPHEN 1 TABLET: 5; 325 TABLET ORAL at 13:42

## 2021-03-02 RX ADMIN — CHLORHEXIDINE GLUCONATE 0.12% ORAL RINSE 15 ML: 1.2 LIQUID ORAL at 08:47

## 2021-03-02 RX ADMIN — OXYCODONE HYDROCHLORIDE AND ACETAMINOPHEN 1 TABLET: 5; 325 TABLET ORAL at 10:36

## 2021-03-02 RX ADMIN — DOCUSATE SODIUM 50MG AND SENNOSIDES 8.6MG 1 TABLET: 8.6; 5 TABLET, FILM COATED ORAL at 20:27

## 2021-03-02 RX ADMIN — DOCUSATE SODIUM 50MG AND SENNOSIDES 8.6MG 1 TABLET: 8.6; 5 TABLET, FILM COATED ORAL at 08:40

## 2021-03-02 RX ADMIN — PANTOPRAZOLE SODIUM 40 MG: 40 INJECTION, POWDER, FOR SOLUTION INTRAVENOUS at 20:26

## 2021-03-02 RX ADMIN — OXYCODONE HYDROCHLORIDE AND ACETAMINOPHEN 2 TABLET: 5; 325 TABLET ORAL at 22:01

## 2021-03-02 RX ADMIN — MUPIROCIN: 20 OINTMENT TOPICAL at 22:02

## 2021-03-02 RX ADMIN — SODIUM CHLORIDE, PRESERVATIVE FREE 10 ML: 5 INJECTION INTRAVENOUS at 22:03

## 2021-03-02 RX ADMIN — OXYCODONE HYDROCHLORIDE AND ACETAMINOPHEN 2 TABLET: 5; 325 TABLET ORAL at 00:04

## 2021-03-02 RX ADMIN — SODIUM CHLORIDE, PRESERVATIVE FREE 10 ML: 5 INJECTION INTRAVENOUS at 20:27

## 2021-03-02 RX ADMIN — POTASSIUM CHLORIDE 40 MEQ: 1500 TABLET, EXTENDED RELEASE ORAL at 08:40

## 2021-03-02 RX ADMIN — PANTOPRAZOLE SODIUM 40 MG: 40 INJECTION, POWDER, FOR SOLUTION INTRAVENOUS at 08:40

## 2021-03-02 RX ADMIN — SODIUM CHLORIDE, PRESERVATIVE FREE 10 ML: 5 INJECTION INTRAVENOUS at 08:40

## 2021-03-02 RX ADMIN — OXYCODONE HYDROCHLORIDE AND ACETAMINOPHEN 2 TABLET: 5; 325 TABLET ORAL at 04:52

## 2021-03-02 RX ADMIN — FUROSEMIDE 40 MG: 10 INJECTION, SOLUTION INTRAMUSCULAR; INTRAVENOUS at 08:40

## 2021-03-02 RX ADMIN — INSULIN GLARGINE 9 UNITS: 100 INJECTION, SOLUTION SUBCUTANEOUS at 20:28

## 2021-03-02 RX ADMIN — Medication 81 MG: at 08:40

## 2021-03-02 RX ADMIN — AMIODARONE HYDROCHLORIDE 200 MG: 200 TABLET ORAL at 08:40

## 2021-03-02 RX ADMIN — ALBUMIN (HUMAN) 25 G: 12.5 INJECTION, SOLUTION INTRAVENOUS at 00:10

## 2021-03-02 RX ADMIN — Medication 3 MG: at 20:26

## 2021-03-02 RX ADMIN — OXYCODONE HYDROCHLORIDE AND ACETAMINOPHEN 2 TABLET: 5; 325 TABLET ORAL at 17:47

## 2021-03-02 RX ADMIN — MUPIROCIN: 20 OINTMENT TOPICAL at 08:40

## 2021-03-02 ASSESSMENT — PAIN SCALES - GENERAL
PAINLEVEL_OUTOF10: 6
PAINLEVEL_OUTOF10: 6
PAINLEVEL_OUTOF10: 8

## 2021-03-02 ASSESSMENT — PAIN DESCRIPTION - PAIN TYPE
TYPE: SURGICAL PAIN
TYPE: SURGICAL PAIN
TYPE: ACUTE PAIN;SURGICAL PAIN

## 2021-03-02 ASSESSMENT — PAIN DESCRIPTION - ORIENTATION: ORIENTATION: MID

## 2021-03-02 ASSESSMENT — PAIN DESCRIPTION - DESCRIPTORS: DESCRIPTORS: DISCOMFORT

## 2021-03-02 NOTE — PROGRESS NOTES
Present: No  General Comment  Pain Assessment  Pain Level: 4  Pain Type: Surgical pain  Pain Location: Chest  Pain Orientation: Mid  Pain Descriptors: Discomfort  Non-Pharmaceutical Pain Intervention(s): Ambulation/Increased Activity; Distraction; Therapeutic presence  Response to Pain Intervention: Patient Satisfied  Vital Signs  Patient Currently in Pain: Yes   Orientation  Orientation  Overall Orientation Status: Within Functional Limits  Objective    ADL  Grooming: Independent  UE Bathing: Setup; Increased time to complete;Supervision  LE Bathing: Setup; Increased time to complete;Supervision  UE Dressing: Minimal assistance;Setup; Increased time to complete(To tie gown)  Toileting: Supervision  Balance  Sitting Balance: Modified independent (Seated unsupported in recliner and on toilet)  Standing Balance: Supervision  Standing Balance  Time: 10 min  Activity: Func mobility and standing at sink to complete ADLs  Functional Mobility  Functional - Mobility Device: No device  Toilet Transfers  Toilet - Technique: Ambulating  Equipment Used: Standard toilet  Toilet Transfer: Supervision  Toilet Transfers Comments: Use of grab bar  Bed mobility  Comment: Pt seated in recliner upon enter and exit  Transfers  Sit to stand: Modified independent  Stand to sit: Modified independent  Cognition  Overall Cognitive Status: WFL  Pt seated in recliner upon arrival. Pt transferred to bathroom to complete toileting. Pt stood at sink to complete oral care, UB bathing/dressing, and LB dressing. Pt completed toileting before returning to recliner. Pt remained with call light within reach.   Plan   Plan  Times per week: 3-5 x/wk  Current Treatment Recommendations: Balance Training, Functional Mobility Training, Endurance Training, Safety Education & Training, Self-Care / ADL, Pain Management, Equipment Evaluation, Education, & procurement, Patient/Caregiver Education & Training, Home Management Training  Goals  Short term goals  Time Frame for Short term goals: By discharge, pt will:  Short term goal 1: Demo Mod I with use of LRD PRN for functional transfers and functional mobility  Short term goal 2: Demo I with setup for UB ADLs and grooming tasks  Short term goal 3: Demo Min A for LB ADLs with setup and use of AE PRN  Short term goal 4: Demo 10+ minutes dynamic standing task to increase safety and independence with ADLs/IADLs  Short term goal 5:  Increase activity tolerance to 30+ min to increase endurance for functional performance       Therapy Time   Individual Concurrent Group Co-treatment   Time In 0838         Time Out 0909         Minutes 31         Timed Code Treatment Minutes: [address] [place]/L

## 2021-03-02 NOTE — PROGRESS NOTES
[facility]   Progress Note                    Date:   3/2/2021  Patient name:  Tammie Guallpa  Date of admission:  2/26/2021  6:06 AM  MRN:   [id number]  YOB: 1975  PCP:    MAYRA Grissom CNP    Reason for Admission:  Severe mitral regurgitation by prior echocardiogram [I34.0]    Subjective:      Clinical Changes / Abnormalities:    Patient seen and examined at bedside. No acute events overnight. Reports no chest pain or shortness of breath. Reports feeling significantly better this morning once the chest tubes were pulled out. Reports no specific complaints. Laying comfortably in recliner this morning.          Urine output in the last 24 hours:     Intake/Output Summary (Last 24 hours) at 3/2/2021 0806  Last data filed at 3/2/2021 0451  Gross per 24 hour   Intake --   Output 2620 ml   Net -2620 ml     I/O since admission: +317 cc      Medications:   Scheduled Meds:   [Held by provider] enoxaparin  1 mg/kg Subcutaneous BID    furosemide  40 mg Intravenous BID    metoprolol tartrate  12.5 mg Oral BID    [MAR Hold] vancomycin  1,000 mg Intravenous Q12H    sodium chloride flush  10 mL Intravenous 2 times per day    aspirin  81 mg Oral Daily    amiodarone  200 mg Oral BID    chlorhexidine  15 mL Mouth/Throat BID    mupirocin   Nasal BID    therapeutic multivitamin-minerals  1 tablet Oral Daily with breakfast    polyethylene glycol  17 g Oral Daily    sennosides-docusate sodium  1 tablet Oral BID    atorvastatin  40 mg Oral Nightly    insulin glargine  0.15 Units/kg Subcutaneous Nightly    pantoprazole  40 mg Intravenous BID    And    sodium chloride (PF)  10 mL Intravenous BID     Continuous Infusions:   [MAR Hold] lactated ringers 1,000 mL (02/26/21 0803)    [MAR Hold] sodium chloride      sodium chloride 10 mL/hr at 02/27/21 0752    insulin Stopped (02/26/21 1333)    dextrose      propofol Stopped (02/26/21 1550)    norepinephrine Stopped (02/27/21 3986) CBC:   Recent Labs     02/28/21  0600 02/28/21  0600 03/01/21  0353 03/01/21  1115 03/01/21 2009 03/02/21 0447   WBC 11.1  --  6.7  --   --  6.4   HGB 9.0*   < > 8.0* 8.8* 8.9* 7.9*   *  --  103*  --   --  See Reflexed IPF Result    < > = values in this interval not displayed. BMP:    Recent Labs     02/28/21  0600 03/01/21 0353 03/02/21 0447   * 133* 134*   K 3.9 3.7 3.5*    101 99   CO2 23 21 27   BUN 10 11 10   CREATININE 0.71 0.52 0.52   GLUCOSE 94 76 105*     INR:   Recent Labs     02/28/21  0600 03/01/21 0353 03/02/21 0447   INR 1.5 3.3 6.4*       Objective:   Vitals: /72   Pulse 91   Temp 98.6 °F (37 °C) (Oral)   Resp 23   Ht 5' 7\" (1.702 m)   Wt 153 lb (69.4 kg)   LMP 02/15/2021   SpO2 100%   BMI 23.96 kg/m²    Constitutional and General Appearance:   · alert, cooperative, no distress and appears stated age  Respiratory:  · No for increased work of breathing. · On auscultation: clear to auscultation bilaterally  Cardiovascular:  · Pericardial rub  · Mechanical Click  · No murmurs    Abdomen:   · No masses or tenderness  · Bowel sounds present  Extremities:  ·  No Cyanosis or Clubbing  ·  Lower extremity edema: No  ·  Skin: Warm and dry  Neurological:  · Alert and oriented. Diagnostic Studies:     ECHO:  1/4/2021  Mitral Valve:    Structurally normal.  Severe Mitral  regurgitation is identified At least 3 different jets were noted. MR ERO 0.45, MR volume 72 ml  Aortic Valve: The aortic valve is trileaflet and opens adequately. Mild  regurgitiation is identified. Tricuspid valve: Structurally normal. No  regurgitation is identified. Pulmonary valve: Normal. No significant regurgitation     No valvular vegetations or thrombus identified.     Cardiac Angiography:      Cardiac Cath 1/5/21  Findings:     LMCA: Normal 0% stenosis.     LAD: Normal 0% stenosis. The D1 is normal.     LCx: Normal 0% stenosis. The OM1 and OM2 are normal.     RCA: Normal 0%

## 2021-03-02 NOTE — CARE COORDINATION
Met with patient and mother Syed Yakov to discuss transitional planning. Plan is for mother to stay with patient, they are open to home care and would like referral to [address] [place] [address].   Referral faxed    [phone] call from [facility] at [address] [place] [address], they are unable to accept patient's insurance

## 2021-03-02 NOTE — PROGRESS NOTES
Physical Therapy  Facility/Department: [facility] CAR 1  Daily Treatment Note  NAME: Delmy Curry  : 1975  MRN: [id number]    Date of Service: 3/2/2021    Discharge Recommendations:  Patient would benefit from continued therapy after discharge     PT equipment needed: Yes    Equipment: RW     Assessment   Body structures, Functions, Activity limitations: Decreased functional mobility ; Decreased balance;Decreased ROM; Decreased endurance;Decreased strength;Decreased posture; Increased pain  Assessment: Pt ambulated 350ft w/ RW SBA 3L of O2. Pt completed 8 stairs ascending and descending. Pt activity limited d/t endurance deficits. Pt woulod benefit from continued skilled physical to address endurance deficits and functional mobility deficits as pt has 18 stairs to enter home. Prognosis: Good  PT education: sitting exercises, Pursed lip breathing, cardiac HEP  REQUIRES PT FOLLOW UP: Yes  Activity Tolerance  Activity Tolerance: Patient limited by endurance; Patient Tolerated treatment well   Patient Diagnosis(es): There were no encounter diagnoses. has a past medical history of Anxiety, CHF (congestive heart failure) ([address] GERD (gastroesophageal reflux disease), History of chest pain, History of echocardiogram, Hypertension, Leg swelling, Migraine, Mitral regurgitation, Mitral valve disease, SOB (shortness of breath), Stomach ulcer, Wellness examination, and Wellness examination. has a past surgical history that includes hernia repair; transesophageal echocardiogram (2021); Cardiac catheterization (2021); Endoscopy, colon, diagnostic; Cosmetic surgery; and Mitral valve repair (N/A, 2021).     Restrictions  Restrictions/Precautions  Restrictions/Precautions: Cardiac, Surgical Protocols, Fall Risk, Up as Tolerated  Required Braces or Orthoses?: (Surgical Bra)  Position Activity Restriction  Sternal Precautions: No Pushing, No Pulling, 5# Lifting Restrictions  Sternal Precautions: MVR 21  Other position/activity restrictions: O2 @3L NC, CT  Subjective    Pt and RN agreeable to PT. Pt in chair upon arrival.  Pt denies pain currently. Orientation   WFL  Objective   Transfers  Sit to Stand: SBA  Stand to sit: SBA  Comment: Pt showed good use of hand placement during transfers. Pt felt slight lightheadness upon standing, symptoms resolved after ~30 sec standing break. Ambulation  Ambulation?: Yes  Ambulation 1  Surface: level tile  Device: Rolling Walker  Other Apparatus: O2(3L),   Assistance: SBA  Quality of Gait: Slow geremias  Distance: 350ft  Comments: No LOB or buckling  Stairs/Curb  Stairs?: Yes  # of Stairs: 8 up/down  Railling: Right ascending/descending  Assistance: CGA  Comment: Pt tolerated stairs well today using reciprocal pattern. Pt complained of slight lightheadness after completion of stairs. ~1 min standing break to decrease symptoms. Balance  Posture: Fair  Sitting - Static: Good  Sitting - Dynamic: Good;-  Standing - Static: Good;-  Standing - Dynamic: Fair;+  Comments: Standing balance assessed w/ RW  Exercises  Seated LE exercise program: Long Arc Quads, hip abduction/adduction, heel/toe raises, and marches. Reps: x10 w/ 2# ankle weights    Pt educated on cardiac HEP. Pt understands HEP and has no further questions. Goals  Short term goals  Time Frame for Short term goals: 15  Short term goal 1: Pt to perform bed mobility and functional transfers independently  Short term goal 2: Ambulate 300ft w/ no AD independently  Short term goal 3: Ascend/descend 18 stairs with R rail SBA to simulate home enviroment  Short term goal 4: Tolerate 30 minutes of therapy to demo increased endurance  Short term goal 5: Demonstrate independence in performance of cardiac rehab HEP  Patient Goals   Patient goals :  To go home    Plan    Plan  Times per week: 6-7x/week; 1-2/day  Current Treatment Recommendations: Strengthening, Transfer Training, Endurance Training, Patient/Caregiver Education & Training, ROM, Balance Training, Gait Training, Home Exercise Program, Functional Mobility Training, Stair training, Safety Education & Training  Safety Devices  Type of devices: Left in bed, Bed alarm in place, Call light within reach, Gait belt, Nurse notified  Restraints  Initially in place: No     Therapy Time   Individual Concurrent Group Co-treatment   Time In  9:57         Time Out  10:20         Minutes  [address] SPTA  Treatment performed by Student PTA under the supervision of co-signing PTA who agrees with all treatment and documentation.    Lena Marin PTA

## 2021-03-02 NOTE — PROGRESS NOTES
[facility] Cardiothoracic Surgery  Progress Note    3/2/2021 7:21 AM  Surgeon:nadia WOLFE  POD # 4  S/P: MVR, mechanical     Subjective:  Ms. Dominguez Floor breathing better with tubes out    Objective:  /72   Pulse 91   Temp 98.6 °F (37 °C) (Oral)   Resp 23   Ht 5' 7\" (1.702 m)   Wt 153 lb (69.4 kg)   LMP 02/15/2021   SpO2 100%   BMI 23.96 kg/m²      Labs:   CBC:   Recent Labs     02/28/21  0600 02/28/21  0600 03/01/21  0353 03/01/21  1115 03/01/21 2009 03/02/21  0447   WBC 11.1  --  6.7  --   --  6.4   HGB 9.0*   < > 8.0* 8.8* 8.9* 7.9*   HCT 27.8*   < > 25.6* 25.5* 27.8* 23.9*   MCV 81.8*  --  82.8  --   --  80.7*   *  --  103*  --   --  See Reflexed IPF Result    < > = values in this interval not displayed. BMP:   Recent Labs     02/28/21  0600 03/01/21 0353 03/02/21  0447   * 133* 134*   K 3.9 3.7 3.5*    101 99   CO2 23 21 27   BUN 10 11 10   CREATININE 0.71 0.52 0.52     I/O: I/O last 3 completed shifts:  In: -   Out: 8109 [Urine:2350;  Chest Tube:270]     Scheduled Meds:   [Held by provider] enoxaparin  1 mg/kg Subcutaneous BID    furosemide  40 mg Intravenous BID    metoprolol tartrate  12.5 mg Oral BID    [MAR Hold] vancomycin  1,000 mg Intravenous Q12H    sodium chloride flush  10 mL Intravenous 2 times per day    aspirin  81 mg Oral Daily    amiodarone  200 mg Oral BID    chlorhexidine  15 mL Mouth/Throat BID    mupirocin   Nasal BID    therapeutic multivitamin-minerals  1 tablet Oral Daily with breakfast    polyethylene glycol  17 g Oral Daily    sennosides-docusate sodium  1 tablet Oral BID    atorvastatin  40 mg Oral Nightly    insulin glargine  0.15 Units/kg Subcutaneous Nightly    pantoprazole  40 mg Intravenous BID    And    sodium chloride (PF)  10 mL Intravenous BID       PRN Meds:potassium chloride **OR** potassium alternative oral replacement **OR** potassium chloride, melatonin, [MAR Hold] sodium chloride, sodium chloride flush, ondansetron, acetaminophen, acetaminophen, oxyCODONE-acetaminophen **OR** oxyCODONE-acetaminophen, fentanNYL **OR** fentanNYL, hydrALAZINE, magnesium hydroxide, bisacodyl, calcium chloride IVPB, magnesium sulfate, albumin human, glucose, dextrose, glucagon (rDNA), dextrose    Beta- Blocker: Yes  Aspirin: Yes  GI: Yes  Plavix: No  Coumadin: Hold  Statin: Yes    Assessment/ Plan:  POD #4, S/P Mechanical MVR  Neuro: intact   HD NSR, BP slightly better  Pulmonary wean off O2 IS/PT CXR atelectasis [id number]  GI belly soft, + gas, needs BM on protocol, start reglan   euvolemic    Chest tubes out this morning  ABLA 7.9  Thrombocytopenia < 100  INR 6.4 no coumadin given continue to hold. Repeat INR today around 3  Physical and occupational therapy  D/C planning.  Can't go home til Friday      Cassie Olmedo

## 2021-03-02 NOTE — PLAN OF CARE
Problem: OXYGENATION/RESPIRATORY FUNCTION  Goal: Patient will maintain patent airway  Outcome: Ongoing  Goal: Patient will achieve/maintain normal respiratory rate/effort  Description: Respiratory rate and effort will be within normal limits for the patient  Outcome: Ongoing     Problem: Discharge Planning:  Goal: Discharged to appropriate level of care  Description: Discharged to appropriate level of care  Outcome: Ongoing     Problem: Cardiac Output - Decreased:  Goal: Cardiac output within specified parameters  Description: Cardiac output within specified parameters  Outcome: Ongoing     Problem: Infection - Surgical Site:  Goal: Will show no infection signs and symptoms  Description: Will show no infection signs and symptoms  Outcome: Ongoing     Problem: Pain - Acute:  Goal: Pain level will decrease  Description: Pain level will decrease  Outcome: Ongoing     Problem: Venous Thromboembolism:  Goal: Will show no signs or symptoms of venous thromboembolism  Description: Will show no signs or symptoms of venous thromboembolism  Outcome: Ongoing  Goal: Absence of signs or symptoms of impaired coagulation  Description: Absence of signs or symptoms of impaired coagulation  Outcome: Ongoing     Problem: Falls - Risk of:  Goal: Will remain free from falls  Description: Will remain free from falls  Outcome: Ongoing  Goal: Absence of physical injury  Description: Absence of physical injury  Outcome: Ongoing     Problem: Pain:  Goal: Pain level will decrease  Description: Pain level will decrease  Outcome: Ongoing  Goal: Control of acute pain  Description: Control of acute pain  Outcome: Ongoing  Goal: Control of chronic pain  Description: Control of chronic pain  Outcome: Ongoing     Problem: Skin Integrity:  Goal: Will show no infection signs and symptoms  Description: Will show no infection signs and symptoms  Outcome: Ongoing  Goal: Absence of new skin breakdown  Description: Absence of new skin breakdown  Outcome: Ongoing

## 2021-03-02 NOTE — PROGRESS NOTES
Patient chest tubes pulled no complications noted, pt tolerated well. Will continue to monitor.     Pablo Baez RN

## 2021-03-03 ENCOUNTER — APPOINTMENT (OUTPATIENT)
Dept: GENERAL RADIOLOGY | Age: 46
DRG: 163 | End: 2021-03-03
Attending: THORACIC SURGERY (CARDIOTHORACIC VASCULAR SURGERY)
Payer: MEDICAID

## 2021-03-03 LAB
ANION GAP SERPL CALCULATED.3IONS-SCNC: 9 MMOL/L (ref 9–17)
BLOOD BANK SPECIMEN: NORMAL
BUN BLDV-MCNC: 8 MG/DL (ref 6–20)
BUN/CREAT BLD: ABNORMAL (ref 9–20)
CALCIUM SERPL-MCNC: 7.2 MG/DL (ref 8.6–10.4)
CHLORIDE BLD-SCNC: 101 MMOL/L (ref 98–107)
CO2: 28 MMOL/L (ref 20–31)
CREAT SERPL-MCNC: 0.49 MG/DL (ref 0.5–0.9)
GFR AFRICAN AMERICAN: >60 ML/MIN
GFR NON-AFRICAN AMERICAN: >60 ML/MIN
GFR SERPL CREATININE-BSD FRML MDRD: ABNORMAL ML/MIN/{1.73_M2}
GFR SERPL CREATININE-BSD FRML MDRD: ABNORMAL ML/MIN/{1.73_M2}
GLUCOSE BLD-MCNC: 144 MG/DL (ref 65–105)
GLUCOSE BLD-MCNC: 85 MG/DL (ref 70–99)
GLUCOSE BLD-MCNC: 96 MG/DL (ref 65–105)
GLUCOSE BLD-MCNC: 97 MG/DL (ref 65–105)
HCT VFR BLD CALC: 22.7 % (ref 36.3–47.1)
HCT VFR BLD CALC: 24.2 % (ref 36.3–47.1)
HCT VFR BLD CALC: 28.3 % (ref 36.3–47.1)
HCT VFR BLD CALC: 32.7 % (ref 36.3–47.1)
HEMOGLOBIN: 10.6 G/DL (ref 11.9–15.1)
HEMOGLOBIN: 7.3 G/DL (ref 11.9–15.1)
HEMOGLOBIN: 7.9 G/DL (ref 11.9–15.1)
HEMOGLOBIN: 9.2 G/DL (ref 11.9–15.1)
INR BLD: 3
MAGNESIUM: 1.9 MG/DL (ref 1.6–2.6)
MCH RBC QN AUTO: 26 PG (ref 25.2–33.5)
MCHC RBC AUTO-ENTMCNC: 32.2 G/DL (ref 28.4–34.8)
MCV RBC AUTO: 80.8 FL (ref 82.6–102.9)
NRBC AUTOMATED: 0 PER 100 WBC
PDW BLD-RTO: 23.7 % (ref 11.8–14.4)
PLATELET # BLD: 186 K/UL (ref 138–453)
PMV BLD AUTO: 9.6 FL (ref 8.1–13.5)
POTASSIUM SERPL-SCNC: 3.7 MMOL/L (ref 3.7–5.3)
PROTHROMBIN TIME: 28.9 SEC (ref 9.1–12.3)
RBC # BLD: 2.81 M/UL (ref 3.95–5.11)
SODIUM BLD-SCNC: 138 MMOL/L (ref 135–144)
WBC # BLD: 5.1 K/UL (ref 3.5–11.3)

## 2021-03-03 PROCEDURE — 36430 TRANSFUSION BLD/BLD COMPNT: CPT

## 2021-03-03 PROCEDURE — 2700000000 HC OXYGEN THERAPY PER DAY

## 2021-03-03 PROCEDURE — P9041 ALBUMIN (HUMAN),5%, 50ML: HCPCS | Performed by: NURSE PRACTITIONER

## 2021-03-03 PROCEDURE — 94761 N-INVAS EAR/PLS OXIMETRY MLT: CPT

## 2021-03-03 PROCEDURE — 86920 COMPATIBILITY TEST SPIN: CPT

## 2021-03-03 PROCEDURE — 85610 PROTHROMBIN TIME: CPT

## 2021-03-03 PROCEDURE — 97116 GAIT TRAINING THERAPY: CPT

## 2021-03-03 PROCEDURE — P9016 RBC LEUKOCYTES REDUCED: HCPCS

## 2021-03-03 PROCEDURE — 2580000003 HC RX 258: Performed by: NURSE PRACTITIONER

## 2021-03-03 PROCEDURE — 6370000000 HC RX 637 (ALT 250 FOR IP): Performed by: NURSE PRACTITIONER

## 2021-03-03 PROCEDURE — 85014 HEMATOCRIT: CPT

## 2021-03-03 PROCEDURE — 85027 COMPLETE CBC AUTOMATED: CPT

## 2021-03-03 PROCEDURE — 6360000002 HC RX W HCPCS: Performed by: PHYSICIAN ASSISTANT

## 2021-03-03 PROCEDURE — 6360000002 HC RX W HCPCS: Performed by: NURSE PRACTITIONER

## 2021-03-03 PROCEDURE — 86901 BLOOD TYPING SEROLOGIC RH(D): CPT

## 2021-03-03 PROCEDURE — 86850 RBC ANTIBODY SCREEN: CPT

## 2021-03-03 PROCEDURE — 83735 ASSAY OF MAGNESIUM: CPT

## 2021-03-03 PROCEDURE — 86900 BLOOD TYPING SEROLOGIC ABO: CPT

## 2021-03-03 PROCEDURE — 85018 HEMOGLOBIN: CPT

## 2021-03-03 PROCEDURE — 71045 X-RAY EXAM CHEST 1 VIEW: CPT

## 2021-03-03 PROCEDURE — 36415 COLL VENOUS BLD VENIPUNCTURE: CPT

## 2021-03-03 PROCEDURE — 82947 ASSAY GLUCOSE BLOOD QUANT: CPT

## 2021-03-03 PROCEDURE — 2140000001 HC CVICU INTERMEDIATE R&B

## 2021-03-03 PROCEDURE — 80048 BASIC METABOLIC PNL TOTAL CA: CPT

## 2021-03-03 PROCEDURE — 97110 THERAPEUTIC EXERCISES: CPT

## 2021-03-03 PROCEDURE — C9113 INJ PANTOPRAZOLE SODIUM, VIA: HCPCS | Performed by: NURSE PRACTITIONER

## 2021-03-03 RX ORDER — WARFARIN SODIUM 1 MG/1
1 TABLET ORAL
Status: COMPLETED | OUTPATIENT
Start: 2021-03-03 | End: 2021-03-03

## 2021-03-03 RX ORDER — SODIUM CHLORIDE 9 MG/ML
INJECTION, SOLUTION INTRAVENOUS PRN
Status: DISCONTINUED | OUTPATIENT
Start: 2021-03-03 | End: 2021-03-04 | Stop reason: HOSPADM

## 2021-03-03 RX ORDER — METOCLOPRAMIDE HYDROCHLORIDE 5 MG/ML
10 INJECTION INTRAMUSCULAR; INTRAVENOUS EVERY 6 HOURS PRN
Status: DISCONTINUED | OUTPATIENT
Start: 2021-03-03 | End: 2021-03-04 | Stop reason: HOSPADM

## 2021-03-03 RX ORDER — FUROSEMIDE 10 MG/ML
20 INJECTION INTRAMUSCULAR; INTRAVENOUS ONCE
Status: COMPLETED | OUTPATIENT
Start: 2021-03-03 | End: 2021-03-03

## 2021-03-03 RX ORDER — ATORVASTATIN CALCIUM 40 MG/1
40 TABLET, FILM COATED ORAL NIGHTLY
Status: DISCONTINUED | OUTPATIENT
Start: 2021-03-03 | End: 2021-03-03

## 2021-03-03 RX ADMIN — PANTOPRAZOLE SODIUM 40 MG: 40 INJECTION, POWDER, FOR SOLUTION INTRAVENOUS at 20:08

## 2021-03-03 RX ADMIN — SODIUM CHLORIDE, PRESERVATIVE FREE 10 ML: 5 INJECTION INTRAVENOUS at 20:08

## 2021-03-03 RX ADMIN — SODIUM CHLORIDE, PRESERVATIVE FREE 10 ML: 5 INJECTION INTRAVENOUS at 09:07

## 2021-03-03 RX ADMIN — Medication 81 MG: at 09:07

## 2021-03-03 RX ADMIN — METOPROLOL TARTRATE 12.5 MG: 25 TABLET ORAL at 20:09

## 2021-03-03 RX ADMIN — ALBUMIN (HUMAN) 25 G: 12.5 INJECTION, SOLUTION INTRAVENOUS at 00:42

## 2021-03-03 RX ADMIN — POTASSIUM CHLORIDE 30 MEQ: 1500 TABLET, EXTENDED RELEASE ORAL at 06:03

## 2021-03-03 RX ADMIN — AMIODARONE HYDROCHLORIDE 200 MG: 200 TABLET ORAL at 09:07

## 2021-03-03 RX ADMIN — OXYCODONE HYDROCHLORIDE AND ACETAMINOPHEN 2 TABLET: 5; 325 TABLET ORAL at 06:03

## 2021-03-03 RX ADMIN — Medication 3 MG: at 20:08

## 2021-03-03 RX ADMIN — OXYCODONE HYDROCHLORIDE AND ACETAMINOPHEN 2 TABLET: 5; 325 TABLET ORAL at 10:50

## 2021-03-03 RX ADMIN — WARFARIN SODIUM 1 MG: 1 TABLET ORAL at 17:12

## 2021-03-03 RX ADMIN — DOCUSATE SODIUM 50MG AND SENNOSIDES 8.6MG 1 TABLET: 8.6; 5 TABLET, FILM COATED ORAL at 20:08

## 2021-03-03 RX ADMIN — MAGNESIUM SULFATE HEPTAHYDRATE 1000 MG: 1 INJECTION, SOLUTION INTRAVENOUS at 09:15

## 2021-03-03 RX ADMIN — SODIUM CHLORIDE, PRESERVATIVE FREE 10 ML: 5 INJECTION INTRAVENOUS at 21:46

## 2021-03-03 RX ADMIN — OXYCODONE HYDROCHLORIDE AND ACETAMINOPHEN 2 TABLET: 5; 325 TABLET ORAL at 15:31

## 2021-03-03 RX ADMIN — OXYCODONE HYDROCHLORIDE AND ACETAMINOPHEN 2 TABLET: 5; 325 TABLET ORAL at 19:49

## 2021-03-03 RX ADMIN — AMIODARONE HYDROCHLORIDE 200 MG: 200 TABLET ORAL at 20:08

## 2021-03-03 RX ADMIN — CHLORHEXIDINE GLUCONATE 0.12% ORAL RINSE 15 ML: 1.2 LIQUID ORAL at 20:09

## 2021-03-03 RX ADMIN — METOPROLOL TARTRATE 12.5 MG: 25 TABLET ORAL at 09:07

## 2021-03-03 RX ADMIN — PANTOPRAZOLE SODIUM 40 MG: 40 INJECTION, POWDER, FOR SOLUTION INTRAVENOUS at 09:07

## 2021-03-03 RX ADMIN — MAGNESIUM SULFATE HEPTAHYDRATE 1000 MG: 1 INJECTION, SOLUTION INTRAVENOUS at 06:04

## 2021-03-03 RX ADMIN — DOCUSATE SODIUM 50MG AND SENNOSIDES 8.6MG 1 TABLET: 8.6; 5 TABLET, FILM COATED ORAL at 09:07

## 2021-03-03 RX ADMIN — FUROSEMIDE 20 MG: 10 INJECTION, SOLUTION INTRAMUSCULAR; INTRAVENOUS at 17:08

## 2021-03-03 RX ADMIN — Medication 1 TABLET: at 09:07

## 2021-03-03 ASSESSMENT — PAIN SCALES - GENERAL: PAINLEVEL_OUTOF10: 8

## 2021-03-03 NOTE — PROGRESS NOTES
[facility] Cardiothoracic Surgery  Progress Note    3/3/2021 7:21 AM  Surgeon: Teofilo Winkler MD  POD # 4  S/P: MVR, mechanical     Subjective:  Ms. Tim Marie is breathing much better. Objective:  BP (!) 101/59   Pulse 86   Temp 98.6 °F (37 °C) (Oral)   Resp 19   Ht 5' 7\" (1.702 m)   Wt 153 lb (69.4 kg)   LMP 02/15/2021   SpO2 100%   BMI 23.96 kg/m²      Labs:   CBC:   Recent Labs     03/01/21  0353 03/01/21  0353 03/02/21  0447 03/02/21  1245 03/02/21 1953 03/03/21 0415   WBC 6.7  --  6.4  --   --  5.1   HGB 8.0*   < > 7.9* 8.1* 8.3* 7.3*   HCT 25.6*   < > 23.9* 25.2* 25.4* 22.7*   MCV 82.8  --  80.7*  --   --  80.8*   *  --  See Reflexed IPF Result  --   --  186    < > = values in this interval not displayed.      BMP:   Recent Labs     03/01/21 0353 03/02/21 0447 03/03/21 0415   * 134* 138   K 3.7 3.5* 3.7    99 101   CO2 21 27 28   BUN 11 10 8   CREATININE 0.52 0.52 0.49*     I/O: I/O last 3 completed shifts:  In: -   Out: 550 [Urine:550]     Scheduled Meds:   furosemide  20 mg Intravenous Once    metoprolol tartrate  12.5 mg Oral BID    [MAR Hold] vancomycin  1,000 mg Intravenous Q12H    sodium chloride flush  10 mL Intravenous 2 times per day    aspirin  81 mg Oral Daily    amiodarone  200 mg Oral BID    chlorhexidine  15 mL Mouth/Throat BID    therapeutic multivitamin-minerals  1 tablet Oral Daily with breakfast    polyethylene glycol  17 g Oral Daily    sennosides-docusate sodium  1 tablet Oral BID    insulin glargine  0.15 Units/kg Subcutaneous Nightly    pantoprazole  40 mg Intravenous BID    And    sodium chloride (PF)  10 mL Intravenous BID       PRN Meds:sodium chloride, potassium chloride **OR** potassium alternative oral replacement **OR** potassium chloride, melatonin, [MAR Hold] sodium chloride, sodium chloride flush, ondansetron, acetaminophen, acetaminophen, oxyCODONE-acetaminophen **OR** oxyCODONE-acetaminophen, fentanNYL **OR** fentanNYL, hydrALAZINE,

## 2021-03-03 NOTE — CARE COORDINATION
Met with patient to discuss transitional planning.   She states she'd like referral to [facility], referral faxed    [phone] received VM from [facility], they cannot accept patient    [phone] met with patient, referral to [facility]    [phone] received call from Marina at [facility], they can accept

## 2021-03-03 NOTE — PROGRESS NOTES
Physical Therapy  Facility/Department: [facility] CAR 1  Daily Treatment Note  NAME: Addie Gordon  : 1975  MRN: [id number]    Date of Service: 3/3/2021    Discharge Recommendations:  Patient would benefit from continued therapy after discharge   PT equipment recommendations:  Equipment: RW     Assessment   Body structures, Functions, Activity limitations: Decreased functional mobility ; Decreased balance;Decreased ROM; Decreased endurance;Decreased strength;Decreased posture; Increased pain  Assessment: Pt ambulated 600ft w/ RW SBA 3L of O2. Pt activity limited d/t endurance deficits. Pt is safe to ambulate household distances unassisted. Pt woulod benefit from continued skilled physical to address endurance deficits and functional mobility deficits as pt has 18 stairs to enter home. Prognosis: Good  PT education: sitting exercises, Pursed lip breathing, cardiac HEP  REQUIRES PT FOLLOW UP: Yes  Activity Tolerance  Activity Tolerance: Patient limited by endurance; Patient Tolerated treatment well   Patient Diagnosis(es): There were no encounter diagnoses. has a past medical history of Anxiety, CHF (congestive heart failure) ([address] GERD (gastroesophageal reflux disease), History of chest pain, History of echocardiogram, Hypertension, Leg swelling, Migraine, Mitral regurgitation, Mitral valve disease, SOB (shortness of breath), Stomach ulcer, Wellness examination, and Wellness examination. has a past surgical history that includes hernia repair; transesophageal echocardiogram (2021); Cardiac catheterization (2021); Endoscopy, colon, diagnostic; Cosmetic surgery; and Mitral valve repair (N/A, 2021).     Restrictions  Restrictions/Precautions  Restrictions/Precautions: Cardiac, Surgical Protocols, Fall Risk, Up as Tolerated  Required Braces or Orthoses?: (Surgical Bra)  Position Activity Restriction  Sternal Precautions: No Pushing, No Pulling, 5# Lifting Restrictions  Sternal Precautions: MVR 2/26/21  Other position/activity restrictions: O2 @3L NC, CT  Subjective    Pt and RN agreeable to PT. Pt in bed upon arrival with RN present. Pt denies currently. Orientation   WFL  Objective   Transfers  Sit to Stand: independent  Stand to sit: independent  Comment: Pt showed good use of hand placement during transfers. Ambulation  Ambulation?: Yes  Ambulation 1  Surface: level tile  Device: Rolling Walker  Other Apparatus: O2(3L),   Assistance: SBA  Quality of Gait: Slow geremias  Distance: 600ft  Comments: No LOB or buckling. No breaks needed d/t fatigue. Pt c/o of fatigued legs after ambulation complete. Stairs/Curb  Stairs?: No  Balance  Posture: Fair  Sitting - Static: Good  Sitting - Dynamic: Good;  Standing - Static: Good;-  Standing - Dynamic: Fair;+  Comments: Standing balance assessed w/ RW  Exercises  Seated LE exercise program: Long Arc Quads, hip abduction/adduction, heel/toe raises, and marches. Reps: x10 w/ 2# ankle weights  Comment: Pt declined standing exercises d/t to legs being fatigued  Goals  Short term goals  Time Frame for Short term goals: 14  Short term goal 1: Pt to perform bed mobility and functional transfers independently  Short term goal 2: Ambulate 300ft w/ no AD independently  Short term goal 3: Ascend/descend 18 stairs with R rail SBA to simulate home enviroment  Short term goal 4: Tolerate 30 minutes of therapy to demo increased endurance  Short term goal 5: Demonstrate independence in performance of cardiac rehab HEP  Patient Goals   Patient goals :  To go home    Plan    Plan  Times per week: 6-7x/week; 1-2/day  Current Treatment Recommendations: Strengthening, Transfer Training, Endurance Training, Patient/Caregiver Education & Training, ROM, Balance Training, Gait Training, Home Exercise Program, Functional Mobility Training, Stair training, Safety Education & Training  Safety Devices  Type of devices: Left in chair, Call light within reach, Gait belt, Nurse notified  Restraints  Initially in place: No     Therapy Time   Individual Concurrent Group Co-treatment   Time In  9:03         Time Out  9:30         Minutes  [facility] SPTA  Treatment performed by Student PTA under the supervision of co-signing PTA who agrees with all treatment and documentation.    Toya Reveal PTA

## 2021-03-03 NOTE — PROGRESS NOTES
Occupational Therapy Not Seen Note    DATE: 3/3/2021  Name: Norma Anguiano  : 1975  MRN: [id number]    Patient not available for Occupational Therapy due to:    Blood Transfusion: for low hemoglobin    Next Scheduled Treatment: 3/4/21    Electronically signed by RAVI Mcelroy OTR/L on 3/3/2021 at 3:22 PM

## 2021-03-03 NOTE — PROGRESS NOTES
Pharmacy Note  Warfarin Consult    Jennifer Enriquez is a 39 y.o. female for whom pharmacy has been consulted to manage warfarin therapy. Consulting Physician: Aldo Caceres/ Dr Libby Brandt  Reason for Admission:   Warfarin dose prior to admission:SOB  Warfarin indication: mechanical mitral valve replacement ( on-X valve)  Target INR range: 2.5-3.5 (per cardiology notes)     Past Medical History:   Diagnosis Date    Anxiety     CHF (congestive heart failure) (HCC)     GERD (gastroesophageal reflux disease)     History of chest pain     now resolved    History of echocardiogram 01/02/2021    mod-severe MR; mild-mod TR; EF 50-55%    Hypertension     Leg swelling     Migraine     Mitral regurgitation     moderated to severe on echo    Mitral valve disease     SOB (shortness of breath)     Stomach ulcer     Wellness examination     [facility]-last visit feb 2021    Wellness examination     no current cardiologist                Recent Labs     03/03/21  0415   INR 3.0     Recent Labs     03/01/21  0353 03/01/21  0353 03/02/21  0447 03/02/21  1245 03/02/21  1953 03/03/21  0415   HGB 8.0*   < > 7.9* 8.1* 8.3* 7.3*   HCT 25.6*   < > 23.9* 25.2* 25.4* 22.7*   *  --  See Reflexed IPF Result  --   --  186    < > = values in this interval not displayed. Current warfarin drug-drug interactions: amiodarone, aspirin, acetaminophen      Date             INR        Dose   3/3/2021          3.0       1 mg    Daily PT/INR while inpatient. Thank you for the consult. Will continue to follow. [address] [place] [address] Ph., CACP, Clinical Pharmacist  Anticoagulation Services, [address] [facility]  3/3/2021  11:07 AM      *.

## 2021-03-03 NOTE — PROGRESS NOTES
105* 85     INR:   Recent Labs     03/02/21  0447 03/02/21  1547 03/03/21  0415   INR 6.4* 4.9* 3.0       Objective:   Vitals: BP (!) 93/59   Pulse 82   Temp 98.2 °F (36.8 °C) (Oral)   Resp 18   Ht 5' 7\" (1.702 m)   Wt 153 lb (69.4 kg)   LMP 02/15/2021   SpO2 93%   BMI 23.96 kg/m²    Constitutional and General Appearance:   · alert, cooperative, no distress and appears stated age  Respiratory:  · No for increased work of breathing. · On auscultation: clear to auscultation bilaterally  Cardiovascular:  · Pericardial rub  · Mechanical Click  · No murmurs    Abdomen:   · No masses or tenderness  · Bowel sounds present  Extremities:  ·  No Cyanosis or Clubbing  ·  Lower extremity edema: No  ·  Skin: Warm and dry  Neurological:  · Alert and oriented. Diagnostic Studies:     ECHO:  1/4/2021  Mitral Valve:    Structurally normal.  Severe Mitral  regurgitation is identified At least 3 different jets were noted. MR ERO 0.45, MR volume 72 ml  Aortic Valve: The aortic valve is trileaflet and opens adequately. Mild  regurgitiation is identified. Tricuspid valve: Structurally normal. No  regurgitation is identified. Pulmonary valve: Normal. No significant regurgitation     No valvular vegetations or thrombus identified.     Cardiac Angiography:      Cardiac Cath 1/5/21  Findings:     LMCA: Normal 0% stenosis.     LAD: Normal 0% stenosis. The D1 is normal.     LCx: Normal 0% stenosis. The OM1 and OM2 are normal.     RCA: Normal 0% stenosis.      Coronary Tree      Dominance: Right  The LV gram was not performed      Estimated blood loss: 5 ml     Conclusions:  1.  Normal Coronary arteries         Assessment / Acute Cardiac Problems:   1. Severe MR s/p MV replacement with On-X 27/29 along with Left atrial appendage ligation w/ 50 mm AtriClip (Op report pending) on 2/26/21  2. Essential HTN  3. Acute on chronic systolic Heart failure (EF 45%)  4. Non-obstructive CAD  5.  Supratherapeutic INR    Plan of Treatment:

## 2021-03-03 NOTE — PLAN OF CARE
Recommend not restarting statin due to the strong affinity of warfarin and statins for cytochrome P450 enzyme 2D6. Patient has no history of dyslipidemia with a Chol/HDL ratio of 3, which is better than ideal according to [facility] studies and the AHA guidelines. May also try to reduce amiodarone dose at time of discharge.     Ciara Rodriguez

## 2021-03-04 ENCOUNTER — APPOINTMENT (OUTPATIENT)
Dept: GENERAL RADIOLOGY | Age: 46
DRG: 163 | End: 2021-03-04
Attending: THORACIC SURGERY (CARDIOTHORACIC VASCULAR SURGERY)
Payer: MEDICAID

## 2021-03-04 VITALS
SYSTOLIC BLOOD PRESSURE: 102 MMHG | HEART RATE: 95 BPM | RESPIRATION RATE: 17 BRPM | HEIGHT: 67 IN | DIASTOLIC BLOOD PRESSURE: 71 MMHG | WEIGHT: 153 LBS | OXYGEN SATURATION: 95 % | TEMPERATURE: 98.5 F | BODY MASS INDEX: 24.01 KG/M2

## 2021-03-04 LAB
ABO/RH: NORMAL
ANION GAP SERPL CALCULATED.3IONS-SCNC: 13 MMOL/L (ref 9–17)
ANTIBODY SCREEN: NEGATIVE
ARM BAND NUMBER: NORMAL
BLD PROD TYP BPU: NORMAL
BUN BLDV-MCNC: 7 MG/DL (ref 6–20)
BUN/CREAT BLD: ABNORMAL (ref 9–20)
CALCIUM SERPL-MCNC: 8.1 MG/DL (ref 8.6–10.4)
CHLORIDE BLD-SCNC: 101 MMOL/L (ref 98–107)
CO2: 25 MMOL/L (ref 20–31)
CREAT SERPL-MCNC: 0.5 MG/DL (ref 0.5–0.9)
CROSSMATCH RESULT: NORMAL
DISPENSE STATUS BLOOD BANK: NORMAL
EXPIRATION DATE: NORMAL
GFR AFRICAN AMERICAN: >60 ML/MIN
GFR NON-AFRICAN AMERICAN: >60 ML/MIN
GFR SERPL CREATININE-BSD FRML MDRD: ABNORMAL ML/MIN/{1.73_M2}
GFR SERPL CREATININE-BSD FRML MDRD: ABNORMAL ML/MIN/{1.73_M2}
GLUCOSE BLD-MCNC: 158 MG/DL (ref 65–105)
GLUCOSE BLD-MCNC: 94 MG/DL (ref 65–105)
GLUCOSE BLD-MCNC: 94 MG/DL (ref 70–99)
GLUCOSE BLD-MCNC: 95 MG/DL (ref 65–105)
HCT VFR BLD CALC: 30.9 % (ref 36.3–47.1)
HCT VFR BLD CALC: 32.7 % (ref 36.3–47.1)
HEMOGLOBIN: 10 G/DL (ref 11.9–15.1)
HEMOGLOBIN: 10.7 G/DL (ref 11.9–15.1)
INR BLD: 2.8
MAGNESIUM: 2.5 MG/DL (ref 1.6–2.6)
MCH RBC QN AUTO: 27 PG (ref 25.2–33.5)
MCHC RBC AUTO-ENTMCNC: 32.4 G/DL (ref 28.4–34.8)
MCV RBC AUTO: 83.3 FL (ref 82.6–102.9)
NRBC AUTOMATED: 0 PER 100 WBC
PDW BLD-RTO: 22.4 % (ref 11.8–14.4)
PLATELET # BLD: 299 K/UL (ref 138–453)
PMV BLD AUTO: 9.2 FL (ref 8.1–13.5)
POTASSIUM SERPL-SCNC: 3.7 MMOL/L (ref 3.7–5.3)
PROTHROMBIN TIME: 27.3 SEC (ref 9.1–12.3)
RBC # BLD: 3.71 M/UL (ref 3.95–5.11)
SODIUM BLD-SCNC: 139 MMOL/L (ref 135–144)
TRANSFUSION STATUS: NORMAL
UNIT DIVISION: 0
UNIT NUMBER: NORMAL
WBC # BLD: 6.6 K/UL (ref 3.5–11.3)

## 2021-03-04 PROCEDURE — 85610 PROTHROMBIN TIME: CPT

## 2021-03-04 PROCEDURE — 6370000000 HC RX 637 (ALT 250 FOR IP): Performed by: NURSE PRACTITIONER

## 2021-03-04 PROCEDURE — C9113 INJ PANTOPRAZOLE SODIUM, VIA: HCPCS | Performed by: NURSE PRACTITIONER

## 2021-03-04 PROCEDURE — 2580000003 HC RX 258: Performed by: NURSE PRACTITIONER

## 2021-03-04 PROCEDURE — 6360000002 HC RX W HCPCS: Performed by: NURSE PRACTITIONER

## 2021-03-04 PROCEDURE — 97110 THERAPEUTIC EXERCISES: CPT

## 2021-03-04 PROCEDURE — 85018 HEMOGLOBIN: CPT

## 2021-03-04 PROCEDURE — 71045 X-RAY EXAM CHEST 1 VIEW: CPT

## 2021-03-04 PROCEDURE — 82947 ASSAY GLUCOSE BLOOD QUANT: CPT

## 2021-03-04 PROCEDURE — 85014 HEMATOCRIT: CPT

## 2021-03-04 PROCEDURE — 85027 COMPLETE CBC AUTOMATED: CPT

## 2021-03-04 PROCEDURE — 36415 COLL VENOUS BLD VENIPUNCTURE: CPT

## 2021-03-04 PROCEDURE — 83735 ASSAY OF MAGNESIUM: CPT

## 2021-03-04 PROCEDURE — 97116 GAIT TRAINING THERAPY: CPT

## 2021-03-04 PROCEDURE — 80048 BASIC METABOLIC PNL TOTAL CA: CPT

## 2021-03-04 RX ORDER — WARFARIN SODIUM 1 MG/1
1 TABLET ORAL DAILY
Qty: 30 TABLET | Refills: 2 | Status: SHIPPED | OUTPATIENT
Start: 2021-03-04 | End: 2021-03-04 | Stop reason: SDUPTHER

## 2021-03-04 RX ORDER — POTASSIUM CHLORIDE 20 MEQ/1
20 TABLET, EXTENDED RELEASE ORAL DAILY
Qty: 3 TABLET | Refills: 0 | Status: SHIPPED | OUTPATIENT
Start: 2021-03-04 | End: 2021-03-08

## 2021-03-04 RX ORDER — AMIODARONE HYDROCHLORIDE 200 MG/1
200 TABLET ORAL DAILY
Qty: 30 TABLET | Refills: 1 | Status: SHIPPED | OUTPATIENT
Start: 2021-03-04 | End: 2021-04-12

## 2021-03-04 RX ORDER — WARFARIN SODIUM 2 MG/1
2 TABLET ORAL
Status: DISCONTINUED | OUTPATIENT
Start: 2021-03-04 | End: 2021-03-04 | Stop reason: HOSPADM

## 2021-03-04 RX ORDER — SODIUM PHOSPHATE, DIBASIC AND SODIUM PHOSPHATE, MONOBASIC 7; 19 G/133ML; G/133ML
1 ENEMA RECTAL ONCE
Status: DISCONTINUED | OUTPATIENT
Start: 2021-03-04 | End: 2021-03-04 | Stop reason: HOSPADM

## 2021-03-04 RX ORDER — ASPIRIN 81 MG/1
81 TABLET ORAL DAILY
Qty: 30 TABLET | Refills: 3 | Status: SHIPPED | OUTPATIENT
Start: 2021-03-05 | End: 2022-01-07 | Stop reason: ALTCHOICE

## 2021-03-04 RX ORDER — FUROSEMIDE 20 MG/1
20 TABLET ORAL DAILY
Qty: 3 TABLET | Refills: 0 | Status: SHIPPED | OUTPATIENT
Start: 2021-03-04 | End: 2021-03-08 | Stop reason: ALTCHOICE

## 2021-03-04 RX ORDER — OXYCODONE HYDROCHLORIDE AND ACETAMINOPHEN 5; 325 MG/1; MG/1
1 TABLET ORAL EVERY 8 HOURS PRN
Qty: 21 TABLET | Refills: 0 | Status: SHIPPED | OUTPATIENT
Start: 2021-03-04 | End: 2021-03-11

## 2021-03-04 RX ORDER — WARFARIN SODIUM 1 MG/1
1 TABLET ORAL DAILY
Qty: 30 TABLET | Refills: 2 | Status: SHIPPED | OUTPATIENT
Start: 2021-03-04 | End: 2021-03-18

## 2021-03-04 RX ADMIN — AMIODARONE HYDROCHLORIDE 200 MG: 200 TABLET ORAL at 08:14

## 2021-03-04 RX ADMIN — OXYCODONE HYDROCHLORIDE AND ACETAMINOPHEN 2 TABLET: 5; 325 TABLET ORAL at 14:58

## 2021-03-04 RX ADMIN — POTASSIUM CHLORIDE 30 MEQ: 1500 TABLET, EXTENDED RELEASE ORAL at 07:08

## 2021-03-04 RX ADMIN — PANTOPRAZOLE SODIUM 40 MG: 40 INJECTION, POWDER, FOR SOLUTION INTRAVENOUS at 08:14

## 2021-03-04 RX ADMIN — METOPROLOL TARTRATE 12.5 MG: 25 TABLET ORAL at 15:46

## 2021-03-04 RX ADMIN — METOPROLOL TARTRATE 12.5 MG: 25 TABLET ORAL at 08:25

## 2021-03-04 RX ADMIN — Medication 1 TABLET: at 08:15

## 2021-03-04 RX ADMIN — OXYCODONE HYDROCHLORIDE AND ACETAMINOPHEN 2 TABLET: 5; 325 TABLET ORAL at 02:23

## 2021-03-04 RX ADMIN — MAGNESIUM HYDROXIDE 30 ML: 400 SUSPENSION ORAL at 07:08

## 2021-03-04 RX ADMIN — DOCUSATE SODIUM 50MG AND SENNOSIDES 8.6MG 1 TABLET: 8.6; 5 TABLET, FILM COATED ORAL at 08:14

## 2021-03-04 RX ADMIN — POLYETHYLENE GLYCOL 3350 17 G: 17 POWDER, FOR SOLUTION ORAL at 08:17

## 2021-03-04 RX ADMIN — Medication 81 MG: at 08:14

## 2021-03-04 RX ADMIN — OXYCODONE HYDROCHLORIDE AND ACETAMINOPHEN 2 TABLET: 5; 325 TABLET ORAL at 08:15

## 2021-03-04 RX ADMIN — SODIUM CHLORIDE, PRESERVATIVE FREE 10 ML: 5 INJECTION INTRAVENOUS at 08:25

## 2021-03-04 RX ADMIN — CHLORHEXIDINE GLUCONATE 0.12% ORAL RINSE 15 ML: 1.2 LIQUID ORAL at 08:14

## 2021-03-04 RX ADMIN — SODIUM CHLORIDE, PRESERVATIVE FREE 10 ML: 5 INJECTION INTRAVENOUS at 08:14

## 2021-03-04 ASSESSMENT — PAIN SCALES - GENERAL
PAINLEVEL_OUTOF10: 7
PAINLEVEL_OUTOF10: 8

## 2021-03-04 NOTE — PROGRESS NOTES
position/activity restrictions: O2 @3L NC, CT  Subjective    Pt and RN agreeable to PT. Pt treatment started outside of room. Pt already walking on their own with RW. Pt c/o of 6/10 chest pain from chest tube removal. RN notified  Orientation   WFL  Objective   Transfers  Sit to Stand: independent  Stand to sit: independent  Comment: Pt showed good use of hand placement during transfers. Ambulation  Ambulation?: Yes  Ambulation 1  Surface: level tile  Device: Rolling Walker  Assistance: SBA  Quality of Gait: Slow geremias, decreased step length  Distance: 300ft  Comments: Slight dizziness upon completion of ambulation. Pt dizziness resolved after~1min seated break  Stairs/Curb  Stairs?: No, Pt declined. Balance  Posture: Fair  Sitting - Static: Good  Sitting - Dynamic: Good;  Standing - Static: Good;-  Standing - Dynamic: Fair;+  Comments: Standing balance assessed w/ RW  Exercises  Standing exercise program: Heel/toe raises, hip flexion, hip abduction, marches and hamstring curls. Reps: 10  Goals  Short term goals  Time Frame for Short term goals: 15  Short term goal 1: Pt to perform bed mobility and functional transfers independently  Short term goal 2: Ambulate 300ft w/ no AD independently  Short term goal 3: Ascend/descend 18 stairs with R rail SBA to simulate home enviroment  Short term goal 4: Tolerate 30 minutes of therapy to demo increased endurance  Short term goal 5: Demonstrate independence in performance of cardiac rehab HEP  Patient Goals   Patient goals :  To go home    Plan    Plan  Times per week: 6-7x/week; 1-2/day  Current Treatment Recommendations: Strengthening, Transfer Training, Endurance Training, Patient/Caregiver Education & Training, ROM, Balance Training, Gait Training, Home Exercise Program, Functional Mobility Training, Stair training, Safety Education & Training  Safety Devices  Type of devices: Left in chair, Call light within reach, Gait belt, Nurse notified  Restraints  Initially in place: No     Therapy Time   Individual Concurrent Group Co-treatment   Time In  8:51         Time Out  9:15         Minutes  [address]  Treatment performed by Student PTA under the supervision of co-signing PTA who agrees with all treatment and documentation.    Chema Oro PTA

## 2021-03-04 NOTE — DISCHARGE INSTR - COC
Added Last Indicated Last Indicated By Review Planned Expiration Resolved Resolved By    None active    Resolved    COVID-19 Rule Out 02/22/21 02/22/21 02/22/21 COVID-19 (Ordered)   02/23/21 Rule-Out Test Resulted    COVID-19 Rule Out 12/31/20 12/31/20 12/31/20 Respiratory Panel, Molecular, with COVID-19 (Ordered)   12/31/20 Rule-Out Test Resulted    COVID-19 Rule Out 12/31/20 12/31/20 12/31/20 COVID-19 (Ordered)   12/31/20 Rule-Out Test Resulted            Nurse Assessment:  Last Vital Signs: /76   Pulse 96   Temp 98.1 °F (36.7 °C) (Oral)   Resp 9   Ht 5' 7\" (1.702 m)   Wt 153 lb (69.4 kg)   LMP 02/15/2021   SpO2 95%   BMI 23.96 kg/m²     Last documented pain score (0-10 scale): Pain Level: 7  Last Weight:   Wt Readings from Last 1 Encounters:   03/02/21 153 lb (69.4 kg)     Mental Status:  Alert and oriented     IV Access:  - None    Nursing Mobility/ADLs:  Walking   Walker, independent   Transfer  Independent  Bathing  Independent  Dressing  Independent  Toileting  Independent  Feeding  Independent  Med Admin  Assisted  Med Delivery   whole    Wound Care Documentation and Therapy:        Elimination:  Continence: Bowel: Yes  Bladder: Yes  Urinary Catheter: None   Colostomy/Ileostomy/Ileal Conduit: No       Date of Last BM: 03/04/2021      Intake/Output Summary (Last 24 hours) at 3/4/2021 2887  Last data filed at 3/4/2021 0200  Gross per 24 hour   Intake 316.67 ml   Output 200 ml   Net 116.67 ml         Safety Concerns:     Bleeding    Impairments/Disabilities:      None    Nutrition Therapy:  Current Nutrition Therapy:   - Oral Diet:  General    Routes of Feeding: Oral  Liquids: No restrictions   Daily Fluid Restriction: no  Last Modified Barium Swallow with Video (Video Swallowing Test): not done    Treatments at the Time of Hospital Discharge:   Respiratory Treatments: ***  Oxygen Therapy:  is not on home oxygen therapy.   Ventilator:    - No ventilator support    Rehab Therapies: Physical Therapy and Occupational Therapy  Weight Bearing Status/Restrictions: No weight bearing restirctions  Other Medical Equipment (for information only, NOT a DME order):  walker  Other Treatments: Sternal precautions    Patient's personal belongings (please select all that are sent with patient):  Glasses cell phone and purse, along with clothes and footwear     RN SIGNATURE:  Electronically signed by David Nagel RN on 3/4/21 at 10:05 AM EST    CASE MANAGEMENT/SOCIAL WORK SECTION    Inpatient Status Date: ***    Readmission Risk Assessment Score:  Readmission Risk              Risk of Unplanned Readmission:        23           Discharging to Facility/ Agency   Name:   Address:  Phone:  Fax:    Dialysis Facility (if applicable)   Name:  Address:  Dialysis Schedule:  Phone:  Fax:    / signature: {Esignature:[id number]}    PHYSICIAN SECTION    Prognosis: Good    Condition at Discharge: Stable    Rehab Potential (if transferring to Rehab): Good    Recommended Labs or Other Treatments After Discharge: Patient to follow up with coumadin clinic to monitor INR    Physician Certification: I certify the above information and transfer of August Hauser  is necessary for the continuing treatment of the diagnosis listed and that she requires Home Care for less than 30 days.      Update Admission H&P: No change in H&P    PHYSICIAN SIGNATURE:  Electronically signed by Severa Cera, MD on 3/4/21 at 11:39 AM EST

## 2021-03-04 NOTE — DISCHARGE SUMMARY
Physician Discharge Summary     Patient ID:  Addie Gordon  [id number]  14 y.o.  1975    Admit date: 2/26/2021    Discharge date and time: 3/4/2021    Admitting Physician: Keith Dunn MD     Discharge Physician: Hal Walters    Admission Diagnoses: Severe mitral regurgitation by prior echocardiogram [I34.0]    Discharge Diagnoses: Same    Admission Condition: good    Discharged Condition: good    Indication for Admission: Mitral valve surgery    Hospital Course: Taken to surgery 2/26/2021 for a Danny mechanical valve. Post op course uneventful. Consults: cardiology    Discharge Exam:  /67   Pulse 85   Temp 98.2 °F (36.8 °C) (Oral)   Resp 17   Ht 5' 7\" (1.702 m)   Wt 153 lb (69.4 kg)   LMP 02/15/2021   SpO2 100%   BMI 23.96 kg/m²     Neuro: intact   HD: NSR, BP WNLs. Hgb 10.0 today  Pulmonary: Patient is off O2 IS/Acapella and PT. Continue to walk and deep breathe. GI belly soft, + gas, needs BM on protocol, start reglan PRN.  euvolemic    INR  2.8. Pharmacy to dose warfarin. Will refer patient to outpatient coumadin clinic for continued follow up. Physical and occupational therapy  Incision healing. Disposition: home    In process/preliminary results:  Outstanding Order Results     No orders found from 1/28/2021 to 2/27/2021.           Patient Instructions:   Current Discharge Medication List      START taking these medications    Details   aspirin 81 MG EC tablet Take 1 tablet by mouth daily  Qty: 30 tablet, Refills: 3      metoprolol tartrate (LOPRESSOR) 25 MG tablet Take 0.5 tablets by mouth 2 times daily  Qty: 60 tablet, Refills: 3      amiodarone (CORDARONE) 200 MG tablet Take 1 tablet by mouth daily  Qty: 30 tablet, Refills: 1      potassium chloride (KLOR-CON M) 20 MEQ extended release tablet Take 1 tablet by mouth daily for 3 days  Qty: 3 tablet, Refills: 0      oxyCODONE-acetaminophen (PERCOCET) 5-325 MG per tablet Take 1 tablet by mouth every 8 hours as needed for Pain for up to 7 days. Qty: 21 tablet, Refills: 0    Comments: Reduce doses taken as pain becomes manageable  Associated Diagnoses: Severe mitral valve regurgitation         CONTINUE these medications which have CHANGED    Details   furosemide (LASIX) 20 MG tablet Take 1 tablet by mouth daily  Qty: 3 tablet, Refills: 0         CONTINUE these medications which have NOT CHANGED    Details   omeprazole (PRILOSEC) 20 MG delayed release capsule Take 20 mg by mouth daily      lurasidone (LATUDA) 40 MG TABS tablet Take by mouth daily      FLUoxetine (PROZAC) 10 MG capsule Take by mouth daily         STOP taking these medications       ciprofloxacin (CIPRO) 500 MG tablet Comments:   Reason for Stopping:         carvedilol (COREG) 3.125 MG tablet Comments:   Reason for Stopping:         losartan (COZAAR) 25 MG tablet Comments:   Reason for Stopping:             Activity: activity as tolerated  Diet: cardiac diet  Wound Care: keep wound clean and dry    Follow-up with Clinic in 1-2 weeks.     Signed:  Gibran Patton [facility]    3/4/2021  9:54 AM

## 2021-03-04 NOTE — PROGRESS NOTES
Pharmacy Note  Warfarin Consult follow-up    Recent Labs     03/04/21  0523   INR 2.8     Recent Labs     03/02/21  0447 03/02/21  0447 03/03/21  0415 03/03/21  0415 03/03/21  1555 03/03/21  1955 03/04/21  0523   HGB 7.9*   < > 7.3*   < > 9.2* 10.6* 10.0*   HCT 23.9*   < > 22.7*   < > 28.3* 32.7* 30.9*   PLT See Reflexed IPF Result  --  186  --   --   --  299    < > = values in this interval not displayed. Current warfarin drug-drug interactions: amiodarone, aspirin, acetaminophen    Date INR Dose   3/3/21 3.0 1 mg    3/4 2.8 2mg     Notes:  Give warfarin 2mg today on 3/4/2021. Daily PT/INR while inpatient.       Claudio Cox, [place], CACP  Clinical Pharmacist Medication Management  3/4/2021  8:06 AM

## 2021-03-04 NOTE — PLAN OF CARE
Problem: OXYGENATION/RESPIRATORY FUNCTION  Goal: Patient will maintain patent airway  3/4/2021 0508 by oZe Mcguier RN  Outcome: Ongoing  3/3/2021 1831 by Ramos Decker RN  Outcome: Ongoing  Goal: Patient will achieve/maintain normal respiratory rate/effort  Description: Respiratory rate and effort will be within normal limits for the patient  3/4/2021 0508 by Zoe Mcguire RN  Outcome: Ongoing  3/3/2021 1831 by Ramos Decker RN  Outcome: Ongoing     Problem: Discharge Planning:  Goal: Discharged to appropriate level of care  Description: Discharged to appropriate level of care  Outcome: Ongoing     Problem: Cardiac Output - Decreased:  Goal: Cardiac output within specified parameters  Description: Cardiac output within specified parameters  Outcome: Ongoing     Problem: Infection - Surgical Site:  Goal: Will show no infection signs and symptoms  Description: Will show no infection signs and symptoms  Outcome: Ongoing     Problem: Venous Thromboembolism:  Goal: Will show no signs or symptoms of venous thromboembolism  Description: Will show no signs or symptoms of venous thromboembolism  Outcome: Ongoing  Goal: Absence of signs or symptoms of impaired coagulation  Description: Absence of signs or symptoms of impaired coagulation  Outcome: Ongoing     Problem: Falls - Risk of:  Goal: Will remain free from falls  Description: Will remain free from falls  Outcome: Ongoing  Goal: Absence of physical injury  Description: Absence of physical injury  Outcome: Ongoing

## 2021-03-04 NOTE — PROGRESS NOTES
Reviewed Dc instructions/ medications/ activity and diet and follow up appts at length with patient and her mother. Pacer wires clipped and IV removed. Pt taken out to private car via [facility] alert and oriented.

## 2021-03-04 NOTE — PROGRESS NOTES
[facility] Cardiothoracic Surgery  Progress Note    3/4/2021 7:12 AM  Surgeon: Bernice Benedict MD  POD # 5  S/P: MVR, mechanical     Subjective:  Ms. Ita Zavaleta is feeling much better. Objective:  /67   Pulse 85   Temp 98.2 °F (36.8 °C) (Oral)   Resp 17   Ht 5' 7\" (1.702 m)   Wt 153 lb (69.4 kg)   LMP 02/15/2021   SpO2 100%   BMI 23.96 kg/m²      Labs:   CBC:   Recent Labs     03/02/21 0447 03/02/21 0447 03/03/21 0415 03/03/21 0415 03/03/21  1555 03/03/21 1955 03/04/21  0523   WBC 6.4  --  5.1  --   --   --  6.6   HGB 7.9*   < > 7.3*   < > 9.2* 10.6* 10.0*   HCT 23.9*   < > 22.7*   < > 28.3* 32.7* 30.9*   MCV 80.7*  --  80.8*  --   --   --  83.3   PLT See Reflexed IPF Result  --  186  --   --   --  299    < > = values in this interval not displayed. BMP:   Recent Labs     03/02/21 0447 03/03/21 0415 03/04/21  0523   * 138 139   K 3.5* 3.7 3.7   CL 99 101 101   CO2 27 28 25   BUN 10 8 7   CREATININE 0.52 0.49* 0.50     I/O: I/O last 3 completed shifts:   In: 316.7 [Blood:316.7]  Out: 200 [Urine:200]     Scheduled Meds:   warfarin (COUMADIN) daily dosing (placeholder)   Other RX Placeholder    metoprolol tartrate  12.5 mg Oral BID    [MAR Hold] vancomycin  1,000 mg Intravenous Q12H    sodium chloride flush  10 mL Intravenous 2 times per day    aspirin  81 mg Oral Daily    amiodarone  200 mg Oral BID    chlorhexidine  15 mL Mouth/Throat BID    therapeutic multivitamin-minerals  1 tablet Oral Daily with breakfast    polyethylene glycol  17 g Oral Daily    sennosides-docusate sodium  1 tablet Oral BID    insulin glargine  0.15 Units/kg Subcutaneous Nightly    pantoprazole  40 mg Intravenous BID    And    sodium chloride (PF)  10 mL Intravenous BID       PRN Meds:sodium chloride, metoclopramide, potassium chloride **OR** potassium alternative oral replacement **OR** potassium chloride, melatonin, [MAR Hold] sodium chloride, sodium chloride flush, ondansetron, acetaminophen, acetaminophen, oxyCODONE-acetaminophen **OR** oxyCODONE-acetaminophen, fentanNYL **OR** fentanNYL, hydrALAZINE, magnesium hydroxide, bisacodyl, calcium chloride IVPB, magnesium sulfate, albumin human, glucose, dextrose, glucagon (rDNA), dextrose    Beta- Blocker: Yes  Aspirin: Yes  GI: Yes  Plavix: No  Coumadin: Pharmacy to dose  Statin: No d/t no CAD    Assessment/ Plan:  POD #5, S/P Mechanical MVR  Neuro: intact   HD: NSR, BP WNLs. Hgb 10.0 today  Pulmonary: Patient is off O2 IS/Acapella and PT. Continue to walk and deep breathe. GI belly soft, + gas, needs BM on protocol, start reglan PRN.  euvolemic    INR  2.8. Pharmacy to dose warfarin. Will refer patient to outpatient coumadin clinic for continued follow up. Physical and occupational therapy  D/C planning.        [facility] [facility]

## 2021-03-05 ENCOUNTER — HOSPITAL ENCOUNTER (OUTPATIENT)
Dept: PHARMACY | Age: 46
Setting detail: THERAPIES SERIES
Discharge: HOME | End: 2021-03-05
Payer: MEDICAID

## 2021-03-05 VITALS — TEMPERATURE: 95.5 F

## 2021-03-05 DIAGNOSIS — I34.0 SEVERE MITRAL REGURGITATION BY PRIOR ECHOCARDIOGRAM: ICD-10-CM

## 2021-03-05 LAB
GLUCOSE BLD-MCNC: 93 MG/DL (ref 65–105)
INR BLD: 3.9
PROTIME: 46.3 SECONDS

## 2021-03-05 PROCEDURE — 99213 OFFICE O/P EST LOW 20 MIN: CPT

## 2021-03-05 PROCEDURE — 85610 PROTHROMBIN TIME: CPT

## 2021-03-08 ENCOUNTER — HOSPITAL ENCOUNTER (OUTPATIENT)
Dept: PHARMACY | Age: 46
Setting detail: THERAPIES SERIES
Discharge: HOME | End: 2021-03-08
Payer: MEDICAID

## 2021-03-08 VITALS — TEMPERATURE: 96.8 F

## 2021-03-08 DIAGNOSIS — I34.0 SEVERE MITRAL REGURGITATION BY PRIOR ECHOCARDIOGRAM: ICD-10-CM

## 2021-03-08 LAB
INR BLD: 1.4
PROTIME: 16.2 SECONDS

## 2021-03-08 PROCEDURE — 85610 PROTHROMBIN TIME: CPT

## 2021-03-08 PROCEDURE — 99212 OFFICE O/P EST SF 10 MIN: CPT

## 2021-03-12 ENCOUNTER — HOSPITAL ENCOUNTER (OUTPATIENT)
Dept: PHARMACY | Age: 46
Setting detail: THERAPIES SERIES
Discharge: HOME | End: 2021-03-12
Payer: MEDICAID

## 2021-03-12 ENCOUNTER — TELEPHONE (OUTPATIENT)
Dept: CARDIOTHORACIC SURGERY | Age: 46
End: 2021-03-12

## 2021-03-12 VITALS — TEMPERATURE: 97.1 F

## 2021-03-12 DIAGNOSIS — I34.0 SEVERE MITRAL REGURGITATION BY PRIOR ECHOCARDIOGRAM: ICD-10-CM

## 2021-03-12 LAB
INR BLD: 1.2
PROTIME: 15 SECONDS

## 2021-03-12 PROCEDURE — 99213 OFFICE O/P EST LOW 20 MIN: CPT

## 2021-03-12 PROCEDURE — 85610 PROTHROMBIN TIME: CPT

## 2021-03-12 NOTE — PROGRESS NOTES
Medication Management Service, Warfarin Management  ISAAK RODRIGUEZ [facility], [phone]  Visit Date: 3/12/2021   Subjective:   Natasha Munoz is a 39 y.o. female who presents to clinic today for anticoagulation monitoring and adjustment. Patient seen in clinic for warfarin management due to  Indication: On-X mechanical mitral valve. INR goal: of 2.0-3.0. Duration of therapy: indefinite. Assessment and PLAN   PT/INR done in office per protocol. INR today is 1.2, subtherapeutic. Patient is still a relatively new start to warfarin (first outpatient appointment was one week ago). INR low due to holding multiple doses, patient missing a dose, and maintenance dose being too low. Plan: Will increase regimen to warfarin 2 mg every day. Using warfarin 1 mg tablets. Contacted referring doctor regarding low INR - advised to start enoxaparin at treatment dosing until INR is within range. RX sent in for Lovenox today. Recheck INR in 4 days. Patient seen in room # 1. ED. OP. = Educated patient on how to inject enoxaparin and why it is necessary at this time. COVID screening complete and temperature recorded. Patient verbalized understanding of dosing directions and information discussed. Dosing schedule given to patient. Progress note sent to referring office. Patient acknowledges working in consult agreement with pharmacist as referred by his/her physician.       Electronically signed by Jaquelin Pop on 3/12/21 at 9:28 AM EST    CLINICAL PHARMACY CONSULT: MED RECONCILIATION/REVIEW Chelsie  22. Tracking Only    PHSO: No  Total # of Interventions Recommended: 2  - Increased Dose #: 1  - New Order #: 1 New Medication Order Reason(s): Needs Additional Medication Therapy  - Maintenance Safety Lab Monitoring #: 1  Total Interventions Accepted: 2  Time Spent (min): 60    Electronically signed by Jaquelin Pop on 3/12/21 at 10:44 AM EST

## 2021-03-12 NOTE — TELEPHONE ENCOUNTER
Asya from medication management called to notify us that patient's INR is dropping rapidly and is at 1.2 today, was 1.4 on Monday. Patient has mulu on 1mg daily. Asya asking for advice on whether patient should start Lovenox. Per Alex Mckeon PA-C patient will require Lovenox, INR is subtherapeutic and needs to be closely monitored, so medication management needs to call us with next draw results. Asya verbalized understanding.

## 2021-03-16 ENCOUNTER — HOSPITAL ENCOUNTER (OUTPATIENT)
Dept: PHARMACY | Age: 46
Setting detail: THERAPIES SERIES
Discharge: HOME | End: 2021-03-16
Payer: MEDICAID

## 2021-03-16 VITALS — TEMPERATURE: 97.1 F

## 2021-03-16 DIAGNOSIS — I34.0 SEVERE MITRAL REGURGITATION BY PRIOR ECHOCARDIOGRAM: ICD-10-CM

## 2021-03-16 LAB
INR BLD: 1.8
PROTIME: 21.1 SECONDS

## 2021-03-16 PROCEDURE — 85610 PROTHROMBIN TIME: CPT

## 2021-03-16 PROCEDURE — 99212 OFFICE O/P EST SF 10 MIN: CPT

## 2021-03-16 NOTE — PROGRESS NOTES
Medication Management Service, Warfarin Management  ISAAK RODRIGUEZ [facility]), [phone]  Visit Date: 3/16/2021   Subjective:   Delmy Curry is a 39 y.o. female who presents to clinic today for anticoagulation monitoring and adjustment. Patient seen in clinic for warfarin management due to  Indication:   mechanical mitral valve. INR goal: of 2.0-3.0. Duration of therapy: indefinite. Assessment and PLAN   PT/INR done in office per protocol. INR today is 1.8, subtherapeutic. New patient to warfarin and still determining maintenance dose, but on an upward trend. Patient currently bridging with Lovenox (started 3/12/21). Will stop once INR is therapeutic, so will follow-up in a few days. Plan: Will increase regimen to warfarin 2 mg daily. Using warfarin 1 mg tablets. Recheck INR in 2 days, in hopes of stopping Lovenox therapy. Patient seen in room # 1. ED. OP. = Discussed green vegetables/keeping diet consistent. COVID screening complete and temperature recorded. Patient verbalized understanding of dosing directions and information discussed. Dosing schedule given to patient. Progress note sent to referring office. Patient acknowledges working in consult agreement with pharmacist as referred by his/her physician.       Electronically signed by Teresa Oro on 3/16/21 at 10:10 AM EDT    CLINICAL PHARMACY CONSULT: MED RECONCILIATION/REVIEW Chelsie  22. Tracking Only    PHSO: No  Total # of Interventions Recommended: 1  - Increased Dose #: 1  - Maintenance Safety Lab Monitoring #: 1  Total Interventions Accepted: 1  Time Spent (min): 30    Electronically signed by Teresa Oro on 3/16/21 at 10:49 AM EDT

## 2021-03-17 ENCOUNTER — OFFICE VISIT (OUTPATIENT)
Dept: CARDIOTHORACIC SURGERY | Age: 46
End: 2021-03-17

## 2021-03-17 VITALS
OXYGEN SATURATION: 98 % | HEART RATE: 85 BPM | SYSTOLIC BLOOD PRESSURE: 135 MMHG | DIASTOLIC BLOOD PRESSURE: 89 MMHG | BODY MASS INDEX: 22.29 KG/M2 | WEIGHT: 142 LBS | HEIGHT: 67 IN

## 2021-03-17 DIAGNOSIS — Z95.2 S/P MITRAL VALVE REPLACEMENT: Primary | Chronic | ICD-10-CM

## 2021-03-17 DIAGNOSIS — Z98.890 S/P LEFT ATRIAL APPENDAGE LIGATION: Chronic | ICD-10-CM

## 2021-03-17 PROCEDURE — 99024 POSTOP FOLLOW-UP VISIT: CPT | Performed by: NURSE PRACTITIONER

## 2021-03-17 RX ORDER — TRAMADOL HYDROCHLORIDE 50 MG/1
50 TABLET ORAL EVERY 8 HOURS PRN
Qty: 15 TABLET | Refills: 0 | Status: SHIPPED | OUTPATIENT
Start: 2021-03-17 | End: 2021-03-22

## 2021-03-17 NOTE — PATIENT INSTRUCTIONS
Patient Education        Learning About Life With a Mechanical Heart Valve  What is a mechanical heart valve? A heart valve may be replaced when it is damaged or narrowed by disease. Your doctor replaces your valve with an artificial valve made of plastic or metal. The new valve controls the normal flow of blood into and out of the heart. It's important to keep in mind that an artificial valve won't work as well as an undamaged natural valve. So even though your heart works better, it may not recover to completely normal levels. If your heart was already working poorly before your valve was replaced, you may still have heart problems. Mechanical valves don't usually wear out. They usually last 20 years or more. Other problems might happen with the valve, such as an infection. As long as you have the valve, you and your doctor will need to watch for signs of problems. After surgery, you may need to take the blood thinner called warfarin. This will lower your risk of blood clots. You will need to take this medicine every day for as long as you have the valve. How can you check for problems with your heart valve? Even though you have a new valve, you still have a serious heart condition that needs to be watched closely. You and your doctor will need to watch for signs that there is a problem with the valve. These signs might be similar to those you had before your original valve was replaced. Watch for:  · Shortness of breath. · Fainting. · Chest pain. Follow-up care is a key part of your treatment and safety. Be sure to make and go to all appointments, and call your doctor if you are having problems. It's also a good idea to know your test results and keep a list of the medicines you take. Where can you learn more? Go to [email or web address] org and sign in to your [employer] account.  Enter M641 in the [employer] box to learn more about \"Learning About Life With a Mechanical Heart Valve. \"     If you do not have an account, please click on the \"Sign Up Now\" link. Current as of: August 31, 2020               Content Version: 12.8  © 2006-2021 Healthwise, Incorporated. Care instructions adapted under license by [facility] ([facility]). If you have questions about a medical condition or this instruction, always ask your healthcare professional. [address] any warranty or liability for your use of this information.

## 2021-03-17 NOTE — PROGRESS NOTES
[facility]  Office Visit      Subjective:  Ms. Merlynn Gaucher is a 39 y.o. female s/p mitral valve replacementwith Gisselle Bustillo, at [facility]. [facility]. she feels well today. Pain is controlled, she is  requiring narcotics for pain relief states only having a few left. I told her I will prescribe her a small prescription of Ultram. she is taking appropriate STS medications. No atorvastatin. Coumadin being managed by Coumadin clinic. Denies chest pain or shortness of breath. Increasing activity as tolerated. Appetite is  returning to normal. Bowel movements are regular. Denies fever, chills, or signs of infection at incision sites. Plan of care reviewed and questions answered. Chest xray reviewed. Ms. Merlynn Gaucher is recovering at home with family    Physical Exam  Vitals:  Vitals:    03/17/21 0951   BP: 135/89   Pulse: 85   SpO2: 98%       General: Alert and Oriented x3. Ambulatory. No apparent distress. Chest:  No abnormality. Equal and symmetric expansion with respiration. Lungs:  Clear to auscultation. Cardiac:  Regular rate and rhythm without murmurs, rubs or gallops. Abdomen:  Soft, non-tender, normoactive bowel sounds. Extremities:  No edema. Intact pulses in all four extremities. Psychiatric: Mood and affect are appropriate. Sternal incision is clean dry intact with no sign of discharge bleeding or infection.   Incision is well approximated no crepitus with palpation of chest  Current Medications:    Current Outpatient Medications:     enoxaparin (LOVENOX) 60 MG/0.6ML injection, Inject 0.6 mLs into the skin 2 times daily, Disp: 14 Syringe, Rfl: 0    aspirin 81 MG EC tablet, Take 1 tablet by mouth daily, Disp: 30 tablet, Rfl: 3    metoprolol tartrate (LOPRESSOR) 25 MG tablet, Take 0.5 tablets by mouth 2 times daily, Disp: 60 tablet, Rfl: 3    amiodarone (CORDARONE) 200 MG tablet, Take 1 tablet by mouth daily, Disp: 30 tablet, Rfl: 1    warfarin (COUMADIN) 1 MG tablet, Take 1 tablet by mouth daily, Disp: 30 tablet, Rfl: 2    omeprazole (PRILOSEC) 20 MG delayed release capsule, Take 20 mg by mouth daily, Disp: , Rfl:     lurasidone (LATUDA) 40 MG TABS tablet, Take by mouth daily, Disp: , Rfl:     Past Surgical History:   Procedure Laterality Date    CARDIAC CATHETERIZATION  01/05/2021    COSMETIC SURGERY      forehead after traumatic injury    ENDOSCOPY, COLON, DIAGNOSTIC      HERNIA REPAIR      MITRAL VALVE REPAIR N/A 2/26/2021    MITRAL VALVE REPLACE  ON-X 27/29 WITH LEFT ATRIAL APPENDAGE LIGATION WITH 50MM ATRICLIP. performed by Samuel Lopez MD at [address] [place] [address] TRANSESOPHAGEAL ECHOCARDIOGRAM  01/04/2021    severe mitral regurgitation       Social Hx: reports that she has been smoking cigarettes. She has been smoking about 0.50 packs per day.  She has never used smokeless tobacco.    Assessment & Plan:   I reviewed her recent INR of 1.8 and agree she should be on Lovenox until she is therapeutic at 2.5-3.5  Patient follows up with Coumadin clinic tomorrow for INR recheck  Patient will follow up with cardiology on March 22 at 1 PM  All questions and concerns answered    Milan Door CNP

## 2021-03-17 NOTE — PROGRESS NOTES
2/26/2021 mitral valve replacement with a 29/31-mm On-X  mechanical mitral valve, 50-mm AtriClip left atrial appendage ligation

## 2021-03-18 ENCOUNTER — HOSPITAL ENCOUNTER (OUTPATIENT)
Dept: PHARMACY | Age: 46
Setting detail: THERAPIES SERIES
Discharge: HOME | End: 2021-03-18
Payer: MEDICAID

## 2021-03-18 VITALS — TEMPERATURE: 96.4 F

## 2021-03-18 DIAGNOSIS — I34.0 SEVERE MITRAL REGURGITATION BY PRIOR ECHOCARDIOGRAM: ICD-10-CM

## 2021-03-18 LAB
INR BLD: 2.2
PROTIME: 25.2 SECONDS

## 2021-03-18 PROCEDURE — 99212 OFFICE O/P EST SF 10 MIN: CPT

## 2021-03-18 PROCEDURE — 85610 PROTHROMBIN TIME: CPT

## 2021-03-18 RX ORDER — WARFARIN SODIUM 2 MG/1
TABLET ORAL
Qty: 90 TABLET | Refills: 1 | Status: SHIPPED | OUTPATIENT
Start: 2021-03-18 | End: 2021-06-01 | Stop reason: SDUPTHER

## 2021-03-18 NOTE — PROGRESS NOTES
Medication Management Service, Warfarin Management  ISAAK RODRIGUEZ [facility], [phone]  Visit Date: 3/18/2021   Subjective:   Margarita Holder is a 39 y.o. female who presents to clinic today for anticoagulation monitoring and adjustment. Patient seen in clinic for warfarin management due to  Indication:   2/26/2021 mitral valve replacement with a 29/31-mm On-X. INR goal: of 2.0-3.0. Duration of therapy: indefinite. Assessment and PLAN   PT/INR done in office per protocol. INR today is 2.2, therapeutic after having 13mg over the week prior. Patient had been using enoxaparin, but since INR therapeutic will stop today. Plan: Will move forward with warfarin 2mg orally once daily. Changing tablet strength to 2mg. New script sent in today. Recheck INR in 1 week. Patient seen in room # 3. ED. OP. = Patient continues with amiodarone, and will be starting tramadol which does have an interaction with warfarin. She does not want to include any green vegetables in her diet. COVID screening complete and temperature recorded. Patient verbalized understanding of dosing directions and information discussed. Dosing schedule given to patient. Progress note sent to referring office. Patient acknowledges working in consult agreement with pharmacist as referred by his/her physician.       Electronically signed by Payam Bo RPh, MARIIA  Clinical Pharmacist Medication Management  3/18/2021  8:54 AM      CLINICAL PHARMACY CONSULT: MED RECONCILIATION/REVIEW ADDENDUM    For Pharmacy Admin Tracking Only    PHSO: No  Total # of Interventions Recommended: 1  - Refills Provided #: 1  - Maintenance Safety Lab Monitoring #: 1  Total Interventions Accepted: 1  Time Spent (min): 20    Electronically signed by Payam Bo [address] [place] [address] on 3/18/21 at 8:54 AM EDT

## 2021-03-19 NOTE — TELEPHONE ENCOUNTER
Thanks for the heads up regarding the INR.   She needs to be 2.5-3.5 I did continue with the same therapy as she is on and dose of Coumadin she will need to follow-up with her primary care physician and Coumadin clinic for further management

## 2021-03-25 ENCOUNTER — HOSPITAL ENCOUNTER (OUTPATIENT)
Dept: PHARMACY | Age: 46
Setting detail: THERAPIES SERIES
Discharge: HOME | End: 2021-03-25
Payer: MEDICAID

## 2021-03-25 VITALS — TEMPERATURE: 96.6 F

## 2021-03-25 DIAGNOSIS — I34.0 SEVERE MITRAL REGURGITATION BY PRIOR ECHOCARDIOGRAM: ICD-10-CM

## 2021-03-25 LAB
INR BLD: 5.6
PROTIME: 67.3 SECONDS

## 2021-03-25 PROCEDURE — 99212 OFFICE O/P EST SF 10 MIN: CPT

## 2021-03-25 PROCEDURE — 85610 PROTHROMBIN TIME: CPT

## 2021-03-25 NOTE — PROGRESS NOTES
Medication Management Service, Warfarin Management  [facility], [phone]  Visit Date: 3/25/2021   Subjective:   Dwaine Dodge is a 39 y.o. female who presents to clinic today for anticoagulation monitoring and adjustment. Patient seen in clinic for warfarin management due to  Indication:  On-X mechanical mitral valve. INR goal: of 2.0-3.0. Duration of therapy: indefinite. Assessment and PLAN   PT/INR done in office per protocol. INR today is 5.6, supratherapeutic. Reports drinking 3 beers last night. Started taking tramadol within the past week (but only took 3 doses). Stopped taking amiodarone per cardiologist's orders - last dose 3/22. Plan:  Reduce dose from 2 mg to 1 mg for today only and advised patient to eat a helping of green vegetables with lunch or dinner today. Then, will continue current regimen of warfarin 2 mg every day. Using warfarin 2 mg tablets. Recheck INR in 5 day(s). Patient seen in room # 1. ED. OP. = Call us with any medication changes; keep alcohol intake to 1-2 drinks/sitting. COVID screening complete and temperature recorded. Patient verbalized understanding of dosing directions and information discussed. Dosing schedule given to patient. Progress note sent to referring office. Patient acknowledges working in consult agreement with pharmacist as referred by his/her physician.       Electronically signed by Claudia James on 3/25/21 at 9:35 AM EDT    CLINICAL PHARMACY CONSULT: MED RECONCILIATION/REVIEW Chelsie  22. Tracking Only    PHSO: No  Total # of Interventions Recommended: 1  - Decreased Dose #: 1  - Maintenance Safety Lab Monitoring #: 1  Total Interventions Accepted: 1  Time Spent (min): 30    Electronically signed by Claudia James on 3/25/21 at 9:38 AM EDT

## 2021-03-31 ENCOUNTER — HOSPITAL ENCOUNTER (OUTPATIENT)
Dept: PHARMACY | Age: 46
Setting detail: THERAPIES SERIES
Discharge: HOME | End: 2021-03-31
Payer: MEDICAID

## 2021-03-31 VITALS — TEMPERATURE: 94.6 F

## 2021-03-31 DIAGNOSIS — I34.0 SEVERE MITRAL REGURGITATION BY PRIOR ECHOCARDIOGRAM: ICD-10-CM

## 2021-03-31 LAB
INR BLD: 5.6
PROTIME: 67.2 SECONDS

## 2021-03-31 PROCEDURE — 99212 OFFICE O/P EST SF 10 MIN: CPT

## 2021-03-31 PROCEDURE — 85610 PROTHROMBIN TIME: CPT

## 2021-04-06 ENCOUNTER — HOSPITAL ENCOUNTER (OUTPATIENT)
Dept: PHARMACY | Age: 46
Setting detail: THERAPIES SERIES
Discharge: HOME | End: 2021-04-06
Payer: MEDICAID

## 2021-04-06 VITALS — TEMPERATURE: 97.1 F

## 2021-04-06 DIAGNOSIS — I34.0 SEVERE MITRAL REGURGITATION BY PRIOR ECHOCARDIOGRAM: ICD-10-CM

## 2021-04-06 LAB
INR BLD: 1.4
PROTIME: 16.5 SECONDS

## 2021-04-06 PROCEDURE — 85610 PROTHROMBIN TIME: CPT

## 2021-04-06 PROCEDURE — 99213 OFFICE O/P EST LOW 20 MIN: CPT

## 2021-04-06 NOTE — PROGRESS NOTES
Medication Management Service, Warfarin Management  ISAAK RODRIGUEZ [facility], [phone]  Visit Date: 4/6/2021   Subjective:   nAgela Mckeon is a 39 y.o. female who presents to clinic today for anticoagulation monitoring and adjustment. Patient seen in clinic for warfarin management due to  Indication:   2/26/2021 mitral valve replacement with a 29/31-mm On-X. INR goal: of 2.0-3.0. Duration of therapy: indefinite. Assessment and PLAN   PT/INR done in office per protocol. INR today is 1.4, subtherapeutic. With initial interview questions patient denied any missed doses, but with further prompting admitted that she was not being truthful and has actually not been taking her warfarin as instructed nor the correct dose. She had been away from her home and did not always have her warfarin with her and was trying to conserve what she did have, some days taking only 1mg and other days none. However, she could not be sure of any specific dosing on any given   Plan: Will boost dose today to 3mg, then move forward with 2mg daily for the next several days with an INR check on Friday to verify that INR is increasing. Using 2mg tablets, does not have any more of the 1mg tablets so episode was updated. Recheck INR in 3 days. Patient seen in room # 3. ED. OP. = Needs to be truthful about missed and modified doses sothat we can make the best clinical decision to properly care for her to prevent clotting and bleeding. She has not been using tramadol. COVID screening complete and temperature recorded. Patient verbalized understanding of dosing directions and information discussed. Dosing schedule given to patient. Progress note sent to referring office. Patient acknowledges working in consult agreement with pharmacist as referred by his/her physician.       Electronically signed by Kaur Vaughn RPh, MARIIA  Clinical Pharmacist Medication Management  4/6/2021  11:48 AM      CLINICAL PHARMACY CONSULT: MED RECONCILIATION/REVIEW ADDENDUM    For Pharmacy Admin Tracking Only    PHSO: No  Total # of Interventions Recommended: 1  - Increased Dose #: 1  - Maintenance Safety Lab Monitoring #: 1  Total Interventions Accepted: 1  Time Spent (min): 25    Electronically signed by LUMA Barajas [facility] on 4/6/21 at 11:53 AM EDT

## 2021-04-09 ENCOUNTER — HOSPITAL ENCOUNTER (OUTPATIENT)
Dept: PHARMACY | Age: 46
Setting detail: THERAPIES SERIES
Discharge: HOME | End: 2021-04-09
Payer: MEDICAID

## 2021-04-09 VITALS — TEMPERATURE: 96.9 F

## 2021-04-09 DIAGNOSIS — I34.0 SEVERE MITRAL REGURGITATION BY PRIOR ECHOCARDIOGRAM: ICD-10-CM

## 2021-04-09 LAB
INR BLD: 2.6
PROTIME: 31.7 SECONDS

## 2021-04-09 PROCEDURE — 85610 PROTHROMBIN TIME: CPT

## 2021-04-09 PROCEDURE — 99212 OFFICE O/P EST SF 10 MIN: CPT

## 2021-04-09 NOTE — PROGRESS NOTES
Medication Management Service, Warfarin Management  ISAAK RODRIGUEZ [facility], [phone]  Visit Date: 4/9/2021   Subjective:   Romeo Owusu is a 39 y.o. female who presents to clinic today for anticoagulation monitoring and adjustment. Patient seen in clinic for warfarin management due to  Indication:   mechanical mitral valve. INR goal: of 2.0-3.0. Duration of therapy: indefinite. Assessment and PLAN   PT/INR done in office per protocol. INR today is 2.6 up from 1.4 three days ago. Patient received 10 mg of warfarin this past week. Plan: Will reduce maintenance regimen by 23.1 % weekly. (Patient admitted at last visit she was not taking all of her warfarin doses and sometimes taking partial doses.)  Patient will hold her warfarin dose today due to large increase in INR in three days. Then regimen will be warfarin 2 mg Mon, Wed, Fri and 1 mg daily all other days of the week. Using warfarin 1 mg tablets. Recheck INR in 5 days to see how she responds to this reduction. Patient seen in room # 1. ED. OP. = Patient's first comment today was \"How am I supposed to work if I have to come in here every three days? \"  I explained the importance of getting her INR into goal to protect her from clotting, heart attack,stroke or even death. I also explained risks of bleeding associated with an elevated INR. She was dismissive of what I had to say staring that she knows all that but continuing to express concerns about working and getting a job to pay her bills. I emphasized the importance of giving her body time to heal after surgery. I advised that the best thing she could do was to follow our dosing instructions exactly and not miss any warfarin doses. This will enable us to determine a stable regimen for her and extend the time between appointments. Her repeat INR check was only three days out because she did not follow the dosing correctly and had no idea what warfarin doses she did take.   It was impossible to safely dose her for any longer without an INR check. I further explained that with her INR at 2.6 today it likely would have continued to rise resulting in a supra herapeutic INR next week. COVID screening complete and temperature recorded. Patient verbalized understanding of dosing directions and information discussed. Dosing schedule given to patient. Progress note sent to referring office. Patient acknowledges working in consult agreement with pharmacist as referred by his/her physician. [address] [place] [address]  Ph., CACP, Clinical Pharmacist  Anticoagulation Services, [facility]  4/9/2021  11:01 AM    CLINICAL PHARMACY CONSULT: MED RECONCILIATION/REVIEW ADDENDUM    For Pharmacy Admin Tracking Only    PHSO: No  Total # of Interventions Recommended: 1  - Decreased Dose #: 1  - Maintenance Safety Lab Monitoring #: 1  Total Interventions Accepted: 1  Time Spent (min): 30    Electronically signed by Ciara Dill RPH on 4/9/21 at 11:01 AM EDT

## 2021-04-12 ENCOUNTER — HOSPITAL ENCOUNTER (EMERGENCY)
Age: 46
Discharge: HOME | End: 2021-04-12
Attending: EMERGENCY MEDICINE
Payer: MEDICAID

## 2021-04-12 ENCOUNTER — APPOINTMENT (OUTPATIENT)
Dept: GENERAL RADIOLOGY | Age: 46
End: 2021-04-12
Payer: MEDICAID

## 2021-04-12 VITALS
HEIGHT: 67 IN | TEMPERATURE: 98.8 F | BODY MASS INDEX: 19.62 KG/M2 | SYSTOLIC BLOOD PRESSURE: 127 MMHG | DIASTOLIC BLOOD PRESSURE: 91 MMHG | HEART RATE: 115 BPM | RESPIRATION RATE: 15 BRPM | OXYGEN SATURATION: 100 % | WEIGHT: 125 LBS

## 2021-04-12 DIAGNOSIS — F10.930 ALCOHOL WITHDRAWAL SYNDROME WITHOUT COMPLICATION (HCC): ICD-10-CM

## 2021-04-12 DIAGNOSIS — R79.1 SUBTHERAPEUTIC INTERNATIONAL NORMALIZED RATIO (INR): Primary | ICD-10-CM

## 2021-04-12 LAB
ABSOLUTE EOS #: 0.01 K/UL (ref 0–0.4)
ABSOLUTE IMMATURE GRANULOCYTE: ABNORMAL K/UL (ref 0–0.3)
ABSOLUTE LYMPH #: 0.94 K/UL (ref 1–4.8)
ABSOLUTE MONO #: 0.65 K/UL (ref 0.1–1.2)
ALBUMIN SERPL-MCNC: 3.8 G/DL (ref 3.5–5.2)
ALBUMIN/GLOBULIN RATIO: 1.1 (ref 1–2.5)
ALP BLD-CCNC: 123 U/L (ref 35–104)
ALT SERPL-CCNC: 19 U/L (ref 5–33)
ANION GAP SERPL CALCULATED.3IONS-SCNC: 10 MMOL/L (ref 9–17)
AST SERPL-CCNC: 28 U/L
BASOPHILS # BLD: 1 % (ref 0–2)
BASOPHILS ABSOLUTE: 0.03 K/UL (ref 0–0.2)
BILIRUB SERPL-MCNC: 0.83 MG/DL (ref 0.3–1.2)
BNP INTERPRETATION: ABNORMAL
BUN BLDV-MCNC: 4 MG/DL (ref 6–20)
BUN/CREAT BLD: ABNORMAL (ref 9–20)
CALCIUM SERPL-MCNC: 8.5 MG/DL (ref 8.6–10.4)
CHLORIDE BLD-SCNC: 100 MMOL/L (ref 98–107)
CO2: 23 MMOL/L (ref 20–31)
CREAT SERPL-MCNC: 0.5 MG/DL (ref 0.5–0.9)
DIFFERENTIAL TYPE: ABNORMAL
EOSINOPHILS RELATIVE PERCENT: 0 % (ref 1–4)
GFR AFRICAN AMERICAN: >60 ML/MIN
GFR NON-AFRICAN AMERICAN: >60 ML/MIN
GFR SERPL CREATININE-BSD FRML MDRD: ABNORMAL ML/MIN/{1.73_M2}
GFR SERPL CREATININE-BSD FRML MDRD: ABNORMAL ML/MIN/{1.73_M2}
GLUCOSE BLD-MCNC: 109 MG/DL (ref 70–99)
HCT VFR BLD CALC: 41.7 % (ref 36–46)
HEMOGLOBIN: 14.3 G/DL (ref 12–16)
IMMATURE GRANULOCYTES: ABNORMAL %
INR BLD: 1.6
LYMPHOCYTES # BLD: 18 % (ref 24–44)
MCH RBC QN AUTO: 28.4 PG (ref 26–34)
MCHC RBC AUTO-ENTMCNC: 34.3 G/DL (ref 31–37)
MCV RBC AUTO: 82.9 FL (ref 80–100)
MONOCYTES # BLD: 12 % (ref 2–11)
NRBC AUTOMATED: ABNORMAL PER 100 WBC
PDW BLD-RTO: 18 % (ref 12.5–15.4)
PLATELET # BLD: 272 K/UL (ref 140–450)
PLATELET ESTIMATE: ABNORMAL
PMV BLD AUTO: 8.9 FL (ref 8–14)
POTASSIUM SERPL-SCNC: 3.9 MMOL/L (ref 3.7–5.3)
PRO-BNP: 462 PG/ML
PROTHROMBIN TIME: 16.6 SEC (ref 9.4–12.6)
RBC # BLD: 5.03 M/UL (ref 4–5.2)
RBC # BLD: ABNORMAL 10*6/UL
SEG NEUTROPHILS: 69 % (ref 36–66)
SEGMENTED NEUTROPHILS ABSOLUTE COUNT: 3.74 K/UL (ref 1.8–7.7)
SODIUM BLD-SCNC: 133 MMOL/L (ref 135–144)
TOTAL PROTEIN: 7.3 G/DL (ref 6.4–8.3)
TROPONIN INTERP: NORMAL
TROPONIN T: NORMAL NG/ML
TROPONIN, HIGH SENSITIVITY: 14 NG/L (ref 0–14)
WBC # BLD: 5.4 K/UL (ref 3.5–11)
WBC # BLD: ABNORMAL 10*3/UL

## 2021-04-12 PROCEDURE — 83880 ASSAY OF NATRIURETIC PEPTIDE: CPT

## 2021-04-12 PROCEDURE — 6360000002 HC RX W HCPCS: Performed by: PHYSICIAN ASSISTANT

## 2021-04-12 PROCEDURE — 2580000003 HC RX 258: Performed by: PHYSICIAN ASSISTANT

## 2021-04-12 PROCEDURE — 96375 TX/PRO/DX INJ NEW DRUG ADDON: CPT

## 2021-04-12 PROCEDURE — 36415 COLL VENOUS BLD VENIPUNCTURE: CPT

## 2021-04-12 PROCEDURE — 93005 ELECTROCARDIOGRAM TRACING: CPT | Performed by: PHYSICIAN ASSISTANT

## 2021-04-12 PROCEDURE — 96365 THER/PROPH/DIAG IV INF INIT: CPT

## 2021-04-12 PROCEDURE — 96366 THER/PROPH/DIAG IV INF ADDON: CPT

## 2021-04-12 PROCEDURE — 80053 COMPREHEN METABOLIC PANEL: CPT

## 2021-04-12 PROCEDURE — 85025 COMPLETE CBC W/AUTO DIFF WBC: CPT

## 2021-04-12 PROCEDURE — 85610 PROTHROMBIN TIME: CPT

## 2021-04-12 PROCEDURE — 2500000003 HC RX 250 WO HCPCS: Performed by: PHYSICIAN ASSISTANT

## 2021-04-12 PROCEDURE — 71045 X-RAY EXAM CHEST 1 VIEW: CPT

## 2021-04-12 PROCEDURE — 99285 EMERGENCY DEPT VISIT HI MDM: CPT

## 2021-04-12 PROCEDURE — 84484 ASSAY OF TROPONIN QUANT: CPT

## 2021-04-12 RX ORDER — ONDANSETRON 2 MG/ML
4 INJECTION INTRAMUSCULAR; INTRAVENOUS ONCE
Status: COMPLETED | OUTPATIENT
Start: 2021-04-12 | End: 2021-04-12

## 2021-04-12 RX ORDER — LORAZEPAM 2 MG/ML
1 INJECTION INTRAMUSCULAR ONCE
Status: COMPLETED | OUTPATIENT
Start: 2021-04-12 | End: 2021-04-12

## 2021-04-12 RX ORDER — ONDANSETRON 4 MG/1
4 TABLET, ORALLY DISINTEGRATING ORAL EVERY 6 HOURS PRN
Qty: 10 TABLET | Refills: 1 | Status: SHIPPED | OUTPATIENT
Start: 2021-04-12 | End: 2021-04-15

## 2021-04-12 RX ORDER — LORAZEPAM 1 MG/1
.5-1 TABLET ORAL EVERY 8 HOURS PRN
Qty: 8 TABLET | Refills: 0 | Status: SHIPPED | OUTPATIENT
Start: 2021-04-12 | End: 2021-04-15

## 2021-04-12 RX ADMIN — FOLIC ACID: 5 INJECTION, SOLUTION INTRAMUSCULAR; INTRAVENOUS; SUBCUTANEOUS at 13:02

## 2021-04-12 RX ADMIN — ONDANSETRON 4 MG: 2 INJECTION INTRAMUSCULAR; INTRAVENOUS at 13:00

## 2021-04-12 RX ADMIN — LORAZEPAM 1 MG: 2 INJECTION INTRAMUSCULAR; INTRAVENOUS at 13:02

## 2021-04-12 NOTE — DISCHARGE INSTR - COC
Continuity of Care Form    Patient Name: Andra Saldana   :  1975  MRN:  [id number]    Admit date:  2021  Discharge date:  ***    Code Status Order: Prior   Advance Directives:     Admitting Physician:  No admitting provider for patient encounter. PCP: MAYRA Viramontes CNP    Discharging Nurse: [facility] Unit/Room#: ER06/ER06  Discharging Unit Phone Number: ***    Emergency Contact:   Extended Emergency Contact Information  Primary Emergency Contact: Samantha Hill, [address] [place] [address] Phone: [phone]  Relation: Parent    Past Surgical History:  Past Surgical History:   Procedure Laterality Date    CARDIAC CATHETERIZATION  2021    COSMETIC SURGERY      forehead after traumatic injury    ENDOSCOPY, COLON, DIAGNOSTIC      HERNIA REPAIR      MITRAL VALVE REPAIR N/A 2021    MITRAL VALVE REPLACE  ON-X  WITH LEFT ATRIAL APPENDAGE LIGATION WITH 50MM ATRICLIP. performed by Leanne Holland MD at [address] [place] [address] TRANSESOPHAGEAL ECHOCARDIOGRAM  2021    severe mitral regurgitation       Immunization History: There is no immunization history on file for this patient.     Active Problems:  Patient Active Problem List   Diagnosis Code    Pulmonary edema, acute, with congestive heart failure (HCC) I50.1    Anxiety F41.9    Microcytic anemia D50.9    History of drug abuse ([facility] [address] F19.11    Acute on chronic diastolic congestive heart failure (HCC) I50.33    Severe mitral valve regurgitation I34.0    Severe mitral regurgitation by prior echocardiogram I34.0    S/P mitral valve replacement Z95.2    S/P left atrial appendage ligation Z98.890       Isolation/Infection:   Isolation          No Isolation        Patient Infection Status     Infection Onset Added Last Indicated Last Indicated By Review Planned Expiration Resolved Resolved By    None active    Resolved    COVID-19 Rule Out 21 COVID-19 (Ordered)   21 Rule-Out Test Resulted    COVID-19 Rule Out 20 Respiratory Panel, Molecular, with COVID-19 (Ordered)   20 Rule-Out Test Resulted    COVID-19 Rule Out 20 COVID-19 (Ordered)   20 Rule-Out Test Resulted          Nurse Assessment:  Last Vital Signs: BP (!) 127/91   Pulse 115   Temp 98.8 °F (37.1 °C) (Oral)   Resp 15   Ht 5' 7\" (1.702 m)   Wt 56.7 kg (125 lb)   LMP 2021   SpO2 100%   BMI 19.58 kg/m²     Last documented pain score (0-10 scale):    Last Weight:   Wt Readings from Last 1 Encounters:   21 56.7 kg (125 lb)     Mental Status:  {IP PT MENTAL STATUS:}    IV Access:  { HORTENCIA IV ACCESS:344399196}    Nursing Mobility/ADLs:  Walking   {CHP DME KIHK:624554273}  Transfer  {CHP DME TRTK:595286502}  Bathing  {CHP DME CPGJ:116744867}  Dressing  {CHP DME RWDI:618479808}  Toileting  {CHP DME OIPS:308105806}  Feeding  {CHP DME XGJ}  Med Admin  {CHP DME GNYY:984255960}  Med Delivery   { HORTENCIA MED Delivery:181523888}    Wound Care Documentation and Therapy:        Elimination:  Continence:   · Bowel: {YES / FC:87588}  · Bladder: {YES / UH:54222}  Urinary Catheter: {Urinary Catheter:535625465}   Colostomy/Ileostomy/Ileal Conduit: {YES / OI:50380}       Date of Last BM: ***  No intake or output data in the 24 hours ending 21 1530  No intake/output data recorded.     Safety Concerns:     [address] Safety Concerns:790383626}    Impairments/Disabilities:      [address] Impairments/Disabilities:539586242}    Nutrition Therapy:  Current Nutrition Therapy:   [address] Diet List:131242259}    Routes of Feeding: {P DME Other Feedings:078192369}  Liquids: {Slp liquid thickness:23582}  Daily Fluid Restriction: {CHP DME Yes amt example:775695553}  Last Modified Barium Swallow with Video (Video Swallowing Test): {Done Not Done QA}    Treatments at the Time of Hospital Discharge:   Respiratory Treatments: ***  Oxygen Therapy:  {Therapy; copd oxygen:60341}  Ventilator: [address]wn CC Vent CURP:407176719}    Rehab Therapies: {THERAPEUTIC INTERVENTION:5742961077}  Weight Bearing Status/Restrictions: {[facility] Weight Bearin}  Other Medical Equipment (for information only, NOT a DME order):  {EQUIPMENT:337989371}  Other Treatments: ***    Patient's personal belongings (please select all that are sent with patient):  {CHP DME Belongings:395626554}    RN SIGNATURE:  {Esignature:318279285}    CASE MANAGEMENT/SOCIAL WORK SECTION    Inpatient Status Date: ***    Readmission Risk Assessment Score:  Readmission Risk              Risk of Unplanned Readmission:        0           Discharging to Facility/ Agency   · Name:   · Address:  · Phone:  · Fax:    Dialysis Facility (if applicable)   · Name:  · Address:  · Dialysis Schedule:  · Phone:  · Fax:    / signature: {Esignature:704066786}    PHYSICIAN SECTION    Prognosis: {Prognosis:9889453976}    Condition at Discharge: [address] Patient Condition:995835454}    Rehab Potential (if transferring to Rehab): {Prognosis:1809269996}    Recommended Labs or Other Treatments After Discharge: ***    Physician Certification: I certify the above information and transfer of Florentino Godwin  is necessary for the continuing treatment of the diagnosis listed and that she requires {Admit to Appropriate Level of Care:88695} for {GREATER/LESS:300593966} 30 days.      Update Admission H&P: {CHP DME Changes in BKUKK:223219887}    PHYSICIAN SIGNATURE:  {Esignature:196384964}

## 2021-04-12 NOTE — ED PROVIDER NOTES
[address] [facility] ED    [address] [facility] [address] [facility] [address]  Phone: [phone]  Fax: [phone]  Emergency Department  Faculty Attestation    I performed a history and physical examination of the patient and discussed management with the mid level provideer. I reviewed the mid level provider's note and agree with the documented findings and plan of care. Any areas of disagreement are noted on the chart. I was personally present for the key portions of any procedures. I have documented in the chart those procedures where I was not present during the key portions. I have reviewed the emergency nurses triage note. I agree with the chief complaint, past medical history, past surgical history, allergies, medications, social and family history as documented unless otherwise noted below. Documentation of the HPI, Physical Exam and Medical Decision Making performed by medical students or scribes is based on my personal performance of the HPI, PE and MDM. For Physician Assistant/ Nurse Practitioner cases/documentation I have personally evaluated this patient and have completed at least one if not all key elements of the E/M (history, physical exam, and MDM). Additional findings are as noted.       Primary Care Physician:  Darrion Kumari, MAYRA - CNP    CHIEF COMPLAINT       Chief Complaint   Patient presents with    Cardiac Clearance       RECENT VITALS:   Temp: 98.8 °F (37.1 °C),  Pulse: 115, Resp: 15, BP: (!) 127/91    LABS:  Labs Reviewed   CBC WITH AUTO DIFFERENTIAL - Abnormal; Notable for the following components:       Result Value    RDW 18.0 (*)     Seg Neutrophils 69 (*)     Lymphocytes 18 (*)     Monocytes 12 (*)     Eosinophils % 0 (*)     Absolute Lymph # 0.94 (*)     All other components within normal limits   COMPREHENSIVE METABOLIC PANEL - Abnormal; Notable for the following components:    Glucose 109 (*)     BUN 4 (*)     Calcium 8.5 (*)     Sodium 133 (*)     Alkaline Phosphatase 123 (*)     All other components within normal limits   PROTIME-INR - Abnormal; Notable for the following components:    Protime 16.6 (*)     All other components within normal limits   BRAIN NATRIURETIC PEPTIDE - Abnormal; Notable for the following components:    Pro- (*)     All other components within normal limits   TROPONIN        XR CHEST PORTABLE (Final result)  Result time 04/12/21 12:59:42  Final result by Prisca Hollins MD (04/12/21 12:59:42)                Impression:    Mild cardiomegaly     No acute cardiopulmonary findings             Narrative:    EXAMINATION:   ONE XRAY VIEW OF THE CHEST     4/12/2021 12:34 pm     COMPARISON:   March 4, 2021, chest exam     HISTORY:   ORDERING SYSTEM PROVIDED HISTORY: cardiac clearance   TECHNOLOGIST PROVIDED HISTORY:   cardiac clearance   Reason for Exam: cardiac problems   Acuity: Acute   Type of Exam: Initial     FINDINGS:   Stable median sternotomy, prosthetic heart valve     There is now mild enlargement of the cardiopericardial silhouette     There are no significant mediastinal, pleural or parenchymal findings.  There   is been interval resolution of previously noted pleuroparenchymal changes                       PERTINENT ATTENDING PHYSICIAN COMMENTS:    EKG: Emergency physician interpretation: Sinus tachycardia 115 with nonspecific ST change. No morphologic change compared to February 2021. Axis -2, , QRS 68, . The patient was sent from [facility] for medical clearance prior to admission for alcohol rehabilitation. The patient has history of alcoholism. Her last use of alcohol was yesterday. She normally drinks 4 tall boy beers a day. She is on Coumadin and the clinic was concerned about her being therapeutic on her medication. On exam, the patient has no tremulousness and has only mild tachycardia at this time. She appears alert pleasant cooperative. She has normal heart and lung sounds to auscultation.   She has no peripheral edema.  Well-healed scar in the middle of her chest is noted. The patient's INR is slightly subtherapeutic. The physician at [facility] does not want her to go there today. We will have her increase her Coumadin dose and have her follow-up with the Coumadin clinic at [facility] in the next couple of days. The patient feels comfortable going home. We have provided prescriptions for Ativan and Zofran to help her stay off of it alcohol in the interim. The patient is discharged in good condition.          Amada Plunkett MD  04/12/21 4557

## 2021-04-12 NOTE — ED NOTES
Paula LANE at bedside speaking with patient. Pt states that she is ready to go home. She is instructed on plan of care for at home and when to return to ED. Pt instructed to contact the Coumadin clinic along with rehab facilities. Pt responded to education. Pt denies needs at this time. No concerns no s/s of distress.       Juan Gamez, RN  04/12/21 4550

## 2021-04-12 NOTE — ED NOTES
Divina LANE spoke with Mary Grace Granados at [facility]. [facility] refuses to take this patient at this time due to her INR. Dr Rony Golden updated, and Jonathan Mccarthy PA at bedside speaking with patient.       Kiesha Mclean RN  04/12/21 3167

## 2021-04-12 NOTE — ED NOTES
Yasmin LANE at bedside for updated on plan of care and re-evaluation.       Mukul Guzmán RN  04/12/21 2526

## 2021-04-12 NOTE — ED NOTES
Julia Cushing PA speaking with Trina Patel at [facility].       Roberto Cedillo RN  04/12/21 4836

## 2021-04-12 NOTE — ED TRIAGE NOTES
Patient to the ED from [address] [place] [address] Rd. The patient states that she hasnt been taking her coumadin since her heart surgery/mitral repair in February.

## 2021-04-12 NOTE — ED PROVIDER NOTES
[address] [facility] ED  [address] [facility] [address]  Phone: [phone]  Fax: Germania Chery 112      Pt Name: Duke Hernandez  MRN: [id number]  [place] 1975  Date of evaluation: 4/12/2021  Provider: Taiwo Vernon PA-C    CHIEF COMPLAINT       Chief Complaint   Patient presents with    Cardiac Clearance           HISTORY OF PRESENT ILLNESS  (Location/Symptom, Timing/Onset, Context/Setting, Quality, Duration, Modifying Factors, Severity.)   Duke Hernandez is a 39 y.o. female who presents to the emergency department for evaluation and medical clearance as she enters Detox at [facility]. She was feeling shaky on intake today and is presently 6 wks post-op from [facility]. No new CP. She reports SOB/SMITH since surgery but this is unchanged. No new leg/other edema. No new cough/f/c. She did have since bout of nonbloody emesis this morning. She has 4 tallboy beers last night, nothing since. She is to be on Coumadin but hasn't been taking as prescribed as she thought her drinking too was be a blood thinners. I spoke to Yandel Diamond/RAOUL specifically. They just wanted a quick medical clearancet and INR check. Pt has not complaints other than feeling anxious/shaky today. Nursing Notes were reviewed. REVIEW OF SYSTEMS    (2-9 systems for level 4, 10 or more for level 5)     Review of Systems   Constitutional: Negative. HENT: Negative. Eyes: Negative. Respiratory: Negative. Cardiovascular: Negative. Gastrointestinal: Negative. Musculoskeletal: Negative. Endocrine: Negative. Genitourinary: Negative. Skin: Negative. Allergic/Immunologic: Negative. Neurological: Negative. Hematological: Negative. Psychiatric/Behavioral: Negative. Except as noted above the remainder of the review of systems was reviewed and negative. PAST MEDICAL HISTORY   History reviewed.     Past Medical History:   Diagnosis Date    Anxiety     CHF (congestive heart failure) (HCC)     GERD (gastroesophageal reflux disease)     History of chest pain     now resolved    History of echocardiogram 01/02/2021    mod-severe MR; mild-mod TR; EF 50-55%    Hypertension     Leg swelling     Migraine     Mitral regurgitation     moderated to severe on echo    Mitral valve disease     SOB (shortness of breath)     Stomach ulcer     Wellness examination     Narcisa bradford [place]-last visit feb 2021    Wellness examination     no current cardiologist         SURGICAL HISTORY     History reviewed. Past Surgical History:   Procedure Laterality Date    CARDIAC CATHETERIZATION  01/05/2021    COSMETIC SURGERY      forehead after traumatic injury    ENDOSCOPY, COLON, DIAGNOSTIC      HERNIA REPAIR      MITRAL VALVE REPAIR N/A 2/26/2021    MITRAL VALVE REPLACE  ON-X 27/29 WITH LEFT ATRIAL APPENDAGE LIGATION WITH 50MM ATRICLIP. performed by Juanjo Hernandez MD at [address] [place] [address] TRANSESOPHAGEAL ECHOCARDIOGRAM  01/04/2021    severe mitral regurgitation         CURRENT MEDICATIONS       Discharge Medication List as of 4/12/2021  3:19 PM      CONTINUE these medications which have NOT CHANGED    Details   sertraline (ZOLOFT) 50 MG tablet Take 50 mg by mouth dailyHistorical Med      warfarin (COUMADIN) 2 MG tablet Take one tablet (or as directed by Medication Management) by mouth once daily.  90DS, Disp-90 tablet, R-1Normal      aspirin 81 MG EC tablet Take 1 tablet by mouth daily, Disp-30 tablet, R-3Normal      metoprolol tartrate (LOPRESSOR) 25 MG tablet Take 0.5 tablets by mouth 2 times daily, Disp-60 tablet, R-3Normal      omeprazole (PRILOSEC) 20 MG delayed release capsule Take 20 mg by mouth dailyHistorical Med      lurasidone (LATUDA) 40 MG TABS tablet Take by mouth dailyHistorical Med             ALLERGIES     Antihistamines, chlorpheniramine-type and Hydroxyzine    FAMILY HISTORY           Problem Relation Age of Onset    Diabetes Mother     High Blood Pressure Mother     No Known Problems Father      Family Status   Relation Name Status    Mother  Alive    Father  Alive          SOCIAL HISTORY      reports that she has been smoking cigarettes. She has been smoking about 0.50 packs per day. She has never used smokeless tobacco. She reports current alcohol use of about 4.0 standard drinks of alcohol per week. She reports previous drug use. lives at home with other     PHYSICAL EXAM    (up to 7 for level 4, 8 or more for level 5)     ED Triage Vitals [04/12/21 1213]   BP Temp Temp Source Pulse Resp SpO2 Height Weight   (!) 143/104 98.8 °F (37.1 °C) Oral 125 16 100 % 5' 7\" (1.702 m) 125 lb (56.7 kg)       Physical Exam   Nursing note and vitals reviewed. Constitutional:   Well-developed and well-nourished. No lethargy. Nontoxic. Head: Normocephalic and atraumatic. Ear: External ears normal.   Nose: Nose normal and midline. Eyes: Conjunctivae and EOM are normal. Pupils are equal/round  Neck: Normal range of motion. Cardiovascular: Normal rate, regular rhythm, normal heart sounds   Pulmonary/Chest: Effort normal and breath sounds normal. No wheezes/rales/rhonchi. Musculoskeletal: Normal to inspection. No BLE edema or TTP. NV intact x 4. Neurological: Alert, age appropriate mentation and interaction. Skin: Skin is warm and dry. No rash noted.    Psychiatric: Mood, memory, affect and judgment normal.       DIAGNOSTIC RESULTS     EKG: All EKG's are interpreted by the Emergency Department Physician who either signs or Co-signs this chart in the absence of a cardiologist.    Not indicated OR per attending note    RADIOLOGY:   Non-plain film images such as CT, Ultrasound and MRI are read by the radiologist. Plain radiographic images are visualized and preliminarily interpreted by the emergency physician with the below findings:      Interpretation per the Radiologist below, if available at the time of this note:    XR CHEST PORTABLE   Final Result   Mild cardiomegaly      No acute cardiopulmonary findings                 LABS:  Labs Reviewed   CBC WITH AUTO DIFFERENTIAL - Abnormal; Notable for the following components:       Result Value    RDW 18.0 (*)     Seg Neutrophils 69 (*)     Lymphocytes 18 (*)     Monocytes 12 (*)     Eosinophils % 0 (*)     Absolute Lymph # 0.94 (*)     All other components within normal limits   COMPREHENSIVE METABOLIC PANEL - Abnormal; Notable for the following components:    Glucose 109 (*)     BUN 4 (*)     Calcium 8.5 (*)     Sodium 133 (*)     Alkaline Phosphatase 123 (*)     All other components within normal limits   PROTIME-INR - Abnormal; Notable for the following components:    Protime 16.6 (*)     All other components within normal limits   BRAIN NATRIURETIC PEPTIDE - Abnormal; Notable for the following components:    Pro- (*)     All other components within normal limits   TROPONIN         All other labs were within normal range or not returned as of this dictation. EMERGENCY DEPARTMENT COURSE and DIFFERENTIAL DIAGNOSIS/MDM:   Vitals:    Vitals:    04/12/21 1213 04/12/21 1329 04/12/21 1334 04/12/21 1435   BP: (!) 143/104 (!) 138/101  (!) 127/91   Pulse: 125 115 112 115   Resp: 16 15  15   Temp: 98.8 °F (37.1 °C)      TempSrc: Oral      SpO2: 100% 100%  100%   Weight: 56.7 kg (125 lb)      Height: 5' 7\" (1.702 m)          1236  [facility] sent here for labs and essentially cardiac clearance. 6 wks post-op from [facility]. No present CP. Baseline SMITH, unchanged. No new edema by history or exam.  LCTA. A little shaky/tachy but nontoxic and comfortable appearing. Giving Zofran/Ativan/IVF. /100 with HR of 113 during my exam, this is improving. She hasn't had a drink since last night for feeling anxious/shaky. [phone]  Results faxed to [facility] for Physician review. Nothing acute here that would preclude her from treatment.      [facility] [place] [facility], the docs there are conflicted on whether to treat here unless her INR is therapeutic. They are had no further requests for me. I inquired about who now picks up care for her and Jame Toney wasn't not able to clarify that. We can tell her to get back on her Coumadin and can offer Ativan prn but otherwise were given no direction on how pt is to process. Pt is calling [facility] now herself. [address] unable to speak with her when she called. Discussed with attending. Pt will increase Coumadin to 4mg daily and then f/u with INR clinic on Wednesday as already scheduled. I asked her to call [facility] Coumadin clinic tomorrow or this afternoon to explain the situation to see if they had any other input for her prior to her Wednesday appt. Pt is to return to ED for worsening symptoms or other concerns. We recommended obstaining from ETOH . Ativan and Zofran rxs provided. I have reviewed the disposition diagnosis with the patient and or their family/guardian. I have answered their questions and given discharge instructions. They voiced understanding of these instructions and did not have any further questions or complaints. CONSULTS:  None    PROCEDURES:  None    Patient instructed to return to the emergency room if symptoms worsen, return, or any other concern right away which is agreed. FINAL IMPRESSION      1. Subtherapeutic international normalized ratio (INR)    2. Alcohol withdrawal syndrome without complication [facility])            DISPOSITION/PLAN   DISPOSITION     CONDITION:  Stable    PATIENT REFERRED TO:  Dinorah Walden, MAYRA - CNP  [address] [facility] [address] [address] [phone]    Schedule an appointment as soon as possible for a visit   for re-evaluation of your symptoms    [facility] ED  [address] [facility].   [place] [id number] [phone]  Go to   for persistence or worsening of your symptoms      DISCHARGE MEDICATIONS:  Discharge Medication List as of 4/12/2021  3:19 PM      START taking these medications Details   LORazepam (ATIVAN) 1 MG tablet Take 0.5-1 tablets by mouth every 8 hours as needed for Anxiety for up to 3 days. , Disp-8 tablet, R-0Print             (Please note that portions of this note were completed with a voice recognition program.  Efforts were made to edit the dictations but occasionally words are mis-transcribed.)    MARISSA Parr PA-C  04/12/21 1535

## 2021-04-13 LAB
EKG ATRIAL RATE: 115 BPM
EKG P AXIS: 54 DEGREES
EKG P-R INTERVAL: 162 MS
EKG Q-T INTERVAL: 340 MS
EKG QRS DURATION: 68 MS
EKG QTC CALCULATION (BAZETT): 470 MS
EKG R AXIS: -2 DEGREES
EKG T AXIS: 21 DEGREES
EKG VENTRICULAR RATE: 115 BPM

## 2021-04-14 ENCOUNTER — APPOINTMENT (OUTPATIENT)
Dept: PHARMACY | Age: 46
End: 2021-04-14
Payer: MEDICAID

## 2021-04-16 ENCOUNTER — HOSPITAL ENCOUNTER (OUTPATIENT)
Dept: PHARMACY | Age: 46
Setting detail: THERAPIES SERIES
Discharge: HOME | End: 2021-04-16
Payer: MEDICAID

## 2021-04-16 DIAGNOSIS — I34.0 SEVERE MITRAL REGURGITATION BY PRIOR ECHOCARDIOGRAM: ICD-10-CM

## 2021-04-16 LAB
INR BLD: 1.7
PROTIME: 19.9 SECONDS

## 2021-04-16 PROCEDURE — 99213 OFFICE O/P EST LOW 20 MIN: CPT

## 2021-04-16 PROCEDURE — 85610 PROTHROMBIN TIME: CPT

## 2021-04-16 NOTE — PROGRESS NOTES
Medication Management Service, Warfarin Management  ISAAK RODRIGUEZ [facility], [phone]  Visit Date: 4/16/2021   Subjective:   Angela Mckeon is a 39 y.o. female who presents to clinic today for anticoagulation monitoring and adjustment. Patient seen in clinic for warfarin management due to  Indication:   mechanical mitral valve. INR goal: of 2.0-3.0. Duration of therapy: indefinite. Assessment and PLAN   PT/INR done in office per protocol. INR today is 1.7, subtherapeutic but trending up. Pt was in ER at [facility] on 4/12/21 for medical clearance and INR was 1.6, they increased her to 4mg that day and told to follow same regimen. Pt reports compliance with doses since then and states she now understands the importance of taking her warfarin everyday. Plan: Will do a one time 10% boost today and have her take 2mg. Then will do a weekly increase of 10%. New regimen will be 1mg MWF and 2mg AOD. Using warfarin 2 mg tablets. Recheck INR in 2 week(s). Patient seen in room # 1. ED. OP. = she is trying to be consistent and do 2 greens per week. Patient attested to self screening for COVID - 19. Patient verbalized understanding of dosing directions and information discussed. Dosing schedule given to patient. Progress note sent to referring office. Patient acknowledges working in consult agreement with pharmacist as referred by his/her physician.       Electronically signed by Fernando Ramirez [address] [place] [address] on 4/16/21 at 8:42 AM EDT    CLINICAL PHARMACY CONSULT: MED RECONCILIATION/REVIEW Chelsie Worthy 22. Tracking Only    PHSO: No  Total # of Interventions Recommended: 2  - Increased Dose #: 2  - Maintenance Safety Lab Monitoring #: 1  Total Interventions Accepted: 3  Time Spent (min): 30    Electronically signed by Fernando Ramirez [address] [place] [address] on 4/16/21 at 9:01 AM EDT

## 2021-04-21 ENCOUNTER — TELEPHONE (OUTPATIENT)
Dept: PHARMACY | Age: 46
End: 2021-04-21

## 2021-04-21 RX ORDER — CITALOPRAM 20 MG/1
40 TABLET ORAL DAILY
COMMUNITY
End: 2022-05-12

## 2021-04-21 RX ORDER — ARIPIPRAZOLE 15 MG/1
15 TABLET ORAL DAILY
COMMUNITY
End: 2022-01-07

## 2021-04-21 NOTE — TELEPHONE ENCOUNTER
Johana Mariah called today to report a change in meds, starting Abilify and Celexa, no longer taking Latuda and Zoloft. CLINICAL PHARMACY CONSULT: MED RECONCILIATION/REVIEW ADDENDUM    For Pharmacy Admin Tracking Only    PHSO: No  Total # of Interventions Recommended: 2  - New Order #: 2 New Medication Order Reason(s): Needs Additional Medication Therapy  - Updated Order #: 2 Updated Order Reason(s):  Other  - Maintenance Safety Lab Monitoring #: 1  Total Interventions Accepted: 2  Time Spent (min): 15    Electronically signed by Norah Reyes, [address] [place] [address] on 4/21/21 at 9:48 AM EDT

## 2021-04-23 NOTE — PROGRESS NOTES
#: 1  Total Interventions Accepted: 0  Time Spent (min): 25    Electronically signed by Katelynn Conley, [address] [place] [address] on 3/8/21 at 10:26 AM EST No

## 2021-04-26 ENCOUNTER — TELEPHONE (OUTPATIENT)
Dept: PHARMACY | Age: 46
End: 2021-04-26

## 2021-04-26 NOTE — TELEPHONE ENCOUNTER
Patient started on Keflex today. Per Nadya:Summary Cephalosporins may enhance the anticoagulant effect of Vitamin K Antagonists. Severity Moderate Reliability Rating Fair    No change in warfarin dose today. INR recheck scheduled on 4/30.

## 2021-04-30 ENCOUNTER — HOSPITAL ENCOUNTER (OUTPATIENT)
Dept: PHARMACY | Age: 46
Setting detail: THERAPIES SERIES
Discharge: HOME | End: 2021-04-30
Payer: MEDICAID

## 2021-04-30 ENCOUNTER — HOSPITAL ENCOUNTER (OUTPATIENT)
Age: 46
Discharge: HOME | End: 2021-04-30
Payer: MEDICAID

## 2021-04-30 VITALS — WEIGHT: 140 LBS | BODY MASS INDEX: 21.93 KG/M2

## 2021-04-30 DIAGNOSIS — I34.0 SEVERE MITRAL REGURGITATION BY PRIOR ECHOCARDIOGRAM: ICD-10-CM

## 2021-04-30 LAB
ABSOLUTE EOS #: <0.03 K/UL (ref 0–0.44)
ABSOLUTE IMMATURE GRANULOCYTE: <0.03 K/UL (ref 0–0.3)
ABSOLUTE LYMPH #: 1.04 K/UL (ref 1.1–3.7)
ABSOLUTE MONO #: 0.23 K/UL (ref 0.1–1.2)
ALBUMIN SERPL-MCNC: 3.6 G/DL (ref 3.5–5.2)
ALBUMIN/GLOBULIN RATIO: 1.2 (ref 1–2.5)
ALP BLD-CCNC: 66 U/L (ref 35–104)
ALT SERPL-CCNC: 9 U/L (ref 5–33)
ANION GAP SERPL CALCULATED.3IONS-SCNC: 11 MMOL/L (ref 9–17)
AST SERPL-CCNC: 11 U/L
BASOPHILS # BLD: 1 % (ref 0–2)
BASOPHILS ABSOLUTE: 0.05 K/UL (ref 0–0.2)
BILIRUB SERPL-MCNC: 0.46 MG/DL (ref 0.3–1.2)
BILIRUBIN URINE: NEGATIVE
BUN BLDV-MCNC: 3 MG/DL (ref 6–20)
BUN/CREAT BLD: ABNORMAL (ref 9–20)
CALCIUM SERPL-MCNC: 8.5 MG/DL (ref 8.6–10.4)
CHLORIDE BLD-SCNC: 105 MMOL/L (ref 98–107)
CO2: 25 MMOL/L (ref 20–31)
COLOR: YELLOW
COMMENT UA: NORMAL
CREAT SERPL-MCNC: 0.55 MG/DL (ref 0.5–0.9)
DIFFERENTIAL TYPE: ABNORMAL
EOSINOPHILS RELATIVE PERCENT: 0 % (ref 1–4)
FOLATE: 5.6 NG/ML
GFR AFRICAN AMERICAN: >60 ML/MIN
GFR NON-AFRICAN AMERICAN: >60 ML/MIN
GFR SERPL CREATININE-BSD FRML MDRD: ABNORMAL ML/MIN/{1.73_M2}
GFR SERPL CREATININE-BSD FRML MDRD: ABNORMAL ML/MIN/{1.73_M2}
GLUCOSE BLD-MCNC: 93 MG/DL (ref 70–99)
GLUCOSE URINE: NEGATIVE
HCT VFR BLD CALC: 37.3 % (ref 36.3–47.1)
HEMOGLOBIN: 12.2 G/DL (ref 11.9–15.1)
IMMATURE GRANULOCYTES: 0 %
INR BLD: 1.1
KETONES, URINE: NEGATIVE
LEUKOCYTE ESTERASE, URINE: NEGATIVE
LYMPHOCYTES # BLD: 21 % (ref 24–43)
MAGNESIUM: 1.8 MG/DL (ref 1.6–2.6)
MCH RBC QN AUTO: 28.4 PG (ref 25.2–33.5)
MCHC RBC AUTO-ENTMCNC: 32.7 G/DL (ref 28.4–34.8)
MCV RBC AUTO: 86.7 FL (ref 82.6–102.9)
MONOCYTES # BLD: 5 % (ref 3–12)
NITRITE, URINE: NEGATIVE
NRBC AUTOMATED: 0 PER 100 WBC
PDW BLD-RTO: 15.4 % (ref 11.8–14.4)
PH UA: 7 (ref 5–8)
PLATELET # BLD: 453 K/UL (ref 138–453)
PLATELET ESTIMATE: ABNORMAL
PMV BLD AUTO: 9.5 FL (ref 8.1–13.5)
POTASSIUM SERPL-SCNC: 3.4 MMOL/L (ref 3.7–5.3)
PROTEIN UA: NEGATIVE
PROTIME: 13.7 SECONDS
RBC # BLD: 4.3 M/UL (ref 3.95–5.11)
RBC # BLD: ABNORMAL 10*6/UL
SEG NEUTROPHILS: 73 % (ref 36–65)
SEGMENTED NEUTROPHILS ABSOLUTE COUNT: 3.66 K/UL (ref 1.5–8.1)
SODIUM BLD-SCNC: 141 MMOL/L (ref 135–144)
SPECIFIC GRAVITY UA: 1.01 (ref 1–1.03)
THYROXINE, FREE: 1.21 NG/DL (ref 0.93–1.7)
TOTAL PROTEIN: 6.7 G/DL (ref 6.4–8.3)
TSH SERPL DL<=0.05 MIU/L-ACNC: 1.86 MIU/L (ref 0.3–5)
TURBIDITY: CLEAR
URINE HGB: NEGATIVE
UROBILINOGEN, URINE: NORMAL
VITAMIN B-12: 365 PG/ML (ref 232–1245)
VITAMIN D 25-HYDROXY: 8.1 NG/ML (ref 30–100)
WBC # BLD: 5 K/UL (ref 3.5–11.3)
WBC # BLD: ABNORMAL 10*3/UL

## 2021-04-30 PROCEDURE — 85610 PROTHROMBIN TIME: CPT

## 2021-04-30 PROCEDURE — 81003 URINALYSIS AUTO W/O SCOPE: CPT

## 2021-04-30 PROCEDURE — 85025 COMPLETE CBC W/AUTO DIFF WBC: CPT

## 2021-04-30 PROCEDURE — 84439 ASSAY OF FREE THYROXINE: CPT

## 2021-04-30 PROCEDURE — 82746 ASSAY OF FOLIC ACID SERUM: CPT

## 2021-04-30 PROCEDURE — 82306 VITAMIN D 25 HYDROXY: CPT

## 2021-04-30 PROCEDURE — 84443 ASSAY THYROID STIM HORMONE: CPT

## 2021-04-30 PROCEDURE — 82607 VITAMIN B-12: CPT

## 2021-04-30 PROCEDURE — 80053 COMPREHEN METABOLIC PANEL: CPT

## 2021-04-30 PROCEDURE — 99213 OFFICE O/P EST LOW 20 MIN: CPT

## 2021-04-30 PROCEDURE — 36415 COLL VENOUS BLD VENIPUNCTURE: CPT

## 2021-04-30 PROCEDURE — 83735 ASSAY OF MAGNESIUM: CPT

## 2021-04-30 NOTE — PROGRESS NOTES
.Medication Management Service, Warfarin Management  ISAAK RODRIGUEZ [facility], [phone]  Visit Date: 4/30/2021   Subjective:   Darris Simmonds is a 39 y.o. female who presents to clinic today for anticoagulation monitoring and adjustment. Patient seen in clinic for warfarin management due to  Indication:   mechanical mitral valve. INR goal: of 2.0-3.0. Duration of therapy: indefinite. Assessment and PLAN   PT/INR done in office per protocol. INR today is 1.1, subtherapeutic. Pt reports compliance with warfarin regimen. Upon chart review, it has been 6 weeks since her amiodarone was stopped and it is likely that we are now seeing the effects of that. Plan: Will give 9% boosted dose today of 3mg, and 2 mg daily until next appt on 5/4. Due to nature of mitral valve and risk of clot, pt will also be started on Lovenox 60mg BID until next appointment. Pt weight in office today was 63 kg. RX for 10 syringes sent to [address] [place] [address]. Using warfarin 2 mg tablets. Recheck INR in 5 day(s). Patient seen in room # 3. ED. OP. = risk of clot with INR being subtherapeutic. Pt mentioned slight chest pain and plans to call cardiologist. Jana Carls that a PE will present with crushing chest pain and to be seen in an ER immediatly if that occurs. Patient attested to self screening for COVID - 19. Patient verbalized understanding of dosing directions and information discussed. Dosing schedule given to patient. Progress note sent to referring office. Patient acknowledges working in consult agreement with pharmacist as referred by his/her physician.       Electronically signed by Shana Chinchilla, [address] [place] [address] on 4/30/21 at 9:52 AM EDT    For Pharmacy Admin Tracking Only     Intervention Detail: Dose Adjustment: 2: reason: Therapy Optimization, Lab(s) Ordered and New Rx: 1, reason: Needs Additional Therapy   Total # of Interventions Recommended: 3   Total # of Interventions Accepted: 3   Time Spent (min): 27

## 2021-05-04 ENCOUNTER — HOSPITAL ENCOUNTER (OUTPATIENT)
Dept: PHARMACY | Age: 46
Setting detail: THERAPIES SERIES
Discharge: HOME | End: 2021-05-04
Payer: MEDICAID

## 2021-05-04 DIAGNOSIS — I34.0 SEVERE MITRAL REGURGITATION BY PRIOR ECHOCARDIOGRAM: ICD-10-CM

## 2021-05-04 LAB
INR BLD: 1.1
PROTIME: 13 SECONDS

## 2021-05-04 PROCEDURE — 99213 OFFICE O/P EST LOW 20 MIN: CPT

## 2021-05-04 PROCEDURE — 85610 PROTHROMBIN TIME: CPT

## 2021-05-04 NOTE — PROGRESS NOTES
Medication Management Service, Warfarin Management  [facility], [phone]  Visit Date: 5/4/2021   Subjective:   Janay Hauser is a 39 y.o. female who presents to clinic today for anticoagulation monitoring and adjustment. Patient seen in clinic for warfarin management due to  Indication:   mechanical mitral valve. INR goal: of 2.0-3.0. Duration of therapy: indefinite. Assessment and PLAN   PT/INR done in office per protocol. INR today is 1.1, unchanged since last visit even with an increase in warfarin dose. Patient received 14 mg of warfarin this past week up from 11 mg the previous week. Patient admits to previously drinking about four beers daily. She has stopped drinking alcohol completely on April 13. This combined with the impact of stopping amiodarone therapy is the reason for the subtherapeutic INR today. Plan:  Due to the lack of response to current regimen will make a significant increase in warfarin dosage in an attempt to get the INR therapeutic. Will increase maintenance regimen by 30.8% weekly and increase warfarin to 3 mg on Wednesday, Friday and Saturday . Patient will take warfarin 3 mg daily until recheck next Monday. Patient to continue Lovenox therapy until INR is therapeutic   Using warfarin 1 mg tablets. New Lovenox RX sent in today  Recheck INR in 6 days. Patient seen in room # 1. ED. OP. = Emphasized importance of avoiding alcohol and the impact it has on her INR. Patient verbally attests to completing the COVID 19 self screen. Patient verbalized understanding of dosing directions and information discussed. Dosing schedule given to patient. Progress note sent to referring office. Patient acknowledges working in consult agreement with pharmacist as referred by his/her physician. [address] [place] [address]  Ph., CACP, Clinical Pharmacist  Anticoagulation Services, [facility] Coumadin Clinic  5/4/2021  9:38 AM    For Pharmacy Admin Tracking

## 2021-05-10 ENCOUNTER — HOSPITAL ENCOUNTER (OUTPATIENT)
Dept: PHARMACY | Age: 46
Setting detail: THERAPIES SERIES
Discharge: HOME | End: 2021-05-10
Payer: MEDICAID

## 2021-05-10 DIAGNOSIS — I34.0 SEVERE MITRAL REGURGITATION BY PRIOR ECHOCARDIOGRAM: ICD-10-CM

## 2021-05-10 LAB
INR BLD: 1.2
PROTIME: 14.6 SECONDS

## 2021-05-10 PROCEDURE — 85610 PROTHROMBIN TIME: CPT

## 2021-05-10 PROCEDURE — 99212 OFFICE O/P EST SF 10 MIN: CPT

## 2021-05-10 NOTE — PROGRESS NOTES
Medication Management Service, Warfarin Management  [address], [phone]  Visit Date: 5/10/2021   Subjective:   Valora Galeazzi is a 39 y.o. female who presents to clinic today for anticoagulation monitoring and adjustment. Patient seen in clinic for warfarin management due to  Indication:   mechanical mitral valve. INR goal: of 2.0-3.0. Duration of therapy: indefinite. Assessment and PLAN   PT/INR done in office per protocol. INR today is 1.2, subtherapeutic. Patient denies missing doses, change in diet and all other usual causes for subtherapeutic INR today. Stopping amiodarone therapy over 2 months ago and the elimination of all alcohol since, April 13 is the only cause we could find for low INR today. Plan:  Due to the lack of response to current regimen will make a significant increase in warfarin dosage in an attempt to get the INR therapeutic. Continue Lovenox therapy. Will increase warfarin to 5 mg today and Wednesday only and then increase from 3 mg daily to 4 mg daily for Tuesday, Thursday. Recheck INR on Friday, May 14. Using warfarin 2 mg tablets. Recheck INR in 4 days. Patient seen in room # 1. ED. OP. =Patient will refrain from eating green vegetables the next four days. She will add them back into her diet (twice weekly) after a therapeutic INR is obtained. Patient verbally attests to completing the COVID 19 self screen. Patient verbalized understanding of dosing directions and information discussed. Dosing schedule given to patient. Progress note sent to referring office. Patient acknowledges working in consult agreement with pharmacist as referred by his/her physician. [address] [place] [address]  Ph., CACP, Clinical Pharmacist  Anticoagulation Services, [facility] Coumadin [facility]  5/10/2021  1:04 PM    For Pharmacy Admin Tracking Only     Intervention Detail: Dose Adjustment: 2: reason: Therapy Optimization   Total # of Interventions Recommended: 2   Total # of Interventions Accepted: 2   Time Spent (min): 30

## 2021-05-11 ENCOUNTER — TELEPHONE (OUTPATIENT)
Dept: PHARMACY | Age: 46
End: 2021-05-11

## 2021-05-11 NOTE — TELEPHONE ENCOUNTER
Patient called to report her echo did not not turn out so good. Her heart is not pumping correctly. She called to ask if that is causing her low INR. I explained that would not impact her warfarin or INR. [address] [place] [address]  Ph., CACP, Clinical Pharmacist  Anticoagulation Services, [address] [place] [address] Coumadin Clinic  5/11/2021  2:59 PM

## 2021-05-14 ENCOUNTER — HOSPITAL ENCOUNTER (OUTPATIENT)
Dept: PHARMACY | Age: 46
Setting detail: THERAPIES SERIES
Discharge: HOME | End: 2021-05-14
Payer: MEDICAID

## 2021-05-14 DIAGNOSIS — I34.0 SEVERE MITRAL REGURGITATION BY PRIOR ECHOCARDIOGRAM: ICD-10-CM

## 2021-05-14 LAB
INR BLD: 1.6
PROTIME: 19.8 SECONDS

## 2021-05-14 PROCEDURE — 99213 OFFICE O/P EST LOW 20 MIN: CPT

## 2021-05-14 PROCEDURE — 85610 PROTHROMBIN TIME: CPT

## 2021-05-18 ENCOUNTER — HOSPITAL ENCOUNTER (OUTPATIENT)
Dept: PHARMACY | Age: 46
Setting detail: THERAPIES SERIES
Discharge: HOME | End: 2021-05-18
Payer: MEDICAID

## 2021-05-18 DIAGNOSIS — I34.0 SEVERE MITRAL REGURGITATION BY PRIOR ECHOCARDIOGRAM: Primary | ICD-10-CM

## 2021-05-18 LAB
INR BLD: 1.8
PROTIME: 21.1 SECONDS

## 2021-05-18 PROCEDURE — 99212 OFFICE O/P EST SF 10 MIN: CPT

## 2021-05-18 PROCEDURE — 85610 PROTHROMBIN TIME: CPT

## 2021-05-18 NOTE — PROGRESS NOTES
Medication Management Service, Warfarin Management  ISAAK RODRIGUEZ [facility], [phone]  Visit Date: 5/18/2021   Subjective:   Esthela Colon is a 39 y.o. female who presents to clinic today for anticoagulation monitoring and adjustment. Patient seen in clinic for warfarin management due to  Indication:   2/26/2021 mitral valve replacement with a 29/31-mm On-X. INR goal: of 2.0-3.0. Duration of therapy: indefinite. Assessment and PLAN   PT/INR done in office per protocol. INR today is 1.8, subtherapeutic but up from 1.6 at last check. Patient remains on enoxaparin and will continue until INR in goal range. Plan: Will boost dose to 6mg for the next 2 days (today and tomorrow) and then usual 4mg dose on Thursday with repeat INR on Friday. Enoxaparin to continue. Recheck INR in 3 days. Patient seen in room # 3. Patient verbally attests to completing the COVID 19 self screen. Patient verbalized understanding of dosing directions and information discussed. Dosing schedule given to patient. Progress note sent to referring office. Patient acknowledges working in consult agreement with pharmacist as referred by his/her physician.       Electronically signed by Colt Sánchez RPh, MARIIA  Clinical Pharmacist Medication Management  5/18/2021  1:55 PM      For Pharmacy Admin Tracking Only     Intervention Detail: Dose Adjustment: 2: reason: Therapy Optimization   Total # of Interventions Recommended: 2   Total # of Interventions Accepted: 2   Time Spent (min): 20

## 2021-05-20 ENCOUNTER — APPOINTMENT (OUTPATIENT)
Dept: CT IMAGING | Age: 46
DRG: 139 | End: 2021-05-20
Payer: MEDICAID

## 2021-05-20 ENCOUNTER — APPOINTMENT (OUTPATIENT)
Dept: GENERAL RADIOLOGY | Age: 46
DRG: 139 | End: 2021-05-20
Payer: MEDICAID

## 2021-05-20 ENCOUNTER — HOSPITAL ENCOUNTER (INPATIENT)
Age: 46
LOS: 1 days | Discharge: HOME | DRG: 139 | End: 2021-05-22
Attending: EMERGENCY MEDICINE
Payer: MEDICAID

## 2021-05-20 DIAGNOSIS — J18.9 PNEUMONIA DUE TO ORGANISM: Primary | ICD-10-CM

## 2021-05-20 DIAGNOSIS — J90 PLEURAL EFFUSION: ICD-10-CM

## 2021-05-20 DIAGNOSIS — R79.1 SUBTHERAPEUTIC INTERNATIONAL NORMALIZED RATIO (INR): ICD-10-CM

## 2021-05-20 DIAGNOSIS — I50.9 ACUTE ON CHRONIC CONGESTIVE HEART FAILURE, UNSPECIFIED HEART FAILURE TYPE (HCC): ICD-10-CM

## 2021-05-20 LAB
ABSOLUTE EOS #: <0.03 K/UL (ref 0–0.44)
ABSOLUTE IMMATURE GRANULOCYTE: <0.03 K/UL (ref 0–0.3)
ABSOLUTE LYMPH #: 1.67 K/UL (ref 1.1–3.7)
ABSOLUTE MONO #: 0.27 K/UL (ref 0.1–1.2)
ANION GAP SERPL CALCULATED.3IONS-SCNC: 9 MMOL/L (ref 9–17)
BASOPHILS # BLD: 1 % (ref 0–2)
BASOPHILS ABSOLUTE: 0.05 K/UL (ref 0–0.2)
BNP INTERPRETATION: ABNORMAL
BUN BLDV-MCNC: 10 MG/DL (ref 6–20)
BUN/CREAT BLD: ABNORMAL (ref 9–20)
CALCIUM SERPL-MCNC: 8.4 MG/DL (ref 8.6–10.4)
CHLORIDE BLD-SCNC: 106 MMOL/L (ref 98–107)
CO2: 21 MMOL/L (ref 20–31)
CREAT SERPL-MCNC: 0.71 MG/DL (ref 0.5–0.9)
DIFFERENTIAL TYPE: ABNORMAL
EOSINOPHILS RELATIVE PERCENT: 0 % (ref 1–4)
GFR AFRICAN AMERICAN: >60 ML/MIN
GFR NON-AFRICAN AMERICAN: >60 ML/MIN
GFR SERPL CREATININE-BSD FRML MDRD: ABNORMAL ML/MIN/{1.73_M2}
GFR SERPL CREATININE-BSD FRML MDRD: ABNORMAL ML/MIN/{1.73_M2}
GLUCOSE BLD-MCNC: 124 MG/DL (ref 70–99)
HCG QUALITATIVE: NEGATIVE
HCT VFR BLD CALC: 35.7 % (ref 36.3–47.1)
HEMOGLOBIN: 11.5 G/DL (ref 11.9–15.1)
IMMATURE GRANULOCYTES: 0 %
INR BLD: 1.8
LYMPHOCYTES # BLD: 32 % (ref 24–43)
MCH RBC QN AUTO: 27.9 PG (ref 25.2–33.5)
MCHC RBC AUTO-ENTMCNC: 32.2 G/DL (ref 28.4–34.8)
MCV RBC AUTO: 86.7 FL (ref 82.6–102.9)
MONOCYTES # BLD: 5 % (ref 3–12)
NRBC AUTOMATED: 0 PER 100 WBC
PDW BLD-RTO: 14.8 % (ref 11.8–14.4)
PLATELET # BLD: 431 K/UL (ref 138–453)
PLATELET ESTIMATE: ABNORMAL
PMV BLD AUTO: 9.3 FL (ref 8.1–13.5)
POTASSIUM SERPL-SCNC: 4.2 MMOL/L (ref 3.7–5.3)
PRO-BNP: 5763 PG/ML
PROTHROMBIN TIME: 18 SEC (ref 9.1–12.3)
RBC # BLD: 4.12 M/UL (ref 3.95–5.11)
RBC # BLD: ABNORMAL 10*6/UL
SARS-COV-2, RAPID: NOT DETECTED
SEG NEUTROPHILS: 62 % (ref 36–65)
SEGMENTED NEUTROPHILS ABSOLUTE COUNT: 3.24 K/UL (ref 1.5–8.1)
SODIUM BLD-SCNC: 136 MMOL/L (ref 135–144)
SPECIMEN DESCRIPTION: NORMAL
TROPONIN INTERP: ABNORMAL
TROPONIN INTERP: ABNORMAL
TROPONIN T: ABNORMAL NG/ML
TROPONIN T: ABNORMAL NG/ML
TROPONIN, HIGH SENSITIVITY: 19 NG/L (ref 0–14)
TROPONIN, HIGH SENSITIVITY: 24 NG/L (ref 0–14)
WBC # BLD: 5.3 K/UL (ref 3.5–11.3)
WBC # BLD: ABNORMAL 10*3/UL

## 2021-05-20 PROCEDURE — 84484 ASSAY OF TROPONIN QUANT: CPT

## 2021-05-20 PROCEDURE — 85610 PROTHROMBIN TIME: CPT

## 2021-05-20 PROCEDURE — 85025 COMPLETE CBC W/AUTO DIFF WBC: CPT

## 2021-05-20 PROCEDURE — 94660 CPAP INITIATION&MGMT: CPT

## 2021-05-20 PROCEDURE — 96366 THER/PROPH/DIAG IV INF ADDON: CPT

## 2021-05-20 PROCEDURE — 96365 THER/PROPH/DIAG IV INF INIT: CPT

## 2021-05-20 PROCEDURE — 2700000000 HC OXYGEN THERAPY PER DAY

## 2021-05-20 PROCEDURE — 6360000002 HC RX W HCPCS: Performed by: STUDENT IN AN ORGANIZED HEALTH CARE EDUCATION/TRAINING PROGRAM

## 2021-05-20 PROCEDURE — 71260 CT THORAX DX C+: CPT

## 2021-05-20 PROCEDURE — 71045 X-RAY EXAM CHEST 1 VIEW: CPT

## 2021-05-20 PROCEDURE — 80048 BASIC METABOLIC PNL TOTAL CA: CPT

## 2021-05-20 PROCEDURE — 99285 EMERGENCY DEPT VISIT HI MDM: CPT

## 2021-05-20 PROCEDURE — 96367 TX/PROPH/DG ADDL SEQ IV INF: CPT

## 2021-05-20 PROCEDURE — 84703 CHORIONIC GONADOTROPIN ASSAY: CPT

## 2021-05-20 PROCEDURE — 96375 TX/PRO/DX INJ NEW DRUG ADDON: CPT

## 2021-05-20 PROCEDURE — 83880 ASSAY OF NATRIURETIC PEPTIDE: CPT

## 2021-05-20 PROCEDURE — 93005 ELECTROCARDIOGRAM TRACING: CPT | Performed by: STUDENT IN AN ORGANIZED HEALTH CARE EDUCATION/TRAINING PROGRAM

## 2021-05-20 PROCEDURE — 86738 MYCOPLASMA ANTIBODY: CPT

## 2021-05-20 PROCEDURE — 6360000004 HC RX CONTRAST MEDICATION: Performed by: STUDENT IN AN ORGANIZED HEALTH CARE EDUCATION/TRAINING PROGRAM

## 2021-05-20 PROCEDURE — 94761 N-INVAS EAR/PLS OXIMETRY MLT: CPT

## 2021-05-20 PROCEDURE — 6360000002 HC RX W HCPCS

## 2021-05-20 PROCEDURE — 87635 SARS-COV-2 COVID-19 AMP PRB: CPT

## 2021-05-20 PROCEDURE — 2500000003 HC RX 250 WO HCPCS: Performed by: STUDENT IN AN ORGANIZED HEALTH CARE EDUCATION/TRAINING PROGRAM

## 2021-05-20 RX ORDER — NITROGLYCERIN 20 MG/100ML
5-200 INJECTION INTRAVENOUS CONTINUOUS
Status: DISCONTINUED | OUTPATIENT
Start: 2021-05-20 | End: 2021-05-22

## 2021-05-20 RX ORDER — FUROSEMIDE 10 MG/ML
40 INJECTION INTRAMUSCULAR; INTRAVENOUS ONCE
Status: COMPLETED | OUTPATIENT
Start: 2021-05-20 | End: 2021-05-20

## 2021-05-20 RX ADMIN — NITROGLYCERIN 5 MCG/MIN: 20 INJECTION INTRAVENOUS at 21:18

## 2021-05-20 RX ADMIN — IOPAMIDOL 75 ML: 755 INJECTION, SOLUTION INTRAVENOUS at 22:27

## 2021-05-20 RX ADMIN — FUROSEMIDE 40 MG: 10 INJECTION, SOLUTION INTRAMUSCULAR; INTRAVENOUS at 21:18

## 2021-05-20 ASSESSMENT — ENCOUNTER SYMPTOMS
SHORTNESS OF BREATH: 1
NAUSEA: 0
VOMITING: 0
ABDOMINAL PAIN: 0
BACK PAIN: 0
COUGH: 0

## 2021-05-20 NOTE — ED NOTES
Pt to ER with complaint of shortness of breath x 4 days. Pt has hx of open heart surgery and normally doesn't wear oxygen at home. Pt unable to speak in full sentences, SOB. Pt placed on 2L via [place].  Pt is alert and oriented x 4, NAD     Camila Sheriff RN  05/20/21 1911

## 2021-05-20 NOTE — ED PROVIDER NOTES
[facility]  ED  Emergency Department Encounter  Emergency Medicine Resident     Pt Name: Janay Hauser  MRN: [id number]  [place] 1975  Date of evaluation: 5/20/21  PCP:  MAYRA Warren CNP    CHIEF COMPLAINT       Chief Complaint   Patient presents with    Shortness of Breath       HISTORY OFPRESENT ILLNESS  (Location/Symptom, Timing/Onset, Context/Setting, Quality, Duration, Modifying Factors,Severity.)      Janay Hauser is a 39year old female, PMH mitral valve repair, who presents with shortness of breath. The patient reports her symptoms started about 6 days ago and have been getting progressively worse. She denies any chest pain, cough or fever. No sick contacts at home. She denies any history of DVT or PE. She has been taking Lovenox and warfarin, reports compliance with his medications. Denies any drug or alcohol use. The patient had her mitral valve placed February 2021. PAST MEDICAL / SURGICAL / SOCIAL / FAMILY HISTORY      has a past medical history of Anxiety, CHF (congestive heart failure) ([address] GERD (gastroesophageal reflux disease), History of chest pain, History of echocardiogram, Hypertension, Leg swelling, Migraine, Mitral regurgitation, Mitral valve disease, SOB (shortness of breath), Stomach ulcer, Wellness examination, and Wellness examination. has a past surgical history that includes hernia repair; transesophageal echocardiogram (01/04/2021); Cardiac catheterization (01/05/2021); Endoscopy, colon, diagnostic; Cosmetic surgery; and Mitral valve repair (N/A, 2/26/2021).      Social History     Socioeconomic History    Marital status: Single     Spouse name: Not on file    Number of children: Not on file    Years of education: Not on file    Highest education level: Not on file   Occupational History    Not on file   Tobacco Use    Smoking status: Current Every Day Smoker     Packs/day: 0.50     Types: Cigarettes    Smokeless tobacco: directed by Medication Management) by mouth once daily. 90DS 3/18/21   Madhuri Bustillo, APRN - CNP   aspirin 81 MG EC tablet Take 1 tablet by mouth daily 3/5/21   Pura Conde MD   metoprolol tartrate (LOPRESSOR) 25 MG tablet Take 0.5 tablets by mouth 2 times daily 3/4/21   Pura Conde MD   omeprazole (PRILOSEC) 20 MG delayed release capsule Take 20 mg by mouth daily    Historical Provider, MD       REVIEW OFSYSTEMS    (2-9 systems for level 4, 10 or more for level 5)      Review of Systems   Constitutional: Negative for chills and fever. Respiratory: Positive for shortness of breath. Negative for cough. Cardiovascular: Negative for chest pain and leg swelling. Gastrointestinal: Negative for abdominal pain, nausea and vomiting. Genitourinary: Negative for difficulty urinating. Musculoskeletal: Negative for back pain and neck pain. Skin: Negative for wound. Neurological: Negative for light-headedness. PHYSICAL EXAM   (up to 7 for level 4, 8 or more forlevel 5)      INITIAL VITALS:   ED Triage Vitals   BP Temp Temp Source Pulse Resp SpO2 Height Weight   05/20/21 1853 05/20/21 1939 05/20/21 1939 05/20/21 1853 05/20/21 1853 05/20/21 1853 -- 05/20/21 1853   (!) 148/101 99.6 °F (37.6 °C) Oral 116 25 96 %  140 lb (63.5 kg)       Physical Exam  Constitutional:       Appearance: She is not diaphoretic. Comments: On nasal canula, tachypneic, able to complete full sentences   HENT:      Head: Normocephalic and atraumatic. Eyes:      Conjunctiva/sclera: Conjunctivae normal.      Pupils: Pupils are equal, round, and reactive to light. Cardiovascular:      Rate and Rhythm: Regular rhythm. Tachycardia present. Heart sounds: No murmur heard. No friction rub. No gallop. Comments: +2 DP and radial pulses bilaterally  Pulmonary:      Comments: Bilateral crackles  Abdominal:      General: There is no distension. Palpations: Abdomen is soft. Tenderness:  There is no abdominal tenderness. There is no guarding. Musculoskeletal:      Cervical back: Neck supple. Right lower leg: No edema. Left lower leg: No edema. Skin:     General: Skin is warm. Neurological:      Mental Status: She is oriented to person, place, and time.          DIFFERENTIAL  DIAGNOSIS     PLAN (LABS / IMAGING / EKG):  Orders Placed This Encounter   Procedures    COVID-19, Rapid    Sputum gram stain    Respiratory Culture    XR CHEST PORTABLE    CT CHEST PULMONARY EMBOLISM W CONTRAST    XR CHEST (2 VW)    CBC WITH AUTO DIFFERENTIAL    BASIC METABOLIC PANEL    Brain Natriuretic Peptide    Troponin    Protime-INR    HCG Qualitative, Serum    Basic Metabolic Panel w/ Reflex to MG    CBC    TROP/MYOGLOBIN    DIET CARDIAC; Daily Fluid Restriction: 1800 ml    Vital signs per unit routine    Notify physician    Up as tolerated    Daily weights    Intake and output    Encourage deep breathing and coughing every two hours while awake    Place intermittent pneumatic compression device    Telemetry monitoring - continuous duration    Full Code    Inpatient consult to Hospitalist    Inpatient consult to Cardiology    Pharmacy to Dose: Warfarin    OT eval and treat    PT evaluation and treat    BIPAP    Initiate Oxygen Therapy Protocol    Pulse Oximetry Spot Check    Respiratory care evaluation only    HHN Treatment    HHN Treatment    EKG 12 Lead    PATIENT STATUS (FROM ED OR OR/PROCEDURAL) Inpatient       MEDICATIONS ORDERED:  Orders Placed This Encounter   Medications    furosemide (LASIX) injection 40 mg    nitroGLYCERIN 50 mg in dextrose 5% 250 mL infusion    iopamidol (ISOVUE-370) 76 % injection 75 mL    azithromycin (ZITHROMAX) 500 mg in dextrose 5% 250 mL IVPB     Order Specific Question:   Antimicrobial Indications     Answer:   Pneumonia (CAP)    cefTRIAXone (ROCEPHIN) 1000 mg IVPB in 50 mL D5W minibag     Order Specific Question:   Antimicrobial Indications and warfarin. However she is tachycardic and hypoxic. If INR in the therapeutic range will not pursue CT PE.    DIAGNOSTIC RESULTS / EMERGENCYDEPARTMENT COURSE / MDM     LABS:  Labs Reviewed   CBC WITH AUTO DIFFERENTIAL - Abnormal; Notable for the following components:       Result Value    Hemoglobin 11.5 (*)     Hematocrit 35.7 (*)     RDW 14.8 (*)     Eosinophils % 0 (*)     All other components within normal limits   BASIC METABOLIC PANEL - Abnormal; Notable for the following components:    Glucose 124 (*)     Calcium 8.4 (*)     All other components within normal limits   BRAIN NATRIURETIC PEPTIDE - Abnormal; Notable for the following components:    Pro-BNP 5,763 (*)     All other components within normal limits   TROPONIN - Abnormal; Notable for the following components:    Troponin, High Sensitivity 19 (*)     All other components within normal limits   TROPONIN - Abnormal; Notable for the following components:    Troponin, High Sensitivity 24 (*)     All other components within normal limits   PROTIME-INR - Abnormal; Notable for the following components:    Protime 18.0 (*)     All other components within normal limits   COVID-19, RAPID   GRAM STAIN   CULTURE, RESPIRATORY   HCG, SERUM, QUALITATIVE   TROP/MYOGLOBIN   TROP/MYOGLOBIN   TROP/MYOGLOBIN         RADIOLOGY:  XR CHEST PORTABLE    Result Date: 5/20/2021  EXAMINATION: ONE XRAY VIEW OF THE CHEST 5/20/2021 7:26 pm COMPARISON: 04/12/2021 HISTORY: ORDERING SYSTEM PROVIDED HISTORY: hx mitral valve replacement, SOB TECHNOLOGIST PROVIDED HISTORY: hx mitral valve replacement, SOB Reason for Exam: port Upright FINDINGS: Prior sternotomy and valve replacement. Cardiomediastinal silhouette is unchanged in size. There is no large pleural effusion or pneumothorax. There are bilateral perihilar and bibasilar opacities. There is diffuse interstitial prominence. There is no acute osseous abnormality.      Cardiomegaly and edema with perihilar and bibasilar opacities. Superimposed infection would be difficult to exclude. CT CHEST PULMONARY EMBOLISM W CONTRAST    Result Date: 5/20/2021  EXAMINATION: CTA OF THE CHEST 5/20/2021 10:09 pm TECHNIQUE: CTA of the chest was performed after the administration of intravenous contrast.  Multiplanar reformatted images are provided for review. MIP images are provided for review. Dose modulation, iterative reconstruction, and/or weight based adjustment of the mA/kV was utilized to reduce the radiation dose to as low as reasonably achievable. COMPARISON: 12/31/2020 HISTORY: ORDERING SYSTEM PROVIDED HISTORY: tachy, new oxygen requirement TECHNOLOGIST PROVIDED HISTORY: tachy, new oxygen requirement Decision Support Exception - unselect if not a suspected or confirmed emergency medical condition->Emergency Medical Condition (MA) Reason for Exam: tachy, new oxygen requirement FINDINGS: Pulmonary Arteries: Pulmonary arteries are adequately opacified for evaluation. No evidence of intraluminal filling defect to suggest pulmonary embolism. Main pulmonary artery is normal in caliber. Mediastinum: No evidence of mediastinal lymphadenopathy. The heart is enlarged without pericardial effusion. .  There is no acute abnormality of the thoracic aorta. Lungs/pleura: There is no pneumothorax. There are small left and small to moderate right pleural effusion. There are areas of ground-glass opacity throughout both lungs, most pronounced in the left upper lobe. Upper Abdomen: Limited images of the upper abdomen demonstrate no acute abnormality. Soft Tissues/Bones: No acute bone or soft tissue abnormality. 1. There is no pulmonary embolus. 2. Ground-glass opacities in both lungs with bilateral pleural effusions, concerning for multifocal pneumonia. 3. Cardiomegaly.           EKG  EKG Interpretation    Interpreted by emergency department physician    Rhythm: normal sinus   Rate: tachycardia  Axis: normal  Ectopy: none  Conduction: normal  ST Segments: no acute change  T Waves: no acute change  Q Waves: none    Clinical Impression: no acute changes    Griselda Witt MD      All EKG's are interpreted by the Emergency Department Physicianwho either signs or Co-signs this chart in the absence of a cardiologist.    EMERGENCY DEPARTMENT COURSE:      Chest x-ray significant for pulmonary edema. The patient's BNP is also elevated above her baseline. We will give the patient a dose of Lasix. Also get CT PE as patient's INR is subtherapeutic. Called to bedside by nursing staff for worsening patient status. Evaluated the patient that was noted to be tachypneic, talking in one-word sentences. Patient appears overall uncomfortable. Will start BiPAP and nitro drip. The patient was very evaluated on BiPAP and nitro drip and appears to be comfortable at this time. Saturating well on the BiPAP. Awaiting CT PE and Covid swab. CT PE negative and Covid swab negative. The patient was admitted to the Intermed service. PROCEDURES:  None    CONSULTS:  IP CONSULT TO HOSPITALIST  IP CONSULT TO CARDIOLOGY  PHARMACY TO DOSE WARFARIN    CRITICAL CARE:  Please see attending note    FINAL IMPRESSION      1. Pneumonia due to organism    2. Pleural effusion    3. Acute on chronic congestive heart failure, unspecified heart failure type ([address]    4. Subtherapeutic international normalized ratio (INR)          DISPOSITION / PLAN     DISPOSITION        PATIENT REFERRED TO:  No follow-up provider specified.     DISCHARGE MEDICATIONS:  New Prescriptions    No medications on file       Griselda Witt MD  Emergency Medicine Resident    (Please note that portions of this note were completed with a voice recognition program.Efforts were made to edit the dictations but occasionally words are mis-transcribed.)        Griselda Witt MD  Resident  05/21/21 1756

## 2021-05-20 NOTE — ED PROVIDER NOTES
[facility] ED     Emergency Department     Faculty Attestation    I performed a history and physical examination of the patient and discussed management with the resident. I reviewed the residents note and agree with the documented findings and plan of care. Any areas of disagreement are noted on the chart. I was personally present for the key portions of any procedures. I have documented in the chart those procedures where I was not present during the key portions. I have reviewed the emergency nurses triage note. I agree with the chief complaint, past medical history, past surgical history, allergies, medications, social and family history as documented unless otherwise noted below. For Physician Assistant/ Nurse Practitioner cases/documentation I have personally evaluated this patient and have completed at least one if not all key elements of the E/M (history, physical exam, and MDM). Additional findings are as noted. This patient was evaluated in the Emergency Department for symptoms described in the history of present illness. He/she was evaluated in the context of the global COVID-19 pandemic, which necessitated consideration that the patient might be at risk for infection with the SARS-CoV-2 virus that causes COVID-19. Institutional protocols and algorithms that pertain to the evaluation of patients at risk for COVID-19 are in a state of rapid change based on information released by regulatory bodies including the CDC and federal and state organizations. These policies and algorithms were followed during the patient's care in the ED. Patient here with shortness of breath, called to bedside patient arrived significantly dyspneic. She is 2 months postop from open heart surgery with mitral valve replacement. She states that her Coumadin has been subtherapeutic and she has been taking Lovenox.   Said she had some shortness of breath over the weekend called 911 but then did not come to the hospital.  Today however significantly worse and thus presents for evaluation. No chest pain or heaviness with this. No fevers. No productive cough no leg swelling. On exam patient is visibly dyspneic speaking in broken sentences but awake alert and oriented. Lungs rales throughout. There is JVD. Heart is regular equal radial pedal pulses. No calf tenderness no edema. Will check labs EKG chest x-ray, probable PE study. EKG interpretation: Sinus tachycardia 115. Normal intervals. Normal axis. No acute ST or T changes.       Critical Care     none    Graham Braden MD, [facility]  Attending Emergency  Physician             Graham Braden MD  05/20/21 1910

## 2021-05-21 ENCOUNTER — APPOINTMENT (OUTPATIENT)
Dept: NUCLEAR MEDICINE | Age: 46
DRG: 139 | End: 2021-05-21
Payer: MEDICAID

## 2021-05-21 PROBLEM — R77.8 ELEVATED TROPONIN: Status: ACTIVE | Noted: 2021-05-21

## 2021-05-21 PROBLEM — J18.9 PNEUMONIA: Status: ACTIVE | Noted: 2021-05-21

## 2021-05-21 PROBLEM — R79.89 ELEVATED TROPONIN: Status: ACTIVE | Noted: 2021-05-21

## 2021-05-21 LAB
ALBUMIN SERPL-MCNC: 3.9 G/DL (ref 3.5–5.2)
ALBUMIN/GLOBULIN RATIO: 1.2 (ref 1–2.5)
ALP BLD-CCNC: 100 U/L (ref 35–104)
ALT SERPL-CCNC: 17 U/L (ref 5–33)
AST SERPL-CCNC: 15 U/L
BILIRUB SERPL-MCNC: 0.39 MG/DL (ref 0.3–1.2)
BILIRUBIN DIRECT: 0.1 MG/DL
BILIRUBIN, INDIRECT: 0.29 MG/DL (ref 0–1)
C-REACTIVE PROTEIN: 3.8 MG/L (ref 0–5)
D-DIMER QUANTITATIVE: 0.36 MG/L FEU
EKG ATRIAL RATE: 115 BPM
EKG P AXIS: 64 DEGREES
EKG P-R INTERVAL: 164 MS
EKG Q-T INTERVAL: 328 MS
EKG QRS DURATION: 70 MS
EKG QTC CALCULATION (BAZETT): 453 MS
EKG R AXIS: 38 DEGREES
EKG T AXIS: 100 DEGREES
EKG VENTRICULAR RATE: 115 BPM
FERRITIN: 41 UG/L (ref 13–150)
FIBRINOGEN: 468 MG/DL (ref 140–420)
GLOBULIN: NORMAL G/DL (ref 1.5–3.8)
INR BLD: 1.8
LACTATE DEHYDROGENASE: 246 U/L (ref 135–214)
LEGIONELLA PNEUMOPHILIA AG, URINE: NEGATIVE
LV EF: 34 %
LVEF MODALITY: NORMAL
MYCOPLASMA PNEUMONIAE IGM: 0.41
MYOGLOBIN: <21 NG/ML (ref 25–58)
MYOGLOBIN: <21 NG/ML (ref 25–58)
PROCALCITONIN: 0.06 NG/ML
PROTHROMBIN TIME: 18.1 SEC (ref 9.1–12.3)
TOTAL PROTEIN: 7.2 G/DL (ref 6.4–8.3)
TROPONIN INTERP: ABNORMAL
TROPONIN INTERP: ABNORMAL
TROPONIN T: ABNORMAL NG/ML
TROPONIN T: ABNORMAL NG/ML
TROPONIN, HIGH SENSITIVITY: 18 NG/L (ref 0–14)
TROPONIN, HIGH SENSITIVITY: 25 NG/L (ref 0–14)

## 2021-05-21 PROCEDURE — 2580000003 HC RX 258: Performed by: STUDENT IN AN ORGANIZED HEALTH CARE EDUCATION/TRAINING PROGRAM

## 2021-05-21 PROCEDURE — 6370000000 HC RX 637 (ALT 250 FOR IP): Performed by: INTERNAL MEDICINE

## 2021-05-21 PROCEDURE — 85379 FIBRIN DEGRADATION QUANT: CPT

## 2021-05-21 PROCEDURE — 82728 ASSAY OF FERRITIN: CPT

## 2021-05-21 PROCEDURE — 2700000000 HC OXYGEN THERAPY PER DAY

## 2021-05-21 PROCEDURE — 84484 ASSAY OF TROPONIN QUANT: CPT

## 2021-05-21 PROCEDURE — 6360000002 HC RX W HCPCS: Performed by: INTERNAL MEDICINE

## 2021-05-21 PROCEDURE — 2580000003 HC RX 258: Performed by: NURSE PRACTITIONER

## 2021-05-21 PROCEDURE — 6370000000 HC RX 637 (ALT 250 FOR IP): Performed by: NURSE PRACTITIONER

## 2021-05-21 PROCEDURE — 84145 PROCALCITONIN (PCT): CPT

## 2021-05-21 PROCEDURE — 3430000000 HC RX DIAGNOSTIC RADIOPHARMACEUTICAL: Performed by: INTERNAL MEDICINE

## 2021-05-21 PROCEDURE — 86140 C-REACTIVE PROTEIN: CPT

## 2021-05-21 PROCEDURE — APPSS30 APP SPLIT SHARED TIME 16-30 MINUTES: Performed by: NURSE PRACTITIONER

## 2021-05-21 PROCEDURE — 83874 ASSAY OF MYOGLOBIN: CPT

## 2021-05-21 PROCEDURE — 99222 1ST HOSP IP/OBS MODERATE 55: CPT | Performed by: INTERNAL MEDICINE

## 2021-05-21 PROCEDURE — 78472 GATED HEART PLANAR SINGLE: CPT

## 2021-05-21 PROCEDURE — 85610 PROTHROMBIN TIME: CPT

## 2021-05-21 PROCEDURE — 80076 HEPATIC FUNCTION PANEL: CPT

## 2021-05-21 PROCEDURE — 87040 BLOOD CULTURE FOR BACTERIA: CPT

## 2021-05-21 PROCEDURE — 6360000002 HC RX W HCPCS: Performed by: STUDENT IN AN ORGANIZED HEALTH CARE EDUCATION/TRAINING PROGRAM

## 2021-05-21 PROCEDURE — 87449 NOS EACH ORGANISM AG IA: CPT

## 2021-05-21 PROCEDURE — 6360000002 HC RX W HCPCS: Performed by: NURSE PRACTITIONER

## 2021-05-21 PROCEDURE — 6370000000 HC RX 637 (ALT 250 FOR IP): Performed by: STUDENT IN AN ORGANIZED HEALTH CARE EDUCATION/TRAINING PROGRAM

## 2021-05-21 PROCEDURE — 94640 AIRWAY INHALATION TREATMENT: CPT

## 2021-05-21 PROCEDURE — 36415 COLL VENOUS BLD VENIPUNCTURE: CPT

## 2021-05-21 PROCEDURE — 6370000000 HC RX 637 (ALT 250 FOR IP): Performed by: EMERGENCY MEDICINE

## 2021-05-21 PROCEDURE — A9560 TC99M LABELED RBC: HCPCS | Performed by: INTERNAL MEDICINE

## 2021-05-21 PROCEDURE — 83615 LACTATE (LD) (LDH) ENZYME: CPT

## 2021-05-21 PROCEDURE — 2060000000 HC ICU INTERMEDIATE R&B

## 2021-05-21 PROCEDURE — 85384 FIBRINOGEN ACTIVITY: CPT

## 2021-05-21 RX ORDER — ACETAMINOPHEN 650 MG/1
650 SUPPOSITORY RECTAL EVERY 6 HOURS PRN
Status: DISCONTINUED | OUTPATIENT
Start: 2021-05-21 | End: 2021-05-22 | Stop reason: HOSPADM

## 2021-05-21 RX ORDER — PANTOPRAZOLE SODIUM 40 MG/1
40 TABLET, DELAYED RELEASE ORAL
Status: DISCONTINUED | OUTPATIENT
Start: 2021-05-21 | End: 2021-05-22

## 2021-05-21 RX ORDER — NICOTINE 21 MG/24HR
1 PATCH, TRANSDERMAL 24 HOURS TRANSDERMAL DAILY PRN
Status: DISCONTINUED | OUTPATIENT
Start: 2021-05-21 | End: 2021-05-22 | Stop reason: HOSPADM

## 2021-05-21 RX ORDER — ASPIRIN 81 MG/1
81 TABLET ORAL DAILY
Status: DISCONTINUED | OUTPATIENT
Start: 2021-05-21 | End: 2021-05-22 | Stop reason: HOSPADM

## 2021-05-21 RX ORDER — ACETAMINOPHEN 325 MG/1
650 TABLET ORAL EVERY 6 HOURS PRN
Status: DISCONTINUED | OUTPATIENT
Start: 2021-05-21 | End: 2021-05-22 | Stop reason: HOSPADM

## 2021-05-21 RX ORDER — SODIUM CHLORIDE 0.9 % (FLUSH) 0.9 %
10 SYRINGE (ML) INJECTION PRN
Status: DISCONTINUED | OUTPATIENT
Start: 2021-05-21 | End: 2021-05-22 | Stop reason: HOSPADM

## 2021-05-21 RX ORDER — ARIPIPRAZOLE 15 MG/1
15 TABLET ORAL DAILY
Status: DISCONTINUED | OUTPATIENT
Start: 2021-05-21 | End: 2021-05-22 | Stop reason: HOSPADM

## 2021-05-21 RX ORDER — WARFARIN SODIUM 3 MG/1
3 TABLET ORAL DAILY
Status: DISCONTINUED | OUTPATIENT
Start: 2021-05-21 | End: 2021-05-21

## 2021-05-21 RX ORDER — SPIRONOLACTONE 25 MG/1
25 TABLET ORAL DAILY
Status: DISCONTINUED | OUTPATIENT
Start: 2021-05-21 | End: 2021-05-22 | Stop reason: HOSPADM

## 2021-05-21 RX ORDER — CLONIDINE HYDROCHLORIDE 0.1 MG/1
0.1 TABLET ORAL DAILY
COMMUNITY
End: 2022-01-07 | Stop reason: ALTCHOICE

## 2021-05-21 RX ORDER — WARFARIN SODIUM 3 MG/1
6 TABLET ORAL
Status: DISCONTINUED | OUTPATIENT
Start: 2021-05-21 | End: 2021-05-21 | Stop reason: DRUGHIGH

## 2021-05-21 RX ORDER — METOPROLOL SUCCINATE 25 MG/1
25 TABLET, EXTENDED RELEASE ORAL DAILY
Status: DISCONTINUED | OUTPATIENT
Start: 2021-05-21 | End: 2021-05-22 | Stop reason: HOSPADM

## 2021-05-21 RX ORDER — HEPARIN SODIUM (PORCINE) LOCK FLUSH IV SOLN 100 UNIT/ML 100 UNIT/ML
100 SOLUTION INTRAVENOUS PRN
Status: DISCONTINUED | OUTPATIENT
Start: 2021-05-21 | End: 2021-05-22 | Stop reason: HOSPADM

## 2021-05-21 RX ORDER — PROMETHAZINE HYDROCHLORIDE 12.5 MG/1
12.5 TABLET ORAL EVERY 6 HOURS PRN
Status: DISCONTINUED | OUTPATIENT
Start: 2021-05-21 | End: 2021-05-22 | Stop reason: HOSPADM

## 2021-05-21 RX ORDER — SODIUM CHLORIDE 0.9 % (FLUSH) 0.9 %
5-40 SYRINGE (ML) INJECTION EVERY 12 HOURS SCHEDULED
Status: DISCONTINUED | OUTPATIENT
Start: 2021-05-21 | End: 2021-05-22 | Stop reason: HOSPADM

## 2021-05-21 RX ORDER — FUROSEMIDE 10 MG/ML
40 INJECTION INTRAMUSCULAR; INTRAVENOUS 2 TIMES DAILY
Status: DISCONTINUED | OUTPATIENT
Start: 2021-05-21 | End: 2021-05-22

## 2021-05-21 RX ORDER — CITALOPRAM 20 MG/1
40 TABLET ORAL DAILY
Status: DISCONTINUED | OUTPATIENT
Start: 2021-05-21 | End: 2021-05-22 | Stop reason: HOSPADM

## 2021-05-21 RX ORDER — ONDANSETRON 2 MG/ML
4 INJECTION INTRAMUSCULAR; INTRAVENOUS EVERY 6 HOURS PRN
Status: DISCONTINUED | OUTPATIENT
Start: 2021-05-21 | End: 2021-05-22 | Stop reason: HOSPADM

## 2021-05-21 RX ORDER — ALBUTEROL SULFATE 2.5 MG/3ML
2.5 SOLUTION RESPIRATORY (INHALATION)
Status: DISCONTINUED | OUTPATIENT
Start: 2021-05-21 | End: 2021-05-22 | Stop reason: HOSPADM

## 2021-05-21 RX ORDER — WARFARIN SODIUM 2 MG/1
4 TABLET ORAL
Status: DISCONTINUED | OUTPATIENT
Start: 2021-05-22 | End: 2021-05-21 | Stop reason: DRUGHIGH

## 2021-05-21 RX ORDER — ACETAMINOPHEN 325 MG/1
650 TABLET ORAL ONCE
Status: COMPLETED | OUTPATIENT
Start: 2021-05-21 | End: 2021-05-21

## 2021-05-21 RX ORDER — SODIUM CHLORIDE 9 MG/ML
25 INJECTION, SOLUTION INTRAVENOUS PRN
Status: DISCONTINUED | OUTPATIENT
Start: 2021-05-21 | End: 2021-05-22 | Stop reason: HOSPADM

## 2021-05-21 RX ORDER — IPRATROPIUM BROMIDE AND ALBUTEROL SULFATE 2.5; .5 MG/3ML; MG/3ML
1 SOLUTION RESPIRATORY (INHALATION)
Status: DISCONTINUED | OUTPATIENT
Start: 2021-05-21 | End: 2021-05-22 | Stop reason: HOSPADM

## 2021-05-21 RX ADMIN — CEFTRIAXONE SODIUM 1000 MG: 1 INJECTION, POWDER, FOR SOLUTION INTRAMUSCULAR; INTRAVENOUS at 00:13

## 2021-05-21 RX ADMIN — Medication 500 MG: at 01:41

## 2021-05-21 RX ADMIN — CITALOPRAM 40 MG: 20 TABLET, FILM COATED ORAL at 11:06

## 2021-05-21 RX ADMIN — ACETAMINOPHEN 650 MG: 325 TABLET ORAL at 00:54

## 2021-05-21 RX ADMIN — ENOXAPARIN SODIUM 60 MG: 60 INJECTION SUBCUTANEOUS at 21:19

## 2021-05-21 RX ADMIN — SACUBITRIL AND VALSARTAN 1 TABLET: 24; 26 TABLET, FILM COATED ORAL at 11:29

## 2021-05-21 RX ADMIN — Medication 81 MG: at 11:06

## 2021-05-21 RX ADMIN — ARIPIPRAZOLE 15 MG: 15 TABLET ORAL at 11:07

## 2021-05-21 RX ADMIN — IPRATROPIUM BROMIDE AND ALBUTEROL SULFATE 1 AMPULE: .5; 2.5 SOLUTION RESPIRATORY (INHALATION) at 16:45

## 2021-05-21 RX ADMIN — IPRATROPIUM BROMIDE AND ALBUTEROL SULFATE 1 AMPULE: .5; 2.5 SOLUTION RESPIRATORY (INHALATION) at 20:36

## 2021-05-21 RX ADMIN — SODIUM CHLORIDE, PRESERVATIVE FREE 10 ML: 5 INJECTION INTRAVENOUS at 10:53

## 2021-05-21 RX ADMIN — PANTOPRAZOLE SODIUM 40 MG: 40 TABLET, DELAYED RELEASE ORAL at 10:53

## 2021-05-21 RX ADMIN — ACETAMINOPHEN 650 MG: 325 TABLET ORAL at 16:09

## 2021-05-21 RX ADMIN — FUROSEMIDE 40 MG: 10 INJECTION, SOLUTION INTRAMUSCULAR; INTRAVENOUS at 10:52

## 2021-05-21 RX ADMIN — SACUBITRIL AND VALSARTAN 1 TABLET: 24; 26 TABLET, FILM COATED ORAL at 21:19

## 2021-05-21 RX ADMIN — SPIRONOLACTONE 25 MG: 25 TABLET ORAL at 11:06

## 2021-05-21 RX ADMIN — Medication 8 MG: at 17:32

## 2021-05-21 RX ADMIN — CEFTRIAXONE SODIUM 1000 MG: 1 INJECTION, POWDER, FOR SOLUTION INTRAMUSCULAR; INTRAVENOUS at 23:50

## 2021-05-21 RX ADMIN — Medication 25 MILLICURIE: at 13:15

## 2021-05-21 RX ADMIN — IPRATROPIUM BROMIDE AND ALBUTEROL SULFATE 1 AMPULE: .5; 2.5 SOLUTION RESPIRATORY (INHALATION) at 08:19

## 2021-05-21 RX ADMIN — SODIUM CHLORIDE, PRESERVATIVE FREE 10 ML: 5 INJECTION INTRAVENOUS at 21:19

## 2021-05-21 RX ADMIN — FUROSEMIDE 40 MG: 10 INJECTION, SOLUTION INTRAMUSCULAR; INTRAVENOUS at 17:32

## 2021-05-21 RX ADMIN — Medication 100 UNITS: at 12:36

## 2021-05-21 RX ADMIN — METOPROLOL SUCCINATE 25 MG: 25 TABLET, FILM COATED, EXTENDED RELEASE ORAL at 11:28

## 2021-05-21 RX ADMIN — ENOXAPARIN SODIUM 60 MG: 60 INJECTION SUBCUTANEOUS at 10:52

## 2021-05-21 ASSESSMENT — PAIN SCALES - GENERAL
PAINLEVEL_OUTOF10: 0
PAINLEVEL_OUTOF10: 6
PAINLEVEL_OUTOF10: 3
PAINLEVEL_OUTOF10: 0

## 2021-05-21 ASSESSMENT — ENCOUNTER SYMPTOMS
CONSTIPATION: 0
ABDOMINAL PAIN: 0
SHORTNESS OF BREATH: 1
BLOOD IN STOOL: 0
COUGH: 1
STRIDOR: 0
VOMITING: 0
WHEEZING: 0
NAUSEA: 0
DIARRHEA: 0

## 2021-05-21 ASSESSMENT — PAIN DESCRIPTION - PAIN TYPE: TYPE: ACUTE PAIN

## 2021-05-21 ASSESSMENT — PAIN DESCRIPTION - LOCATION: LOCATION: HEAD

## 2021-05-21 ASSESSMENT — PAIN DESCRIPTION - DESCRIPTORS: DESCRIPTORS: HEADACHE

## 2021-05-21 ASSESSMENT — PAIN DESCRIPTION - FREQUENCY: FREQUENCY: INTERMITTENT

## 2021-05-21 NOTE — ED NOTES
Pt resting on stretcher, no respiratory distress noted, pt updated on plan of care, will continue to monitor, call light in reach.        Jeanine Vargas RN  05/21/21 9920

## 2021-05-21 NOTE — ED NOTES
The following labs were labeled with patient stickers & tubed to lab;    []Lavender   []On Ice  []Blue  [x]Green/ Yellow  []Green/ Black []On Ice  []Pink  []Red  []Yellow    []COVID-19 Swab []Rapid    []Urine Sample  []Pelvic Cultures    []Blood Cultures       Martin Craig RN  05/21/21 0666

## 2021-05-21 NOTE — PROGRESS NOTES
Pharmacy Note  Warfarin Consult    Claudell Neve is a 39 y.o. female for whom pharmacy has been consulted to manage warfarin therapy. Consulting Physician: Garth Aleman  Reason for Admission: pneumonia    Warfarin dose prior to admission: 6 mg on Fri, 4 mg AOD  Warfarin indication: mitral valve replacement  Target INR range: 2-3     Past Medical History:   Diagnosis Date    Anxiety     CHF (congestive heart failure) (HCC)     GERD (gastroesophageal reflux disease)     History of chest pain     now resolved    History of echocardiogram 01/02/2021    mod-severe MR; mild-mod TR; EF 50-55%    Hypertension     Leg swelling     Migraine     Mitral regurgitation     moderated to severe on echo    Mitral valve disease     SOB (shortness of breath)     Stomach ulcer     Wellness [facility]    Wellness examination     no current cardiologist                Recent Labs     05/20/21 1920   INR 1.8     Recent Labs     05/20/21 1920   HGB 11.5*   HCT 35.7*          Current warfarin drug-drug interactions:        Date             INR        Dose   5/21/2021            1.8       4mg    Daily PT/INR while inpatient. PT/INR ordered to start yes. Thank you for the consult. Will continue to follow.

## 2021-05-21 NOTE — H&P
[facility]  Office: [address], DO, Hermelinda Kevin, DO, Carlos Olsen, DO, Kp Rodriguez Blood, DO, Kristal Johnson MD, Palmira Hannon MD, Shantell Petersen MD, Leonardo Martinez MD, Miranda Martinez MD, Tanesha Powell MD, Linda Augustin MD, David Blevins MD, Jazzy Camarena DO, Triny Bruce MD, Angelita Aguilar, DO, Lenin Olson MD,  Kelsey Andujar DO, Fredi Lemos MD, Batool Avila MD, Nuris Ortiz MD, Corky Gong MD, Salima Bal, [facility], [facility] And[facility], CNP, Cheikh Yadav, CNP, Cecille Hankins, CNS, Hermes Collins, CNP, Marichuy Haro, CNP, Carlitos Moreno, CNP, Estelle Tee, CNP, Alex Patrick, CNP, Makeda Prasad PA-C, Mendoza Wagner, [facility], Gladys Wilson, CNP, Shahab Kc, CNP, Rhea Castellanos, CNP, Lit Villanueva, CNP, Jorge Travis, CNP, Jeanine Barrera, [facility]    HISTORY AND PHYSICAL EXAMINATION            Date:   5/21/2021  Patient name:  José Luis Pinto  Date of admission:  5/20/2021  6:57 PM  MRN:   [id number]  Account:  [de-identified]  YOB: 1975  PCP:    MAYRA Frazier CNP  Room:   07/07  Code Status:    Full Code    Chief Complaint:     Chief Complaint   Patient presents with    Shortness of Breath       History Obtained From:     patient, electronic medical record    History of Present Illness:     José Luis Pinto is a 39 y.o. Non-/non  female who presents with Shortness of Breath   and is admitted to the hospital for the management of <principal problem not specified>. Patient presents the emergency room for the complaint of shortness of breath. Patient states that she has been having ongoing shortness of breath and cough that has progressively worsened over  about 5 to 6 days with associated fatigue, sweating, nausea, change of appetite, and activity change. Patient denies any chest pain or anyone recently sick at home.       The work-up in the emergency room Morris APRN - CNP   ARIPiprazole (ABILIFY) 15 MG tablet Take 15 mg by mouth daily    Historical Provider, MD   citalopram (CELEXA) 20 MG tablet Take 40 mg by mouth daily     Historical Provider, MD   warfarin (COUMADIN) 2 MG tablet Take one tablet (or as directed by Medication Management) by mouth once daily. 90DS 3/18/21   Areli Arreola, APRN - CNP   aspirin 81 MG EC tablet Take 1 tablet by mouth daily 3/5/21   Cat Roth MD   metoprolol tartrate (LOPRESSOR) 25 MG tablet Take 0.5 tablets by mouth 2 times daily 3/4/21   Cat Roth MD   omeprazole (PRILOSEC) 20 MG delayed release capsule Take 20 mg by mouth daily    Historical Provider, MD        Allergies:     Antihistamines, chlorpheniramine-type and Hydroxyzine    Social History:     Tobacco:    reports that she has been smoking cigarettes. She has been smoking about 0.50 packs per day. She has never used smokeless tobacco.  Alcohol:      reports current alcohol use of about 4.0 standard drinks of alcohol per week. Drug Use:  reports previous drug use. Family History:     Family History   Problem Relation Age of Onset    Diabetes Mother     High Blood Pressure Mother     No Known Problems Father        Review of Systems:     Positive and Negative as described in HPI. Review of Systems   Constitutional: Positive for activity change, appetite change, chills, diaphoresis and fatigue. Negative for fever. HENT: Negative for congestion and hearing loss. Respiratory: Positive for cough and shortness of breath. Negative for wheezing and stridor. Cardiovascular: Negative for chest pain, palpitations and leg swelling. Gastrointestinal: Negative for abdominal pain, blood in stool, constipation, diarrhea, nausea and vomiting. Genitourinary: Negative for dysuria and frequency. Musculoskeletal: Negative for myalgias. Skin: Negative for rash. Neurological: Negative for dizziness, seizures and headaches.    Psychiatric/Behavioral: The patient is not nervous/anxious. Physical Exam:   BP (!) 143/99   Pulse 99   Temp 99.6 °F (37.6 °C) (Oral)   Resp 16   Wt 140 lb (63.5 kg)   LMP 2021   SpO2 100%   BMI 21.93 kg/m²   Temp (24hrs), Av.6 °F (37.6 °C), Min:99.6 °F (37.6 °C), Max:99.6 °F (37.6 °C)    No results for input(s): POCGLU in the last 72 hours. Intake/Output Summary (Last 24 hours) at 2021 0253  Last data filed at 2021 0135  Gross per 24 hour   Intake 50 ml   Output 800 ml   Net -750 ml       Physical Exam  Vitals and nursing note reviewed. Constitutional:       General: She is not in acute distress. Appearance: She is well-developed. She is not diaphoretic. HENT:      Head: Normocephalic and atraumatic. Right Ear: Hearing normal.      Left Ear: Hearing normal.      Nose: Nose normal. No rhinorrhea. Eyes:      General: Lids are normal.      Extraocular Movements:      Right eye: Normal extraocular motion. Left eye: Normal extraocular motion. Conjunctiva/sclera: Conjunctivae normal.      Right eye: Right conjunctiva is not injected. Left eye: Left conjunctiva is not injected. Pupils: Pupils are equal, round, and reactive to light. Pupils are equal.      Right eye: Pupil is reactive. Left eye: Pupil is reactive. Neck:      Thyroid: No thyromegaly. Vascular: No carotid bruit. Trachea: Trachea and phonation normal. No tracheal deviation. Cardiovascular:      Rate and Rhythm: Normal rate and regular rhythm. Pulses: Normal pulses. Heart sounds: Murmur heard. Systolic (Positive valve click) murmur is present. Pulmonary:      Effort: Pulmonary effort is normal. No respiratory distress. Breath sounds: No stridor. Examination of the right-middle field reveals rhonchi. Examination of the left-middle field reveals rhonchi. Examination of the right-lower field reveals rhonchi. Examination of the left-lower field reveals rhonchi. Rhonchi present.  No Collection Time: 05/20/21  7:20 PM   Result Value Ref Range    Glucose 124 (H) 70 - 99 mg/dL    BUN 10 6 - 20 mg/dL    CREATININE 0.71 0.50 - 0.90 mg/dL    Bun/Cre Ratio NOT REPORTED 9 - 20    Calcium 8.4 (L) 8.6 - 10.4 mg/dL    Sodium 136 135 - 144 mmol/L    Potassium 4.2 3.7 - 5.3 mmol/L    Chloride 106 98 - 107 mmol/L    CO2 21 20 - 31 mmol/L    Anion Gap 9 9 - 17 mmol/L    GFR Non-African American >60 >60 mL/min    GFR African American >60 >60 mL/min    GFR Comment          GFR Staging NOT REPORTED    Brain Natriuretic Peptide    Collection Time: 05/20/21  7:20 PM   Result Value Ref Range    Pro-BNP 5,763 (H) <300 pg/mL    BNP Interpretation Pro-BNP Reference Range:    Troponin    Collection Time: 05/20/21  7:20 PM   Result Value Ref Range    Troponin, High Sensitivity 19 (H) 0 - 14 ng/L    Troponin T NOT REPORTED <0.03 ng/mL    Troponin Interp NOT REPORTED    Protime-INR    Collection Time: 05/20/21  7:20 PM   Result Value Ref Range    Protime 18.0 (H) 9.1 - 12.3 sec    INR 1.8    HCG Qualitative, Serum    Collection Time: 05/20/21  7:20 PM   Result Value Ref Range    hCG Qual NEGATIVE NEGATIVE   Troponin    Collection Time: 05/20/21 11:01 PM   Result Value Ref Range    Troponin, High Sensitivity 24 (H) 0 - 14 ng/L    Troponin T NOT REPORTED <0.03 ng/mL    Troponin Interp NOT REPORTED    COVID-19, Rapid    Collection Time: 05/20/21 11:22 PM    Specimen: Nasopharyngeal Swab   Result Value Ref Range    Specimen Description . NASOPHARYNGEAL SWAB     SARS-CoV-2, Rapid Not Detected Not Detected       Imaging/Diagnostics:  XR CHEST PORTABLE    Result Date: 5/20/2021  Cardiomegaly and edema with perihilar and bibasilar opacities. Superimposed infection would be difficult to exclude. CT CHEST PULMONARY EMBOLISM W CONTRAST    Result Date: 5/20/2021  1. There is no pulmonary embolus. 2. Ground-glass opacities in both lungs with bilateral pleural effusions, concerning for multifocal pneumonia. 3. Cardiomegaly. Assessment :      Hospital Problems         Last Modified POA    * (Principal) Multifocal pneumonia 5/21/2021 Yes    Chronic diastolic congestive heart failure ([address] 5/21/2021 Yes    S/P mitral valve replacement (Chronic) 5/21/2021 Yes    Overview Signed 3/17/2021  8:08 AM by MAYRA Ahumada CNP     mitral valve replacement with a 29/31-mm On-X mechanical mitral valve,          S/P left atrial appendage ligation (Chronic) 5/21/2021 Yes    Overview Signed 3/17/2021  8:08 AM by MAYRA Ahumada CNP     50-mm AtriClip left atrial appendage ligation               Plan:     Patient status inpatient in the Progressive Unit/Step down    1. Started on IV Rocephin and azithromycin. Will check for atypicals. Check for mycoplasma strep pneumonia Legionella. We will check respiratory pathogen panel. BiPAP as needed for distress currently on supplemental oxygen. 2. Severe mitral stenosis status post valve replacement-continue with Coumadin with pharmacy to dose  3. Chronic diastolic heart failure-outside of the elevated BNP no clinical exam findings to support exacerbation of heart failure. We will continue to monitor. 4. Elevated troponin-we will continue to trend and monitor. Patient denies chest pain. Cardiology was consulted in the emergency room. Cardiac cath in December was clean without coronary artery disease. 5. ER prophylaxis  6. DVT prophylaxis    Consultations:   IP CONSULT TO HOSPITALIST  IP CONSULT TO CARDIOLOGY  PHARMACY TO DOSE WARFARIN     Patient is admitted as inpatient status because of co-morbidities listed above, severity of signs and symptoms as outlined, requirement for current medical therapies and most importantly because of direct risk to patient if care not provided in a hospital setting. Expected length of stay > 48 hours.     MAYRA Titus - [facility]  5/21/2021  2:53 AM    Copy sent to MAYRA Mcneil - AALIYAH

## 2021-05-21 NOTE — ED NOTES
Report taken from [facility]. Care assumed at this time.       Eric Enriquez RN  05/20/21 1803

## 2021-05-21 NOTE — PROGRESS NOTES
[facility] Pneumonia [J18.9] . Vitals:    05/21/21 0305   BP:    Pulse: 104   Resp: 19   Temp:    SpO2: 97%   . Patients home meds are   Prior to Admission medications    Medication Sig Start Date End Date Taking? Authorizing Provider   enoxaparin (LOVENOX) 60 MG/0.6ML injection Inject 0.6 mLs into the skin 2 times daily for 10 days 5/14/21 5/24/21  MAYRA Huerta - CNP   ARIPiprazole (ABILIFY) 15 MG tablet Take 15 mg by mouth daily    Historical Provider, MD   citalopram (CELEXA) 20 MG tablet Take 40 mg by mouth daily     Historical Provider, MD   warfarin (COUMADIN) 2 MG tablet Take one tablet (or as directed by Medication Management) by mouth once daily.  90DS 3/18/21   MAYRA Russo CNP   aspirin 81 MG EC tablet Take 1 tablet by mouth daily 3/5/21   Kendal Mcgowan MD   metoprolol tartrate (LOPRESSOR) 25 MG tablet Take 0.5 tablets by mouth 2 times daily 3/4/21   Kendal Mcgowan MD   omeprazole (PRILOSEC) 20 MG delayed release capsule Take 20 mg by mouth daily    Historical Provider, MD   .  Recent Surgical History: None = 0     Assessment: CHF, diminished  Peak Flow (asthma only)    Predicted:   Personal Best:   PEF   % Predicted   Peak Flow : not applicable = 0    APO9/HANEY    FEV1 Predicted       FEV1     FEV1 % Predicted   FVC   IS volume   IBW   FIO2% 2 L  SPO2 100  RR 24  Breath Sounds: Diminished      · Bronchodilator assessment at level  3  · Hyperinflation assessment at level   · Secretion Management assessment at level    ·   · [x]    Bronchodilator Assessment  BRONCHODILATOR ASSESSMENT SCORE  Score 0 1 2 3 4 5   Breath Sounds   []  Patient Baseline []  No Wheeze good aeration []  Faint, scattered wheezing, good aeration [x]  Expiratory Wheezing and or moderately diminished []  Insp/Exp wheeze and/or very diminished []  Insp/Exp and/ or marked distress   Respiratory Rate   []  Patient Baseline []  Less than 20 []  Less than 20 [x]  20-25 []  Greater than 25 []  Greater than 25   Peak flow % of Pred or PB [x]  NA   []  Greater than 90%  []  81-90% []  71-80% []  Less than or equal to 70%  or unable to perform []  Unable due to Respiratory Distress   Dyspnea re []  Patient Baseline []  No SOB []  No SOB [x]  SOB on exertion []  SOB min activity []  At rest/acute   e FEV% Predicted       [x]  NA []  Above 69%  [x]  Unable []  Above 60-69%  [x]  Unable []  Above 50-59%  [x]  Unable []  Above 35-49%  []  Unable []  Less than 35%  []  Unable                 []  Hyperinflation Assessment  Score 1 2 3   CXR and Breath Sounds   []  Clear []  No atelectasis  Basilar aeration []  Atelectasis or absent basilar breath sounds   Incentive Spirometry Volume  (Per IBW)   []  Greater than or equal to 15ml/Kg []  less than 15ml/Kg []  less than 15ml/Kg   Surgery within last 2 weeks []  None or general   []  Abdominal or thoracic surgery  []  Abdominal or thoracic   Chronic Pulmonary Historyre []  No []  Yes []  Yes     []  Secretion Management Assessment  Score 1 2 3   Bilateral Breath Sounds   []  Occasional Rhonchi []  Scattered Rhonchi []  Course Rhonchi and/or poor aeration   Sputum    []  Small amount of thin secretions []  Moderate amount of viscous secretions []  Copius, Viscious Yellow/ Secretions   CXR as reported by physician []  clear  []  Unavailable []  Infiltrates and/or consolidation  []  Unavailable []  Mucus Plugging and or lobar consolidation  []  Unavailable   Cough []  Strong, productive cough []  Weak productive cough []  No cough or weak non-productive cough

## 2021-05-21 NOTE — ED NOTES
Patient resting on cot  Patient updated on plan of care  Patient denies any needs at this time  Call light within reach     [facility], RN  05/21/21 7822

## 2021-05-21 NOTE — ED NOTES
Pt resting on stretcher, no respiratory distress noted, pt updated on plan of care, will continue to monitor, call light in reach.        Jeanine Vargas RN  05/21/21 4063

## 2021-05-21 NOTE — PROGRESS NOTES
RAPID Covid 19 swab taken from right nare, labeled, placed in red dot bag, and handed off to second healthcare worker outside of room for transport to laboratory per hospital policy and procedure. Patient tolerated procedure well. Pt is off BIPAP, tolerating well on 2L NC. RN informed.

## 2021-05-21 NOTE — PROGRESS NOTES
Pharmacy Note  Warfarin Consult follow-up      Recent Labs     05/21/21  0539   INR 1.8     Recent Labs     05/20/21  1920   HGB 11.5*   HCT 35.7*          Significant Drug-Drug Interactions:  New warfarin drug-drug interactions: ceftriaxone, azithromycin  Discontinued drug-drug interactions: none  Current warfarin drug-drug interactions: ceftriaxone, azithromycin, citalopram, enoxaparin, acetaminophen      Date INR Dose   5/21/2021 1.8 8 mg        Notes:        Increase warfarin to 8 mg for today. Daily PT/INR while inpatient. [address] [place] [address]  Ph., CACP, Clinical Pharmacist  Anticoagulation Services, [address] [facility]  5/21/2021  10:21 AM

## 2021-05-21 NOTE — ED NOTES
Pt paused on BiPAP for CT. Pt reports feeling better in regards to her effort of breathing. Pt has notable normal RR while off of BiPAP temporarily. Will continue to monitor.       Ana Osorio RN  05/20/21 1029

## 2021-05-21 NOTE — ED NOTES
Report given to Zoe Mixon RN  All Questions and concerns addressed     Ashely Hemphill RN  05/21/21 5109

## 2021-05-21 NOTE — PLAN OF CARE
Problem: Fluid Volume:  Goal: Risk for excess fluid volume will decrease  Description: Risk for excess fluid volume will decrease  Outcome: Ongoing  Goal: Maintenance of adequate hydration will improve  Description: Maintenance of adequate hydration will improve  Outcome: Ongoing  Goal: Will show no signs and symptoms of electrolyte imbalance  Description: Will show no signs and symptoms of electrolyte imbalance  Outcome: Ongoing     Problem: Health Behavior:  Goal: Ability to manage health-related needs will improve  Description: Ability to manage health-related needs will improve  Outcome: Ongoing     Problem: Pain:  Goal: Pain level will decrease  Description: Pain level will decrease  Outcome: Ongoing

## 2021-05-21 NOTE — CONSULTS
Attending Physician Statement:    I have discussed the care of  Juana Gates , including pertinent history and exam findings, with the Cardiology fellow/resident. I have seen and examined the patient and the key elements of all parts of the encounter have been performed by me. I agree with the assessment, plan and orders as documented by the fellow/resident, after I modified exam findings and plan of treatments, and the final version is my approved version of the assessment. Additional Comments:   Acute sys HF, HFrEF, EF in office 35%- newly reduced  Severe MR s/p recent Danny Mech valve  No CAD on Cath 1/21  Elevated BNP  SOB  - Diurese with IV lasix  - add entresto, aldactone, toprol xl  - check MUGA scan today (was planned as outpatient)   - Re eval in AM    Discussed with patient and nursing. Thank you for allowing me to participate in the care of this patient, please do not hesitate to call if you have any questions. Nazanin Stapleton DO, [facility] [place], [address], [place] [address] [facility]  [employer]  [phone]     [facility] Cardiology    Consult / H&P               Today's Date: 5/21/2021  Patient Name: Juana Palm Shumway  Date of admission: 5/20/2021  6:57 PM  Patient's age: 39 y.o., 1975  Admission Dx: Pneumonia [J18.9]    Reason for Consult:  Cardiac evaluation for elevated troponins    Requesting Physician: No admitting provider for patient encounter. CHIEF COMPLAINT:  Shortness of breath    History Obtained From:  patient    HISTORY OF PRESENT ILLNESS:      The patient is a 39 y.o.  female with a history of diastolic CHF, HTN, tobacco use, mitral valve repair for mitral regurgitation who is admitted to the hospital for shortness of breath. Patient states she has been short of breath for past 6 days. She reports it has been constant and progressively getting worse. Patient denies chest pain, cough, and fever.  Patient recently had an ECHO which showed an EF of 45% and severer mitral regurgitation  and cardiac catheterization that showed no blockages on 12/31/21. She then had her mitral valve repair on 2/2. She has been taking Lovenox and warfarin. Patient states she was seen in [place] office of her cardiologist and they did an ECHO that showed an abnormality warranting a MUGA scan. Patient was seen in the ED had CXR that showed Cardiomegaly and edema with perihilar and bibasilar opacities. Superimposed infection would be difficult to exclude. CTA PE that showed 1. There is no pulmonary embolus. 2. Ground-glass opacities in both lungs with bilateral pleural effusions, concerning for multifocal pneumonia. 3. Cardiomegaly. Her labs included BNP of 5,763, troponin of 19, 24, 25. Her previous BNP was taken on 4/12/21 which was 462, and her INR is 1.8. In the ED she was given coumadin due to her INR being subtherapeutic, and she was given Lasix IV, BIPAP and is currently on 2 liters of supplemental O2. Past Medical History:   has a past medical history of Anxiety, CHF (congestive heart failure) ([address] GERD (gastroesophageal reflux disease), History of chest pain, History of echocardiogram, Hypertension, Leg swelling, Migraine, Mitral regurgitation, Mitral valve disease, SOB (shortness of breath), Stomach ulcer, Wellness examination, and Wellness examination. Past Surgical History:   has a past surgical history that includes hernia repair; transesophageal echocardiogram (01/04/2021); Cardiac catheterization (01/05/2021); Endoscopy, colon, diagnostic; Cosmetic surgery; and Mitral valve repair (N/A, 2/26/2021). Home Medications:    Prior to Admission medications    Medication Sig Start Date End Date Taking?  Authorizing Provider   enoxaparin (LOVENOX) 60 MG/0.6ML injection Inject 0.6 mLs into the skin 2 times daily for 10 days 5/14/21 5/24/21  MAYRA Martins - CNP   ARIPiprazole (ABILIFY) 15 MG tablet Take 15 mg by mouth daily    Historical Provider, MD   citalopram (CELEXA) 20 MG tablet Take 40 mg by mouth daily     Historical Provider, MD   warfarin (COUMADIN) 2 MG tablet Take one tablet (or as directed by Medication Management) by mouth once daily.  90DS 3/18/21   MAYRA Corona - CNP   aspirin 81 MG EC tablet Take 1 tablet by mouth daily 3/5/21   Brigette Carmen MD   metoprolol tartrate (LOPRESSOR) 25 MG tablet Take 0.5 tablets by mouth 2 times daily 3/4/21   Brigette Carmen MD   omeprazole (PRILOSEC) 20 MG delayed release capsule Take 20 mg by mouth daily    Historical Provider, MD      Current Facility-Administered Medications: ARIPiprazole (ABILIFY) tablet 15 mg, 15 mg, Oral, Daily  aspirin EC tablet 81 mg, 81 mg, Oral, Daily  citalopram (CELEXA) tablet 40 mg, 40 mg, Oral, Daily  metoprolol tartrate (LOPRESSOR) tablet 12.5 mg, 12.5 mg, Oral, BID  pantoprazole (PROTONIX) tablet 40 mg, 40 mg, Oral, QAM AC  sodium chloride flush 0.9 % injection 5-40 mL, 5-40 mL, Intravenous, 2 times per day  sodium chloride flush 0.9 % injection 10 mL, 10 mL, Intravenous, PRN  0.9 % sodium chloride infusion, 25 mL, Intravenous, PRN  nicotine (NICODERM CQ) 21 MG/24HR 1 patch, 1 patch, Transdermal, Daily PRN  promethazine (PHENERGAN) tablet 12.5 mg, 12.5 mg, Oral, Q6H PRN **OR** ondansetron (ZOFRAN) injection 4 mg, 4 mg, Intravenous, Q6H PRN  magnesium hydroxide (MILK OF MAGNESIA) 400 MG/5ML suspension 30 mL, 30 mL, Oral, Daily PRN  acetaminophen (TYLENOL) tablet 650 mg, 650 mg, Oral, Q6H PRN **OR** acetaminophen (TYLENOL) suppository 650 mg, 650 mg, Rectal, Q6H PRN  albuterol (PROVENTIL) nebulizer solution 2.5 mg, 2.5 mg, Nebulization, Q2H PRN  ipratropium-albuterol (DUONEB) nebulizer solution 1 ampule, 1 ampule, Inhalation, Q4H WA  [START ON 5/22/2021] azithromycin (ZITHROMAX) 500 mg in dextrose 5% 250 mL IVPB, 500 mg, Intravenous, Q24H **AND** [START ON 5/22/2021] cefTRIAXone (ROCEPHIN) 1000 mg IVPB in 50 mL D5W minibag, 1,000 mg, Intravenous, Q24H  enoxaparin (LOVENOX) injection 60 mg, 1 mg/kg, Subcutaneous, BID  warfarin (COUMADIN) daily dosing (placeholder), , Other, RX Placeholder  [START ON 5/22/2021] warfarin (COUMADIN) tablet 4 mg, 4 mg, Oral, Once per day on Sun Mon Tue Wed Thu Sat  warfarin (COUMADIN) tablet 6 mg, 6 mg, Oral, Once per day on Fri  nitroGLYCERIN 50 mg in dextrose 5% 250 mL infusion, 5-200 mcg/min, Intravenous, Continuous    Allergies:  Antihistamines, chlorpheniramine-type and Hydroxyzine    Social History:   reports that she has been smoking cigarettes. She has been smoking about 0.50 packs per day. She has never used smokeless tobacco. She reports current alcohol use of about 4.0 standard drinks of alcohol per week. She reports previous drug use. Family History: family history includes Diabetes in her mother; High Blood Pressure in her mother; No Known Problems in her father. No h/o sudden cardiac death. No for premature CAD    REVIEW OF SYSTEMS:    Constitutional: there has been no unanticipated weight loss. There's been No change in energy level, No change in activity level. Eyes: No visual changes or diplopia. No scleral icterus. ENT: No Headaches  Cardiovascular: No cardiac history  Respiratory: No previous pulmonary problems, No cough  Gastrointestinal: No abdominal pain. No change in bowel or bladder habits. Genitourinary: No dysuria, trouble voiding, or hematuria. Musculoskeletal:  No gait disturbance, No weakness or joint complaints. Integumentary: No rash or pruritis. Neurological: No headache, diplopia, change in muscle strength, numbness or tingling. No change in gait, balance, coordination, mood, affect, memory, mentation, behavior. Psychiatric: No anxiety, or depression. Endocrine: No temperature intolerance. No excessive thirst, fluid intake, or urination. No tremor. Hematologic/Lymphatic: No abnormal bruising or bleeding, blood clots or swollen lymph nodes.   Allergic/Immunologic: No nasal congestion or hives. PHYSICAL EXAM:      /88   Pulse 100   Temp 98.2 °F (36.8 °C) (Oral)   Resp 22   Wt 140 lb (63.5 kg)   LMP 04/19/2021   SpO2 99%   BMI 21.93 kg/m²    Constitutional and General Appearance: alert, cooperative, no distress and appears stated age  [de-identified]: PERRL, no cervical lymphadenopathy. No masses palpable. Normal oral mucosa  Respiratory:  Normal excursion and expansion without use of accessory muscles is on 2 liters of supplemental O2  Resp Auscultation: Good respiratory effort. No for increased work of breathing. On auscultation: bilateral crackles  Cardiovascular: The apical impulse is not displaced  Heart tones are crisp and normal. regular S1 and S2.  Jugular venous pulsation Normal  The carotid upstroke is normal in amplitude and contour without delay or bruit  Peripheral pulses are symmetrical and full   Abdomen:   No masses or tenderness  Bowel sounds present  Extremities:   No Cyanosis or Clubbing   Lower extremity edema: No   Skin: Warm and dry  Neurological:  Alert and oriented. Moves all extremities well  No abnormalities of mood, affect, memory, mentation, or behavior are noted    DATA:    Diagnostics:      EKG:   normal EKG, normal sinus rhythm, unchanged from previous tracings. ECHO:   Previous taken on 5/7 in [place] office EF was 35-35%, global hypokinesis, Reduced LV diastolic compliance, functioning mitral valve, severe tricuspid regurgitation. previously taken 12/31 and show EF of 45% and severe mitral regurgitation. Stress Test:   not obtained. Cardiac Angiography:   previously taken 12/31 and show 0 stenosis in LMCA, LAD, LCx, RCA. Labs:     CBC:   Recent Labs     05/20/21 1920   WBC 5.3   HGB 11.5*   HCT 35.7*        BMP:   Recent Labs     05/20/21 1920      K 4.2   CO2 21   BUN 10   CREATININE 0.71   LABGLOM >60   GLUCOSE 124*     BNP: No results for input(s): BNP in the last 72 hours.   PT/INR:   Recent Labs     05/20/21 1920

## 2021-05-22 VITALS
TEMPERATURE: 99 F | OXYGEN SATURATION: 97 % | WEIGHT: 138.89 LBS | RESPIRATION RATE: 20 BRPM | BODY MASS INDEX: 22.32 KG/M2 | HEART RATE: 102 BPM | DIASTOLIC BLOOD PRESSURE: 68 MMHG | SYSTOLIC BLOOD PRESSURE: 90 MMHG | HEIGHT: 66 IN

## 2021-05-22 LAB
ANION GAP SERPL CALCULATED.3IONS-SCNC: 11 MMOL/L (ref 9–17)
BUN BLDV-MCNC: 12 MG/DL (ref 6–20)
BUN/CREAT BLD: ABNORMAL (ref 9–20)
CALCIUM SERPL-MCNC: 8.1 MG/DL (ref 8.6–10.4)
CHLORIDE BLD-SCNC: 101 MMOL/L (ref 98–107)
CO2: 24 MMOL/L (ref 20–31)
CREAT SERPL-MCNC: 0.56 MG/DL (ref 0.5–0.9)
GFR AFRICAN AMERICAN: >60 ML/MIN
GFR NON-AFRICAN AMERICAN: >60 ML/MIN
GFR SERPL CREATININE-BSD FRML MDRD: ABNORMAL ML/MIN/{1.73_M2}
GFR SERPL CREATININE-BSD FRML MDRD: ABNORMAL ML/MIN/{1.73_M2}
GLUCOSE BLD-MCNC: 108 MG/DL (ref 70–99)
HCT VFR BLD CALC: 41.5 % (ref 36.3–47.1)
HEMOGLOBIN: 13.2 G/DL (ref 11.9–15.1)
INR BLD: 2
MAGNESIUM: 1.8 MG/DL (ref 1.6–2.6)
MCH RBC QN AUTO: 27.3 PG (ref 25.2–33.5)
MCHC RBC AUTO-ENTMCNC: 31.8 G/DL (ref 28.4–34.8)
MCV RBC AUTO: 85.9 FL (ref 82.6–102.9)
NRBC AUTOMATED: 0 PER 100 WBC
PDW BLD-RTO: 14.8 % (ref 11.8–14.4)
PLATELET # BLD: 462 K/UL (ref 138–453)
PMV BLD AUTO: 9.3 FL (ref 8.1–13.5)
POTASSIUM SERPL-SCNC: 3.3 MMOL/L (ref 3.7–5.3)
PROTHROMBIN TIME: 20.3 SEC (ref 9.1–12.3)
RBC # BLD: 4.83 M/UL (ref 3.95–5.11)
SODIUM BLD-SCNC: 136 MMOL/L (ref 135–144)
WBC # BLD: 4.5 K/UL (ref 3.5–11.3)

## 2021-05-22 PROCEDURE — 80048 BASIC METABOLIC PNL TOTAL CA: CPT

## 2021-05-22 PROCEDURE — 6360000002 HC RX W HCPCS: Performed by: INTERNAL MEDICINE

## 2021-05-22 PROCEDURE — 6360000002 HC RX W HCPCS: Performed by: NURSE PRACTITIONER

## 2021-05-22 PROCEDURE — 6370000000 HC RX 637 (ALT 250 FOR IP): Performed by: NURSE PRACTITIONER

## 2021-05-22 PROCEDURE — 99239 HOSP IP/OBS DSCHRG MGMT >30: CPT | Performed by: INTERNAL MEDICINE

## 2021-05-22 PROCEDURE — 85610 PROTHROMBIN TIME: CPT

## 2021-05-22 PROCEDURE — 6370000000 HC RX 637 (ALT 250 FOR IP): Performed by: INTERNAL MEDICINE

## 2021-05-22 PROCEDURE — 85027 COMPLETE CBC AUTOMATED: CPT

## 2021-05-22 PROCEDURE — 94640 AIRWAY INHALATION TREATMENT: CPT

## 2021-05-22 PROCEDURE — 36415 COLL VENOUS BLD VENIPUNCTURE: CPT

## 2021-05-22 PROCEDURE — 83735 ASSAY OF MAGNESIUM: CPT

## 2021-05-22 PROCEDURE — 2580000003 HC RX 258: Performed by: NURSE PRACTITIONER

## 2021-05-22 RX ORDER — LANOLIN ALCOHOL/MO/W.PET/CERES
325 CREAM (GRAM) TOPICAL 2 TIMES DAILY WITH MEALS
Status: DISCONTINUED | OUTPATIENT
Start: 2021-05-22 | End: 2021-05-22 | Stop reason: HOSPADM

## 2021-05-22 RX ORDER — FUROSEMIDE 40 MG/1
40 TABLET ORAL DAILY
Qty: 60 TABLET | Refills: 3 | Status: SHIPPED | OUTPATIENT
Start: 2021-05-22 | End: 2022-01-07 | Stop reason: ALTCHOICE

## 2021-05-22 RX ORDER — SPIRONOLACTONE 25 MG/1
25 TABLET ORAL DAILY
Qty: 30 TABLET | Refills: 3 | Status: SHIPPED | OUTPATIENT
Start: 2021-05-23 | End: 2022-01-07 | Stop reason: ALTCHOICE

## 2021-05-22 RX ORDER — FUROSEMIDE 40 MG/1
40 TABLET ORAL 2 TIMES DAILY
Status: DISCONTINUED | OUTPATIENT
Start: 2021-05-22 | End: 2021-05-22 | Stop reason: HOSPADM

## 2021-05-22 RX ORDER — WARFARIN SODIUM 3 MG/1
6 TABLET ORAL
Status: DISCONTINUED | OUTPATIENT
Start: 2021-05-22 | End: 2021-05-22 | Stop reason: HOSPADM

## 2021-05-22 RX ORDER — LANOLIN ALCOHOL/MO/W.PET/CERES
325 CREAM (GRAM) TOPICAL 2 TIMES DAILY WITH MEALS
Qty: 90 TABLET | Refills: 3 | Status: SHIPPED | OUTPATIENT
Start: 2021-05-22 | End: 2022-05-12 | Stop reason: SDUPTHER

## 2021-05-22 RX ORDER — METOPROLOL SUCCINATE 25 MG/1
25 TABLET, EXTENDED RELEASE ORAL DAILY
Qty: 30 TABLET | Refills: 3 | Status: SHIPPED | OUTPATIENT
Start: 2021-05-23 | End: 2022-05-12 | Stop reason: SDUPTHER

## 2021-05-22 RX ORDER — FUROSEMIDE 20 MG/1
20 TABLET ORAL EVERY EVENING
Qty: 60 TABLET | Refills: 3 | Status: SHIPPED | OUTPATIENT
Start: 2021-05-22 | End: 2022-05-12 | Stop reason: SDUPTHER

## 2021-05-22 RX ORDER — POTASSIUM CHLORIDE 20 MEQ/1
40 TABLET, EXTENDED RELEASE ORAL ONCE
Status: COMPLETED | OUTPATIENT
Start: 2021-05-22 | End: 2021-05-22

## 2021-05-22 RX ADMIN — SPIRONOLACTONE 25 MG: 25 TABLET ORAL at 08:03

## 2021-05-22 RX ADMIN — SODIUM CHLORIDE, PRESERVATIVE FREE 10 ML: 5 INJECTION INTRAVENOUS at 08:04

## 2021-05-22 RX ADMIN — ARIPIPRAZOLE 15 MG: 15 TABLET ORAL at 08:03

## 2021-05-22 RX ADMIN — POTASSIUM CHLORIDE 40 MEQ: 1500 TABLET, EXTENDED RELEASE ORAL at 08:03

## 2021-05-22 RX ADMIN — FERROUS SULFATE TAB EC 325 MG (65 MG FE EQUIVALENT) 325 MG: 325 (65 FE) TABLET DELAYED RESPONSE at 10:29

## 2021-05-22 RX ADMIN — Medication 81 MG: at 08:03

## 2021-05-22 RX ADMIN — ENOXAPARIN SODIUM 60 MG: 60 INJECTION SUBCUTANEOUS at 08:03

## 2021-05-22 RX ADMIN — METOPROLOL SUCCINATE 25 MG: 25 TABLET, FILM COATED, EXTENDED RELEASE ORAL at 08:02

## 2021-05-22 RX ADMIN — CITALOPRAM 40 MG: 20 TABLET, FILM COATED ORAL at 08:03

## 2021-05-22 RX ADMIN — Medication 500 MG: at 01:07

## 2021-05-22 RX ADMIN — SACUBITRIL AND VALSARTAN 1 TABLET: 24; 26 TABLET, FILM COATED ORAL at 08:03

## 2021-05-22 RX ADMIN — IPRATROPIUM BROMIDE AND ALBUTEROL SULFATE 1 AMPULE: .5; 2.5 SOLUTION RESPIRATORY (INHALATION) at 08:36

## 2021-05-22 RX ADMIN — PANTOPRAZOLE SODIUM 40 MG: 40 TABLET, DELAYED RELEASE ORAL at 06:19

## 2021-05-22 RX ADMIN — FUROSEMIDE 40 MG: 10 INJECTION, SOLUTION INTRAMUSCULAR; INTRAVENOUS at 08:03

## 2021-05-22 NOTE — PROGRESS NOTES
Pharmacy Note  Warfarin Consult follow-up      Recent Labs     05/22/21  0604   INR 2.0     Recent Labs     05/20/21  1920 05/22/21  0604   HGB 11.5* 13.2   HCT 35.7* 41.5    462*     Current warfarin drug-drug interactions: citalopram, enoxaparin, acetaminophen    Date INR Dose   5/20/2021 1.8 none   5/21/2021 1.8 8 mg    5/22/2021 2.0 6mg     Notes: Give warfarin 6mg today on 5/22/2021. Daily PT/INR while inpatient.      Kaur Vaughn, [facility], CACP  Clinical Pharmacist Medication Management  5/22/2021  8:56 AM

## 2021-05-22 NOTE — PROGRESS NOTES
[place] [facility]  Progress Note                   Date:   5/22/2021  Patient name: Claudell Neve  Date of admission:  5/20/2021  6:57 PM  MRN:   [id number]  YOB: 1975  PCP: Joanna Hill, APRN - CNP    Reason for Admission: Pneumonia [J18.9]    Subjective:       Clinical Changes /Abnormalities: Seen & examined in room. Clinically improved and wants to go home. Denies CP and SOB has improved. Labs, vitals, & tele reviewed. Review of Systems    Medications:   Scheduled Meds:   ferrous sulfate  325 mg Oral BID WC    warfarin  6 mg Oral Once    furosemide  40 mg Oral BID    ARIPiprazole  15 mg Oral Daily    aspirin  81 mg Oral Daily    citalopram  40 mg Oral Daily    sodium chloride flush  5-40 mL Intravenous 2 times per day    ipratropium-albuterol  1 ampule Inhalation Q4H WA    enoxaparin  1 mg/kg Subcutaneous BID    warfarin (COUMADIN) daily dosing (placeholder)   Other RX Placeholder    spironolactone  25 mg Oral Daily    sacubitril-valsartan  1 tablet Oral BID    metoprolol succinate  25 mg Oral Daily     Continuous Infusions:   sodium chloride       CBC:   Recent Labs     05/20/21 1920 05/22/21  0604   WBC 5.3 4.5   HGB 11.5* 13.2    462*     BMP:    Recent Labs     05/20/21 1920 05/22/21  0604    136   K 4.2 3.3*    101   CO2 21 24   BUN 10 12   CREATININE 0.71 0.56   GLUCOSE 124* 108*     Hepatic:  Recent Labs     05/21/21  1539   AST 15   ALT 17   BILITOT 0.39   ALKPHOS 100     Troponin:   Recent Labs     05/20/21  2301 05/21/21  0315 05/21/21  1539   TROPHS 24* 25* 18*     BNP: No results for input(s): BNP in the last 72 hours. Lipids: No results for input(s): CHOL, HDL in the last 72 hours.     Invalid input(s): LDLCALCU  INR:   Recent Labs     05/20/21 1920 05/21/21  0539 05/22/21  0604   INR 1.8 1.8 2.0       Objective:   Vitals: /83   Pulse 103   Temp 98.4 °F (36.9 °C) (Oral)   Resp 18   Ht 5' 6\" (1.676 m)   Wt 138 lb 14.2 oz (63 kg)   LMP 04/19/2021   SpO2 98%   BMI 22.42 kg/m²   General appearance: alert and cooperative with exam  HEENT: Head: Normocephalic, no lesions, without obvious abnormality. Neck:no JVD, trachea midline, no adenopathy  Lungs: Clear to auscultation  Heart: Regular rate and rhythm, s1/s2 auscultated, no murmurs  Abdomen: soft, non-tender, bowel sounds active  Extremities: no edema  Neurologic: not done    EKG:   normal EKG, normal sinus rhythm, unchanged from previous tracings.     ECHO:   Previous taken on 5/7 in [place] office EF was 35-35%, global hypokinesis, Reduced LV diastolic compliance, functioning mitral valve, severe tricuspid regurgitation.       previously taken 12/31 and show EF of 45% and severe mitral regurgitation.      Stress Test:   not obtained.     Cardiac Angiography:   previously taken 12/31 and show 0 stenosis in LMCA, LAD, LCx, RCA.     MUGA scan 5/21/21  Impression   Left ventricular ejection fraction is 34%         Assessment / Acute Cardiac Problems:   Acute sys HF, HFrEF, EF in office 35%- newly reduced  Severe MR s/p recent Camilo Mech valve  No CAD on Cath 1/21  NICMP with LVEF on MUGA of 34%    Patient Active Problem List:     Pulmonary edema, acute, with congestive heart failure (HCC)     Anxiety     Microcytic anemia     History of drug abuse (HCC)     Chronic diastolic congestive heart failure (HCC)     Severe mitral valve regurgitation     Severe mitral regurgitation by prior echocardiogram     S/P mitral valve replacement     S/P left atrial appendage ligation     Pneumonia due to organism     Elevated troponin      Plan of Treatment:   1. Stable. MUGA reviewed in detail with patient. Questions/concerns addressed. Will f/u as OP for either repeat MUGA or AICD placement - pt. To think about and will review clinical improvement of symptoms/progress. 2. Continue PO ASA, Lasix, BB, & Entresto along with Aldactone. 3. Severe MR s/p CAMILO mech valve.  Continue PO Coumadin & BB  4. F/U in office in 2 weeks.      Electronically signed by MAYRA Borja CNP on 5/22/2021 at [address] [place] [address],[address].  [phone]

## 2021-05-22 NOTE — CARE COORDINATION
Case Management Initial Discharge Plan  [facility],             Met with:patient to discuss discharge plans. Information verified: address, contacts, phone number, , insurance Yes    Emergency Contact/Next of Kin name & number: Brianne Barron/mother/[phone]    PCP: [facility]  Date of last visit: new patient has appt scheduled this Monday    Insurance Provider: [facility]    Discharge Planning    Living Arrangements:  Alone   Support Systems:  Parent    Lives on second floor of duplex  3-4 stairs to climb to get into front door, 20 stairs to climb to reach second floor  Location of bedroom/bathroom in home main    Patient able to perform ADL's:Independent    Current Services (outpatient & in home) currently staying at drug and alcohol rehab, [address] [address]  DME equipment: none  DME provider: n/a    Receiving oral anticoagulation therapy? No    If indicated:   Physician managing anticoagulation treatment: n/a  Where does patient obtain lab work for ATC treatment? n/a      Potential Assistance Needed:  N/A    Patient agreeable to home care: No  [place] of choice provided:  n/a    Prior SNF/Rehab Placement and Facility: no  Agreeable to SNF/Rehab: No  [place] of choice provided: n/a     Evaluation: no    Expected Discharge date:  21    Patient expects to be discharged to:  [facility]  Follow Up Appointment: Best Day/ Time: Monday AM    Transportation provider: may need help with transport  Transportation arrangements needed for discharge: No    Readmission Risk              Risk of Unplanned Readmission:  22             Does patient have a readmission risk score greater than 14?: Yes  If yes, follow-up appointment must be made within 7 days of discharge. Goals of Care:       Discharge Plan: Back to [facility] drug and alcohol rehab.  Has a follow up appt with [facility] this Monday. May need help with ride to facility if  unable to transport.            Electronically signed by Missy Odonnell RN on 5/22/21 at 10:53 AM EDT

## 2021-05-22 NOTE — PROGRESS NOTES
Pt given discharge papers and updated on new medications and need for followup appointments. Pt IV removed and is currently waiting on meds to beds.  Pt has ride arranged once meds recieved

## 2021-05-22 NOTE — PROGRESS NOTES
Physical Therapy    DATE: 2021    NAME: Armida Alvarez  MRN: [id number]   : 1975      Patient not seen this date for Physical Therapy due to:    Patient independent with functional mobility with no acute PT needs. Will defer PT evaluation at this time. Please reorder PT if future needs arise.        Electronically signed by Delilah Lira PT on 2021 at 1:16 PM

## 2021-05-22 NOTE — PROGRESS NOTES
[facility]  Office: [address], DO, Adalberto Tijerina, DO, Deedee Iglesias, DO, Diamond Dyer Blood, DO, Kei Barry MD, Efrain Ashford MD, Marina Medina MD, Anne-Marie Herrmann MD, Rylee Knight MD, Priyank Wei MD, Brennen Odom MD, Yahaira Doe MD, Alena Jaime, DO, Macie Colby MD, Suad Nicholas, DO, Meir Sharp MD,  Nyasia Ramos, DO, Ninfa Chen MD, Shmuel Espinosa MD, Jennifer Andrew MD, Lavern Gupta MD, Yao Germain, Timur Stout, AALIYAH, Courtney Christiansen, CNP, Sana Guillen, CNS, Maryam Cobian, CNP, Vannie Epley, CNP, Beatris Spaulding, CNP, Janis Plunkett, CNP, Miya Davis, CNP, Nina Austin PA-C, Arnav Luis, [facility], Hoang Florian, CNP, Toya Olsen, CNP, Vicky Jaramillo, CNP, Petr Balderas, CNP, Eric Beard, CNP, Brodie Quijano, [address] [place] [address]    Progress Note    5/22/2021    11:31 AM    Name:   Shraddha Baird  MRN:     [id number]     Acct:      [de-identified]   Room:   [facility]7197-21   Day:  1  Admit Date:  5/20/2021  6:57 PM    PCP:   MAYRA Nicholas CNP  Code Status:  Full Code    Subjective:     C/C:   Chief Complaint   Patient presents with    Shortness of Breath     Interval History Status: improved. She no longer requiring oxygen after diuretic therapy. Blood pressure was marginal overnight. Spoke with cardiology and cleared for discharge    Brief History:     79-year-old female presents for shortness of breath. States has been progressively worse for the past 5 to 6 days. Patient noted to have a BNP of 5763 in the emergency department, CTA showed no pulmonary embolism with bilateral groundglass opacities. Patient was Covid negative. Patient was admitted by internal medicine with cardiology consult, patient was started on diuretic therapy and overnight she tolerated therapy well. Patient was eventually taken off oxygen and maintain saturations of 95 to 96%.   Her medications were adjusted to include Aldactone, Entresto and Toprol-XL. Patient was cleared for discharge by cardiology the following day, she will follow up with cardiologist outpatient. Review of Systems:     Constitutional:  negative for chills, fevers, sweats  Respiratory:  negative for cough, dyspnea on exertion, shortness of breath, wheezing  Cardiovascular:  negative for chest pain, chest pressure/discomfort, lower extremity edema, palpitations  Gastrointestinal:  negative for abdominal pain, constipation, diarrhea, nausea, vomiting  Neurological:  negative for dizziness, headache    Medications: Allergies: Allergies   Allergen Reactions    Antihistamines, Chlorpheniramine-Type Rash    Hydroxyzine Anxiety       Current Meds:   Scheduled Meds:    ferrous sulfate  325 mg Oral BID WC    warfarin  6 mg Oral Once    furosemide  40 mg Oral BID    ARIPiprazole  15 mg Oral Daily    aspirin  81 mg Oral Daily    citalopram  40 mg Oral Daily    sodium chloride flush  5-40 mL Intravenous 2 times per day    ipratropium-albuterol  1 ampule Inhalation Q4H WA    enoxaparin  1 mg/kg Subcutaneous BID    warfarin (COUMADIN) daily dosing (placeholder)   Other RX Placeholder    spironolactone  25 mg Oral Daily    sacubitril-valsartan  1 tablet Oral BID    metoprolol succinate  25 mg Oral Daily     Continuous Infusions:    sodium chloride       PRN Meds: sodium chloride flush, sodium chloride, nicotine, promethazine **OR** ondansetron, magnesium hydroxide, acetaminophen **OR** acetaminophen, albuterol, heparin flush    Data:     Past Medical History:   has a past medical history of Anxiety, CHF (congestive heart failure) (HCC), GERD (gastroesophageal reflux disease), History of chest pain, History of echocardiogram, Hypertension, Leg swelling, Migraine, Mitral regurgitation, Mitral valve disease, SOB (shortness of breath), Stomach ulcer, Wellness examination, and Wellness examination.     Social History: PROT 7.2   LABALBU 3.9   AST 15   ALT 17   *   ALKPHOS 100   BILITOT 0.39   BILIDIR 0.10     ABG:  Lab Results   Component Value Date    POCPH 7.330 02/26/2021    POCPCO2 43.2 02/26/2021    POCPO2 129.6 02/26/2021    POCHCO3 22.8 02/26/2021    NBEA 3 02/26/2021    PBEA NOT REPORTED 02/26/2021    PRZ4BXI 24 02/26/2021    UBAW8YNB 99 02/26/2021    FIO2 4.0 02/26/2021     Lab Results   Component Value Date/Time    SPECIAL LFT AC 10ML 05/21/2021 03:30 PM     Lab Results   Component Value Date/Time    CULTURE NO GROWTH 12 HOURS 05/21/2021 03:30 PM       Radiology:  NM CARDIAC MUGA SCAN    Result Date: 5/21/2021  Left ventricular ejection fraction is 34%     XR CHEST PORTABLE    Result Date: 5/20/2021  Cardiomegaly and edema with perihilar and bibasilar opacities. Superimposed infection would be difficult to exclude. CT CHEST PULMONARY EMBOLISM W CONTRAST    Result Date: 5/20/2021  1. There is no pulmonary embolus. 2. Ground-glass opacities in both lungs with bilateral pleural effusions, concerning for multifocal pneumonia. 3. Cardiomegaly.        Physical Examination:        General appearance:  alert, cooperative and no distress  Mental Status:  oriented to person, place and time and normal affect  Lungs:  clear to auscultation bilaterally, normal effort  Heart:  regular rate and rhythm, no murmur  Abdomen:  soft, nontender, nondistended, normal bowel sounds, no masses, hepatomegaly, splenomegaly  Extremities:  no edema, redness, tenderness in the calves  Skin:  no gross lesions, rashes, induration    Assessment:        Hospital Problems         Last Modified POA    * (Principal) Pneumonia due to organism 5/21/2021 Yes    Chronic diastolic congestive heart failure ([address] 5/21/2021 Yes    S/P mitral valve replacement (Chronic) 5/21/2021 Yes    Overview Signed 3/17/2021  8:08 AM by MAYRA Sage CNP     mitral valve replacement with a 29/31-mm On-X mechanical mitral valve,          S/P left atrial appendage ligation (Chronic) 5/21/2021 Yes    Overview Signed 3/17/2021  8:08 AM by Armida Cordova, APRN - CNP     50-mm AtriClip left atrial appendage ligation         Elevated troponin 5/21/2021 Yes          Plan:        1. Shortness of breath and hypoxia likely secondary to acute exacerbation CHF. Pulmonary edema seems to have improved, patient is weaned off supplemental oxygen. 2. Spoke with cardiology, discharged with Lasix 40 mg daily and 20 mg at night. Started on [place], Toprol-XL and Aldactone. Outpatient follow-up for repeat echocardiogram  3.  Discharge today    Mateo Novoa DO  5/22/2021  11:31 AM

## 2021-05-22 NOTE — PROGRESS NOTES
Occupational Therapy Not Seen Note    DATE: 2021  Name: Warren Arndt  : 1975  MRN: [id number]    Patient not available for Occupational Therapy due to:    Pt independent with ADLs and functional tasks at this time.  Pt with no OT acute care needs, will defer OT eval.    Next Scheduled Treatment: NA    Electronically signed by PEG Rosado on 2021 at 10:24 AM

## 2021-05-22 NOTE — DISCHARGE SUMMARY
[facility]  Office: [address], DO, Wolf Watson, DO, Ike Rhys, DO, Noah Lujan Andrey, DO, Paulo Jeronimo MD, Lisbeth Parada MD, Lulu Swan MD, Bonnielee Nissen, MD, Courtney Pena MD, Mat Wright MD, Coral Heredia MD, Bharath Boyer MD, Goran Oliva DO, Jeanine Perez MD, Dominguez Pearl DO, Anamika Shaffer MD,  Rosalino Velez DO, Geno Giordano MD, Wilman Gomez MD, Tommie Buitrago MD, Rishabh Torres MD, Cristina Herrmann, [facility], [facility], CNP, Charissa South, CNP, Dee Price, CNS, Cari Madsen, CNP, Raymundo Hicks, CNP, Jazlyn Galdamez, CNP, Rosa Kay, CNP, Dipesh Thao, CNP, Hilda Young PA-C, Geoff Bae, [facility], Amber Young, CNP, Nolan Samuels, CNP, Luz Marina Man, CNP, Darian Veloz, CNP, Genie Ferraro, CNP, Unruly Degroot, [facility]    Discharge Summary     Patient ID: Emil Villagomez  :  1975   MRN: [id number]     ACCOUNT:  [de-identified]   Patient's PCP: MAYRA Seth CNP  Admit Date: 2021   Discharge Date: 2021     Length of Stay: 1  Code Status:  Full Code  Admitting Physician: No admitting provider for patient encounter. Discharge Physician: Dominguez Pearl DO     Active Discharge Diagnoses:     Hospital Problem Lists:  Principal Problem:    Pneumonia due to organism  Active Problems:    Chronic diastolic congestive heart failure (HCC)    S/P mitral valve replacement    S/P left atrial appendage ligation    Elevated troponin  Resolved Problems:    * No resolved hospital problems. *      Admission Condition:  good     Discharged Condition: good    Hospital Stay:     Hospital Course:  53-year-old female presents for shortness of breath. States has been progressively worse for the past 5 to 6 days. Patient noted to have a BNP of 5763 in the emergency department, CTA showed no pulmonary embolism with bilateral groundglass opacities.   Patient was Covid May 23, 2021         * This list has 2 medication(s) that are the same as other medications prescribed for you. Read the directions carefully, and ask your doctor or other care provider to review them with you. CONTINUE taking these medications    ARIPiprazole 15 MG tablet  Commonly known as: ABILIFY     aspirin 81 MG EC tablet  Take 1 tablet by mouth daily     citalopram 20 MG tablet  Commonly known as: CELEXA     cloNIDine 0.1 MG tablet  Commonly known as: CATAPRES     enoxaparin 60 MG/0.6ML injection  Commonly known as: Lovenox  Inject 0.6 mLs into the skin 2 times daily for 10 days     omeprazole 20 MG delayed release capsule  Commonly known as: PRILOSEC     warfarin 2 MG tablet  Commonly known as: Coumadin  Take as directed. If you are unsure how to take this medication, talk to your nurse or doctor. Original instructions: Take one tablet (or as directed by Medication Management) by mouth once daily. 90DS        STOP taking these medications    metoprolol tartrate 25 MG tablet  Commonly known as: LOPRESSOR           Where to Get Your Medications      These medications were sent to [place] [address]    Phone: [phone]   · ferrous sulfate 325 (65 Fe) MG EC tablet  · furosemide 20 MG tablet  · furosemide 40 MG tablet  · metoprolol succinate 25 MG extended release tablet  · sacubitril-valsartan 24-26 MG per tablet  · spironolactone 25 MG tablet         No discharge procedures on file. Time Spent on discharge is  40 mins in patient examination, evaluation, counseling as well as medication reconciliation, prescriptions for required medications, discharge plan and follow up. Electronically signed by   Regine Baker DO  5/22/2021  11:31 AM      Thank you Dr. Jacki Vázquez, APRN - CNP for the opportunity to be involved in this patient's care.

## 2021-05-24 ENCOUNTER — HOSPITAL ENCOUNTER (OUTPATIENT)
Dept: PHARMACY | Age: 46
Setting detail: THERAPIES SERIES
Discharge: HOME | End: 2021-05-24
Payer: MEDICAID

## 2021-05-24 DIAGNOSIS — I34.0 SEVERE MITRAL REGURGITATION BY PRIOR ECHOCARDIOGRAM: Primary | ICD-10-CM

## 2021-05-24 LAB
INR BLD: 2.7
PROTIME: 32.5 SECONDS

## 2021-05-24 PROCEDURE — 85610 PROTHROMBIN TIME: CPT

## 2021-05-24 PROCEDURE — 99212 OFFICE O/P EST SF 10 MIN: CPT

## 2021-05-24 NOTE — PROGRESS NOTES
Medication Management Service, Warfarin Management  [address], [phone]  Visit Date: 5/24/2021   Subjective:   Megha Abdi is a 39 y.o. female who presents to clinic today for anticoagulation monitoring and adjustment. Patient seen in clinic for warfarin management due to  Indication:   mechanical mitral valve. INR goal: of 2.0-3.0. Duration of therapy: indefinite. Assessment and PLAN   PT/INR done in office per protocol. INR today is 2.7, therapeutic. INR is in goal but trending up quickly from 2.0 three days ago. Patient received a total of 38 mg of warfarin the past seven days. Plan: Will adjust maintenance regimen to 36 mg of warfarin weekly. New regimen will be 4 mg Monday, Wednesday, Friday, and 6 mg daily all other days of the week. Recheck INR on Friday, May 28. Using warfarin 2 mg tablets. Recheck INR in 4 days. Patient seen in room # 1. Patient verbally attests to completing the COVID 19 self screen. Patient verbalized understanding of dosing directions and information discussed. Dosing schedule given to patient. Progress note sent to referring office. Patient acknowledges working in consult agreement with pharmacist as referred by his/her physician. [address] [place] [address]  Ph., CACP, Clinical Pharmacist  Anticoagulation Services, [address] [facility]  5/24/2021  8:55 AM     For Pharmacy Admin Tracking Only     Intervention Detail: Dose Adjustment: 1: reason: Therapy Optimization   Total # of Interventions Recommended: 1   Total # of Interventions Accepted: 1   Time Spent (min): 30

## 2021-05-27 ENCOUNTER — HOSPITAL ENCOUNTER (OUTPATIENT)
Dept: PHARMACY | Age: 46
Setting detail: THERAPIES SERIES
Discharge: HOME | End: 2021-05-27
Payer: MEDICAID

## 2021-05-27 DIAGNOSIS — I34.0 SEVERE MITRAL REGURGITATION BY PRIOR ECHOCARDIOGRAM: Primary | ICD-10-CM

## 2021-05-27 LAB
CULTURE: NORMAL
INR BLD: 2.3
Lab: NORMAL
PROTIME: 28 SECONDS
SPECIMEN DESCRIPTION: NORMAL

## 2021-05-27 PROCEDURE — 99211 OFF/OP EST MAY X REQ PHY/QHP: CPT

## 2021-05-27 PROCEDURE — 85610 PROTHROMBIN TIME: CPT

## 2021-05-27 NOTE — PROGRESS NOTES
Medication Management Service, Warfarin Management  ISAAK RODRIGUEZ [facility], [phone]  Visit Date: 5/27/2021   Subjective:   Trina Doe is a 39 y.o. female who presents to clinic today for anticoagulation monitoring and adjustment. Patient seen in clinic for warfarin management due to  Indication:   MVR. INR goal: of 2.0-3.0. Duration of therapy: indefinite. Assessment and PLAN   PT/INR done in office per protocol. INR today is 2.3, therapeutic. Plan: Will continue current regimen of warfarin 4 mg Monday, Wednesday, Friday and 6 mg on all other days. Using warfarin 2 mg tablets. Recheck INR in 1 week(s). Patient seen in room # 1. Patient attested to self screening for COVID - 19. Patient verbalized understanding of dosing directions and information discussed. Dosing schedule given to patient. Progress note sent to referring office. Patient acknowledges working in consult agreement with pharmacist as referred by his/her physician.       Electronically signed by Star Maldonado, [facility] on 5/27/21 at 1:21 PM EDT    For Pharmacy Admin Tracking Only     Total # of Interventions Recommended: 1   Total # of Interventions Accepted: 1   Time Spent (min): 15

## 2021-06-01 ENCOUNTER — TELEPHONE (OUTPATIENT)
Dept: PHARMACY | Age: 46
End: 2021-06-01

## 2021-06-01 RX ORDER — WARFARIN SODIUM 5 MG/1
TABLET ORAL
Qty: 35 TABLET | Refills: 1 | Status: SHIPPED | OUTPATIENT
Start: 2021-06-01 | End: 2021-09-28 | Stop reason: SDUPTHER

## 2021-06-01 NOTE — TELEPHONE ENCOUNTER
Patient called today to report that she is out of warfarin, although she has a refill on her bottle the pharmacy denied as \"too soon\". I explained to patient that she should be her own best advocate and press for at least a short supply of warfarin, state law permits a dispensing of a short supply for a life saving medication. I sent a new script in today, changed tablet strength to 5mg tabs. Called patient back to inform her of change to 5mg and also instructed her to take 1.5 tabs today for 7.5mg dose since she missed her dose yesterday. Patient repeated instructions with read back method.

## 2021-06-03 ENCOUNTER — HOSPITAL ENCOUNTER (OUTPATIENT)
Age: 46
Discharge: HOME | End: 2021-06-03
Payer: MEDICAID

## 2021-06-03 ENCOUNTER — APPOINTMENT (OUTPATIENT)
Dept: PHARMACY | Age: 46
End: 2021-06-03
Payer: MEDICAID

## 2021-06-03 ENCOUNTER — HOSPITAL ENCOUNTER (OUTPATIENT)
Dept: PHARMACY | Age: 46
Setting detail: THERAPIES SERIES
Discharge: HOME | End: 2021-06-03
Payer: MEDICAID

## 2021-06-03 DIAGNOSIS — I34.0 SEVERE MITRAL REGURGITATION BY PRIOR ECHOCARDIOGRAM: Primary | ICD-10-CM

## 2021-06-03 LAB
ANION GAP SERPL CALCULATED.3IONS-SCNC: 8 MMOL/L (ref 9–17)
BNP INTERPRETATION: ABNORMAL
BUN BLDV-MCNC: 17 MG/DL (ref 6–20)
BUN/CREAT BLD: ABNORMAL (ref 9–20)
CALCIUM SERPL-MCNC: 8.8 MG/DL (ref 8.6–10.4)
CHLORIDE BLD-SCNC: 103 MMOL/L (ref 98–107)
CO2: 26 MMOL/L (ref 20–31)
CREAT SERPL-MCNC: 0.61 MG/DL (ref 0.5–0.9)
GFR AFRICAN AMERICAN: >60 ML/MIN
GFR NON-AFRICAN AMERICAN: >60 ML/MIN
GFR SERPL CREATININE-BSD FRML MDRD: ABNORMAL ML/MIN/{1.73_M2}
GFR SERPL CREATININE-BSD FRML MDRD: ABNORMAL ML/MIN/{1.73_M2}
GLUCOSE BLD-MCNC: 102 MG/DL (ref 70–99)
INR BLD: 1.8
MAGNESIUM: 2 MG/DL (ref 1.6–2.6)
POTASSIUM SERPL-SCNC: 4.6 MMOL/L (ref 3.7–5.3)
PRO-BNP: 753 PG/ML
PROTIME: 21.3 SECONDS
SODIUM BLD-SCNC: 137 MMOL/L (ref 135–144)

## 2021-06-03 PROCEDURE — 80048 BASIC METABOLIC PNL TOTAL CA: CPT

## 2021-06-03 PROCEDURE — 83880 ASSAY OF NATRIURETIC PEPTIDE: CPT

## 2021-06-03 PROCEDURE — 83735 ASSAY OF MAGNESIUM: CPT

## 2021-06-03 PROCEDURE — 36415 COLL VENOUS BLD VENIPUNCTURE: CPT

## 2021-06-03 PROCEDURE — 99212 OFFICE O/P EST SF 10 MIN: CPT

## 2021-06-03 PROCEDURE — 85610 PROTHROMBIN TIME: CPT

## 2021-06-03 NOTE — PROGRESS NOTES
Medication Management Service, Warfarin Management  ISAAK RODRIGUEZ [facility], [phone]  Visit Date: 6/3/2021   Subjective:   Rachel Merritt is a 39 y.o. female who presents to clinic today for anticoagulation monitoring and adjustment. Patient seen in clinic for warfarin management due to  Indication:   MVR. INR goal: of 2.0-3.0. Duration of therapy: indefinite. Assessment and PLAN   PT/INR done in office per protocol. INR today is 1.8, subtherapeutic. Likely due to the missed dose on Sunday. Plan:  Instructed the patient to start an increased weekly warfarin regimen of 7.5 mg Thursday and 5 mg on all other days, which is a 4.2% increase. Using warfarin 5 mg tablets. Recheck INR in 4 day(s). Patient seen in room # 1. Patient attested to self screening for COVID - 19. Patient verbalized understanding of dosing directions and information discussed. Dosing schedule given to patient. Progress note sent to referring office. Patient acknowledges working in consult agreement with pharmacist as referred by his/her physician.       Electronically signed by Koko Bains, [address] [place] [address] on 6/3/21 at 8:39 AM EDT    For Pharmacy Admin Tracking Only     Intervention Detail: Dose Adjustment: 1: reason: Therapy Optimization   Total # of Interventions Recommended: 1   Total # of Interventions Accepted: 1   Time Spent (min): 15

## 2021-06-07 ENCOUNTER — TELEPHONE (OUTPATIENT)
Dept: PHARMACY | Age: 46
End: 2021-06-07

## 2021-06-08 ENCOUNTER — HOSPITAL ENCOUNTER (OUTPATIENT)
Dept: PHARMACY | Age: 46
Setting detail: THERAPIES SERIES
Discharge: HOME | End: 2021-06-08
Payer: MEDICAID

## 2021-06-08 DIAGNOSIS — I34.0 SEVERE MITRAL REGURGITATION BY PRIOR ECHOCARDIOGRAM: Primary | ICD-10-CM

## 2021-06-08 LAB
INR BLD: 3.3
PROTIME: 39.3 SECONDS

## 2021-06-08 PROCEDURE — 85610 PROTHROMBIN TIME: CPT

## 2021-06-08 PROCEDURE — 99212 OFFICE O/P EST SF 10 MIN: CPT

## 2021-06-08 NOTE — PROGRESS NOTES
Medication Management Service, Warfarin Management  ISAAK RODRIGUEZ [facility], [phone]  Visit Date: 6/8/2021   Subjective:   Florentino Godwin is a 39 y.o. female who presents to clinic today for anticoagulation monitoring and adjustment. Patient seen in clinic for warfarin management due to  Indication:   mechanical mitral valve. INR goal: of 2.0-3.0. Duration of therapy: indefinite. Assessment and PLAN   PT/INR done in office per protocol. INR today is 3.3, supra-therapeutic. INR is above goal after receiving 37.5 mg of warfarin this past week. Likely cause of elevated INR is the regimen is too high for her. No other cause identified today. Plan: Will adjust maintenance regimen to 35 mg of warfarin weekly. New regimen will be warfarin 5 mg daily. Patient will also incorporate one serving of vegetables into her diet once weekly. Using warfarin 2 mg tablets. Recheck INR in one week. Patient seen in room # 1. Patient verbally attests to completing the COVID 19 self screen. Patient verbalized understanding of dosing directions and information discussed. Dosing schedule given to patient. Progress note sent to referring office. Patient acknowledges working in consult agreement with pharmacist as referred by his/her physician. [address] [place] [address]  Ph., CACP, Clinical Pharmacist  Anticoagulation Services, [address] [facility]  6/8/2021  1:13 PM     For Pharmacy Admin Tracking Only     Intervention Detail: Dose Adjustment: 1: reason: Therapy Optimization   Total # of Interventions Recommended: 1   Total # of Interventions Accepted: 1   Time Spent (min): 20

## 2021-06-15 ENCOUNTER — HOSPITAL ENCOUNTER (OUTPATIENT)
Dept: PHARMACY | Age: 46
Setting detail: THERAPIES SERIES
Discharge: HOME | End: 2021-06-15
Payer: MEDICAID

## 2021-06-15 DIAGNOSIS — I34.0 SEVERE MITRAL REGURGITATION BY PRIOR ECHOCARDIOGRAM: Primary | ICD-10-CM

## 2021-06-15 LAB
INR BLD: 2.5
PROTIME: 30.3 SECONDS

## 2021-06-15 PROCEDURE — 85610 PROTHROMBIN TIME: CPT

## 2021-06-15 PROCEDURE — 99211 OFF/OP EST MAY X REQ PHY/QHP: CPT

## 2021-06-15 NOTE — PROGRESS NOTES
Medication Management Service, Warfarin Management  ISAAK RODRIGUEZ [facility], [phone]  Visit Date: 6/15/2021   Subjective:   Pina Benjamin is a 39 y.o. female who presents to clinic today for anticoagulation monitoring and adjustment. Patient seen in clinic for warfarin management due to  Indication:   mechanical mitral valve. INR goal: of 2.0-3.0. Duration of therapy: indefinite. Assessment and PLAN   PT/INR done in office per protocol. INR today is 2.5, therapeutic. INR is in the middle of her goal after taking 35 mg of warfarin this past week. Maintenance regimen has been established. Plan:    Continue current regimen of warfarin 5 mg daily. Patient will continue with one serving of green vegetables each week. Using warfarin 2 mg tablets. Recheck INR in one week. Patient seen in room # 1. Patient verbally attests to completing the COVID 19 self screen. Patient verbalized understanding of dosing directions and information discussed. Dosing schedule given to patient. Progress note sent to referring office. Patient acknowledges working in consult agreement with pharmacist as referred by his/her physician. [address] [place] [address]  Ph., CACP, Clinical Pharmacist  Anticoagulation Services, [address] [place] [address] Coumadin Clinic  6/15/2021  2:16 PM     For Pharmacy Admin Tracking Only     Intervention Detail:    Total # of Interventions Recommended: 0   Total # of Interventions Accepted: 0   Time Spent (min): 20

## 2021-06-20 PROBLEM — R77.8 ELEVATED TROPONIN: Status: RESOLVED | Noted: 2021-05-21 | Resolved: 2021-06-20

## 2021-06-20 PROBLEM — R79.89 ELEVATED TROPONIN: Status: RESOLVED | Noted: 2021-05-21 | Resolved: 2021-06-20

## 2021-06-23 ENCOUNTER — HOSPITAL ENCOUNTER (OUTPATIENT)
Dept: PHARMACY | Age: 46
Setting detail: THERAPIES SERIES
Discharge: HOME | End: 2021-06-23
Payer: MEDICAID

## 2021-06-23 DIAGNOSIS — I34.0 SEVERE MITRAL REGURGITATION BY PRIOR ECHOCARDIOGRAM: Primary | ICD-10-CM

## 2021-06-23 LAB
INR BLD: 4.1
PROTIME: 49.7 SECONDS

## 2021-06-23 PROCEDURE — 85610 PROTHROMBIN TIME: CPT

## 2021-06-23 PROCEDURE — 99212 OFFICE O/P EST SF 10 MIN: CPT

## 2021-06-23 NOTE — PROGRESS NOTES
Medication Management Service, Warfarin Management  ISAAK RODRIGUEZ [facility], [phone]  Visit Date: 6/23/2021   Subjective:   Rebecca Gonzalez is a 39 y.o. female who presents to clinic today for anticoagulation monitoring and adjustment. Patient seen in clinic for warfarin management due to  Indication:   mechanical mitral valve. INR goal: of 2.0-3.0. Duration of therapy: indefinite. Assessment and PLAN   PT/INR done in office per protocol. INR today is 4.1, supra therapeutic. Patient's Celexa dose was increased within the past month. This medication can increase the INR. Plan:   Hold warfarin today only. Then starting next week will decrease the current regimen by 7.1 % weekly. New regimen will be warfarin 2.5 mg Wednesday and 5 mg all other days of the week. Using warfarin 2 mg tablets. Recheck INR in one and one half weeks  Patient seen in room # 1. Patient verbally attests to completing the COVID 19 self screen. Patient verbalized understanding of dosing directions and information discussed. Dosing schedule given to patient. Progress note sent to referring office. Patient acknowledges working in consult agreement with pharmacist as referred by his/her physician. [address] [place] [address]  Ph., CACP, Clinical Pharmacist  Anticoagulation Services, [address] [place] [address] Coumadin Clinic  6/23/2021  10:14 AM     For Pharmacy Admin Tracking Only     Intervention Detail: Dose Adjustment: 2, reason: Therapy Optimization   Total # of Interventions Recommended: 2   Total # of Interventions Accepted: 2   Time Spent (min): 20

## 2021-07-02 ENCOUNTER — TELEPHONE (OUTPATIENT)
Dept: PHARMACY | Age: 46
End: 2021-07-02

## 2021-07-02 NOTE — TELEPHONE ENCOUNTER
Patient was a No Call No Show for [facility] appointment today. Called to reschedule, left voicemail.

## 2021-07-13 ENCOUNTER — HOSPITAL ENCOUNTER (OUTPATIENT)
Dept: PHARMACY | Age: 46
Setting detail: THERAPIES SERIES
Discharge: HOME | End: 2021-07-13
Payer: MEDICAID

## 2021-07-13 DIAGNOSIS — I34.0 SEVERE MITRAL REGURGITATION BY PRIOR ECHOCARDIOGRAM: Primary | ICD-10-CM

## 2021-07-13 LAB
INR BLD: 5.7
PROTIME: 68.8 SECONDS

## 2021-07-13 PROCEDURE — 85610 PROTHROMBIN TIME: CPT

## 2021-07-13 PROCEDURE — 99212 OFFICE O/P EST SF 10 MIN: CPT

## 2021-07-13 NOTE — PROGRESS NOTES
Medication Management Service, Warfarin Management  ISAAK RODRIGUEZ [facility], [phone]  Visit Date: 7/13/2021   Subjective:   Megan Mckeon is a 39 y.o. female who presents to clinic today for anticoagulation monitoring and adjustment. Patient seen in clinic for warfarin management due to  Indication:   mechanical mitral valve. INR goal: of 2.0-3.0. Duration of therapy: indefinite. Assessment and PLAN   PT/INR done in office per protocol. INR today is 5.7, supra therapeutic. Patient reports not eating any vegetables this week but denies all other causes for elevated INR. After further discussion, she admits to being discharged from the group home about one month ago. I asked again about alcohol consumption but she denies use of any type of alcohol. Plan: Patient will have vegetables with dinner today. Hold warfarin today only. Then reduce the current regimen by 15.4 % weekly. New regimen will be warfarin 2.5 mg Sunday, Wednesday, Friday and 5 mg all other days of the week. Using warfarin 2 mg tablets. Recheck INR in one  Week. Patient has run out of medical cabs and if struggling to get transportation to medical appointments. Patient seen in room # 1. Patient verbally attests to completing the COVID 19 self screen. Patient verbalized understanding of dosing directions and information discussed. Dosing schedule given to patient. Progress note sent to referring office. Patient acknowledges working in consult agreement with pharmacist as referred by his/her physician. [address] [place] [address]  Ph., CACP, Clinical Pharmacist  Anticoagulation Services, [address] [place] [address] Coumadin Clinic  7/13/2021  2:42 PM     For Pharmacy Admin Tracking Only     Intervention Detail: Dose Adjustment: 2, reason: Therapy Optimization   Total # of Interventions Recommended: 2   Total # of Interventions Accepted: 2   Time Spent (min): 30

## 2021-07-20 ENCOUNTER — TELEPHONE (OUTPATIENT)
Dept: PHARMACY | Age: 46
End: 2021-07-20

## 2021-07-20 NOTE — TELEPHONE ENCOUNTER
Patient was a No Call No Show for [facility] appointment today. Called to reschedule, left voicemail.

## 2021-07-22 ENCOUNTER — TELEPHONE (OUTPATIENT)
Dept: OBGYN | Age: 46
End: 2021-07-22

## 2021-07-22 NOTE — TELEPHONE ENCOUNTER
Patient no showed for 7/22/21 appointment at [address] office. Writer spoke to patient and she would like to call to reschedule her new patient appointment when she has transportation.

## 2021-07-28 ENCOUNTER — APPOINTMENT (OUTPATIENT)
Dept: GENERAL RADIOLOGY | Age: 46
End: 2021-07-28
Payer: MEDICAID

## 2021-07-28 ENCOUNTER — HOSPITAL ENCOUNTER (EMERGENCY)
Age: 46
Discharge: HOME | End: 2021-07-28
Attending: EMERGENCY MEDICINE
Payer: MEDICAID

## 2021-07-28 VITALS
SYSTOLIC BLOOD PRESSURE: 111 MMHG | OXYGEN SATURATION: 99 % | RESPIRATION RATE: 18 BRPM | HEART RATE: 104 BPM | TEMPERATURE: 98.8 F | DIASTOLIC BLOOD PRESSURE: 82 MMHG

## 2021-07-28 DIAGNOSIS — R07.9 CHEST PAIN, UNSPECIFIED TYPE: Primary | ICD-10-CM

## 2021-07-28 DIAGNOSIS — N30.01 ACUTE CYSTITIS WITH HEMATURIA: ICD-10-CM

## 2021-07-28 LAB
-: ABNORMAL
ABSOLUTE EOS #: <0.03 K/UL (ref 0–0.44)
ABSOLUTE IMMATURE GRANULOCYTE: <0.03 K/UL (ref 0–0.3)
ABSOLUTE LYMPH #: 1.44 K/UL (ref 1.1–3.7)
ABSOLUTE MONO #: 0.32 K/UL (ref 0.1–1.2)
ALBUMIN SERPL-MCNC: 4.2 G/DL (ref 3.5–5.2)
ALBUMIN/GLOBULIN RATIO: 1.3 (ref 1–2.5)
ALP BLD-CCNC: 65 U/L (ref 35–104)
ALT SERPL-CCNC: 6 U/L (ref 5–33)
AMORPHOUS: ABNORMAL
ANION GAP SERPL CALCULATED.3IONS-SCNC: 9 MMOL/L (ref 9–17)
AST SERPL-CCNC: 13 U/L
BACTERIA: ABNORMAL
BASOPHILS # BLD: 1 % (ref 0–2)
BASOPHILS ABSOLUTE: 0.04 K/UL (ref 0–0.2)
BILIRUB SERPL-MCNC: 1.67 MG/DL (ref 0.3–1.2)
BILIRUBIN URINE: NEGATIVE
BNP INTERPRETATION: NORMAL
BUN BLDV-MCNC: 8 MG/DL (ref 6–20)
BUN/CREAT BLD: ABNORMAL (ref 9–20)
CALCIUM SERPL-MCNC: 8.9 MG/DL (ref 8.6–10.4)
CASTS UA: ABNORMAL /LPF (ref 0–2)
CHLORIDE BLD-SCNC: 99 MMOL/L (ref 98–107)
CO2: 26 MMOL/L (ref 20–31)
COLOR: ABNORMAL
COMMENT UA: ABNORMAL
CREAT SERPL-MCNC: 0.67 MG/DL (ref 0.5–0.9)
CRYSTALS, UA: ABNORMAL /HPF
D-DIMER QUANTITATIVE: 0.39 MG/L FEU
DIFFERENTIAL TYPE: ABNORMAL
EOSINOPHILS RELATIVE PERCENT: 1 % (ref 1–4)
EPITHELIAL CELLS UA: ABNORMAL /HPF (ref 0–5)
GFR AFRICAN AMERICAN: >60 ML/MIN
GFR NON-AFRICAN AMERICAN: >60 ML/MIN
GFR SERPL CREATININE-BSD FRML MDRD: ABNORMAL ML/MIN/{1.73_M2}
GFR SERPL CREATININE-BSD FRML MDRD: ABNORMAL ML/MIN/{1.73_M2}
GLUCOSE BLD-MCNC: 130 MG/DL (ref 70–99)
GLUCOSE URINE: NEGATIVE
HCG(URINE) PREGNANCY TEST: NEGATIVE
HCT VFR BLD CALC: 42.3 % (ref 36.3–47.1)
HEMOGLOBIN: 13.6 G/DL (ref 11.9–15.1)
IMMATURE GRANULOCYTES: 0 %
INR BLD: 1.1
KETONES, URINE: NEGATIVE
LEUKOCYTE ESTERASE, URINE: ABNORMAL
LIPASE: 9 U/L (ref 13–60)
LYMPHOCYTES # BLD: 34 % (ref 24–43)
MCH RBC QN AUTO: 26.8 PG (ref 25.2–33.5)
MCHC RBC AUTO-ENTMCNC: 32.2 G/DL (ref 28.4–34.8)
MCV RBC AUTO: 83.4 FL (ref 82.6–102.9)
MONOCYTES # BLD: 7 % (ref 3–12)
MUCUS: ABNORMAL
NITRITE, URINE: POSITIVE
NRBC AUTOMATED: 0 PER 100 WBC
OTHER OBSERVATIONS UA: ABNORMAL
PDW BLD-RTO: 16.6 % (ref 11.8–14.4)
PH UA: 5.5 (ref 5–8)
PLATELET # BLD: 306 K/UL (ref 138–453)
PLATELET ESTIMATE: ABNORMAL
PMV BLD AUTO: 9.5 FL (ref 8.1–13.5)
POTASSIUM SERPL-SCNC: 3.7 MMOL/L (ref 3.7–5.3)
PRO-BNP: 76 PG/ML
PROTEIN UA: ABNORMAL
PROTHROMBIN TIME: 11.9 SEC (ref 9.1–12.3)
RBC # BLD: 5.07 M/UL (ref 3.95–5.11)
RBC # BLD: ABNORMAL 10*6/UL
RBC UA: ABNORMAL /HPF (ref 0–2)
RENAL EPITHELIAL, UA: ABNORMAL /HPF
SEG NEUTROPHILS: 57 % (ref 36–65)
SEGMENTED NEUTROPHILS ABSOLUTE COUNT: 2.47 K/UL (ref 1.5–8.1)
SODIUM BLD-SCNC: 134 MMOL/L (ref 135–144)
SPECIFIC GRAVITY UA: 1.02 (ref 1–1.03)
TOTAL CK: 56 U/L (ref 26–192)
TOTAL PROTEIN: 7.5 G/DL (ref 6.4–8.3)
TRICHOMONAS: ABNORMAL
TROPONIN INTERP: NORMAL
TROPONIN INTERP: NORMAL
TROPONIN T: NORMAL NG/ML
TROPONIN T: NORMAL NG/ML
TROPONIN, HIGH SENSITIVITY: 6 NG/L (ref 0–14)
TROPONIN, HIGH SENSITIVITY: 7 NG/L (ref 0–14)
TURBIDITY: ABNORMAL
URINE HGB: ABNORMAL
UROBILINOGEN, URINE: NORMAL
WBC # BLD: 4.3 K/UL (ref 3.5–11.3)
WBC # BLD: ABNORMAL 10*3/UL
WBC UA: ABNORMAL /HPF (ref 0–5)
YEAST: ABNORMAL

## 2021-07-28 PROCEDURE — 6360000002 HC RX W HCPCS: Performed by: EMERGENCY MEDICINE

## 2021-07-28 PROCEDURE — 81025 URINE PREGNANCY TEST: CPT

## 2021-07-28 PROCEDURE — 93005 ELECTROCARDIOGRAM TRACING: CPT | Performed by: EMERGENCY MEDICINE

## 2021-07-28 PROCEDURE — 81001 URINALYSIS AUTO W/SCOPE: CPT

## 2021-07-28 PROCEDURE — 2580000003 HC RX 258: Performed by: EMERGENCY MEDICINE

## 2021-07-28 PROCEDURE — 99284 EMERGENCY DEPT VISIT MOD MDM: CPT

## 2021-07-28 PROCEDURE — 6370000000 HC RX 637 (ALT 250 FOR IP): Performed by: EMERGENCY MEDICINE

## 2021-07-28 PROCEDURE — 96361 HYDRATE IV INFUSION ADD-ON: CPT

## 2021-07-28 PROCEDURE — 85610 PROTHROMBIN TIME: CPT

## 2021-07-28 PROCEDURE — 84484 ASSAY OF TROPONIN QUANT: CPT

## 2021-07-28 PROCEDURE — 85025 COMPLETE CBC W/AUTO DIFF WBC: CPT

## 2021-07-28 PROCEDURE — 96365 THER/PROPH/DIAG IV INF INIT: CPT

## 2021-07-28 PROCEDURE — 82550 ASSAY OF CK (CPK): CPT

## 2021-07-28 PROCEDURE — 80053 COMPREHEN METABOLIC PANEL: CPT

## 2021-07-28 PROCEDURE — 83690 ASSAY OF LIPASE: CPT

## 2021-07-28 PROCEDURE — 85379 FIBRIN DEGRADATION QUANT: CPT

## 2021-07-28 PROCEDURE — 71045 X-RAY EXAM CHEST 1 VIEW: CPT

## 2021-07-28 PROCEDURE — 83880 ASSAY OF NATRIURETIC PEPTIDE: CPT

## 2021-07-28 RX ORDER — FLUCONAZOLE 150 MG/1
150 TABLET ORAL ONCE
Qty: 1 TABLET | Refills: 0 | Status: SHIPPED | OUTPATIENT
Start: 2021-07-28 | End: 2021-07-28

## 2021-07-28 RX ORDER — CEPHALEXIN 500 MG/1
500 CAPSULE ORAL 4 TIMES DAILY
Qty: 40 CAPSULE | Refills: 0 | Status: SHIPPED | OUTPATIENT
Start: 2021-07-28 | End: 2021-08-07

## 2021-07-28 RX ORDER — 0.9 % SODIUM CHLORIDE 0.9 %
1000 INTRAVENOUS SOLUTION INTRAVENOUS ONCE
Status: COMPLETED | OUTPATIENT
Start: 2021-07-28 | End: 2021-07-28

## 2021-07-28 RX ORDER — ONDANSETRON 4 MG/1
4 TABLET, ORALLY DISINTEGRATING ORAL EVERY 8 HOURS PRN
Qty: 8 TABLET | Refills: 0 | Status: SHIPPED | OUTPATIENT
Start: 2021-07-28 | End: 2022-05-12 | Stop reason: SDUPTHER

## 2021-07-28 RX ORDER — NITROGLYCERIN 0.4 MG/1
0.4 TABLET SUBLINGUAL EVERY 5 MIN PRN
Status: DISCONTINUED | OUTPATIENT
Start: 2021-07-28 | End: 2021-07-28 | Stop reason: HOSPADM

## 2021-07-28 RX ADMIN — CEFTRIAXONE SODIUM 1000 MG: 1 INJECTION, POWDER, FOR SOLUTION INTRAMUSCULAR; INTRAVENOUS at 14:30

## 2021-07-28 RX ADMIN — NITROGLYCERIN 0.4 MG: 0.4 TABLET, ORALLY DISINTEGRATING SUBLINGUAL at 13:13

## 2021-07-28 RX ADMIN — SODIUM CHLORIDE 1000 ML: 9 INJECTION, SOLUTION INTRAVENOUS at 15:36

## 2021-07-28 NOTE — ED NOTES
Pt given boxed lunch     Catherine Gowers, [facility]  07/28/21 8423

## 2021-07-28 NOTE — ED TRIAGE NOTES
Pt reports chest pain and concern that she may have been exposed to hepatitis C. Pt states that the chest pain is dull and a 6/10. Pt denies SOB. Pt on full monitoring, EKG completed, and labs sent fito tube system. All other needs addressed at this time.

## 2021-07-28 NOTE — ED NOTES
Pt discharge instructions reviewed and IV removed. All questions answered at this time.       Katlin Darby RN  07/28/21 5914

## 2021-07-30 LAB
EKG ATRIAL RATE: 116 BPM
EKG P AXIS: 67 DEGREES
EKG P-R INTERVAL: 138 MS
EKG Q-T INTERVAL: 352 MS
EKG QRS DURATION: 64 MS
EKG QTC CALCULATION (BAZETT): 489 MS
EKG R AXIS: 45 DEGREES
EKG T AXIS: 73 DEGREES
EKG VENTRICULAR RATE: 116 BPM

## 2021-08-02 ENCOUNTER — HOSPITAL ENCOUNTER (OUTPATIENT)
Dept: PHARMACY | Age: 46
Setting detail: THERAPIES SERIES
Discharge: HOME | End: 2021-08-02
Payer: MEDICAID

## 2021-08-02 DIAGNOSIS — I34.0 SEVERE MITRAL REGURGITATION BY PRIOR ECHOCARDIOGRAM: Primary | ICD-10-CM

## 2021-08-02 LAB
INR BLD: 1.9
PROTIME: 22.3 SECONDS

## 2021-08-02 PROCEDURE — 99211 OFF/OP EST MAY X REQ PHY/QHP: CPT

## 2021-08-02 PROCEDURE — 85610 PROTHROMBIN TIME: CPT

## 2021-08-02 NOTE — PROGRESS NOTES
Medication Management Service, Warfarin Management  IASAK RODRIGUEZ [facility], [phone]  Visit Date: 8/2/2021   Subjective:   Betsy Cutler is a 39 y.o. female who presents to clinic today for anticoagulation monitoring and adjustment. Patient seen in clinic for warfarin management due to  Indication:   mechanical mitral valve. INR goal: of 2.0-3.0. Duration of therapy: indefinite. Assessment and PLAN   PT/INR done in office per protocol. INR today is 1.9, subtherapeutic. Patient admits to drinking alcohol prior to elevated INR of 5.7 at last visit, two weeks ago. She does not know how much she had to drink. Then she went to ED last week and had a low INR of 1.1 after missing two consecutive doses of warfarin. Also, patient started on Keflex 7/29 for 10 days. ED OP: Emphasized limiting alcohol intake. Plan:  Continue current regimen of warfarin 2.5 mg Sunday, Wednesday, Friday and 5 mg all other days of the week. Will not increase the regimen at this time since INR is going up after two missed doses last week (7/24, 7/25), initiation of keflex therapy, and potential for patient to drink alcohol. Using warfarin 2 mg tablets. Recheck INR in two weeks  Patient seen in room # 1. Patient verbally attests to completing the COVID 19 self screen. Patient verbalized understanding of dosing directions and information discussed. Dosing schedule given to patient. Progress note sent to referring office. Patient acknowledges working in consult agreement with pharmacist as referred by his/her physician. [address] [place] [address]  Ph., CACP, Clinical Pharmacist  Anticoagulation Services, [address] [place] [address] Coumadin Clinic  8/2/2021  2:29 PM     For Pharmacy Admin Tracking Only     Intervention Detail:    Total # of Interventions Recommended: 0   Total # of Interventions Accepted: 0   Time Spent (min): 20

## 2021-08-06 ASSESSMENT — ENCOUNTER SYMPTOMS
ABDOMINAL PAIN: 0
VOICE CHANGE: 0
COUGH: 0
FACIAL SWELLING: 0
NAUSEA: 0
WHEEZING: 0
VOMITING: 0
DIARRHEA: 0
SHORTNESS OF BREATH: 0

## 2021-08-06 NOTE — ED PROVIDER NOTES
[facility] ED  EMERGENCY DEPARTMENT ENCOUNTER      Pt Name: Ingrid Taveras  MRN: [id number]  Sand[place]ega 1975  Date of evaluation: 7/28/2021  Provider: Luna Briggs MD    CHIEF COMPLAINT     Chief Complaint   Patient presents with    Chest Pain    Exposure to STD     Hep C         HISTORY OF PRESENT ILLNESS   (Location/Symptom, Timing/Onset, Context/Setting,Quality, Duration, Modifying Factors, Severity)  Note limiting factors. Ingrid Taveras is a38 y.o. female who presents to the emergency department      HPI    Patient has history of mitral valve replacement, also have left-sided chest pain. Pain has been there for several days, reproducible, no associated symptoms. She also has concern about exposure to hepatitis C, she does not have any right upper quadrant pain, no jaundice, no other concerning symptoms. She denies any vaginal bleeding or discharge, does have some urgency with urination. Does not have any abdominal pain    Nursing Notes werereviewed. REVIEW OF SYSTEMS    (2-9 systems for level 4, 10 or more for level 5)     Review of Systems   Constitutional: Negative for appetite change, chills, fatigue and fever. HENT: Negative for drooling, facial swelling, mouth sores and voice change. Eyes: Negative for visual disturbance. Respiratory: Negative for cough, shortness of breath and wheezing. Cardiovascular: Positive for chest pain. Gastrointestinal: Negative for abdominal pain, diarrhea, nausea and vomiting. Genitourinary: Positive for dysuria. Negative for difficulty urinating. Musculoskeletal: Negative for neck stiffness. Skin: Negative for rash. Neurological: Negative for weakness and numbness. Psychiatric/Behavioral: Negative for agitation. All other systems reviewed and are negative. Except as noted above the remainder of the review of systems was reviewed and negative.        PAST MEDICAL HISTORY     Past Medical History:   Diagnosis Date  Anxiety     CHF (congestive heart failure) (HCC)     GERD (gastroesophageal reflux disease)     History of chest pain     now resolved    History of echocardiogram 01/02/2021    mod-severe MR; mild-mod TR; EF 50-55%    Hypertension     Leg swelling     Migraine     Mitral regurgitation     moderated to severe on echo    Mitral valve disease     SOB (shortness of breath)     Stomach ulcer     Wellness examination     Narcisa araiza-last visit feb 2021    Wellness examination     no current cardiologist         SURGICALHISTORY       Past Surgical History:   Procedure Laterality Date    CARDIAC CATHETERIZATION  01/05/2021    COSMETIC SURGERY      forehead after traumatic injury    ENDOSCOPY, COLON, DIAGNOSTIC      HERNIA REPAIR      MITRAL VALVE REPAIR N/A 2/26/2021    MITRAL VALVE REPLACE  ON-X 27/29 WITH LEFT ATRIAL APPENDAGE LIGATION WITH 50MM ATRICLIP. performed by Bryan Bernabe MD at [address] [place] [address] TRANSESOPHAGEAL ECHOCARDIOGRAM  01/04/2021    severe mitral regurgitation         CURRENT MEDICATIONS       Discharge Medication List as of 7/28/2021  4:30 PM      CONTINUE these medications which have NOT CHANGED    Details   warfarin (COUMADIN) 5 MG tablet Take one tablet (or as directed by Medication Management) by mouth once daily.  *NEW STRENGTH* 30DS, Disp-35 tablet, R-1Normal      ferrous sulfate (FE TABS 325) 325 (65 Fe) MG EC tablet Take 1 tablet by mouth 2 times daily (with meals), Disp-90 tablet, R-3Normal      !! furosemide (LASIX) 40 MG tablet Take 1 tablet by mouth daily, Disp-60 tablet, R-3Normal      !! furosemide (LASIX) 20 MG tablet Take 1 tablet by mouth every evening, Disp-60 tablet, R-3Normal      metoprolol succinate (TOPROL XL) 25 MG extended release tablet Take 1 tablet by mouth daily, Disp-30 tablet, R-3Normal      sacubitril-valsartan (ENTRESTO) 24-26 MG per tablet Take 1 tablet by mouth 2 times daily, Disp-60 tablet, R-0Normal      spironolactone (ALDACTONE) 25 MG tablet Take 1 tablet by mouth daily, Disp-30 tablet, R-3Normal      cloNIDine (CATAPRES) 0.1 MG tablet Take 0.1 mg by mouth daily Once a day at nightHistorical Med      ARIPiprazole (ABILIFY) 15 MG tablet Take 15 mg by mouth dailyHistorical Med      citalopram (CELEXA) 20 MG tablet Take 40 mg by mouth daily Historical Med      aspirin 81 MG EC tablet Take 1 tablet by mouth daily, Disp-30 tablet, R-3Normal      omeprazole (PRILOSEC) 20 MG delayed release capsule Take 20 mg by mouth dailyHistorical Med       !! - Potential duplicate medications found. Please discuss with provider. ALLERGIES   Antihistamines, chlorpheniramine-type and Hydroxyzine    FAMILY HISTORY       Family History   Problem Relation Age of Onset    Diabetes Mother     High Blood Pressure Mother     No Known Problems Father           SOCIAL HISTORY       Social History     Socioeconomic History    Marital status: Single     Spouse name: None    Number of children: None    Years of education: None    Highest education level: None   Occupational History    None   Tobacco Use    Smoking status: Current Every Day Smoker     Packs/day: 0.50     Types: Cigarettes    Smokeless tobacco: Never Used   Vaping Use    Vaping Use: Never used   Substance and Sexual Activity    Alcohol use: Yes     Alcohol/week: 4.0 standard drinks     Types: 4 Cans of beer per week     Comment: 4 tall boys yesterday    Drug use: Not Currently    Sexual activity: None   Other Topics Concern    None   Social History Narrative    None     Social Determinants of Health     Financial Resource Strain:     Difficulty of Paying Living Expenses:    Food Insecurity:     Worried About Running Out of Food in the Last Year:     Ran Out of Food in the Last Year:    Transportation Needs:     Lack of Transportation (Medical):      Lack of Transportation (Non-Medical):    Physical Activity:     Days of Exercise per Week:     Minutes of Exercise per Session: Stress:     Feeling of Stress :    Social Connections:     Frequency of Communication with Friends and Family:     Frequency of Social Gatherings with Friends and Family:     Attends Cheondoism Services:     Active Member of Clubs or Organizations:     Attends Club or Organization Meetings:     Marital Status:    Intimate Partner Violence:     Fear of Current or Ex-Partner:     Emotionally Abused:     Physically Abused:     Sexually Abused:        SCREENINGS    Efe Coma Scale  Eye Opening: Spontaneous  Best Verbal Response: Oriented  Best Motor Response: Obeys commands  [place] Coma Scale Score: 15        PHYSICAL EXAM    (up to 7 for level 4, 8 or more for level 5)     ED Triage Vitals [07/28/21 1120]   BP Temp Temp src Pulse Resp SpO2 Height Weight   122/88 98.8 °F (37.1 °C) -- 130 18 99 % -- --       Physical Exam  Constitutional:       General: She is not in acute distress. Appearance: She is well-developed. HENT:      Head: Normocephalic and atraumatic. Eyes:      Conjunctiva/sclera: Conjunctivae normal.   Neck:      Vascular: No JVD. Cardiovascular:      Rate and Rhythm: Regular rhythm. Tachycardia present. Comments: She does have a systolic murmur, otherwise cardiac exam normal  Pulmonary:      Effort: Pulmonary effort is normal. No respiratory distress. Breath sounds: No stridor. Abdominal:      General: There is no distension. Palpations: Abdomen is soft. Comments: No abdominal pain, negative Tabor's, no right upper quadrant tenderness, no suprapubic or lower abdominal pain   Musculoskeletal:         General: Normal range of motion. Cervical back: Normal range of motion and neck supple. Skin:     General: Skin is warm and dry. Neurological:      Mental Status: She is alert and oriented to person, place, and time.          DIAGNOSTIC RESULTS     EKG: All EKG's are interpreted by the Emergency Department Physician who either signs orCo-signs this chart in the absence of a cardiologist.      RADIOLOGY:   Non-plainfilm images such as CT, Ultrasound and MRI are read by the radiologist. Plain radiographic images are visualized and preliminarily interpreted by the emergency physician with the below findings:      Interpretationper the Radiologist below, if available at the time of this note:    XR CHEST PORTABLE   Final Result   No acute cardiopulmonary process. ED BEDSIDE ULTRASOUND:   Performed by ED Physician - none    LABS:  Labs Reviewed   CBC WITH AUTO DIFFERENTIAL - Abnormal; Notable for the following components:       Result Value    RDW 16.6 (*)     All other components within normal limits   COMPREHENSIVE METABOLIC PANEL W/ REFLEX TO MG FOR LOW K - Abnormal; Notable for the following components:    Glucose 130 (*)     Sodium 134 (*)     Total Bilirubin 1.67 (*)     All other components within normal limits   LIPASE - Abnormal; Notable for the following components:    Lipase 9 (*)     All other components within normal limits   URINALYSIS - Abnormal; Notable for the following components:    Color, UA RED (*)     Turbidity UA TURBID (*)     Urine Hgb SMALL (*)     Protein, UA 1+ (*)     Nitrite, Urine POSITIVE (*)     Leukocyte Esterase, Urine MODERATE (*)     All other components within normal limits   MICROSCOPIC URINALYSIS - Abnormal; Notable for the following components:    Bacteria, UA MODERATE (*)     All other components within normal limits   TROPONIN   BRAIN NATRIURETIC PEPTIDE   PROTIME-INR   D-DIMER, QUANTITATIVE   PREGNANCY, URINE   CK   TROPONIN       All other labs were within normal range or not returned as of this dictation.     EMERGENCY DEPARTMENT COURSE and DIFFERENTIAL DIAGNOSIS/MDM:   Vitals:    Vitals:    07/28/21 1120 07/28/21 1231   BP: 122/88 111/82   Pulse: 130 104   Resp: 18    Temp: 98.8 °F (37.1 °C)    SpO2: 99% 99%         MDM  Number of Diagnoses or Management Options  Acute cystitis with hematuria  Chest pain, Summation      Patient Course:      ED Medications administered this visit:    Medications   cefTRIAXone (ROCEPHIN) 1000 mg IVPB in 50 mL D5W minibag (0 mg Intravenous Stopped 7/28/21 1521)   0.9 % sodium chloride bolus (0 mLs Intravenous Stopped 7/28/21 1647)       New Prescriptions from this visit:    Discharge Medication List as of 7/28/2021  4:30 PM      START taking these medications    Details   cephALEXin (KEFLEX) 500 MG capsule Take 1 capsule by mouth 4 times daily for 10 days, Disp-40 capsule, R-0Print      fluconazole (DIFLUCAN) 150 MG tablet Take 1 tablet by mouth once for 1 dose, Disp-1 tablet, R-0Print      ondansetron (ZOFRAN ODT) 4 MG disintegrating tablet Take 1 tablet by mouth every 8 hours as needed for Nausea, Disp-8 tablet, R-0Print             Follow-up:  MAYRA Hernández - CNP  [address]  [phone]    Schedule an appointment as soon as possible for a visit in 1 day  For further evaluation        Final Impression:   1. Chest pain, unspecified type    2.  Acute cystitis with hematuria               (Please note that portions of this note were completed with a voice recognition program.  Efforts were made to edit the dictations but occasionally words are mis-transcribed.)           Constance Duverney, MD  08/06/21 2461

## 2021-08-16 ENCOUNTER — TELEPHONE (OUTPATIENT)
Dept: PHARMACY | Age: 46
End: 2021-08-16

## 2021-08-16 NOTE — TELEPHONE ENCOUNTER
Patient called to report she will not be in this afternoon, death in the family out of town. She will call when back in town or when she knows she will be back.      Edwar Le, [place], MARLENEP  Clinical Pharmacist Medication Management  8/16/2021  9:53 AM

## 2021-09-22 ENCOUNTER — TELEPHONE (OUTPATIENT)
Dept: CARDIOTHORACIC SURGERY | Age: 46
End: 2021-09-22

## 2021-09-22 NOTE — TELEPHONE ENCOUNTER
Pt sent message via My Chart requesting an appointment with [facility] for a physical. I informed her that Dr. Dawn Araujo does not do this, that a Primary Care doctor will take care of that for her. Currently, patient does not have PCP and I informed her that she will need to find a new one. Pt voiced understanding.

## 2021-09-28 ENCOUNTER — HOSPITAL ENCOUNTER (OUTPATIENT)
Dept: PHARMACY | Age: 46
Setting detail: THERAPIES SERIES
Discharge: HOME | End: 2021-09-28
Payer: MEDICAID

## 2021-09-28 DIAGNOSIS — I34.0 SEVERE MITRAL REGURGITATION BY PRIOR ECHOCARDIOGRAM: Primary | ICD-10-CM

## 2021-09-28 LAB
INR BLD: 1.3
PROTIME: 15.2 SECONDS

## 2021-09-28 PROCEDURE — 85610 PROTHROMBIN TIME: CPT

## 2021-09-28 PROCEDURE — 99213 OFFICE O/P EST LOW 20 MIN: CPT

## 2021-09-28 RX ORDER — WARFARIN SODIUM 5 MG/1
TABLET ORAL
Qty: 30 TABLET | Refills: 0 | Status: SHIPPED | OUTPATIENT
Start: 2021-09-28 | End: 2022-01-07 | Stop reason: ALTCHOICE

## 2021-09-28 NOTE — PROGRESS NOTES
Medication Management Service, Warfarin Management  ISAAK RODRIGUEZ [facility], [phone]  Visit Date: 9/28/2021   Subjective:   Sarah Alas is a 42 y.o. female who presents to clinic today for anticoagulation monitoring and adjustment. Patient seen in clinic for warfarin management due to  Indication:   mechanical mitral valve. INR goal: of 2.0-3.0. Duration of therapy: indefinite. Assessment and PLAN   PT/INR done in office per protocol. INR today is 1.3, subtherapeutic. Patient denies most usual causes but states she probably missed some doses when she was out of town but has been back home for at least a week, so cause not determined. She does report taking her regimen incorrectly as 2.5mg every other day. I suspect non-adherence. Plan: Will boost dose tomorrow to 5mg, then continue with current regimen of warfarin 2.5mg on Wed, Fri, sun only and 5mg all other days of the week. Using 5mg tablets. Recheck INR in one week. ED OP = stressed importance of adherence and explained risk of clotting with low INR. Encouraged patient to yeimi off on calendar daily when she takes her dose and bring calendar with her to next appt. She reports 5pm administration and requested a pillbox which was provided. Therefore I have encouraged her to use the pillbox and then each night before bed double check to make sure she took her dose for that day. Also reviewed that every other day is not quite the same as a dose that needs to be taken three times each week. Patient has not been to clinic since 8/2/2021, and this appt was initiated by clinic. Explained that if she misses any of her next 3 appt she will be discharged from service, she expressed understanding. Suggest administration of Flu vaccine at next visit. New prescription sent electronically to patient's preferred pharmacy, [facility]. Patient seen in room # 3.   Patient verbally attests to completing the COVID 19 self screen. Patient verbalized understanding of dosing directions and information discussed. Dosing schedule given to patient. Progress note sent to referring office. Patient acknowledges working in consult agreement with pharmacist as referred by his/her physician.       Dexter Johnson RPh, MARLENEP  Clinical Pharmacist Medication Management  2021  11:51 AM      For Pharmacy Admin Tracking Only     Intervention Detail: Adherence Monitorin, Dose Adjustment: 1, reason: Therapy Optimization, Refill(s) Provided and Vaccine Recommended/Administered   Total # of Interventions Recommended: 4   Total # of Interventions Accepted: 4   Time Spent (min): 20

## 2021-10-06 ENCOUNTER — TELEPHONE (OUTPATIENT)
Dept: PHARMACY | Age: 46
End: 2021-10-06

## 2021-10-06 NOTE — TELEPHONE ENCOUNTER
Patient was a No Call No Show for [facility] appointment today. Called to reschedule, left voicemail.

## 2021-10-25 ENCOUNTER — TELEPHONE (OUTPATIENT)
Dept: PHARMACY | Age: 46
End: 2021-10-25

## 2021-10-25 NOTE — TELEPHONE ENCOUNTER
Patient called to report she will not be at her appointment today because she is hospitalized at [facility].  She had to call the squad for chest pain. She reports her lungs are not expanding properly and she may need a surgery or procedure to correct this. Discharge date unknown at this time. She will call before she leaves hospital to reschedule. [address] [place] [address]  Ph., CACP, Clinical Pharmacist  Anticoagulation Services, [address] [place] [address] Coumadin Clinic  10/25/2021  8:31 AM      For Pharmacy Admin Tracking Only     Intervention Detail:    Total # of Interventions Recommended: 0   Total # of Interventions Accepted: 0   Time Spent (min): 10

## 2021-10-26 ENCOUNTER — TELEPHONE (OUTPATIENT)
Dept: CARDIOTHORACIC SURGERY | Age: 46
End: 2021-10-26

## 2021-10-26 NOTE — TELEPHONE ENCOUNTER
CTS NP called and explained pts situation of SOB and noted thrombus on MV. Explained to attempt dose of TPA if pt is safe for dose to break clot up. Noted BCs gram positive. Hx of old drug abuse. Pt is frail. Np will relay input to attending. No plans for transfer to [facility]  It was noted that patient stopped taking her coumadin.

## 2021-11-01 NOTE — TELEPHONE ENCOUNTER
Claudean Slough today, she did have cardiac surgery, bioprosethic valve was replaced. I asked her to give us a call when she goes home so we can resume warfarin management. She will now need only 3-6 months therapy.

## 2021-11-05 ENCOUNTER — HOSPITAL ENCOUNTER (OUTPATIENT)
Dept: MEDSURG UNIT | Age: 46
Discharge: HOME HEALTH | End: 2021-11-05
Attending: INTERNAL MEDICINE | Admitting: INTERNAL MEDICINE

## 2021-11-08 LAB
ALBUMIN SERPL-MCNC: 3.2 G/DL (ref 3.5–5.2)
ALBUMIN/GLOBULIN RATIO: ABNORMAL (ref 1–2.5)
ALP BLD-CCNC: 83 U/L (ref 35–104)
ALT SERPL-CCNC: <5 U/L (ref 5–33)
ANION GAP SERPL CALCULATED.3IONS-SCNC: 13 MMOL/L (ref 9–17)
AST SERPL-CCNC: 14 U/L
BILIRUB SERPL-MCNC: 0.34 MG/DL (ref 0.3–1.2)
BUN BLDV-MCNC: 7 MG/DL (ref 6–20)
BUN/CREAT BLD: 11 (ref 9–20)
CALCIUM SERPL-MCNC: 8.1 MG/DL (ref 8.6–10.4)
CHLORIDE BLD-SCNC: 99 MMOL/L (ref 98–107)
CO2: 24 MMOL/L (ref 20–31)
CREAT SERPL-MCNC: 0.63 MG/DL (ref 0.5–0.9)
GFR AFRICAN AMERICAN: >60 ML/MIN
GFR NON-AFRICAN AMERICAN: >60 ML/MIN
GFR SERPL CREATININE-BSD FRML MDRD: ABNORMAL ML/MIN/{1.73_M2}
GFR SERPL CREATININE-BSD FRML MDRD: ABNORMAL ML/MIN/{1.73_M2}
GLUCOSE BLD-MCNC: 67 MG/DL (ref 70–99)
HCT VFR BLD CALC: 26.7 % (ref 36.3–47.1)
HEMOGLOBIN: 8.6 G/DL (ref 11.9–15.1)
MAGNESIUM: 1.8 MG/DL (ref 1.6–2.6)
MCH RBC QN AUTO: 29 PG (ref 25.2–33.5)
MCHC RBC AUTO-ENTMCNC: 32.2 G/DL (ref 28.4–34.8)
MCV RBC AUTO: 89.9 FL (ref 82.6–102.9)
NRBC AUTOMATED: 0 PER 100 WBC
PDW BLD-RTO: 15.2 % (ref 11.8–14.4)
PHOSPHORUS: 3.7 MG/DL (ref 2.6–4.5)
PLATELET # BLD: 834 K/UL (ref 138–453)
PMV BLD AUTO: 8.8 FL (ref 8.1–13.5)
POTASSIUM SERPL-SCNC: 4.4 MMOL/L (ref 3.7–5.3)
RBC # BLD: 2.97 M/UL (ref 3.95–5.11)
SODIUM BLD-SCNC: 136 MMOL/L (ref 135–144)
TOTAL PROTEIN: 5.7 G/DL (ref 6.4–8.3)
WBC # BLD: 9.2 K/UL (ref 3.5–11.3)

## 2021-11-08 PROCEDURE — 85027 COMPLETE CBC AUTOMATED: CPT

## 2021-11-08 PROCEDURE — 80053 COMPREHEN METABOLIC PANEL: CPT

## 2021-11-08 PROCEDURE — 84100 ASSAY OF PHOSPHORUS: CPT

## 2021-11-08 PROCEDURE — 83735 ASSAY OF MAGNESIUM: CPT

## 2021-11-15 ENCOUNTER — TELEPHONE (OUTPATIENT)
Dept: PHARMACY | Age: 46
End: 2021-11-15

## 2021-11-15 NOTE — TELEPHONE ENCOUNTER
Called patient to schedule overdue INR. Patient stated she was in  hospital at [facility] recovering from open heart surgery. Instructed patient to make appointment with us when she was home. Patient verbalized understanding.     For Pharmacy Admin Tracking Only     Intervention Detail:    Total # of Interventions Recommended: 0   Total # of Interventions Accepted: 0   Time Spent (min): 5

## 2021-11-24 PROCEDURE — 36415 COLL VENOUS BLD VENIPUNCTURE: CPT

## 2021-11-25 LAB
TROPONIN INTERP: ABNORMAL
TROPONIN INTERP: NORMAL
TROPONIN INTERP: NORMAL
TROPONIN T: ABNORMAL NG/ML
TROPONIN T: NORMAL NG/ML
TROPONIN T: NORMAL NG/ML
TROPONIN, HIGH SENSITIVITY: 11 NG/L (ref 0–14)
TROPONIN, HIGH SENSITIVITY: 12 NG/L (ref 0–14)
TROPONIN, HIGH SENSITIVITY: 23 NG/L (ref 0–14)

## 2021-11-25 PROCEDURE — 84484 ASSAY OF TROPONIN QUANT: CPT

## 2021-11-26 LAB
TROPONIN INTERP: ABNORMAL
TROPONIN INTERP: NORMAL
TROPONIN T: ABNORMAL NG/ML
TROPONIN T: NORMAL NG/ML
TROPONIN, HIGH SENSITIVITY: 11 NG/L (ref 0–14)
TROPONIN, HIGH SENSITIVITY: 15 NG/L (ref 0–14)

## 2021-11-26 PROCEDURE — 84484 ASSAY OF TROPONIN QUANT: CPT

## 2021-12-09 LAB
HCT VFR BLD CALC: 38.3 % (ref 36.3–47.1)
HEMOGLOBIN: 12.5 G/DL (ref 11.9–15.1)
MCH RBC QN AUTO: 28.7 PG (ref 25.2–33.5)
MCHC RBC AUTO-ENTMCNC: 32.6 G/DL (ref 28.4–34.8)
MCV RBC AUTO: 87.8 FL (ref 82.6–102.9)
NRBC AUTOMATED: 0 PER 100 WBC
PDW BLD-RTO: 12.9 % (ref 11.8–14.4)
PLATELET # BLD: 291 K/UL (ref 138–453)
PMV BLD AUTO: 10.1 FL (ref 8.1–13.5)
RBC # BLD: 4.36 M/UL (ref 3.95–5.11)
WBC # BLD: 14.8 K/UL (ref 3.5–11.3)

## 2021-12-09 PROCEDURE — 85027 COMPLETE CBC AUTOMATED: CPT

## 2021-12-11 PROCEDURE — 87493 C DIFF AMPLIFIED PROBE: CPT

## 2021-12-13 LAB
C DIFFICILE TOXINS, PCR: NORMAL
SPECIMEN DESCRIPTION: NORMAL

## 2021-12-14 ENCOUNTER — TELEPHONE (OUTPATIENT)
Dept: PHARMACY | Age: 46
End: 2021-12-14

## 2021-12-14 NOTE — TELEPHONE ENCOUNTER
Patient overdue for check of INR, called to schedule, left voicemail.     Gillian Hurd RP, CACP  Clinical Pharmacist Medication Management  12/14/2021  3:49 PM

## 2021-12-15 NOTE — TELEPHONE ENCOUNTER
Alejandra Ramires called today to check if she had received the Flu vaccine from us - NO. She reports that she is till hospitalized with discharge home today. She had cardiac surgery to replace the mechanical heart valve with a bioprosthetic valve and states she is no longer on warfarin.       I will DC Episode of Care and close referral.

## 2022-01-07 ENCOUNTER — OFFICE VISIT (OUTPATIENT)
Dept: FAMILY MEDICINE CLINIC | Age: 47
End: 2022-01-07
Payer: MEDICAID

## 2022-01-07 VITALS
BODY MASS INDEX: 21.95 KG/M2 | TEMPERATURE: 97.6 F | WEIGHT: 136 LBS | HEART RATE: 95 BPM | SYSTOLIC BLOOD PRESSURE: 116 MMHG | DIASTOLIC BLOOD PRESSURE: 81 MMHG

## 2022-01-07 DIAGNOSIS — F33.41 RECURRENT MAJOR DEPRESSIVE DISORDER, IN PARTIAL REMISSION (HCC): ICD-10-CM

## 2022-01-07 DIAGNOSIS — Z00.00 HEALTH MAINTENANCE EXAMINATION: ICD-10-CM

## 2022-01-07 DIAGNOSIS — Z95.2 S/P MITRAL VALVE REPLACEMENT: Primary | Chronic | ICD-10-CM

## 2022-01-07 LAB — HBA1C MFR BLD: 5.8 %

## 2022-01-07 PROCEDURE — 99211 OFF/OP EST MAY X REQ PHY/QHP: CPT | Performed by: STUDENT IN AN ORGANIZED HEALTH CARE EDUCATION/TRAINING PROGRAM

## 2022-01-07 PROCEDURE — 83036 HEMOGLOBIN GLYCOSYLATED A1C: CPT | Performed by: STUDENT IN AN ORGANIZED HEALTH CARE EDUCATION/TRAINING PROGRAM

## 2022-01-07 PROCEDURE — G8484 FLU IMMUNIZE NO ADMIN: HCPCS | Performed by: STUDENT IN AN ORGANIZED HEALTH CARE EDUCATION/TRAINING PROGRAM

## 2022-01-07 PROCEDURE — 99203 OFFICE O/P NEW LOW 30 MIN: CPT | Performed by: STUDENT IN AN ORGANIZED HEALTH CARE EDUCATION/TRAINING PROGRAM

## 2022-01-07 PROCEDURE — 4004F PT TOBACCO SCREEN RCVD TLK: CPT | Performed by: STUDENT IN AN ORGANIZED HEALTH CARE EDUCATION/TRAINING PROGRAM

## 2022-01-07 PROCEDURE — G8427 DOCREV CUR MEDS BY ELIG CLIN: HCPCS | Performed by: STUDENT IN AN ORGANIZED HEALTH CARE EDUCATION/TRAINING PROGRAM

## 2022-01-07 PROCEDURE — G8420 CALC BMI NORM PARAMETERS: HCPCS | Performed by: STUDENT IN AN ORGANIZED HEALTH CARE EDUCATION/TRAINING PROGRAM

## 2022-01-07 RX ORDER — ARIPIPRAZOLE 15 MG/1
15 TABLET ORAL DAILY
Qty: 30 TABLET | Refills: 1 | Status: SHIPPED | OUTPATIENT
Start: 2022-01-07 | End: 2022-03-03

## 2022-01-07 RX ORDER — ASPIRIN 325 MG
325 TABLET, DELAYED RELEASE (ENTERIC COATED) ORAL DAILY
Qty: 90 TABLET | Refills: 3 | Status: SHIPPED | OUTPATIENT
Start: 2022-01-07 | End: 2022-05-12 | Stop reason: SDUPTHER

## 2022-01-07 RX ORDER — LISINOPRIL 5 MG/1
5 TABLET ORAL DAILY
Qty: 60 TABLET | Refills: 3 | Status: SHIPPED | OUTPATIENT
Start: 2022-01-07 | End: 2022-05-12 | Stop reason: ALTCHOICE

## 2022-01-07 RX ORDER — ASPIRIN 81 MG/1
81 TABLET ORAL DAILY
Qty: 30 TABLET | Refills: 3 | Status: CANCELLED | OUTPATIENT
Start: 2022-01-07

## 2022-01-07 ASSESSMENT — PATIENT HEALTH QUESTIONNAIRE - PHQ9
SUM OF ALL RESPONSES TO PHQ9 QUESTIONS 1 & 2: 0
SUM OF ALL RESPONSES TO PHQ QUESTIONS 1-9: 0
SUM OF ALL RESPONSES TO PHQ QUESTIONS 1-9: 0
2. FEELING DOWN, DEPRESSED OR HOPELESS: 0
1. LITTLE INTEREST OR PLEASURE IN DOING THINGS: 0
SUM OF ALL RESPONSES TO PHQ QUESTIONS 1-9: 0
SUM OF ALL RESPONSES TO PHQ QUESTIONS 1-9: 0

## 2022-01-07 ASSESSMENT — ENCOUNTER SYMPTOMS
ABDOMINAL PAIN: 0
DIARRHEA: 0
CONSTIPATION: 0
ABDOMINAL DISTENTION: 0
WHEEZING: 0
NAUSEA: 0
COUGH: 0
SHORTNESS OF BREATH: 0
VOMITING: 0

## 2022-01-07 NOTE — PROGRESS NOTES
[facility] Medicine Residency Program - Outpatient Note        Analilia Pena is a 42 y.o. female  presented to the office on 01/07/22with complaints of: Establishing care    HPI:  Patient with history of CHF, MVR x2 came today to establish care. Patient used to do IV drugs and had severe MR. She got mechanical valve replacement but got endocarditis and the valve was replaced again and bioprosthetic valve was putting on October 24, 2021. She is doing well after that. Denies chest pain, shortness of breath. She finished therapy with Coumadin. Currently not on anticoagulation. She takes aspirin 325 mg daily  She takes Lasix 20 mg daily  She is not fit for Entresto currently as she states by her cardiologist.  Need to keep her on lisinopril for now. She sees cardiologist in [facility] but is in the process of switching to cardiologist at [facility]. A1c is 5.8. Not on statins. Review of Systems   Constitutional: Negative for chills and fever. HENT: Negative. Respiratory: Negative for cough, shortness of breath and wheezing. Cardiovascular: Negative for chest pain, palpitations and leg swelling. Gastrointestinal: Negative for abdominal distention, abdominal pain, constipation, diarrhea, nausea and vomiting. Genitourinary: Negative. Musculoskeletal: Negative. Skin: Negative. Neurological: Negative. Psychiatric/Behavioral: Positive for dysphoric mood. Vitals:    01/07/22 1011   BP: 116/81   Pulse: 95   Temp: 97.6 °F (36.4 °C)       Physical Exam  Vitals and nursing note reviewed. Constitutional:       General: She is not in acute distress. Appearance: Normal appearance. HENT:      Head: Normocephalic and atraumatic. Cardiovascular:      Rate and Rhythm: Normal rate and regular rhythm. Pulses: Normal pulses. Heart sounds: Normal heart sounds. No murmur heard.       Pulmonary:      Effort: Pulmonary effort is normal. No respiratory distress. Breath sounds: Normal breath sounds. No wheezing or rales. Abdominal:      General: Abdomen is flat. Bowel sounds are normal.      Palpations: Abdomen is soft. Musculoskeletal:         General: No swelling or tenderness. Normal range of motion. Skin:     General: Skin is warm and dry. Capillary Refill: Capillary refill takes less than 2 seconds. Coloration: Skin is not jaundiced or pale. Findings: No erythema. Neurological:      General: No focal deficit present. Mental Status: She is alert and oriented to person, place, and time. Psychiatric:         Mood and Affect: Mood normal.            Diagnosis Orders   1. S/P mitral valve replacement  aspirin 325 MG EC tablet    lisinopril (PRINIVIL;ZESTRIL) 5 MG tablet    Lipid Panel   2. Health maintenance examination  POCT glycosylated hemoglobin (Hb A1C)    ARIPiprazole (ABILIFY) 15 MG tablet    aspirin 325 MG EC tablet   3. Recurrent major depressive disorder, in partial remission (HCC)  ARIPiprazole (ABILIFY) 15 MG tablet         Assessment and plan:  1.)  Medications adjusted. Aspirin dose increased to 325 mg daily. Entresto discontinued and started on lisinopril 5 mg daily. Will check lipid profile and start statins if indicated. She does not have diabetes. Encouraged to follow-up with her cardiologist.  She has an appointment in 2 weeks. 2.)  She states recent HIV, hep C screening was negative. She is up-to-date on Pap smears. 3.)  Depression: Continue Celexa and Abilify.         Minerva Rich MD  Family Medicine PGY-2  01/07/22 at 10:54 AM

## 2022-01-07 NOTE — PROGRESS NOTES
Attending Physician Statement  I have discussed the care of Satnam Castillo 42 y.o. female, including pertinent history and exam findings, with the resident Dr. Sonny Newman MD.    History and Exam:   Chief Complaint   Patient presents with    Check-Up     patient states she doing well       Past Medical History:   Diagnosis Date    Anxiety     CHF (congestive heart failure) ([address]     GERD (gastroesophageal reflux disease)     History of chest pain     now resolved    History of echocardiogram 01/02/2021    mod-severe MR; mild-mod TR; EF 50-55%    Hypertension     Leg swelling     Migraine     Mitral regurgitation     moderated to severe on echo    Mitral valve disease     SOB (shortness of breath)     Stomach ulcer     Wellness examination     [facility]-last visit feb 2021    Wellness examination     no current cardiologist     Allergies   Allergen Reactions    Antihistamines, Chlorpheniramine-Type Rash    Hydroxyzine Anxiety      I have seen and examined the patient and the key elements of the encounter have been performed by me.   BP Readings from Last 3 Encounters:   01/07/22 116/81   07/28/21 111/82   05/22/21 90/68     /81 (Site: Right Upper Arm, Position: Sitting, Cuff Size: Medium Adult)   Pulse 95   Temp 97.6 °F (36.4 °C) (Temporal)   Wt 136 lb (61.7 kg)   LMP 12/25/2021   BMI 21.95 kg/m²   Lab Results   Component Value Date    WBC 14.8 (H) 12/09/2021    HGB 12.5 12/09/2021    HCT 38.3 12/09/2021     12/09/2021    CHOL 164 01/05/2021    TRIG 59 01/05/2021    HDL 55 01/05/2021    ALT <5 (L) 11/08/2021    AST 14 11/08/2021     11/08/2021    K 4.4 11/08/2021    CL 99 11/08/2021    CREATININE 0.63 11/08/2021    BUN 7 11/08/2021    CO2 24 11/08/2021    TSH 1.86 04/30/2021    INR 1.3 09/28/2021    LABA1C 5.8 01/07/2022     Lab Results   Component Value Date    LABALBU 3.2 (L) 11/08/2021     Lab Results   Component Value Date    IRON 28 (L) 12/31/2020    TIBC 361 12/31/2020    FERRITIN 41 05/21/2021     Lab Results   Component Value Date    LDLCHOLESTEROL 97 01/05/2021     I agree with the assessment, plan and the diagnosis of    Diagnosis Orders   1. S/P mitral valve replacement  aspirin 325 MG EC tablet    lisinopril (PRINIVIL;ZESTRIL) 5 MG tablet    Lipid Panel   2. Health maintenance examination  POCT glycosylated hemoglobin (Hb A1C)    ARIPiprazole (ABILIFY) 15 MG tablet    aspirin 325 MG EC tablet   3. Recurrent major depressive disorder, in partial remission (HCC)  ARIPiprazole (ABILIFY) 15 MG tablet    . I agree with orders as documented by the resident. The 10-year ASCVD risk score (Yahaira Childs, et al., 2013) is: 1%    Values used to calculate the score:      Age: 42 years      Sex: Female      Is Non- : Yes      Diabetic: No      Tobacco smoker: Yes      Systolic Blood Pressure: 457 mmHg      Is BP treated: No      HDL Cholesterol: 55 mg/dL      Total Cholesterol: 164 mg/dL    More than 25 minutes spent  in face to face encounter with the patient and more than half in counseling. Patient's questions were answered. Patient Voiced understanding to the counseling. Return in about 6 weeks (around 2/18/2022).    (GC Modifier)-Dr. Naomi Sanches MD

## 2022-01-07 NOTE — PATIENT INSTRUCTIONS
Thank you for letting us take care of you today. We hope all your questions were addressed. If a question was overlooked or something else comes to mind after you return home, please contact a member of your Care Team listed below. Your Care Team at [place] [address] is Team #3  Teodoro Mccormack MD (Faculty)  Morenita Hernandez MD (Faculty  Lynnicholas Gunter MD (Resident)  Alicia Carrillo (Resident)   Shea Dunn MD (Resident)  HESHAM Jo., NICOLLE Ryan., [facility]  Fam Huitron ([address] [facility])  Brenda Bass [place] ([address] [place] )    Stephane Romero, [facility] (Clinical Pharmacist)     Office phone number: [phone]    If you need to get in right away due to illness, please be advised we have \"Same Day\" appointments available Monday-Friday. Please call us at [phone] option #3 to schedule your \"Same Day\" appointment.

## 2022-02-06 PROBLEM — Z00.00 HEALTH MAINTENANCE EXAMINATION: Status: RESOLVED | Noted: 2022-01-07 | Resolved: 2022-02-06

## 2022-02-23 ENCOUNTER — NURSE TRIAGE (OUTPATIENT)
Dept: OTHER | Facility: CLINIC | Age: 47
End: 2022-02-23

## 2022-02-23 NOTE — TELEPHONE ENCOUNTER
Received call from Angella at [facility]. Subjective: Caller states \"I had two open heart surgeris in February and October. I missed my appointment because I was trying to go back to work. When I was getting dressed, I noticed a lump in right breast on the left side. I've had lumps before- they found it was non-cancerous - that was on the right side too. \"     Pt then continues to notify RN \" I'm having chest pain since this morning on and off. I've been sweating more than normal and I'm having trouble breathing - I do all the same, but worse than normal\"    Current Symptoms:   Intermittent left-sided chest pain (6/10 sharp pain) that started this morning - pt has chest pain during call and reporting \"it is has there for about 1 hour now\". Pt reporting shortness of breath worse than normal at rest and increases with any exertion     Increased urinary frequency  \"Sweating more than normal\"    Right interior breast lump - tender - size \"smaller than a quarter - probably a nickel size\" - external skin appears normal     Pt speech is clear and answering questions appropriately - no audible difficulty breathing    Onset: 1 hour ago; unchanged    Associated Symptoms: NA    Pain Severity: 6/10; sharp; intermittent    Temperature: Low grade fever - pt has not checked her temperature, but \"know it is high\"    What has been tried: NA    LMP: NA Pregnant: NA    Recommended disposition: CALL  NOW: You need to hang up and call 911 (or an ambulance). Care advice provided, patient verbalizes understanding; denies any other questions or concerns; instructed to call back for any new or worsening symptoms. Patient/caller agrees to calling 911     Attention Provider: Thank you for allowing me to participate in the care of your patient. The patient was connected to triage in response to information provided to the ECC/PSC.   Please do not respond through this encounter as the response is not directed to a shared pool.     Reason for Disposition   Chest pain lasting longer than 5 minutes and ANY of the following:* Over 40years old* Over 27years old and at least one cardiac risk factor (e.g., diabetes mellitus, high blood pressure, high cholesterol, smoker, or strong family history of heart disease)* History of heart disease (i.e., angina, heart attack, heart failure, bypass surgery, takes nitroglycerin)* Pain is crushing, pressure-like, or heavy    Protocols used: CHEST PAIN-ADULT-[place]

## 2022-03-03 DIAGNOSIS — Z00.00 HEALTH MAINTENANCE EXAMINATION: ICD-10-CM

## 2022-03-03 DIAGNOSIS — F33.41 RECURRENT MAJOR DEPRESSIVE DISORDER, IN PARTIAL REMISSION (HCC): ICD-10-CM

## 2022-03-03 RX ORDER — ARIPIPRAZOLE 15 MG/1
15 TABLET ORAL DAILY
Qty: 30 TABLET | Refills: 3 | Status: SHIPPED | OUTPATIENT
Start: 2022-03-03 | End: 2022-05-12 | Stop reason: SDUPTHER

## 2022-03-03 NOTE — TELEPHONE ENCOUNTER
E-scribe request for med refills. Please review and e-scribe if applicable.      Last Visit Date:  1/7/22  Next Visit Date:  3/4/2022    Hemoglobin A1C (%)   Date Value   01/07/2022 5.8   12/31/2020 6.2 (H)             ( goal A1C is < 7)   No results found for: LABMICR  LDL Cholesterol (mg/dL)   Date Value   01/05/2021 97       (goal LDL is <100)   AST (U/L)   Date Value   11/08/2021 14     ALT (U/L)   Date Value   11/08/2021 <5 (L)     BUN (mg/dL)   Date Value   11/08/2021 7     BP Readings from Last 3 Encounters:   01/07/22 116/81   07/28/21 111/82   05/22/21 90/68          (goal 120/80)        Patient Active Problem List:     Pulmonary edema, acute, with congestive heart failure (HCC)     Anxiety     Microcytic anemia     History of drug abuse (HCC)     Chronic diastolic congestive heart failure (HCC)     Severe mitral valve regurgitation     Severe mitral regurgitation by prior echocardiogram     S/P mitral valve replacement     S/P left atrial appendage ligation     Pneumonia due to organism     Recurrent major depressive disorder, in partial remission ([facility] [address]      ----Maite Nowak

## 2022-03-04 ENCOUNTER — OFFICE VISIT (OUTPATIENT)
Dept: FAMILY MEDICINE CLINIC | Age: 47
End: 2022-03-04
Payer: MEDICAID

## 2022-03-04 VITALS
HEART RATE: 103 BPM | DIASTOLIC BLOOD PRESSURE: 75 MMHG | SYSTOLIC BLOOD PRESSURE: 113 MMHG | HEIGHT: 66 IN | BODY MASS INDEX: 20.67 KG/M2 | WEIGHT: 128.6 LBS | TEMPERATURE: 98.6 F

## 2022-03-04 DIAGNOSIS — N63.0 LUMP OR MASS IN BREAST: Primary | ICD-10-CM

## 2022-03-04 PROBLEM — Z12.31 ENCOUNTER FOR SCREENING MAMMOGRAM FOR MALIGNANT NEOPLASM OF BREAST: Status: ACTIVE | Noted: 2022-03-04

## 2022-03-04 PROCEDURE — 4004F PT TOBACCO SCREEN RCVD TLK: CPT | Performed by: STUDENT IN AN ORGANIZED HEALTH CARE EDUCATION/TRAINING PROGRAM

## 2022-03-04 PROCEDURE — 99213 OFFICE O/P EST LOW 20 MIN: CPT | Performed by: STUDENT IN AN ORGANIZED HEALTH CARE EDUCATION/TRAINING PROGRAM

## 2022-03-04 PROCEDURE — G8420 CALC BMI NORM PARAMETERS: HCPCS | Performed by: STUDENT IN AN ORGANIZED HEALTH CARE EDUCATION/TRAINING PROGRAM

## 2022-03-04 PROCEDURE — G8427 DOCREV CUR MEDS BY ELIG CLIN: HCPCS | Performed by: STUDENT IN AN ORGANIZED HEALTH CARE EDUCATION/TRAINING PROGRAM

## 2022-03-04 PROCEDURE — G8484 FLU IMMUNIZE NO ADMIN: HCPCS | Performed by: STUDENT IN AN ORGANIZED HEALTH CARE EDUCATION/TRAINING PROGRAM

## 2022-03-04 ASSESSMENT — ENCOUNTER SYMPTOMS
RESPIRATORY NEGATIVE: 1
GASTROINTESTINAL NEGATIVE: 1

## 2022-03-04 NOTE — PROGRESS NOTES
I have reviewed and discussed key elements of [place] with the resident including plan of care and follow up and agree with the care javier plan.

## 2022-03-04 NOTE — PATIENT INSTRUCTIONS
Thank you for letting us take care of you today. We hope all your questions were addressed. If a question was overlooked or something else comes to mind after you return home, please contact a member of your Care Team listed below. Your Care Team at [place] [address] is Team #3  Teodoro Mccormack MD (Faculty)  Morenita Hernandez MD (Faculty  Lynnicholas Gunter MD (Resident)  Alicia Carrillo (Resident)   Shea Dunn MD (Resident)  Soraya Wright MD (Resident)  Sarah Butler., KAMALA Mejia., KAMALA Dukes., Ayleen Everett., Tamiko Deutsch ([address] [place] [address])  Eli Moctezuma (Clinical Practice Manager)  Stephane Romero, [address] [place] [address] (Clinical Pharmacist)     Office phone number: [phone]    If you need to get in right away due to illness, please be advised we have \"Same Day\" appointments available Monday-Friday. Please call us at [phone] option #3 to schedule your \"Same Day\" appointment.

## 2022-03-04 NOTE — PROGRESS NOTES
[facility] Medicine Residency Program - Outpatient Note        Renita Pino is a 42 y.o. female  presented to the office on 03/04/22with complaints of: Lumps in breasts    HPI:  Patient with history of CHF, MVR x2 came today for concern of lumps in breast associated with mild chest pain. Patient complains of small palpable lumps in both breasts. States they appeared after she got her last MVR surgery in October 21. Slowly growing. Mildly tender. States no skin color changes or nipple retraction. No nipple discharge. No significant weight loss. No fever or chills. Unsure about family history of breast cancer. No previous mammogram available. Review of Systems   Constitutional: Negative. Respiratory: Negative. Cardiovascular: Positive for chest pain (Diffuse mild chest discomfort). Gastrointestinal: Negative. Genitourinary: Negative. Musculoskeletal: Negative. Skin: Negative. Neurological: Negative. Psychiatric/Behavioral: Negative. Vitals:    03/04/22 1513   BP: 113/75   Pulse: 103   Temp: 98.6 °F (37 °C)       Physical Exam  Vitals and nursing note reviewed. Constitutional:       General: She is not in acute distress. Appearance: Normal appearance. Cardiovascular:      Rate and Rhythm: Tachycardia present. Heart sounds: No murmur heard. Pulmonary:      Effort: No respiratory distress. Chest:   Breasts:      Right: Mass and tenderness present. No swelling, bleeding, inverted nipple, nipple discharge, skin change, axillary adenopathy or supraclavicular adenopathy. Left: Mass and tenderness present. No swelling, bleeding, inverted nipple, nipple discharge, skin change, axillary adenopathy or supraclavicular adenopathy. Comments: Palpable nodules  Lymphadenopathy:      Upper Body:      Right upper body: No supraclavicular, axillary or pectoral adenopathy.       Left upper body: No supraclavicular, axillary or pectoral adenopathy. Neurological:      Mental Status: She is alert and oriented to person, place, and time. Psychiatric:         Mood and Affect: Mood normal.         Assessment and plan:  Palpable breast nodules bilateral-infectious etiology less likely. Concerning for malignancy. Will do diagnostic mammogram bilateral and see her back in 2 weeks time. Diagnosis Orders   1. Lump or mass in breast  GAEL [address] [place] CAD BILATERAL       Patient was counseled on preventive measures and screenings offered today. Rationale of preventive and screening measures and consequences of delayed screening explained. Answered questions and patient continued to decline at this time. Will continue to address at follow up appointment. Requested Prescriptions      No prescriptions requested or ordered in this encounter       There are no discontinued medications. [facility] received counseling on the following healthy behaviors: nutrition, exercise and medication adherence    Discussed use, benefit, and side effects of prescribed medications. Barriers to medication compliance addressed. All patient questions answered. Pt voiced understanding. Return in about 2 weeks (around 3/18/2022). Disclaimer: Some orall of this note was transcribed using voice-recognition software. This may cause typographical errors occasionally. Although all effort is made to fix these errors, please do not hesitate to contact our office if there Angelique Fake concern with the understanding of this note.     Clint Costa MD  Family Medicine PGY-2  03/04/22 at 4:02 PM

## 2022-03-04 NOTE — PROGRESS NOTES
Visit Information    Have you changed or started any medications since your last visit including any over-the-counter medicines, vitamins, or herbal medicines? no   Have you stopped taking any of your medications? Is so, why? -  no  Are you having any side effects from any of your medications? - no    Have you seen any other physician or provider since your last visit?  no   Have you had any other diagnostic tests since your last visit?  no   Have you been seen in the emergency room and/or had an admission in a hospital since we last saw you?  yes - [facility]   Have you had your routine dental cleaning in the past 6 months?  no     Do you have an active MyChart account? If no, what is the barrier?   Yes    Patient Care Team:  Marlana Pallas, MD as PCP - General (Emergency Medicine)    Medical History Review  Past Medical, Family, and Social History reviewed and does not contribute to the patient presenting condition    Health Maintenance   Topic Date Due    Hepatitis C screen  Never done    COVID-19 Vaccine (1) Never done    Pneumococcal 0-64 years Vaccine (1 of 2 - PPSV23) Never done    HIV screen  Never done    DTaP/Tdap/Td vaccine (1 - Tdap) Never done    Cervical cancer screen  Never done    Potassium monitoring  11/08/2022    Creatinine monitoring  11/08/2022    A1C test (Diabetic or Prediabetic)  01/07/2023    Depression Monitoring  01/07/2023    Lipid screen  01/05/2026    Flu vaccine  Completed    Hepatitis A vaccine  Aged Out    Hepatitis B vaccine  Aged Out    Hib vaccine  Aged Out    Meningococcal (ACWY) vaccine  Aged Out

## 2022-03-17 ENCOUNTER — HOSPITAL ENCOUNTER (OUTPATIENT)
Dept: MAMMOGRAPHY | Age: 47
Discharge: HOME | End: 2022-03-19
Payer: MEDICAID

## 2022-03-17 ENCOUNTER — HOSPITAL ENCOUNTER (OUTPATIENT)
Dept: ULTRASOUND IMAGING | Age: 47
Discharge: HOME | End: 2022-03-19
Payer: MEDICAID

## 2022-03-17 DIAGNOSIS — R92.8 ABNORMAL MAMMOGRAM: ICD-10-CM

## 2022-03-17 DIAGNOSIS — N63.0 LUMP OR MASS IN BREAST: ICD-10-CM

## 2022-03-17 PROCEDURE — G0279 TOMOSYNTHESIS, MAMMO: HCPCS

## 2022-03-17 PROCEDURE — 76642 ULTRASOUND BREAST LIMITED: CPT

## 2022-03-18 ENCOUNTER — TELEPHONE (OUTPATIENT)
Dept: FAMILY MEDICINE CLINIC | Age: 47
End: 2022-03-18

## 2022-03-18 RX ORDER — FLUCONAZOLE 100 MG/1
100 TABLET ORAL DAILY
Qty: 3 TABLET | Refills: 0 | Status: SHIPPED | OUTPATIENT
Start: 2022-03-18 | End: 2022-03-23

## 2022-03-18 NOTE — TELEPHONE ENCOUNTER
Patient calling stating that she has a yeast infection and would like to know if PCP can send over some diflucan pills for her.  Please advise

## 2022-03-22 ENCOUNTER — TELEPHONE (OUTPATIENT)
Dept: FAMILY MEDICINE CLINIC | Age: 47
End: 2022-03-22

## 2022-03-22 NOTE — TELEPHONE ENCOUNTER
----- Message from Jennifer Campo MD sent at 3/18/2022  9:40 AM EDT -----  Mammogram did not reveal any cancerous lesions in both breasts. Repeat mammogram in 12 months is advised.

## 2022-03-23 RX ORDER — FLUCONAZOLE 100 MG/1
100 TABLET ORAL DAILY
Qty: 3 TABLET | Refills: 0 | Status: SHIPPED | OUTPATIENT
Start: 2022-03-23 | End: 2022-03-26

## 2022-03-23 NOTE — TELEPHONE ENCOUNTER
diflucan pending for refill     Health Maintenance   Topic Date Due    Hepatitis C screen  Never done    COVID-19 Vaccine (1) Never done    Pneumococcal 0-64 years Vaccine (1 of 2 - PPSV23) Never done    HIV screen  Never done    DTaP/Tdap/Td vaccine (1 - Tdap) Never done    Cervical cancer screen  Never done    Potassium monitoring  11/08/2022    Creatinine monitoring  11/08/2022    A1C test (Diabetic or Prediabetic)  01/07/2023    Depression Monitoring  01/07/2023    Lipid screen  01/05/2026    Flu vaccine  Completed    Hepatitis A vaccine  Aged Out    Hepatitis B vaccine  Aged Out    Hib vaccine  Aged Out    Meningococcal (ACWY) vaccine  Aged Out             (applicable per patient's age: Cancer Screenings, Depression Screening, Fall Risk Screening, Immunizations)    Hemoglobin A1C (%)   Date Value   01/07/2022 5.8   12/31/2020 6.2 (H)     LDL Cholesterol (mg/dL)   Date Value   01/05/2021 97     AST (U/L)   Date Value   11/08/2021 14     ALT (U/L)   Date Value   11/08/2021 <5 (L)     BUN (mg/dL)   Date Value   11/08/2021 7      (goal A1C is < 7)   (goal LDL is <100) need 30-50% reduction from baseline     BP Readings from Last 3 Encounters:   03/04/22 113/75   01/07/22 116/81   07/28/21 111/82    (goal /80)      All Future Testing planned in [facility]:  Lab Frequency Next Occurrence   GAEL DIGITAL SCREEN W OR WO CAD BILATERAL Once 06/07/2021   Lipid Panel Once 01/07/2023   Hemoglobin and Hematocrit, Blood, Post Transfusion POST TRANSFUSION    Hemoglobin and Hematocrit, Blood, Post Transfusion POST TRANSFUSION        Next Visit Date:  Future Appointments   Date Time Provider Vega Tinoco   3/25/2022  1:45 PM Juan Manuel Reyes MD [address] [facility]            Patient Active Problem List:     Pulmonary edema, acute, with congestive heart failure (HCC)     Anxiety     Microcytic anemia     History of drug abuse ([facility] [address]     Chronic diastolic congestive heart failure (HCC)     Severe mitral valve regurgitation     Severe mitral regurgitation by prior echocardiogram     S/P mitral valve replacement     S/P left atrial appendage ligation     Pneumonia due to organism     Recurrent major depressive disorder, in partial remission ([facility] [address]     Encounter for screening mammogram for malignant neoplasm of breast     Lump or mass in breast

## 2022-04-03 PROBLEM — Z12.31 ENCOUNTER FOR SCREENING MAMMOGRAM FOR MALIGNANT NEOPLASM OF BREAST: Status: RESOLVED | Noted: 2022-03-04 | Resolved: 2022-04-03

## 2022-05-10 RX ORDER — FERROUS SULFATE 325(65) MG
TABLET ORAL
Qty: 30 TABLET | Refills: 2 | Status: SHIPPED | OUTPATIENT
Start: 2022-05-10 | End: 2022-08-10

## 2022-05-10 NOTE — TELEPHONE ENCOUNTER
E-scribe request for med refill. Please review and e-scribe if applicable.      Last Visit Date:  03/04/2022  Next Visit Date:  Visit date not found    Hemoglobin A1C (%)   Date Value   01/07/2022 5.8   12/31/2020 6.2 (H)             ( goal A1C is < 7)   No results found for: LABMICR  LDL Cholesterol (mg/dL)   Date Value   01/05/2021 97       (goal LDL is <100)   AST (U/L)   Date Value   11/08/2021 14     ALT (U/L)   Date Value   11/08/2021 <5 (L)     BUN (mg/dL)   Date Value   11/08/2021 7     BP Readings from Last 3 Encounters:   03/04/22 113/75   01/07/22 116/81   07/28/21 111/82          (goal 120/80)        Patient Active Problem List:     Pulmonary edema, acute, with congestive heart failure (HCC)     Anxiety     Microcytic anemia     History of drug abuse (HCC)     Chronic diastolic congestive heart failure (HCC)     Severe mitral valve regurgitation     Severe mitral regurgitation by prior echocardiogram     S/P mitral valve replacement     S/P left atrial appendage ligation     Pneumonia due to organism     Recurrent major depressive disorder, in partial remission ([facility] [address]     Lump or mass in breast      ----Graciella Later

## 2022-05-12 ENCOUNTER — OFFICE VISIT (OUTPATIENT)
Dept: FAMILY MEDICINE CLINIC | Age: 47
End: 2022-05-12
Payer: MEDICAID

## 2022-05-12 VITALS
BODY MASS INDEX: 19.13 KG/M2 | SYSTOLIC BLOOD PRESSURE: 109 MMHG | WEIGHT: 119 LBS | HEART RATE: 113 BPM | TEMPERATURE: 96.4 F | DIASTOLIC BLOOD PRESSURE: 84 MMHG | HEIGHT: 66 IN

## 2022-05-12 DIAGNOSIS — F33.41 RECURRENT MAJOR DEPRESSIVE DISORDER, IN PARTIAL REMISSION (HCC): ICD-10-CM

## 2022-05-12 DIAGNOSIS — Z00.00 HEALTH CARE MAINTENANCE: ICD-10-CM

## 2022-05-12 DIAGNOSIS — Z95.2 S/P MITRAL VALVE REPLACEMENT: Chronic | ICD-10-CM

## 2022-05-12 DIAGNOSIS — Z76.0 MEDICATION REFILL: ICD-10-CM

## 2022-05-12 DIAGNOSIS — R63.4 WEIGHT LOSS, UNINTENTIONAL: Primary | ICD-10-CM

## 2022-05-12 DIAGNOSIS — R00.0 TACHYCARDIA: ICD-10-CM

## 2022-05-12 DIAGNOSIS — Z00.00 HEALTH MAINTENANCE EXAMINATION: ICD-10-CM

## 2022-05-12 PROCEDURE — 99213 OFFICE O/P EST LOW 20 MIN: CPT | Performed by: STUDENT IN AN ORGANIZED HEALTH CARE EDUCATION/TRAINING PROGRAM

## 2022-05-12 PROCEDURE — 4004F PT TOBACCO SCREEN RCVD TLK: CPT | Performed by: FAMILY MEDICINE

## 2022-05-12 PROCEDURE — G8420 CALC BMI NORM PARAMETERS: HCPCS | Performed by: FAMILY MEDICINE

## 2022-05-12 PROCEDURE — G8427 DOCREV CUR MEDS BY ELIG CLIN: HCPCS | Performed by: FAMILY MEDICINE

## 2022-05-12 RX ORDER — FUROSEMIDE 20 MG/1
20 TABLET ORAL EVERY EVENING
Qty: 60 TABLET | Refills: 3 | Status: SHIPPED | OUTPATIENT
Start: 2022-05-12

## 2022-05-12 RX ORDER — CITALOPRAM 40 MG/1
40 TABLET ORAL DAILY
Qty: 30 TABLET | Refills: 3 | Status: SHIPPED | OUTPATIENT
Start: 2022-05-12

## 2022-05-12 RX ORDER — LANOLIN ALCOHOL/MO/W.PET/CERES
3 CREAM (GRAM) TOPICAL NIGHTLY PRN
Qty: 30 TABLET | Refills: 2 | Status: SHIPPED | OUTPATIENT
Start: 2022-05-12 | End: 2022-06-08 | Stop reason: SDUPTHER

## 2022-05-12 RX ORDER — LANOLIN ALCOHOL/MO/W.PET/CERES
325 CREAM (GRAM) TOPICAL 2 TIMES DAILY WITH MEALS
Qty: 90 TABLET | Refills: 3 | Status: SHIPPED | OUTPATIENT
Start: 2022-05-12 | End: 2022-08-10

## 2022-05-12 RX ORDER — ARIPIPRAZOLE 15 MG/1
15 TABLET ORAL DAILY
Qty: 30 TABLET | Refills: 3 | Status: SHIPPED | OUTPATIENT
Start: 2022-05-12

## 2022-05-12 RX ORDER — METOPROLOL SUCCINATE 25 MG/1
25 TABLET, EXTENDED RELEASE ORAL DAILY
Qty: 60 TABLET | Refills: 2 | Status: SHIPPED | OUTPATIENT
Start: 2022-05-12 | End: 2022-07-26

## 2022-05-12 RX ORDER — CITALOPRAM 20 MG/1
20 TABLET ORAL DAILY
Qty: 30 TABLET | Refills: 1 | Status: CANCELLED | OUTPATIENT
Start: 2022-05-12

## 2022-05-12 RX ORDER — ONDANSETRON 4 MG/1
4 TABLET, ORALLY DISINTEGRATING ORAL EVERY 8 HOURS PRN
Qty: 8 TABLET | Refills: 0 | Status: SHIPPED | OUTPATIENT
Start: 2022-05-12 | End: 2022-07-25 | Stop reason: SDUPTHER

## 2022-05-12 RX ORDER — ASPIRIN 325 MG
325 TABLET, DELAYED RELEASE (ENTERIC COATED) ORAL DAILY
Qty: 90 TABLET | Refills: 3 | Status: SHIPPED | OUTPATIENT
Start: 2022-05-12 | End: 2023-05-12

## 2022-05-12 RX ORDER — OMEPRAZOLE 20 MG/1
20 CAPSULE, DELAYED RELEASE ORAL DAILY
Qty: 30 CAPSULE | Refills: 1 | Status: SHIPPED | OUTPATIENT
Start: 2022-05-12 | End: 2022-07-13

## 2022-05-12 ASSESSMENT — ENCOUNTER SYMPTOMS
DIARRHEA: 0
COLOR CHANGE: 0
CONSTIPATION: 0
CHEST TIGHTNESS: 0
SHORTNESS OF BREATH: 0
COUGH: 0
WHEEZING: 0
ABDOMINAL PAIN: 0
NAUSEA: 0

## 2022-05-12 NOTE — PATIENT INSTRUCTIONS
Thank you for letting us take care of you today. We hope all your questions were addressed. If a question was overlooked or something else comes to mind after you return home, please contact a member of your Care Team listed below. Your Care Team at [place] [address] is Team #3  Norah Hayes MD (Faculty)  Tosin Oconnor MD (Faculty  Madisynvinod Gifford MD (Resident)  Luigi Chavez (Resident)   Eliana Gonzalez MD (Resident)  Cory Berry MD (Resident)  Gama Castano., KAMALA Do., KAMALA Sampson., [place]., Lynn [place] ([address] [facility])  Eli Watts (Clinical Practice Manager)  Mildred Cuellar, [facility] (Clinical Pharmacist)     Office phone number: [phone]    If you need to get in right away due to illness, please be advised we have \"Same Day\" appointments available Monday-Friday. Please call us at [phone] option #3 to schedule your \"Same Day\" appointment.

## 2022-05-12 NOTE — PROGRESS NOTES
and nausea. Genitourinary: Negative for difficulty urinating. Musculoskeletal: Negative for joint swelling. Skin: Negative for color change. Neurological: Negative for dizziness, weakness, light-headedness, numbness and headaches. Psychiatric/Behavioral: Negative for agitation and confusion. Objective:    /84 (Site: Left Upper Arm, Position: Sitting, Cuff Size: Medium Adult) Comment: Machine  Pulse 113   Temp 96.4 °F (35.8 °C) (Temporal)   Ht 5' 5.98\" (1.676 m)   Wt 119 lb (54 kg)   BMI 19.22 kg/m²    BP Readings from Last 3 Encounters:   05/12/22 109/84   03/04/22 113/75   01/07/22 116/81     Physical Exam  Constitutional:       General: She is not in acute distress. Appearance: Normal appearance. She is not ill-appearing. Comments: Patient is wearing Holter monitor   Cardiovascular:      Rate and Rhythm: Regular rhythm. Tachycardia present. Heart sounds: No murmur heard. No gallop. Pulmonary:      Effort: Pulmonary effort is normal. No respiratory distress. Breath sounds: Normal breath sounds. No wheezing. Abdominal:      General: Bowel sounds are normal. There is no distension. Tenderness: There is no abdominal tenderness. Hernia: No hernia is present. Musculoskeletal:         General: No swelling or deformity. Neurological:      General: No focal deficit present. Mental Status: She is alert and oriented to person, place, and time. Psychiatric:         Mood and Affect: Mood is depressed. Affect is blunt. Affect is not angry or tearful. Speech: Speech normal.         Thought Content: Thought content does not include suicidal ideation. Thought content does not include suicidal plan.            Lab Results   Component Value Date    WBC 14.8 (H) 12/09/2021    HGB 12.5 12/09/2021    HCT 38.3 12/09/2021     12/09/2021    CHOL 164 01/05/2021    TRIG 59 01/05/2021    HDL 55 01/05/2021    ALT <5 (L) 11/08/2021    AST 14 11/08/2021    NA 136 11/08/2021    K 4.4 11/08/2021    CL 99 11/08/2021    CREATININE 0.63 11/08/2021    BUN 7 11/08/2021    CO2 24 11/08/2021    TSH 1.86 04/30/2021    INR 1.3 09/28/2021    LABA1C 5.8 01/07/2022     Lab Results   Component Value Date    CALCIUM 8.1 (L) 11/08/2021    PHOS 3.7 11/08/2021     Lab Results   Component Value Date    LDLCHOLESTEROL 97 01/05/2021       Assessment and Plan:    1. Weight loss, unintentional  -Patient has lost 20 pounds since the beginning of this year  -Likely 2/2 depression versus organic cause  -Will obtain CBC, CMP, ESR, CRP, LDH, TSH, and CXR  -F/u 2 weeks  - CBC with Auto Differential; Future  - Comprehensive Metabolic Panel; Future  - Sedimentation Rate; Future  - C-Reactive Protein; Future  - Lactate Dehydrogenase; Future  - TSH; Future  - XR CHEST STANDARD (2 VW); Future    2. S/P mitral valve replacement  -S/p mitral valve replacement x2 2021  -Currently follows with [facility] cardiology consultants  -Last saw cardiologist on 5/11/2022  -Per patient, her dose of metoprolol was increased and patient was instructed to wear Holter monitor  -Refilled aspirin 325 and Lasix 20 mg daily  - aspirin 325 MG EC tablet; Take 1 tablet by mouth daily  Dispense: 90 tablet; Refill: 3  - furosemide (LASIX) 20 MG tablet; Take 1 tablet by mouth every evening  Dispense: 60 tablet; Refill: 3    3. Recurrent major depressive disorder, in partial remission ([facility] [address]  -Worsening depression  -Denies any suicidal ideations or plans  -Will increase Celexa from 20 mg once daily to 40 mg once daily  -Refilled prescription for Abilify 50 mg once daily  -Started melatonin 3 mg once nightly as needed  -Benefits, side effects, and alternatives discussed with patient  -She voices understanding and is in agreement  -Patient questions answered  -We will refer patient to Dr. Sam Aggarwal for psychotherapy  - ARIPiprazole (ABILIFY) 15 MG tablet; Take 1 tablet by mouth daily  Dispense: 30 tablet;  Refill: 3  - melatonin (RA MELATONIN) 3 MG TABS tablet; Take 1 tablet by mouth nightly as needed (sleep)  Dispense: 30 tablet; Refill: 2  - citalopram (CELEXA) 40 MG tablet; Take 1 tablet by mouth daily  Dispense: 30 tablet; Refill: 3    4. Health care maintenance    - HIV Screen; Future  - Hepatitis C Antibody; Future    5. Health maintenance examination    - ARIPiprazole (ABILIFY) 15 MG tablet; Take 1 tablet by mouth daily  Dispense: 30 tablet; Refill: 3    6. Medication refill    - omeprazole (PRILOSEC) 20 MG delayed release capsule; Take 1 capsule by mouth daily  Dispense: 30 capsule; Refill: 1  - ferrous sulfate (FE TABS 325) 325 (65 Fe) MG EC tablet; Take 1 tablet by mouth 2 times daily (with meals)  Dispense: 90 tablet; Refill: 3  - ondansetron (ZOFRAN ODT) 4 MG disintegrating tablet; Take 1 tablet by mouth every 8 hours as needed for Nausea  Dispense: 8 tablet; Refill: 0    7. Tachycardia  -/84; heart rate 113  -Patient in NSR  -Denies any chest pain or worsening SOB  -Currently follows with cardiology last seen on 5/11/2022  -Currently wearing a Holter monitor  - metoprolol succinate (TOPROL XL) 25 MG extended release tablet; Take 1 tablet by mouth daily Takes 1.5 tabs in the morning and takes 1 full tab at night  Dispense: 60 tablet;  Refill: 2          Requested Prescriptions     Signed Prescriptions Disp Refills    omeprazole (PRILOSEC) 20 MG delayed release capsule 30 capsule 1     Sig: Take 1 capsule by mouth daily    ARIPiprazole (ABILIFY) 15 MG tablet 30 tablet 3     Sig: Take 1 tablet by mouth daily    aspirin 325 MG EC tablet 90 tablet 3     Sig: Take 1 tablet by mouth daily    ferrous sulfate (FE TABS 325) 325 (65 Fe) MG EC tablet 90 tablet 3     Sig: Take 1 tablet by mouth 2 times daily (with meals)    furosemide (LASIX) 20 MG tablet 60 tablet 3     Sig: Take 1 tablet by mouth every evening    metoprolol succinate (TOPROL XL) 25 MG extended release tablet 60 tablet 2     Sig: Take 1 tablet by mouth daily Takes 1.5 tabs in the morning and takes 1 full tab at night    ondansetron (ZOFRAN ODT) 4 MG disintegrating tablet 8 tablet 0     Sig: Take 1 tablet by mouth every 8 hours as needed for Nausea    melatonin (RA MELATONIN) 3 MG TABS tablet 30 tablet 2     Sig: Take 1 tablet by mouth nightly as needed (sleep)    citalopram (CELEXA) 40 MG tablet 30 tablet 3     Sig: Take 1 tablet by mouth daily       Medications Discontinued During This Encounter   Medication Reason    lisinopril (PRINIVIL;ZESTRIL) 5 MG tablet Therapy completed    citalopram (CELEXA) 20 MG tablet LIST CLEANUP    omeprazole (PRILOSEC) 20 MG delayed release capsule REORDER    ferrous sulfate (FE TABS 325) 325 (65 Fe) MG EC tablet REORDER    furosemide (LASIX) 20 MG tablet REORDER    metoprolol succinate (TOPROL XL) 25 MG extended release tablet REORDER    ondansetron (ZOFRAN ODT) 4 MG disintegrating tablet REORDER    aspirin 325 MG EC tablet REORDER    ARIPiprazole (ABILIFY) 15 MG tablet REORDER       Return in about 2 weeks (around 5/26/2022), or F/u wieght loss. Nidia Ray received counseling on the following healthy behaviors:   Reviewed prior labs and health maintenance  Continue current medications, diet and exercise. Discussed use, benefit, and side effects of prescribed medications. Barriers to medication compliance addressed. Patient given educational materials - see patient instructions  Was a self-tracking handout given in paper form or via [employer]?  No:     Requested Prescriptions     Signed Prescriptions Disp Refills    omeprazole (PRILOSEC) 20 MG delayed release capsule 30 capsule 1     Sig: Take 1 capsule by mouth daily    ARIPiprazole (ABILIFY) 15 MG tablet 30 tablet 3     Sig: Take 1 tablet by mouth daily    aspirin 325 MG EC tablet 90 tablet 3     Sig: Take 1 tablet by mouth daily    ferrous sulfate (FE TABS 325) 325 (65 Fe) MG EC tablet 90 tablet 3     Sig: Take 1 tablet by mouth 2 times daily (with meals)    furosemide (LASIX) 20 MG tablet 60 tablet 3     Sig: Take 1 tablet by mouth every evening    metoprolol succinate (TOPROL XL) 25 MG extended release tablet 60 tablet 2     Sig: Take 1 tablet by mouth daily Takes 1.5 tabs in the morning and takes 1 full tab at night    ondansetron (ZOFRAN ODT) 4 MG disintegrating tablet 8 tablet 0     Sig: Take 1 tablet by mouth every 8 hours as needed for Nausea    melatonin (RA MELATONIN) 3 MG TABS tablet 30 tablet 2     Sig: Take 1 tablet by mouth nightly as needed (sleep)    citalopram (CELEXA) 40 MG tablet 30 tablet 3     Sig: Take 1 tablet by mouth daily       All patient questions answered. Patient voiced understanding. Quality Measures    Body mass index is 19.22 kg/m². Normal. Weight control planned discussed Healthy diet and regular exercise. BP: 109/84 (Machine) Blood pressure is normal. Treatment plan consists of No treatment change needed.     Lab Results   Component Value Date    LDLCHOLESTEROL 97 01/05/2021    (goal LDL reduction with dx if diabetes is 50% LDL reduction)      PHQ Scores 1/7/2022   PHQ2 Score 0   PHQ9 Score 0     Interpretation of Total Score Depression Severity: 1-4 = Minimal depression, 5-9 = Mild depression, 10-14 = Moderate depression, 15-19 = Moderately severe depression, 20-27 = Severe depression

## 2022-05-16 ENCOUNTER — HOSPITAL ENCOUNTER (EMERGENCY)
Age: 47
Discharge: HOME | End: 2022-05-16
Attending: EMERGENCY MEDICINE
Payer: MEDICAID

## 2022-05-16 ENCOUNTER — TELEPHONE (OUTPATIENT)
Dept: FAMILY MEDICINE CLINIC | Age: 47
End: 2022-05-16

## 2022-05-16 ENCOUNTER — HOSPITAL ENCOUNTER (OUTPATIENT)
Age: 47
Discharge: HOME | End: 2022-05-16
Payer: MEDICAID

## 2022-05-16 ENCOUNTER — HOSPITAL ENCOUNTER (OUTPATIENT)
Age: 47
Discharge: HOME | End: 2022-05-18
Payer: MEDICAID

## 2022-05-16 ENCOUNTER — HOSPITAL ENCOUNTER (OUTPATIENT)
Dept: GENERAL RADIOLOGY | Age: 47
Discharge: HOME | End: 2022-05-18
Payer: MEDICAID

## 2022-05-16 VITALS
OXYGEN SATURATION: 95 % | DIASTOLIC BLOOD PRESSURE: 78 MMHG | RESPIRATION RATE: 16 BRPM | TEMPERATURE: 97.2 F | HEART RATE: 97 BPM | SYSTOLIC BLOOD PRESSURE: 134 MMHG

## 2022-05-16 DIAGNOSIS — Z00.00 HEALTH CARE MAINTENANCE: ICD-10-CM

## 2022-05-16 DIAGNOSIS — E87.6 HYPOKALEMIA: Primary | ICD-10-CM

## 2022-05-16 DIAGNOSIS — R63.4 WEIGHT LOSS, UNINTENTIONAL: ICD-10-CM

## 2022-05-16 LAB
ABSOLUTE EOS #: <0.03 K/UL (ref 0–0.44)
ABSOLUTE IMMATURE GRANULOCYTE: <0.03 K/UL (ref 0–0.3)
ABSOLUTE LYMPH #: 1.33 K/UL (ref 1.1–3.7)
ABSOLUTE MONO #: 0.38 K/UL (ref 0.1–1.2)
ALBUMIN SERPL-MCNC: 3.7 G/DL (ref 3.5–5.2)
ALBUMIN/GLOBULIN RATIO: 1.4 (ref 1–2.5)
ALP BLD-CCNC: 62 U/L (ref 35–104)
ALT SERPL-CCNC: 38 U/L (ref 5–33)
ANION GAP SERPL CALCULATED.3IONS-SCNC: 11 MMOL/L (ref 9–17)
AST SERPL-CCNC: 28 U/L
BASOPHILS # BLD: 1 % (ref 0–2)
BASOPHILS ABSOLUTE: 0.04 K/UL (ref 0–0.2)
BILIRUB SERPL-MCNC: 0.31 MG/DL (ref 0.3–1.2)
BUN BLDV-MCNC: 6 MG/DL (ref 6–20)
C-REACTIVE PROTEIN: <3 MG/L (ref 0–5)
CALCIUM SERPL-MCNC: 8.5 MG/DL (ref 8.6–10.4)
CHLORIDE BLD-SCNC: 102 MMOL/L (ref 98–107)
CO2: 25 MMOL/L (ref 20–31)
CREAT SERPL-MCNC: 0.63 MG/DL (ref 0.5–0.9)
EOSINOPHILS RELATIVE PERCENT: 0 % (ref 1–4)
GFR AFRICAN AMERICAN: >60 ML/MIN
GFR NON-AFRICAN AMERICAN: >60 ML/MIN
GFR SERPL CREATININE-BSD FRML MDRD: ABNORMAL ML/MIN/{1.73_M2}
GLUCOSE BLD-MCNC: 112 MG/DL (ref 70–99)
HCT VFR BLD CALC: 40.4 % (ref 36.3–47.1)
HEMOGLOBIN: 13.6 G/DL (ref 11.9–15.1)
HEPATITIS C ANTIBODY: NONREACTIVE
HIV AG/AB: NONREACTIVE
IMMATURE GRANULOCYTES: 0 %
LACTATE DEHYDROGENASE: 177 U/L (ref 135–214)
LYMPHOCYTES # BLD: 28 % (ref 24–43)
MCH RBC QN AUTO: 30.1 PG (ref 25.2–33.5)
MCHC RBC AUTO-ENTMCNC: 33.7 G/DL (ref 28.4–34.8)
MCV RBC AUTO: 89.4 FL (ref 82.6–102.9)
MONOCYTES # BLD: 8 % (ref 3–12)
NRBC AUTOMATED: 0 PER 100 WBC
PDW BLD-RTO: 13.6 % (ref 11.8–14.4)
PLATELET # BLD: 291 K/UL (ref 138–453)
PMV BLD AUTO: 9.4 FL (ref 8.1–13.5)
POTASSIUM SERPL-SCNC: 2.9 MMOL/L (ref 3.7–5.3)
POTASSIUM SERPL-SCNC: 3.2 MMOL/L (ref 3.7–5.3)
RBC # BLD: 4.52 M/UL (ref 3.95–5.11)
SEDIMENTATION RATE, ERYTHROCYTE: 3 MM/HR (ref 0–20)
SEG NEUTROPHILS: 63 % (ref 36–65)
SEGMENTED NEUTROPHILS ABSOLUTE COUNT: 2.96 K/UL (ref 1.5–8.1)
SODIUM BLD-SCNC: 138 MMOL/L (ref 135–144)
TOTAL PROTEIN: 6.4 G/DL (ref 6.4–8.3)
TSH SERPL DL<=0.05 MIU/L-ACNC: 0.83 UIU/ML (ref 0.3–5)
WBC # BLD: 4.7 K/UL (ref 3.5–11.3)

## 2022-05-16 PROCEDURE — 85025 COMPLETE CBC W/AUTO DIFF WBC: CPT

## 2022-05-16 PROCEDURE — 84132 ASSAY OF SERUM POTASSIUM: CPT

## 2022-05-16 PROCEDURE — 83615 LACTATE (LD) (LDH) ENZYME: CPT

## 2022-05-16 PROCEDURE — 93005 ELECTROCARDIOGRAM TRACING: CPT | Performed by: STUDENT IN AN ORGANIZED HEALTH CARE EDUCATION/TRAINING PROGRAM

## 2022-05-16 PROCEDURE — 80053 COMPREHEN METABOLIC PANEL: CPT

## 2022-05-16 PROCEDURE — 6370000000 HC RX 637 (ALT 250 FOR IP): Performed by: STUDENT IN AN ORGANIZED HEALTH CARE EDUCATION/TRAINING PROGRAM

## 2022-05-16 PROCEDURE — 99284 EMERGENCY DEPT VISIT MOD MDM: CPT

## 2022-05-16 PROCEDURE — 86140 C-REACTIVE PROTEIN: CPT

## 2022-05-16 PROCEDURE — 86803 HEPATITIS C AB TEST: CPT

## 2022-05-16 PROCEDURE — 85652 RBC SED RATE AUTOMATED: CPT

## 2022-05-16 PROCEDURE — 36415 COLL VENOUS BLD VENIPUNCTURE: CPT

## 2022-05-16 PROCEDURE — 84443 ASSAY THYROID STIM HORMONE: CPT

## 2022-05-16 PROCEDURE — 87389 HIV-1 AG W/HIV-1&-2 AB AG IA: CPT

## 2022-05-16 PROCEDURE — 71046 X-RAY EXAM CHEST 2 VIEWS: CPT

## 2022-05-16 RX ORDER — POTASSIUM CHLORIDE 20 MEQ/1
20 TABLET, EXTENDED RELEASE ORAL 2 TIMES DAILY
Qty: 180 TABLET | Refills: 1 | Status: SHIPPED | OUTPATIENT
Start: 2022-05-16 | End: 2022-05-16 | Stop reason: SDUPTHER

## 2022-05-16 RX ORDER — POTASSIUM CHLORIDE 20 MEQ/1
20 TABLET, EXTENDED RELEASE ORAL 2 TIMES DAILY
Qty: 20 TABLET | Refills: 0 | Status: SHIPPED | OUTPATIENT
Start: 2022-05-16

## 2022-05-16 RX ORDER — ONDANSETRON 4 MG/1
4 TABLET, ORALLY DISINTEGRATING ORAL ONCE
Status: COMPLETED | OUTPATIENT
Start: 2022-05-16 | End: 2022-05-16

## 2022-05-16 RX ADMIN — ONDANSETRON 4 MG: 4 TABLET, ORALLY DISINTEGRATING ORAL at 15:59

## 2022-05-16 RX ADMIN — POTASSIUM BICARBONATE 40 MEQ: 782 TABLET, EFFERVESCENT ORAL at 15:59

## 2022-05-16 NOTE — TELEPHONE ENCOUNTER
Called patient to inform her about her hypokalemia of 2.9. Patient was last seen in the office on 05/12/2022. During that visit had some lab work ordered which included a CMP. Patient had the labs done today, which showed potassium of 2.9. Patient states she has been feeling more fatigued than normal and was having some nausea today. Patient denies chest pain, shortness of breath, belly pain, headaches, vomiting. Patient states she takes Lasix as needed for lower extremity edema. Last took it 3 weeks ago. Informed patient that she would have to go to the ER to get it replaced. Patient agreed and will go to [facility] ER for further management of hypokalemia. Advised patient not to take any Lasix in the meantime.

## 2022-05-16 NOTE — ED PROVIDER NOTES
[facility]     Emergency Department     Faculty Attestation    I performed a history and physical examination of the patient and discussed management with the resident. I have reviewed and agree with the residents findings including all diagnostic interpretations, and treatment plans as written. Any areas of disagreement are noted on the chart. I was personally present for the key portions of any procedures. I have documented in the chart those procedures where I was not present during the key portions. I have reviewed the emergency nurses triage note. I agree with the chief complaint, past medical history, past surgical history, allergies, medications, social and family history as documented unless otherwise noted below. Documentation of the HPI, Physical Exam and Medical Decision Making performed by scribes is based on my personal performance of the HPI, PE and MDM. For Physician Assistant/ Nurse Practitioner cases/documentation I have personally evaluated this patient and have completed at least one if not all key elements of the E/M (history, physical exam, and MDM). Additional findings are as noted.       Nikko Damon D.O, M.P.H  Attending Emergency Medicine Physician         Nikko Damon DO  05/16/22 [id number]

## 2022-05-16 NOTE — ED PROVIDER NOTES
[facility] ED  Emergency Department Encounter  EmergencyMedicine Resident     Pt Name:Mony Razo  MRN: [id number]  Birthdate 1975  Date of evaluation: 5/16/22  PCP:  Tacos Zapata MD    [address] [place] [address]       Chief Complaint   Patient presents with    Abnormal Lab     called to come to ED for low K (2.9). denies s/s.  states past heart hx       HISTORY OF PRESENT ILLNESS  (Location/Symptom, Timing/Onset, Context/Setting, Quality, Duration, Modifying Factors, Severity.)      Jered Hooker is a 42 y.o. female who presents with instructions for PCP to be treated in the emergency department for hypokalemia. Patient states for the past several weeks she has felt fatigued and had some unintentional approximately 10 pound weight loss. Denies other symptoms denies chest pain shortness of breath nausea vomiting fever diarrhea. No abdominal pain dysuria hematuria. Denies palpitations syncope presyncope. PAST MEDICAL / SURGICAL / SOCIAL / FAMILY HISTORY      has a past medical history of Anxiety, CHF (congestive heart failure) ([address] GERD (gastroesophageal reflux disease), History of chest pain, History of echocardiogram, Hypertension, Leg swelling, Migraine, Mitral regurgitation, Mitral valve disease, SOB (shortness of breath), Stomach ulcer, Wellness examination, and Wellness examination. has a past surgical history that includes hernia repair; transesophageal echocardiogram (01/04/2021); Cardiac catheterization (01/05/2021); Endoscopy, colon, diagnostic; Cosmetic surgery; Mitral valve repair (N/A, 2/26/2021); and Breast biopsy.     Social History     Socioeconomic History    Marital status: Single     Spouse name: Not on file    Number of children: Not on file    Years of education: Not on file    Highest education level: Not on file   Occupational History    Not on file   Tobacco Use    Smoking status: Current Every Day Smoker     Packs/day: 0.50     Types: Cigarettes    Smokeless tobacco: Never Used   Vaping Use    Vaping Use: Never used   Substance and Sexual Activity    Alcohol use: Yes     Alcohol/week: 4.0 standard drinks     Types: 4 Cans of beer per week     Comment: 4 tall boys yesterday    Drug use: Not Currently    Sexual activity: Not on file   Other Topics Concern    Not on file   Social History Narrative    Not on file     Social Determinants of Health     Financial Resource Strain:     Difficulty of Paying Living Expenses: Not on file   Food Insecurity:     Worried About Running Out of Food in the Last Year: Not on file    Renzo of Food in the Last Year: Not on file   Transportation Needs:     Lack of Transportation (Medical): Not on file    Lack of Transportation (Non-Medical):  Not on file   Physical Activity:     Days of Exercise per Week: Not on file    Minutes of Exercise per Session: Not on file   Stress:     Feeling of Stress : Not on file   Social Connections:     Frequency of Communication with Friends and Family: Not on file    Frequency of Social Gatherings with Friends and Family: Not on file    Attends [employer]ism Services: Not on file    Active Member of [address] [place] [address] or Organizations: Not on file    Attends Club or Organization Meetings: Not on file    Marital Status: Not on file   Intimate Partner Violence:     Fear of Current or Ex-Partner: Not on file    Emotionally Abused: Not on file    Physically Abused: Not on file    Sexually Abused: Not on file   Housing Stability:     Unable to Pay for Housing in the Last Year: Not on file    Number of [place] in the Last Year: Not on file    Unstable Housing in the Last Year: Not on file       Family History   Problem Relation Age of Onset    Diabetes Mother     High Blood Pressure Mother     No Known Problems Father     Breast Cancer Maternal Aunt 47       Allergies:  Antihistamines, chlorpheniramine-type and Hydroxyzine    Home Medications:  Prior to Admission medications Neurological: Negative for dizziness. Hematological: Does not bruise/bleed easily. Psychiatric/Behavioral: Negative for agitation. PHYSICAL EXAM   (up to 7 for level 4, 8 or more for level 5)      INITIAL VITALS:   /84   Pulse 93   Temp 97.2 °F (36.2 °C) (Oral)   Resp 15   LMP 05/02/2022 (Approximate)   SpO2 98%     Physical Exam  Constitutional:       General: Not in acute distress. Appearance: Normal appearance. Normal weight. Not toxic-appearing. HENT:      Head: Normocephalic and atraumatic. Nose: Nose normal.      Mouth/Throat: Mucous membranes are moist.  Uvula midline no oropharyngeal edema. Pharynx: Oropharynx is clear. Eyes:      Extraocular Movements: Extraocular movements intact. Conjunctiva/sclera: Conjunctivae normal.      Pupils: Pupils are equal, round, and reactive to light. Neck:      Musculoskeletal: Normal range of motion and neck supple. No neck rigidity. Cardiovascular:      Rate and Rhythm: Normal rate and regular rhythm. Pulses: Normal pulses. Heart sounds: Normal heart sounds. No murmur. Pulmonary:      Effort: Pulmonary effort is normal.      Breath sounds: Normal breath sounds. No wheezing. Abdominal:      General: Abdomen is flat. Bowel sounds are normal.      Tenderness: There is no abdominal tenderness. Musculoskeletal:     Normal range of motion. General: No swelling or tenderness. No LE edema    Skin:     General: Skin is warm. Capillary Refill: Capillary refill takes less than 2 seconds. Coloration: Skin is not jaundiced. Neurological:      General: No focal deficit present. Mental Status: Alert and oriented to person, place, and time. Mental status is at baseline. Motor: No weakness.        DIFFERENTIAL  DIAGNOSIS     PLAN (LABS / IMAGING / EKG):  Orders Placed This Encounter   Procedures    Potassium    Urinalysis with Reflex to Culture       MEDICATIONS ORDERED:  Orders Placed This Encounter   Medications    potassium bicarb-citric acid (EFFER-K) effervescent tablet 40 mEq    ondansetron (ZOFRAN-ODT) disintegrating tablet 4 mg    potassium chloride (KLOR-CON M) 20 MEQ extended release tablet     Sig: Take 1 tablet by mouth 2 times daily     Dispense:  20 tablet     Refill:  0           DIAGNOSTIC RESULTS / EMERGENCY DEPARTMENT COURSE / MDM     LABS:  Results for orders placed or performed during the hospital encounter of 05/16/22   Potassium   Result Value Ref Range    Potassium 3.2 (L) 3.7 - 5.3 mmol/L         RADIOLOGY:  None    EKG  EKG Interpretation    Interpreted by me    Rhythm: normal sinus   Rate: normal  Axis: normal  Ectopy: none  Conduction: normal  ST Segments: no acute change  T Waves: no acute change  Q Waves: none    Clinical Impression: no acute changes and normal EKG    All EKG's are interpreted by the Emergency Department Physician who either signs or Co-signs this chart in the absence of a cardiologist.    EMERGENCY DEPARTMENT COURSE:  Patient breathing quietly and unlabored on room air. Speech is normal and speaking in full sentences without requiring to pause to take a breath. Patient reports overall feeling well today repeat potassium, will give p.o. repletion. On chart review patient has normal creatinine function laboratory work done at PCP today largely unremarkable. TSH normal.  Will get urinalysis due to generalized fatigue although no hematuria dysuria no frequency no vaginal discharge. .      Patient declined urinalysis. Repeat potassium 3.2. Patient tolerated p.o. repletion, represcribed her home dose potassium. Instructed her to follow-up with PCP. Patient understands and agrees. Gave return precautions. PROCEDURES:  None    CONSULTS:  None    CRITICAL CARE:  None    FINAL IMPRESSION      1.  Hypokalemia          DISPOSITION / PLAN     DISPOSITION Decision To Discharge 05/16/2022 04:12:09 PM      PATIENT REFERRED TO:  Rocio Wright MD  [address]   [address]  [address]  [phone]    Schedule an appointment as soon as possible for a visit in 2 days        DISCHARGE MEDICATIONS:  Current Discharge Medication List          Praneeth Carolina MD  Emergency Medicine Resident    (Please note that portions of thisnote were completed with a voice recognition program.  Efforts were made to edit the dictations but occasionally words are mis-transcribed.)       Praneeth Carolina MD  Resident  05/17/22 [id number]

## 2022-05-16 NOTE — PROGRESS NOTES
I signed up for this patient in error. I did not contribute to the patient's care today.     Jerrod Che MD  Emergency Medicine PGY-2

## 2022-05-16 NOTE — ED NOTES
Pt sent to the ER from the [facility] for low potassium levels of 2.9. Pt reports fatigue, loss of appetite and weight loss. Denies chest pain, no SOB. Placed pt on full cardiac monitor, no acute distress noted, rr even and NL.  Dr. Mukul Floyd at the bedside t evaluate the pt     Jerald Do RN  05/16/22 2958

## 2022-05-17 ENCOUNTER — TELEPHONE (OUTPATIENT)
Dept: FAMILY MEDICINE CLINIC | Age: 47
End: 2022-05-17

## 2022-05-17 LAB
EKG ATRIAL RATE: 94 BPM
EKG P AXIS: 62 DEGREES
EKG P-R INTERVAL: 158 MS
EKG Q-T INTERVAL: 378 MS
EKG QRS DURATION: 66 MS
EKG QTC CALCULATION (BAZETT): 472 MS
EKG R AXIS: 35 DEGREES
EKG T AXIS: 31 DEGREES
EKG VENTRICULAR RATE: 94 BPM

## 2022-05-17 PROCEDURE — 93010 ELECTROCARDIOGRAM REPORT: CPT | Performed by: INTERNAL MEDICINE

## 2022-05-17 NOTE — TELEPHONE ENCOUNTER
PA request for Metoprolol     PA processed and submitted to pt insurance, waiting for response in regards to coverage 18

## 2022-05-19 NOTE — TELEPHONE ENCOUNTER
[facility] says Metoprol does not needs a Prior Auth. Faxed paper to pharmacy for her.

## 2022-05-25 NOTE — PROGRESS NOTES
Attending Physician Statement    Wt Readings from Last 3 Encounters:   05/12/22 119 lb (54 kg)   03/04/22 128 lb 9.6 oz (58.3 kg)   01/07/22 136 lb (61.7 kg)     Temp Readings from Last 3 Encounters:   05/16/22 97.2 °F (36.2 °C) (Oral)   05/12/22 96.4 °F (35.8 °C) (Temporal)   03/04/22 98.6 °F (37 °C)     BP Readings from Last 3 Encounters:   05/16/22 134/78   05/12/22 109/84   03/04/22 113/75     Pulse Readings from Last 3 Encounters:   05/16/22 97   05/12/22 113   03/04/22 103         I have discussed the care of Anastacio Shaver, including pertinent history and exam findings with the resident. I have reviewed the key elements of all parts of the encounter with the resident. I agree with the assessment, plan and orders as documented by the resident.   (GE Modifier)

## 2022-06-08 ENCOUNTER — OFFICE VISIT (OUTPATIENT)
Dept: FAMILY MEDICINE CLINIC | Age: 47
End: 2022-06-08
Payer: MEDICAID

## 2022-06-08 VITALS
HEIGHT: 66 IN | WEIGHT: 120.2 LBS | HEART RATE: 117 BPM | DIASTOLIC BLOOD PRESSURE: 86 MMHG | BODY MASS INDEX: 19.32 KG/M2 | TEMPERATURE: 96.8 F | SYSTOLIC BLOOD PRESSURE: 120 MMHG

## 2022-06-08 DIAGNOSIS — Z95.2 S/P MITRAL VALVE REPLACEMENT: Chronic | ICD-10-CM

## 2022-06-08 DIAGNOSIS — F33.41 RECURRENT MAJOR DEPRESSIVE DISORDER, IN PARTIAL REMISSION (HCC): Primary | ICD-10-CM

## 2022-06-08 DIAGNOSIS — F41.9 ANXIETY: ICD-10-CM

## 2022-06-08 PROCEDURE — G8420 CALC BMI NORM PARAMETERS: HCPCS | Performed by: STUDENT IN AN ORGANIZED HEALTH CARE EDUCATION/TRAINING PROGRAM

## 2022-06-08 PROCEDURE — G8427 DOCREV CUR MEDS BY ELIG CLIN: HCPCS | Performed by: STUDENT IN AN ORGANIZED HEALTH CARE EDUCATION/TRAINING PROGRAM

## 2022-06-08 PROCEDURE — 4004F PT TOBACCO SCREEN RCVD TLK: CPT | Performed by: STUDENT IN AN ORGANIZED HEALTH CARE EDUCATION/TRAINING PROGRAM

## 2022-06-08 PROCEDURE — 99213 OFFICE O/P EST LOW 20 MIN: CPT | Performed by: STUDENT IN AN ORGANIZED HEALTH CARE EDUCATION/TRAINING PROGRAM

## 2022-06-08 RX ORDER — LANOLIN ALCOHOL/MO/W.PET/CERES
3 CREAM (GRAM) TOPICAL NIGHTLY PRN
Qty: 30 TABLET | Refills: 1 | Status: SHIPPED | OUTPATIENT
Start: 2022-06-08 | End: 2022-07-25

## 2022-06-08 NOTE — PATIENT INSTRUCTIONS
Thank you for letting us take care of you today. We hope all your questions were addressed. If a question was overlooked or something else comes to mind after you return home, please contact a member of your Care Team listed below. Your Care Team at [place] [address] is Team #3  Author MD Soraya (Faculty)  Dayo Yan MD (Faculty  Sherryle Champ, MD (Resident)  Christophe Solano (Resident)   Christine Causey MD (Resident)  Mitali Tillman MD (Resident)  Richard Wu., KAMALA Llanos., KAMALA Donovan., Júnior Scott., Bernadine Negro ([address] [place] [address])  [facility] (Clinical Practice Manager)  Leander Tejeda [facility] (Clinical Pharmacist)     Office phone number: [phone]    If you need to get in right away due to illness, please be advised we have \"Same Day\" appointments available Monday-Friday. Please call us at [phone] option #3 to schedule your \"Same Day\" appointment.

## 2022-06-08 NOTE — PROGRESS NOTES
Visit Information    Have you changed or started any medications since your last visit including any over-the-counter medicines, vitamins, or herbal medicines? no   Have you stopped taking any of your medications? Is so, why? -  no  Are you having any side effects from any of your medications? - no    Have you seen any other physician or provider since your last visit?  no   Have you had any other diagnostic tests since your last visit? Yes - Labs, XR, EKG  Have you been seen in the emergency room and/or had an admission in a hospital since we last saw you? Yes - [address]  Have you had your routine dental cleaning in the past 6 months?  no     Do you have an active MyChart account? If no, what is the barrier?   Yes    Patient Care Team:  Rocio Wright MD as PCP - General (Emergency Medicine)    Medical History Review  Past Medical, Family, and Social History reviewed and does not contribute to the patient presenting condition    Health Maintenance   Topic Date Due    Pneumococcal 0-64 years Vaccine (1 - PCV) Never done    DTaP/Tdap/Td vaccine (1 - Tdap) Never done    Cervical cancer screen  Never done    COVID-19 Vaccine (2 - Pfizer 3-dose series) 06/17/2022    A1C test (Diabetic or Prediabetic)  01/07/2023    Depression Monitoring  01/07/2023    Lipids  01/05/2026    Flu vaccine  Completed    Hepatitis C screen  Completed    HIV screen  Completed    Hepatitis A vaccine  Aged Out    Hepatitis B vaccine  Aged Out    Hib vaccine  Aged Out    Meningococcal (ACWY) vaccine  Aged Out

## 2022-06-08 NOTE — PROGRESS NOTES
Attending Physician Statement  I have discussed the care of Wen Castillo 42 y.o. female, including pertinent history and exam findings, with the resident Dr. Betito Latif MD.    History and Exam:   Chief Complaint   Patient presents with    Weight Loss     Follow up       Past Medical History:   Diagnosis Date    Anxiety     CHF (congestive heart failure) ([address]     GERD (gastroesophageal reflux disease)     History of chest pain     now resolved    History of echocardiogram 01/02/2021    mod-severe MR; mild-mod TR; EF 50-55%    Hypertension     Leg swelling     Migraine     Mitral regurgitation     moderated to severe on echo    Mitral valve disease     SOB (shortness of breath)     Stomach ulcer     Wellness examination     [facility]-last visit feb 2021    Wellness examination     no current cardiologist     Allergies   Allergen Reactions    Antihistamines, Chlorpheniramine-Type Rash    Hydroxyzine Anxiety      I have seen and examined the patient and the key elements of the encounter have been performed by me.   BP Readings from Last 3 Encounters:   06/08/22 120/86   05/16/22 134/78   05/12/22 109/84     /86 (Site: Right Upper Arm, Position: Sitting, Cuff Size: Medium Adult) Comment: machine  Pulse (!) 117   Temp 96.8 °F (36 °C) (Temporal)   Ht 5' 5.98\" (1.676 m)   Wt 120 lb 3.2 oz (54.5 kg)   LMP 06/07/2022 (Exact Date)   BMI 19.41 kg/m²   Lab Results   Component Value Date    WBC 4.7 05/16/2022    HGB 13.6 05/16/2022    HCT 40.4 05/16/2022     05/16/2022    CHOL 164 01/05/2021    TRIG 59 01/05/2021    HDL 55 01/05/2021    ALT 38 (H) 05/16/2022    AST 28 05/16/2022     05/16/2022    K 3.2 (L) 05/16/2022     05/16/2022    CREATININE 0.63 05/16/2022    BUN 6 05/16/2022    CO2 25 05/16/2022    TSH 0.83 05/16/2022    INR 1.3 09/28/2021    LABA1C 5.8 01/07/2022     Lab Results   Component Value Date    LABALBU 3.7 05/16/2022     Lab Results   Component Value Date    IRON 28 (L) 12/31/2020    TIBC 361 12/31/2020    FERRITIN 41 05/21/2021     Lab Results   Component Value Date    LDLCHOLESTEROL 97 01/05/2021     I agree with the assessment, plan and the diagnosis of    Diagnosis Orders   1. Recurrent major depressive disorder, in partial remission (HCC)  melatonin (RA MELATONIN) 3 mg TABS tablet   2. Anxiety     3. S/P mitral valve replacement  Lipid Panel    . I agree with orders as documented by the resident. More than 25 minutes spent  in face to face encounter with the patient and more than half in counseling. Patient's questions were answered. Patient Voiced understanding to the counseling. Return in about 4 weeks (around 7/6/2022) for Depression, insomnia, labs follow-up.    (GC Modifier)-Dr. Gerard James MD

## 2022-06-08 NOTE — PROGRESS NOTES
[facility] Medicine Residency Program - Outpatient Note        Abhishek Cook is a 42 y.o. female  presented to the office on 06/08/22with complaints of: Weight loss    HPI:  Patient is here for follow-up of weight loss. She has been seen multiple times in the office and recently was diagnosed with major depression. Her dose of Celexa was increased to 40 mg at prior visit. She reports her depressive symptoms are much improved now except sleep issues. She still has difficulty falling asleep. She was prescribed melatonin but she never got the prescription. Patient also had a lot of other labs done which were all normal except for hypokalemia and she is currently on KCl supplements. She had MVR repair done last year. She follows with cardiology who are managing her beta-blocker dosage she is still tachycardic today and wants to discuss with her cardiologist before adjusting her BB dose. Review of systems  CONSTITUTIONAL: Insomnia  HEENT: Negative  RESPIRATORY: No dyspnea, or wheezing  CARDIOVASCULAR: No chest pain, shortness of breath  GASTROINTESTINAL: Negative  GENITOURINARY: Negative   ENDOCRINE: Negative  MUSCULOSKELETAL: Negative  NEUROLOGICAL: Negative  BEHAVIOR/PSYCH: Negative    Physical exam  Vitals:    06/08/22 1427   BP: 120/86   Pulse: (!) 117   Temp: 96.8 °F (36 °C)      General Appearance - Alert and oriented x 3   HEENT - Head is normocephalic, atraumatic.  Lungs - Bilateral equal air entry , no wheezes, rales or rhonchi, aeration good   Cardiovascular -pounding heart sounds of mechanical heart valve without murmur or abnormal rhythm.  Abdomen -abdomen is soft and nontender   Neurologic - There are no new focal motor or sensory deficits   Skin - No bruising or bleeding on exposed skin area   Extremities - No cyanosis, clubbing or edema   Psych - normal affect     Assessment and Plan:    I believe her major depression symptoms are improving.   We will represcribe her melatonin to help with sleep. She is reluctant to start statins and wants to check her lipids first.  We will get a lipid panel and put her on statins because of significant cardiac history. She is tachycardic heart rate is 117. Discussed about increasing the dose of Lopressor. She reports the dose was recently increased. She wants to continue it for now and discussed with her cardiologist at next appointment. 1. Recurrent major depressive disorder, in partial remission (HCC)  - melatonin (RA MELATONIN) 3 mg TABS tablet; Take 1 tablet by mouth nightly as needed (sleep)  Dispense: 30 tablet; Refill: 1    3. S/P mitral valve replacement  - Lipid Panel; Future        Requested Prescriptions     Signed Prescriptions Disp Refills    melatonin (RA MELATONIN) 3 mg TABS tablet 30 tablet 1     Sig: Take 1 tablet by mouth nightly as needed (sleep)       Medications Discontinued During This Encounter   Medication Reason    melatonin (RA MELATONIN) 3 MG TABS tablet REORDER       Mony received counseling on the following healthy behaviors: nutrition, exercise and medication adherence    Discussed use, benefit, and side effects of prescribed medications. Barriers to medication compliance addressed. All patient questions answered. Pt voiced understanding. Return in about 4 weeks (around 7/6/2022) for Depression, insomnia, labs follow-up. Disclaimer: Some orall of this note was transcribed using voice-recognition software. This may cause typographical errors occasionally. Although all effort is made to fix these errors, please do not hesitate to contact our office if there [place] concern with the understanding of this note.     Fredrick Willis MD  Family Medicine PGY-2  06/08/22 at 3:47 PM

## 2022-06-14 ENCOUNTER — OFFICE VISIT (OUTPATIENT)
Dept: FAMILY MEDICINE CLINIC | Age: 47
End: 2022-06-14

## 2022-06-14 ENCOUNTER — HOSPITAL ENCOUNTER (OUTPATIENT)
Age: 47
Setting detail: SPECIMEN
Discharge: HOME | End: 2022-06-14

## 2022-06-14 DIAGNOSIS — Z95.2 S/P MITRAL VALVE REPLACEMENT: Chronic | ICD-10-CM

## 2022-06-14 DIAGNOSIS — F33.41 RECURRENT MAJOR DEPRESSIVE DISORDER, IN PARTIAL REMISSION (HCC): Primary | ICD-10-CM

## 2022-06-14 LAB
CHOLESTEROL/HDL RATIO: 1.8
CHOLESTEROL: 191 MG/DL
HDLC SERPL-MCNC: 105 MG/DL
LDL CHOLESTEROL: 68 MG/DL (ref 0–130)
TRIGL SERPL-MCNC: 91 MG/DL

## 2022-06-14 PROCEDURE — 99999 PR OFFICE/OUTPT VISIT,PROCEDURE ONLY: CPT | Performed by: PSYCHOLOGIST

## 2022-06-14 NOTE — PROGRESS NOTES
6/14/2022  Time:  9:45-10:38 am    Evita Caputo  was referred to Radha Estevez, PhD by [facility] physicians. Clinician met with patient for educational group before being scheduled with clinician on individual schedule. Patient alert, oriented, and able to participate in group. Provided psycho-education about depression, anxiety, the stress response, sleep, exercise, and self-care. Engaged in relaxation exercise. Pt. did sign consent form after being provided with information about brief approach to therapy and confidentiality. Patient did not schedule individual appointment with provider following group. She decided to go back to [facility] due to difficulty scheduling with this provider who typically can only meet with patients monthly.

## 2022-07-13 DIAGNOSIS — Z76.0 MEDICATION REFILL: ICD-10-CM

## 2022-07-13 RX ORDER — OMEPRAZOLE 20 MG/1
20 CAPSULE, DELAYED RELEASE ORAL DAILY
Qty: 30 CAPSULE | Refills: 1 | Status: SHIPPED | OUTPATIENT
Start: 2022-07-13 | End: 2022-07-25

## 2022-07-13 NOTE — TELEPHONE ENCOUNTER
Last visit: 6/8/22  Last Med refill: 6/12/22  Does patient have enough medication for 72 hours: No:     Next Visit Date:  Future Appointments   Date Time Provider Vega Tinoco   7/25/2022  2:15 PM Gem Allen MD [address] [place] [address] Maintenance   Topic Date Due    Pneumococcal 0-64 years Vaccine (1 - PCV) Never done    DTaP/Tdap/Td vaccine (1 - Tdap) Never done    Cervical cancer screen  Never done    COVID-19 Vaccine (2 - Pfizer 3-dose series) 06/17/2022    Flu vaccine (1) 09/01/2022    A1C test (Diabetic or Prediabetic)  01/07/2023    Depression Monitoring  01/07/2023    Lipids  06/14/2027    Hepatitis C screen  Completed    HIV screen  Completed    Hepatitis A vaccine  Aged Out    Hepatitis B vaccine  Aged Out    Hib vaccine  Aged Out    Meningococcal (ACWY) vaccine  Aged Out       Hemoglobin A1C (%)   Date Value   01/07/2022 5.8   12/31/2020 6.2 (H)             ( goal A1C is < 7)   No results found for: LABMICR  LDL Cholesterol (mg/dL)   Date Value   06/14/2022 68   01/05/2021 97       (goal LDL is <100)   AST (U/L)   Date Value   05/16/2022 28     ALT (U/L)   Date Value   05/16/2022 38 (H)     BUN (mg/dL)   Date Value   05/16/2022 6     BP Readings from Last 3 Encounters:   06/08/22 120/86   05/16/22 134/78   05/12/22 109/84          (goal 120/80)    All Future Testing planned in [facility]  Lab Frequency Next Occurrence   GAEL DIGITAL SCREEN W OR WO CAD BILATERAL Once 06/07/2021   Hemoglobin and Hematocrit, Blood, Post Transfusion POST TRANSFUSION    Hemoglobin and Hematocrit, Blood, Post Transfusion POST TRANSFUSION                Patient Active Problem List:     Pulmonary edema, acute, with congestive heart failure (HCC)     Anxiety     Microcytic anemia     History of drug abuse (HCC)     Chronic diastolic congestive heart failure (HCC)     Severe mitral valve regurgitation     Severe mitral regurgitation by prior echocardiogram     S/P mitral valve replacement     S/P left atrial

## 2022-07-25 ENCOUNTER — OFFICE VISIT (OUTPATIENT)
Dept: FAMILY MEDICINE CLINIC | Age: 47
End: 2022-07-25
Payer: MEDICAID

## 2022-07-25 VITALS
WEIGHT: 119 LBS | BODY MASS INDEX: 18.68 KG/M2 | HEART RATE: 70 BPM | HEIGHT: 67 IN | OXYGEN SATURATION: 97 % | TEMPERATURE: 97.2 F | DIASTOLIC BLOOD PRESSURE: 80 MMHG | SYSTOLIC BLOOD PRESSURE: 112 MMHG

## 2022-07-25 DIAGNOSIS — K20.80 PILL ESOPHAGITIS DUE TO POTASSIUM CHLORIDE: Primary | ICD-10-CM

## 2022-07-25 DIAGNOSIS — R00.0 TACHYCARDIA: ICD-10-CM

## 2022-07-25 DIAGNOSIS — Z76.0 MEDICATION REFILL: ICD-10-CM

## 2022-07-25 DIAGNOSIS — T50.3X5A PILL ESOPHAGITIS DUE TO POTASSIUM CHLORIDE: Primary | ICD-10-CM

## 2022-07-25 PROCEDURE — G8420 CALC BMI NORM PARAMETERS: HCPCS | Performed by: STUDENT IN AN ORGANIZED HEALTH CARE EDUCATION/TRAINING PROGRAM

## 2022-07-25 PROCEDURE — 99211 OFF/OP EST MAY X REQ PHY/QHP: CPT | Performed by: FAMILY MEDICINE

## 2022-07-25 PROCEDURE — 99213 OFFICE O/P EST LOW 20 MIN: CPT | Performed by: STUDENT IN AN ORGANIZED HEALTH CARE EDUCATION/TRAINING PROGRAM

## 2022-07-25 PROCEDURE — 4004F PT TOBACCO SCREEN RCVD TLK: CPT | Performed by: STUDENT IN AN ORGANIZED HEALTH CARE EDUCATION/TRAINING PROGRAM

## 2022-07-25 PROCEDURE — G8427 DOCREV CUR MEDS BY ELIG CLIN: HCPCS | Performed by: STUDENT IN AN ORGANIZED HEALTH CARE EDUCATION/TRAINING PROGRAM

## 2022-07-25 RX ORDER — MIRTAZAPINE 15 MG/1
TABLET, FILM COATED ORAL
COMMUNITY
Start: 2022-07-19

## 2022-07-25 RX ORDER — ONDANSETRON 4 MG/1
4 TABLET, ORALLY DISINTEGRATING ORAL EVERY 8 HOURS PRN
Qty: 20 TABLET | Refills: 0 | Status: SHIPPED | OUTPATIENT
Start: 2022-07-25

## 2022-07-25 RX ORDER — POLYETHYLENE GLYCOL 3350 17 G/17G
POWDER, FOR SOLUTION ORAL
COMMUNITY
Start: 2021-12-15

## 2022-07-25 RX ORDER — ERGOCALCIFEROL (VITAMIN D2) 1250 MCG
CAPSULE ORAL
COMMUNITY

## 2022-07-25 RX ORDER — OMEPRAZOLE 40 MG/1
40 CAPSULE, DELAYED RELEASE ORAL DAILY
Qty: 30 CAPSULE | Refills: 2 | Status: SHIPPED | OUTPATIENT
Start: 2022-07-25

## 2022-07-25 RX ORDER — MAGNESIUM OXIDE 400 MG/1
TABLET ORAL
COMMUNITY

## 2022-07-25 RX ORDER — PSEUDOEPHEDRINE HCL 30 MG
200 TABLET ORAL
COMMUNITY
Start: 2021-12-15

## 2022-07-25 SDOH — ECONOMIC STABILITY: FOOD INSECURITY: WITHIN THE PAST 12 MONTHS, THE FOOD YOU BOUGHT JUST DIDN'T LAST AND YOU DIDN'T HAVE MONEY TO GET MORE.: NEVER TRUE

## 2022-07-25 SDOH — ECONOMIC STABILITY: FOOD INSECURITY: WITHIN THE PAST 12 MONTHS, YOU WORRIED THAT YOUR FOOD WOULD RUN OUT BEFORE YOU GOT MONEY TO BUY MORE.: NEVER TRUE

## 2022-07-25 ASSESSMENT — PATIENT HEALTH QUESTIONNAIRE - PHQ9
4. FEELING TIRED OR HAVING LITTLE ENERGY: 0
9. THOUGHTS THAT YOU WOULD BE BETTER OFF DEAD, OR OF HURTING YOURSELF: 0
3. TROUBLE FALLING OR STAYING ASLEEP: 0
6. FEELING BAD ABOUT YOURSELF - OR THAT YOU ARE A FAILURE OR HAVE LET YOURSELF OR YOUR FAMILY DOWN: 1
SUM OF ALL RESPONSES TO PHQ QUESTIONS 1-9: 9
10. IF YOU CHECKED OFF ANY PROBLEMS, HOW DIFFICULT HAVE THESE PROBLEMS MADE IT FOR YOU TO DO YOUR WORK, TAKE CARE OF THINGS AT HOME, OR GET ALONG WITH OTHER PEOPLE: 1
1. LITTLE INTEREST OR PLEASURE IN DOING THINGS: 0
SUM OF ALL RESPONSES TO PHQ QUESTIONS 1-9: 9
SUM OF ALL RESPONSES TO PHQ QUESTIONS 1-9: 9
7. TROUBLE CONCENTRATING ON THINGS, SUCH AS READING THE NEWSPAPER OR WATCHING TELEVISION: 3
SUM OF ALL RESPONSES TO PHQ QUESTIONS 1-9: 9
2. FEELING DOWN, DEPRESSED OR HOPELESS: 0
SUM OF ALL RESPONSES TO PHQ9 QUESTIONS 1 & 2: 0
5. POOR APPETITE OR OVEREATING: 3
8. MOVING OR SPEAKING SO SLOWLY THAT OTHER PEOPLE COULD HAVE NOTICED. OR THE OPPOSITE, BEING SO FIGETY OR RESTLESS THAT YOU HAVE BEEN MOVING AROUND A LOT MORE THAN USUAL: 2

## 2022-07-25 ASSESSMENT — SOCIAL DETERMINANTS OF HEALTH (SDOH): HOW HARD IS IT FOR YOU TO PAY FOR THE VERY BASICS LIKE FOOD, HOUSING, MEDICAL CARE, AND HEATING?: NOT HARD AT ALL

## 2022-07-25 NOTE — PROGRESS NOTES
[facility] Medicine Residency Program - Outpatient Note      Subjective:      Marcia Temple is a 42 y.o. female  presented to the office on 07/25/22 with complaints of: Depression follow-up, acid reflux symptoms    Patient was recently diagnosed with major depression and was started on Celexa. She reports her symptoms are much improved. She is also following up with Iggy on and was recently started on Abilify. She is also taking Remeron for insomnia. Reports her symptoms are improving. Her recent potassium was low and currently she is taking potassium supplements. She reports having acid reflux symptoms and nausea occasionally. She is currently taking Prilosec 20 mg daily. He denies blood in stool, chest pain or shortness of breath. Review of systems  CONSTITUTIONAL: Negative  RESPIRATORY: Negative  CARDIOVASCULAR: Negative  GASTROINTESTINAL: Positive for nausea, acid reflux  GENITOURINARY: Negative   MUSCULOSKELETAL: Negative  NEUROLOGICAL: Negative  BEHAVIOR/PSYCH: Depression, improving      Objective:      Vitals:    07/25/22 1358   BP: 112/80   Pulse: 70   Temp: 97.2 °F (36.2 °C)   SpO2: 97%       General Appearance - Alert and oriented x 3  HEENT - No obvious deformity  Lungs - Bilateral good air entry , no wheezes or rales  Cardiovascular - Regular rate and rhythm. No murmur  Abdomen - Soft and nontender  Neurologic - No new focal motor or sensory deficits  Skin - No bruising or bleeding on exposed skin area  MSK - No new joint/bone pains   Psych - normal affect       Assessment:        ICD-10-CM    1. Pill esophagitis due to potassium chloride  K20.80 omeprazole (PRILOSEC) 40 MG delayed release capsule    T50.3X5A Potassium      2. Medication refill  Z76.0 ondansetron (ZOFRAN ODT) 4 MG disintegrating tablet     omeprazole (PRILOSEC) 40 MG delayed release capsule          Plan:    I believe she is having mild esophagitis from potassium supplements.   We will increase her omeprazole from 20 to 40 mg daily. Recheck potassium in 2 weeks and reassess the need for continuing KCl 20 twice daily. Prescribe Zofran for occasional nausea. We will meet again in 2 months and reevaluate. Patient agrees with this plan. Return in about 2 months (around 9/25/2022) for Esophagitis/GERD follow-up. Requested Prescriptions     Signed Prescriptions Disp Refills    ondansetron (ZOFRAN ODT) 4 MG disintegrating tablet 20 tablet 0     Sig: Take 1 tablet by mouth every 8 hours as needed for Nausea    omeprazole (PRILOSEC) 40 MG delayed release capsule 30 capsule 2     Sig: Take 1 capsule by mouth in the morning. Medications Discontinued During This Encounter   Medication Reason    melatonin (RA MELATONIN) 3 mg TABS tablet LIST CLEANUP    ondansetron (ZOFRAN ODT) 4 MG disintegrating tablet REORDER    omeprazole (PRILOSEC) 20 MG delayed release capsule        Mony received counseling on the following healthy behaviors: nutrition, exercise and medication adherence    Discussed use, benefit, and side effects of prescribed medications. Barriers to medication compliance addressed. All patient questions answered. Pt voiced understanding. Disclaimer: Some orall of this note was transcribed using voice-recognition software. This may cause typographical errors occasionally. Although all effort is made to fix these errors, please do not hesitate to contact our office if there [place] concern with the understanding of this note.     Sharmin Dukes MD  Family Medicine PGY-2  07/25/22 at 2:27 PM

## 2022-07-25 NOTE — PROGRESS NOTES
Attending Physician Statement  I  have discussed the care of [address] [place] [address] including pertinent history and exam findings with the resident. I agree with the assessment, plan and orders as documented by the resident. /80   Pulse 70   Temp 97.2 °F (36.2 °C) (Infrared)   Ht 5' 7\" (1.702 m)   Wt 119 lb (54 kg)   SpO2 97%   BMI 18.64 kg/m²    BP Readings from Last 3 Encounters:   07/25/22 112/80   06/08/22 120/86   05/16/22 134/78     Wt Readings from Last 3 Encounters:   07/25/22 119 lb (54 kg)   06/08/22 120 lb 3.2 oz (54.5 kg)   05/12/22 119 lb (54 kg)          Diagnosis Orders   1. Pill esophagitis due to potassium chloride  omeprazole (PRILOSEC) 40 MG delayed release capsule    Potassium      2.  Medication refill  ondansetron (ZOFRAN ODT) 4 MG disintegrating tablet    omeprazole (PRILOSEC) 40 MG delayed release capsule          Jeffrey Caceres DO 7/25/2022 3:18 PM

## 2022-07-26 RX ORDER — METOPROLOL SUCCINATE 25 MG/1
TABLET, EXTENDED RELEASE ORAL
Qty: 60 TABLET | Refills: 2 | Status: SHIPPED | OUTPATIENT
Start: 2022-07-26

## 2022-07-26 NOTE — TELEPHONE ENCOUNTER
E-scribe request for med refills. Please review and e-scribe if applicable.      Last Visit Date:  7/25/22  Next Visit Date:  7/25/2022    Hemoglobin A1C (%)   Date Value   01/07/2022 5.8   12/31/2020 6.2 (H)             ( goal A1C is < 7)   No results found for: LABMICR  LDL Cholesterol (mg/dL)   Date Value   06/14/2022 68       (goal LDL is <100)   AST (U/L)   Date Value   05/16/2022 28     ALT (U/L)   Date Value   05/16/2022 38 (H)     BUN (mg/dL)   Date Value   05/16/2022 6     BP Readings from Last 3 Encounters:   07/25/22 112/80   06/08/22 120/86   05/16/22 134/78          (goal 120/80)        Patient Active Problem List:     Pulmonary edema, acute, with congestive heart failure (HCC)     Anxiety     Microcytic anemia     History of drug abuse (HCC)     Chronic diastolic congestive heart failure (HCC)     Severe mitral valve regurgitation     Severe mitral regurgitation by prior echocardiogram     S/P mitral valve replacement     S/P left atrial appendage ligation     Pneumonia due to organism     Recurrent major depressive disorder, in partial remission ([facility] [address]     Lump or mass in breast      ----Teofilo Prasad

## 2022-08-10 DIAGNOSIS — E87.6 HYPOKALEMIA: ICD-10-CM

## 2022-08-10 DIAGNOSIS — Z76.0 MEDICATION REFILL: ICD-10-CM

## 2022-08-10 RX ORDER — FERROUS SULFATE 325(65) MG
TABLET ORAL
Qty: 30 TABLET | Refills: 2 | Status: SHIPPED | OUTPATIENT
Start: 2022-08-10

## 2022-08-10 RX ORDER — POTASSIUM CHLORIDE 1500 MG/1
TABLET, FILM COATED, EXTENDED RELEASE ORAL
Qty: 180 TABLET | Refills: 0 | Status: SHIPPED | OUTPATIENT
Start: 2022-08-10

## 2022-08-10 NOTE — TELEPHONE ENCOUNTER
E-scribe request for med refills. Please review and e-scribe if applicable.      Last Visit Date:  07/25/2022  Next Visit Date:  Visit date not found    Hemoglobin A1C (%)   Date Value   01/07/2022 5.8   12/31/2020 6.2 (H)             ( goal A1C is < 7)   No results found for: LABMICR  LDL Cholesterol (mg/dL)   Date Value   06/14/2022 68       (goal LDL is <100)   AST (U/L)   Date Value   05/16/2022 28     ALT (U/L)   Date Value   05/16/2022 38 (H)     BUN (mg/dL)   Date Value   05/16/2022 6     BP Readings from Last 3 Encounters:   07/25/22 112/80   06/08/22 120/86   05/16/22 134/78          (goal 120/80)        Patient Active Problem List:     Pulmonary edema, acute, with congestive heart failure (HCC)     Anxiety     Microcytic anemia     History of drug abuse (HCC)     Chronic diastolic congestive heart failure (HCC)     Severe mitral valve regurgitation     Severe mitral regurgitation by prior echocardiogram     S/P mitral valve replacement     S/P left atrial appendage ligation     Pneumonia due to organism     Recurrent major depressive disorder, in partial remission ([facility] [address]     Lump or mass in breast      ----Dorla Escort

## 2022-08-16 ENCOUNTER — TELEMEDICINE (OUTPATIENT)
Dept: FAMILY MEDICINE CLINIC | Age: 47
End: 2022-08-16
Payer: MEDICAID

## 2022-08-16 DIAGNOSIS — N39.0 URINARY TRACT INFECTION WITHOUT HEMATURIA, SITE UNSPECIFIED: Primary | ICD-10-CM

## 2022-08-16 PROCEDURE — 4004F PT TOBACCO SCREEN RCVD TLK: CPT

## 2022-08-16 PROCEDURE — 99213 OFFICE O/P EST LOW 20 MIN: CPT

## 2022-08-16 PROCEDURE — G8420 CALC BMI NORM PARAMETERS: HCPCS

## 2022-08-16 PROCEDURE — G8427 DOCREV CUR MEDS BY ELIG CLIN: HCPCS

## 2022-08-16 RX ORDER — CEPHALEXIN 500 MG/1
500 CAPSULE ORAL 4 TIMES DAILY
Qty: 40 CAPSULE | Refills: 0 | Status: SHIPPED | OUTPATIENT
Start: 2022-08-16 | End: 2022-08-26

## 2022-08-16 NOTE — PROGRESS NOTES
Lanie Parr is a 42 y.o. female evaluated via telephone on 8/16/2022 for Urinary Tract Infection (Patient thinks she may have Cayetano Chalet  / 3 weeks , burning , dark urine painful when urinating )  . Documentation:  I communicated with the patient and/or health care decision maker about UTI.  42years old female patient with past med history of heart failure called the office today complaining of urinary frequency. Patient said that she has been having urinary urgency, frequency and dysuria for the last 3 weeks. Patient also reported lower abdominal pain over her bladder. Denies any hematuria, vaginal discharge, fever, chills, nausea, vomiting, chest pain or shortness of breath. Patient said that she had a UTI last year while she was in the hospital.    Details of this discussion including any medical advice provided:  -We will get urinalysis with culture  -Keflex 500 mg for 10 days was ordered  -I educated the patient about UTI and I advised her to the urinalysis and culture first before taking the antibiotics. Seemed understanding and agreeable to the plan  -I told the patient to go to the ED if she has persistent flank pain, fever, chills, nausea or vomiting. Patient started and agreed to the plan  Follow-up in 2 weeks    Total Time: minutes: 5-10 minutes    Mony Castillo was evaluated through a synchronous (real-time) audio encounter. Patient identification was verified at the start of the visit. She (or guardian if applicable) is aware that this is a billable service, which includes applicable co-pays. This visit was conducted with the patient's (and/or legal guardian's) verbal consent. She has not had a related appointment within my department in the past 7 days or scheduled within the next 24 hours. The patient was located at Home: [place] [address] [address]. The provider was located at [facility] (Appt Dept): [address] [place] [address]     Note: not billable if this call serves to triage the patient into an appointment for the relevant concern    Michelle Ugalde MD

## 2022-08-16 NOTE — PATIENT INSTRUCTIONS
Thank you for letting us take care of you today. We hope all your questions were addressed. If a question was overlooked or something else comes to mind after you return home, please contact a member of your Care Team listed below. Your Care Team at [place] [address] is Team #4  Annalise Marte MD (Faculty)  Leilani Gore MD (Resident)  Dai Moe MD (Resident)  Kristen Galvez MD (Resident)  Yoly Che MD (Resident)  KAMALA Bowen., HESHAM Morel., Regi Rosales., Sammi [facility] office)  Kristi Monaco, [address] [facility] [address] (Clinical Practice Manager)  Les Bryant [facility] (Clinical Pharmacist)       Office phone number: [phone]    If you need to get in right away due to illness, please be advised we have \"Same Day\" appointments available Monday-Friday. Please call us at [phone] option #3 to schedule your \"Same Day\" appointment.

## 2022-08-16 NOTE — PROGRESS NOTES
Attending Physician Statement  I have [facility] and exam findings,  with the resident. I have reviewed the key elements of all parts of the encounter with the resident. I agree with the assessment, plan and orders as documented by the resident.   (GE Modifier)    V Visit- UTI- Keflex X 7 days/ Urine for C/S

## 2022-08-24 ENCOUNTER — HOSPITAL ENCOUNTER (OUTPATIENT)
Age: 47
Discharge: HOME | End: 2022-08-24
Payer: MEDICAID

## 2022-08-24 ENCOUNTER — TELEPHONE (OUTPATIENT)
Dept: FAMILY MEDICINE CLINIC | Age: 47
End: 2022-08-24

## 2022-08-24 DIAGNOSIS — T50.3X5A PILL ESOPHAGITIS DUE TO POTASSIUM CHLORIDE: ICD-10-CM

## 2022-08-24 DIAGNOSIS — K20.80 PILL ESOPHAGITIS DUE TO POTASSIUM CHLORIDE: ICD-10-CM

## 2022-08-24 DIAGNOSIS — N39.0 URINARY TRACT INFECTION WITHOUT HEMATURIA, SITE UNSPECIFIED: ICD-10-CM

## 2022-08-24 LAB
BACTERIA: ABNORMAL
BILIRUBIN URINE: NEGATIVE
CASTS UA: ABNORMAL /LPF (ref 0–8)
COLOR: YELLOW
EPITHELIAL CELLS UA: ABNORMAL /HPF (ref 0–5)
GLUCOSE URINE: NEGATIVE
KETONES, URINE: NEGATIVE
LEUKOCYTE ESTERASE, URINE: ABNORMAL
NITRITE, URINE: NEGATIVE
PH UA: 5.5 (ref 5–8)
POTASSIUM SERPL-SCNC: 3.9 MMOL/L (ref 3.7–5.3)
PROTEIN UA: NEGATIVE
RBC UA: ABNORMAL /HPF (ref 0–4)
SPECIFIC GRAVITY UA: 1.02 (ref 1–1.03)
TURBIDITY: ABNORMAL
URINE HGB: ABNORMAL
UROBILINOGEN, URINE: NORMAL
WBC UA: ABNORMAL /HPF (ref 0–5)

## 2022-08-24 PROCEDURE — 36415 COLL VENOUS BLD VENIPUNCTURE: CPT

## 2022-08-24 PROCEDURE — 84132 ASSAY OF SERUM POTASSIUM: CPT

## 2022-08-24 PROCEDURE — 87086 URINE CULTURE/COLONY COUNT: CPT

## 2022-08-24 PROCEDURE — 81001 URINALYSIS AUTO W/SCOPE: CPT

## 2022-08-24 NOTE — TELEPHONE ENCOUNTER
Patient called in this afternoon for her UA results, but stated the keflex 2 days ago. Please call patient with results.

## 2022-08-25 LAB
CULTURE: NO GROWTH
SPECIMEN DESCRIPTION: NORMAL

## 2022-10-29 ENCOUNTER — APPOINTMENT (OUTPATIENT)
Dept: GENERAL RADIOLOGY | Age: 47
End: 2022-10-29
Payer: MEDICARE

## 2022-10-29 ENCOUNTER — HOSPITAL ENCOUNTER (EMERGENCY)
Age: 47
Discharge: HOME | End: 2022-10-29
Attending: EMERGENCY MEDICINE
Payer: MEDICARE

## 2022-10-29 VITALS
RESPIRATION RATE: 18 BRPM | TEMPERATURE: 97.9 F | DIASTOLIC BLOOD PRESSURE: 75 MMHG | HEART RATE: 98 BPM | SYSTOLIC BLOOD PRESSURE: 115 MMHG | OXYGEN SATURATION: 98 %

## 2022-10-29 DIAGNOSIS — U07.1 COVID-19: Primary | ICD-10-CM

## 2022-10-29 LAB
FLU A ANTIGEN: NEGATIVE
FLU B ANTIGEN: NEGATIVE
RSV ANTIGEN: NEGATIVE
SARS-COV-2, RAPID: DETECTED
SOURCE: NORMAL
SPECIMEN DESCRIPTION: ABNORMAL

## 2022-10-29 PROCEDURE — 87807 RSV ASSAY W/OPTIC: CPT

## 2022-10-29 PROCEDURE — 6370000000 HC RX 637 (ALT 250 FOR IP): Performed by: STUDENT IN AN ORGANIZED HEALTH CARE EDUCATION/TRAINING PROGRAM

## 2022-10-29 PROCEDURE — 99284 EMERGENCY DEPT VISIT MOD MDM: CPT

## 2022-10-29 PROCEDURE — 87635 SARS-COV-2 COVID-19 AMP PRB: CPT

## 2022-10-29 PROCEDURE — 87804 INFLUENZA ASSAY W/OPTIC: CPT

## 2022-10-29 PROCEDURE — 71045 X-RAY EXAM CHEST 1 VIEW: CPT

## 2022-10-29 RX ORDER — FLUTICASONE PROPIONATE 50 MCG
1 SPRAY, SUSPENSION (ML) NASAL DAILY
Status: DISCONTINUED | OUTPATIENT
Start: 2022-10-29 | End: 2022-10-29 | Stop reason: HOSPADM

## 2022-10-29 RX ORDER — ACETAMINOPHEN 500 MG
1000 TABLET ORAL ONCE
Status: COMPLETED | OUTPATIENT
Start: 2022-10-29 | End: 2022-10-29

## 2022-10-29 RX ORDER — IBUPROFEN 400 MG/1
400 TABLET ORAL ONCE
Status: COMPLETED | OUTPATIENT
Start: 2022-10-29 | End: 2022-10-29

## 2022-10-29 RX ADMIN — IBUPROFEN 400 MG: 400 TABLET, FILM COATED ORAL at 11:58

## 2022-10-29 RX ADMIN — ACETAMINOPHEN 1000 MG: 500 TABLET ORAL at 11:58

## 2022-10-29 RX ADMIN — FLUTICASONE PROPIONATE 1 SPRAY: 50 SPRAY, METERED NASAL at 12:04

## 2022-10-29 ASSESSMENT — ENCOUNTER SYMPTOMS
SINUS PAIN: 0
DIARRHEA: 0
SHORTNESS OF BREATH: 0
EYE PAIN: 0
ABDOMINAL PAIN: 0
COUGH: 1
RHINORRHEA: 1
NAUSEA: 0
SORE THROAT: 0
VOMITING: 0
BACK PAIN: 0

## 2022-10-29 ASSESSMENT — PAIN - FUNCTIONAL ASSESSMENT: PAIN_FUNCTIONAL_ASSESSMENT: NONE - DENIES PAIN

## 2022-10-29 NOTE — ED NOTES
Pt presents to the ED c/o cough, congestion, SOB and loss of taste and smell x 4 days. Pt has a significant medical hx of MVP with surgical repair, PE, CHF, and a hx of endocarditis. Pt denies any CP, nausea/vomiting, dizziness or LOC. Pt A&O x 4, does not appear in acute distress, RR even and unlabored, resting comfortably on stretcher with eyes open and call light in reach. Vital signs obtained, medical hx and allergies reviewed with pt. Pt placed in a gown, on continuous monitor with alarms set. Dr. Artemio Jacques at bedside to assess pt.   Initial assessment performed by physician, Courtney Gale will carry out initial orders/tasks and reassess pt.         Antoniette Opitz, LPN  15/94/56 [id number]

## 2022-10-29 NOTE — ED PROVIDER NOTES
[address]  ED  Emergency Department Encounter  EmergencyMedicineResident       Pt Name: Dipika Fernandez  MRN: [id number]  [place] 1975  Date of evaluation: 10/29/22  PCP: Cesario Ayon MD    CHIEF COMPLAINT       Chief Complaint   Patient presents with    Shortness of Breath    Cough       HISTORY OF PRESENT ILLNESS  (Location/Symptom, Timing/Onset, Context/Setting, Quality, Duration, Modifying Factors, Severity.)      Dipika Fernandez is a 41 y.o. female who presents evaluation today for nasal congestion and cough. Patient significant past medical history of mitral valve replacement. She is actually had to have 2 operations on this is the first surgery led to endocarditis that had to be replaced. Patient states that she staying in a rehab facility right now one of the shelter liters has COVID. She is afraid that she has a symptoms. Nothing tried for relief to this point. She felt febrile earlier in the week but not right now. No nausea vomiting diarrhea. No headache. Symptoms actually are getting better over the course of the week since Sunday. PAST MEDICAL / SURGICAL /SOCIAL / FAMILY HISTORY      has a past medical history of Anxiety, CHF (congestive heart failure) ([address] GERD (gastroesophageal reflux disease), History of chest pain, History of echocardiogram, Hypertension, Leg swelling, Migraine, Mitral regurgitation, Mitral valve disease, SOB (shortness of breath), Stomach ulcer, Wellness examination, and Wellness examination. has a past surgical history that includes hernia repair; transesophageal echocardiogram (01/04/2021); Cardiac catheterization (01/05/2021); Endoscopy, colon, diagnostic; Cosmetic surgery; Mitral valve repair (N/A, 2/26/2021); and Breast biopsy.       Social History     Socioeconomic History    Marital status: Single     Spouse name: Not on file    Number of children: Not on file    Years of education: Not on file    Highest education level: Not on file   Occupational History    Not on file   Tobacco Use    Smoking status: Every Day     Packs/day: 0.50     Types: Cigarettes    Smokeless tobacco: Never   Vaping Use    Vaping Use: Never used   Substance and Sexual Activity    Alcohol use: Yes     Alcohol/week: 4.0 standard drinks     Types: 4 Cans of beer per week     Comment: 4 tall boys yesterday    Drug use: Not Currently    Sexual activity: Not on file   Other Topics Concern    Not on file   Social History Narrative    Not on file     Social Determinants of Health     Financial Resource Strain: Low Risk     Difficulty of Paying Living Expenses: Not hard at all   Food Insecurity: No Food Insecurity    Worried About Running Out of Food in the Last Year: Never true    Ran Out of Food in the Last Year: Never true   Transportation Needs: Not on file   Physical Activity: Not on file   Stress: Not on file   Social Connections: Not on file   Intimate Partner Violence: Not on file   Housing Stability: Not on file       Family History   Problem Relation Age of Onset    Diabetes Mother     High Blood Pressure Mother     No Known Problems Father     Breast Cancer Maternal Aunt 52       Allergies:  Antihistamines, chlorpheniramine-type and Hydroxyzine    Home Medications:  Prior to Admission medications    Medication Sig Start Date End Date Taking?  Authorizing Provider   potassium chloride (KLOR-CON M) 20 MEQ TBCR extended release tablet TAKE 1 TABLET BY MOUTH TWICE A DAY 8/10/22   Kajal Geiger MD   FEROSUL 325 (65 Fe) MG tablet TAKE 1 TABLET BY MOUTH DAILY WITH MEALS 8/10/22   Kajal Geiger MD   metoprolol succinate (TOPROL XL) 25 MG extended release tablet TAKE 1 & 1/2 TABLETS BY MOUTH EVERY MORNING AND 1 TAB BY MOUTH EVERY EVENING 7/26/22   Jean-Paul Alvarez MD   magnesium oxide (MAG-OX) 400 MG tablet magnesium oxide  400 mg tabs    Historical Provider, MD   docusate (COLACE, DULCOLAX) 100 MG CAPS 200 mg 12/15/21   Historical Provider, MD ergocalciferol (ERGOCALCIFEROL) 1.25 MG (59456 UT) capsule Vitamin D2 1,250 mcg (50,000 unit) capsule    Historical Provider, MD   mirtazapine (REMERON) 15 MG tablet  7/19/22   Historical Provider, MD   polyethylene glycol (GLYCOLAX) 17 GM/SCOOP powder polyethylene glycol 3350 17 gram/dose oral powder 12/15/21   Historical Provider, MD   ondansetron (ZOFRAN ODT) 4 MG disintegrating tablet Take 1 tablet by mouth every 8 hours as needed for Nausea 7/25/22   James Champagne MD   omeprazole (PRILOSEC) 40 MG delayed release capsule Take 1 capsule by mouth in the morning. 7/25/22   James Champagne MD   potassium chloride (KLOR-CON M) 20 MEQ extended release tablet Take 1 tablet by mouth 2 times daily 5/16/22   Megan Mcgowan MD   ARIPiprazole (ABILIFY) 15 MG tablet Take 1 tablet by mouth daily 5/12/22   Mary Ray MD   aspirin 325 MG EC tablet Take 1 tablet by mouth daily 5/12/22 5/12/23  Mary Ray MD   furosemide (LASIX) 20 MG tablet Take 1 tablet by mouth every evening 5/12/22   Mary Ray MD   citalopram (CELEXA) 40 MG tablet Take 1 tablet by mouth daily 5/12/22   Mary Ray MD       REVIEW OF SYSTEMS    (2-9 systems for level 4, 10 or more forlevel 5)      Review of Systems   Constitutional:  Positive for fatigue. Negative for activity change, chills and fever. HENT:  Positive for congestion and rhinorrhea. Negative for sinus pain and sore throat. Eyes:  Negative for pain and visual disturbance. Respiratory:  Positive for cough. Negative for shortness of breath. Cardiovascular:  Negative for chest pain. Gastrointestinal:  Negative for abdominal pain, diarrhea, nausea and vomiting. Genitourinary:  Negative for difficulty urinating, dysuria and hematuria. Musculoskeletal:  Negative for back pain and myalgias. Skin:  Negative for rash and wound. Neurological:  Negative for dizziness, light-headedness and headaches. Psychiatric/Behavioral:  Negative for agitation and confusion.       PHYSICAL EXAM   (up to 7 for level 4, 8 or more forlevel 5)      ED TRIAGE VITALS BP: 128/86, Temp: 97.9 °F (36.6 °C), Heart Rate: (!) 119, Resp: 18, SpO2: 99 %    Vitals:    10/29/22 1213 10/29/22 1215 10/29/22 1218 10/29/22 1225   BP:  115/75     Pulse: 94 (!) 101 (!) 101 98   Resp: 28 14  18   Temp:       TempSrc:       SpO2: 97% 99%  98%         Physical Exam  Vitals and nursing note reviewed. Constitutional:       General: She is not in acute distress. Appearance: Normal appearance. She is not ill-appearing or toxic-appearing. HENT:      Head: Normocephalic and atraumatic. Nose: Nose normal.      Mouth/Throat:      Mouth: Mucous membranes are moist.      Pharynx: No pharyngeal swelling or oropharyngeal exudate. Eyes:      Extraocular Movements: Extraocular movements intact. Pupils: Pupils are equal, round, and reactive to light. Cardiovascular:      Rate and Rhythm: Normal rate and regular rhythm. Pulses: Normal pulses. Heart sounds: Normal heart sounds. Pulmonary:      Effort: Pulmonary effort is normal.      Breath sounds: Normal breath sounds. No decreased breath sounds, wheezing, rhonchi or rales. Abdominal:      General: Abdomen is flat. Palpations: Abdomen is soft. Musculoskeletal:         General: Normal range of motion. Cervical back: Normal range of motion. Skin:     General: Skin is warm and dry. Capillary Refill: Capillary refill takes less than 2 seconds. Neurological:      General: No focal deficit present. Mental Status: She is alert and oriented to person, place, and time.    Psychiatric:         Mood and Affect: Mood normal.         Behavior: Behavior normal.         DIFFERENTIAL  DIAGNOSIS     PLAN (LABS / IMAGING / EKG):  Orders Placed This Encounter   Procedures    COVID-19, Rapid    RAPID INFLUENZA A/B ANTIGENS    RSV RAPID ANTIGEN    XR CHEST PORTABLE       MEDICATIONS ORDERED:  ED Medication Orders (From admission, onward)      Start Ordered     Status Ordering Provider    10/29/22 1145 10/29/22 1138  fluticasone (FLONASE) 50 MCG/ACT nasal spray 1 spray  DAILY         Last MAR action: Given - by Shanelle Chapman on 10/29/22 at [address]    10/29/22 1145 10/29/22 1138  acetaminophen (TYLENOL) tablet 1,000 mg  ONCE         Last MAR action: Given - by Shanelle Chapman on 10/29/22 at [address]    10/29/22 1145 10/29/22 1138  ibuprofen (ADVIL;MOTRIN) tablet 400 mg  ONCE         Last MAR action: Given - by Shanelle Champan on 10/29/22 at [address] / [address] [facility]     IMPRESSION & INITIAL PLAN:  51-year-old female presented for evaluation of nasal congestion. She is currently staying in a shelter rehab facility, when the shelter liters has COVID. She is concerned for COVID. She is well-appearing on exam.  Vitals are within normal limits, bouts of tachycardia. Treated symptomatically with Flonase, acetaminophen and Motrin. All viral swabs were taken, COVID did come back positive. Chest x-ray was negative. Patient be discharged home with close PCP follow-up. LABS:  Results for orders placed or performed during the hospital encounter of 10/29/22   COVID-19, Rapid    Specimen: Nasopharyngeal Swab   Result Value Ref Range    Specimen Description . NASOPHARYNGEAL SWAB     SARS-CoV-2, Rapid DETECTED (A) Not Detected   RAPID INFLUENZA A/B ANTIGENS    Specimen: Nasopharyngeal   Result Value Ref Range    Flu A Antigen NEGATIVE NEGATIVE    Flu B Antigen NEGATIVE NEGATIVE   RSV RAPID ANTIGEN    Specimen: Nasopharyngeal Swab   Result Value Ref Range    Source . NASOPHARYNGEAL SWAB     RSV Antigen NEGATIVE NEGATIVE       RADIOLOGY:  XR CHEST PORTABLE   Final Result   No acute intrathoracic process.              ED Course as of 10/29/22 1318   Sat Oct 29, 2022   1219 SARS-CoV-2, Rapid(!): DETECTED [TJ]      ED Course User Index  [TJ] Edilma Michel MD CONSULTS:  None    CRITICAL CARE:  See attending physician note    FINAL IMPRESSION      1. COVID-19          DISPOSITION / PLAN     DISPOSITION Decision To Discharge 10/29/2022 12:23:32 PM      PATIENT REFERRED TO:  Livan Lopes MD  [address]   [address]  [address]  [phone]    In 2 days      [facility] ED  [address] [address]  [phone]    As needed, If symptoms worsen      DISCHARGE MEDICATIONS:  Discharge Medication List as of 10/29/2022 12:23 PM        Discharge Medication List as of 10/29/2022 12:23 PM           Britney Scott MD  Emergency Medicine Resident    (Please note that portions of this note were completed with a voice recognition program.  Efforts were made to edit the dictations but occasionally words are mis-transcribed.)       Britney Scott MD  Resident  10/29/22 6045

## 2022-10-29 NOTE — ED PROVIDER NOTES
[facility]     Emergency Department     Faculty Attestation    I performed a history and physical examination of the patient and discussed management with the resident. I reviewed the resident´s note and agree with the documented findings and plan of care. Any areas of disagreement are noted on the chart. I was personally present for the key portions of any procedures. I have documented in the chart those procedures where I was not present during the key portions. I have reviewed the emergency nurses triage note. I agree with the chief complaint, past medical history, past surgical history, allergies, medications, social and family history as documented unless otherwise noted below. For Physician Assistant/ Nurse Practitioner cases/documentation I have personally evaluated this patient and have completed at least one if not all key elements of the E/M (history, physical exam, and MDM). Additional findings are as noted. Chest clear, no respiratory distress, no lower extremity pain or swelling on examination.        Rosio Abarca MD  10/29/22 3729

## 2022-10-29 NOTE — ED NOTES
Pt updated on covid diagnosis. Awaiting additional lab results. Pt denies any needs at this time.       [place] RN  10/29/22 3498

## 2022-11-04 ENCOUNTER — HOSPITAL ENCOUNTER (OUTPATIENT)
Age: 47
Setting detail: SPECIMEN
Discharge: HOME | End: 2022-11-04

## 2022-11-04 ENCOUNTER — OFFICE VISIT (OUTPATIENT)
Dept: FAMILY MEDICINE CLINIC | Age: 47
End: 2022-11-04
Payer: MEDICARE

## 2022-11-04 VITALS
DIASTOLIC BLOOD PRESSURE: 78 MMHG | SYSTOLIC BLOOD PRESSURE: 107 MMHG | HEIGHT: 67 IN | WEIGHT: 145.2 LBS | BODY MASS INDEX: 22.79 KG/M2 | HEART RATE: 97 BPM

## 2022-11-04 DIAGNOSIS — R35.0 URINARY FREQUENCY: ICD-10-CM

## 2022-11-04 DIAGNOSIS — U07.1 COVID-19: Primary | ICD-10-CM

## 2022-11-04 DIAGNOSIS — I50.32 CHRONIC DIASTOLIC CONGESTIVE HEART FAILURE (HCC): ICD-10-CM

## 2022-11-04 DIAGNOSIS — E78.00 ELEVATED LDL CHOLESTEROL LEVEL: ICD-10-CM

## 2022-11-04 DIAGNOSIS — I50.22 CHRONIC SYSTOLIC (CONGESTIVE) HEART FAILURE (HCC): ICD-10-CM

## 2022-11-04 DIAGNOSIS — R73.03 PREDIABETES: ICD-10-CM

## 2022-11-04 LAB
ANION GAP SERPL CALCULATED.3IONS-SCNC: 9 MMOL/L (ref 9–17)
BILIRUBIN, POC: NEGATIVE
BLOOD URINE, POC: NEGATIVE
BUN BLDV-MCNC: 13 MG/DL (ref 6–20)
CALCIUM SERPL-MCNC: 8.8 MG/DL (ref 8.6–10.4)
CHLORIDE BLD-SCNC: 101 MMOL/L (ref 98–107)
CLARITY, POC: CLEAR
CO2: 27 MMOL/L (ref 20–31)
COLOR, POC: YELLOW
CREAT SERPL-MCNC: 0.8 MG/DL (ref 0.5–0.9)
ESTIMATED AVERAGE GLUCOSE: 140 MG/DL
GFR SERPL CREATININE-BSD FRML MDRD: >60 ML/MIN/1.73M2
GLUCOSE BLD-MCNC: 92 MG/DL (ref 70–99)
GLUCOSE URINE, POC: NEGATIVE
HBA1C MFR BLD: 6.5 % (ref 4–6)
KETONES, POC: NORMAL
LEUKOCYTE EST, POC: NEGATIVE
NITRITE, POC: NEGATIVE
PH, POC: 6.5
POTASSIUM SERPL-SCNC: 5.3 MMOL/L (ref 3.7–5.3)
PROTEIN, POC: NEGATIVE
SODIUM BLD-SCNC: 137 MMOL/L (ref 135–144)
SPECIFIC GRAVITY, POC: 1.01
SPO2: 99 %
UROBILINOGEN, POC: 0.2

## 2022-11-04 PROCEDURE — G8484 FLU IMMUNIZE NO ADMIN: HCPCS | Performed by: STUDENT IN AN ORGANIZED HEALTH CARE EDUCATION/TRAINING PROGRAM

## 2022-11-04 PROCEDURE — G8420 CALC BMI NORM PARAMETERS: HCPCS | Performed by: STUDENT IN AN ORGANIZED HEALTH CARE EDUCATION/TRAINING PROGRAM

## 2022-11-04 PROCEDURE — 4004F PT TOBACCO SCREEN RCVD TLK: CPT | Performed by: STUDENT IN AN ORGANIZED HEALTH CARE EDUCATION/TRAINING PROGRAM

## 2022-11-04 PROCEDURE — 81002 URINALYSIS NONAUTO W/O SCOPE: CPT | Performed by: STUDENT IN AN ORGANIZED HEALTH CARE EDUCATION/TRAINING PROGRAM

## 2022-11-04 PROCEDURE — 94760 N-INVAS EAR/PLS OXIMETRY 1: CPT | Performed by: STUDENT IN AN ORGANIZED HEALTH CARE EDUCATION/TRAINING PROGRAM

## 2022-11-04 PROCEDURE — 99213 OFFICE O/P EST LOW 20 MIN: CPT | Performed by: STUDENT IN AN ORGANIZED HEALTH CARE EDUCATION/TRAINING PROGRAM

## 2022-11-04 PROCEDURE — G8427 DOCREV CUR MEDS BY ELIG CLIN: HCPCS | Performed by: STUDENT IN AN ORGANIZED HEALTH CARE EDUCATION/TRAINING PROGRAM

## 2022-11-04 RX ORDER — ATORVASTATIN CALCIUM 20 MG/1
20 TABLET, FILM COATED ORAL DAILY
Qty: 30 TABLET | Refills: 3 | Status: SHIPPED | OUTPATIENT
Start: 2022-11-04

## 2022-11-04 ASSESSMENT — PATIENT HEALTH QUESTIONNAIRE - PHQ9
3. TROUBLE FALLING OR STAYING ASLEEP: 0
SUM OF ALL RESPONSES TO PHQ QUESTIONS 1-9: 0
2. FEELING DOWN, DEPRESSED OR HOPELESS: 0
SUM OF ALL RESPONSES TO PHQ QUESTIONS 1-9: 0
9. THOUGHTS THAT YOU WOULD BE BETTER OFF DEAD, OR OF HURTING YOURSELF: 0
5. POOR APPETITE OR OVEREATING: 0
SUM OF ALL RESPONSES TO PHQ QUESTIONS 1-9: 0
4. FEELING TIRED OR HAVING LITTLE ENERGY: 0
SUM OF ALL RESPONSES TO PHQ9 QUESTIONS 1 & 2: 0
10. IF YOU CHECKED OFF ANY PROBLEMS, HOW DIFFICULT HAVE THESE PROBLEMS MADE IT FOR YOU TO DO YOUR WORK, TAKE CARE OF THINGS AT HOME, OR GET ALONG WITH OTHER PEOPLE: 0
1. LITTLE INTEREST OR PLEASURE IN DOING THINGS: 0
8. MOVING OR SPEAKING SO SLOWLY THAT OTHER PEOPLE COULD HAVE NOTICED. OR THE OPPOSITE, BEING SO FIGETY OR RESTLESS THAT YOU HAVE BEEN MOVING AROUND A LOT MORE THAN USUAL: 0
6. FEELING BAD ABOUT YOURSELF - OR THAT YOU ARE A FAILURE OR HAVE LET YOURSELF OR YOUR FAMILY DOWN: 0
7. TROUBLE CONCENTRATING ON THINGS, SUCH AS READING THE NEWSPAPER OR WATCHING TELEVISION: 0
SUM OF ALL RESPONSES TO PHQ QUESTIONS 1-9: 0

## 2022-11-04 NOTE — PATIENT INSTRUCTIONS
Thank you for letting us take care of you today. We hope all your questions were addressed. If a question was overlooked or something else comes to mind after you return home, please contact a member of your Care Team listed below. Your Care Team at [place] [address] is Team #3  Zakia Rogers MD (Faculty)  Kaleb Cantu MD (Faculty  Denverdella Winter MD (Resident)  Tuyet Anderson (Resident)   Mitch Rowley MD (Resident)  Sofía Galvin., KAMALA Ogden., KAMALA Hansen., HESHAM Russell., Tashia Romero, Laura Garcia ([facility])  Sukhwinder Goetz, [facility] (Clinical Practice Manager)  Td [facility] (Clinical Pharmacist)     Office phone number: [phone]    If you need to get in right away due to illness, please be advised we have \"Same Day\" appointments available Monday-Friday. Please call us at [phone] option #3 to schedule your \"Same Day\" appointment.

## 2022-11-04 NOTE — PROGRESS NOTES
[facility] Medicine Residency Program - Outpatient Note      Subjective:      Ranulfo Cantu is a 41 y.o. female  presented to the office on 11/04/22 for COVID follow-up. She had COVID infection diagnosed on 10/28/2022. Her symptoms of cough, congestion, fever are resolved. Currently, she is only complaining of dyspneic feeling. On office pulse ox testing she saturated 99%. She denies chest pain, headache, any other concerns at this time. 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 She is staying in a rehab facility. She follows with cardiology and is currently on metoprolol. Denies any new symptoms. Her most recent LDL was in 120s and she is currently not on any statins. Her last A1c earlier this year was 5.8. Wants it to be retested. Also complains of mild UTI symptoms. She had a UTI 2 months ago she did not finished the course of antibiotics. Review of systems  Positive as mentioned above in subjective. All other systems negative. Objective:      Vitals:    11/04/22 1345   BP: 107/78   Pulse: 97       General Appearance - Alert and oriented x 3  Lungs - Bilateral good air entry , no wheezes or rales  Cardiovascular - Regular rate and rhythm. No murmur      Assessment:        ICD-10-CM    1. COVID-19  U07.1 Pulse Oximetry, Spot      2. Elevated LDL cholesterol level  E78.00 atorvastatin (LIPITOR) 20 MG tablet      3. Chronic diastolic congestive heart failure (HCC)  J66.53 Basic Metabolic Panel     atorvastatin (LIPITOR) 20 MG tablet      4. Chronic systolic (congestive) heart failure  P26.16 Basic Metabolic Panel     atorvastatin (LIPITOR) 20 MG tablet      5. Prediabetes  G90.24 Basic Metabolic Panel     Hemoglobin A1C     atorvastatin (LIPITOR) 20 MG tablet      6. Urinary frequency  R35.0 POCT Urinalysis no Micro          Plan:    COVID-19-symptoms resolved. Saturating 99% on pulse ox today in the office.   Reassurance provided. Advised to continue wearing mask till 10 days from symptoms onset. Elevated LDL-started on Lipitor 20 mg daily  Will check BMP, A1c.  UA negative for UTI. She mentions her blood pressure sometimes runs close to high 90s-100s. She is on metoprolol. Advised to discuss with her cardiologist due to history of mitral valve replacement. Return in about 4 weeks (around 12/2/2022) for As needed for any new problem/concern. Requested Prescriptions     Signed Prescriptions Disp Refills    atorvastatin (LIPITOR) 20 MG tablet 30 tablet 3     Sig: Take 1 tablet by mouth daily       There are no discontinued medications. Discussed use, benefit, and side effects of prescribed medications. Barriers to medication compliance addressed. All patient questions answered. Pt voiced understanding. Disclaimer: Some orall of this note was transcribed using voice-recognition software. This may cause typographical errors occasionally. Although all effort is made to fix these errors, please do not hesitate to contact our office if there Bimal Castellon concern with the understanding of this note.     Chalino Lakhani MD  Family Medicine PGY-3  11/04/22 at 3:20 PM

## 2022-11-04 NOTE — PROGRESS NOTES
Visit Information    Have you changed or started any medications since your last visit including any over-the-counter medicines, vitamins, or herbal medicines? no   Are you having any side effects from any of your medications? -  no  Have you stopped taking any of your medications? Is so, why? -  no    Have you seen any other physician or provider since your last visit? No  Have you had any other diagnostic tests since your last visit? No  Have you been seen in the emergency room and/or had an admission to a hospital since we last saw you? Yes - Records Requested  Have you had your routine dental cleaning in the past 6 months? no    Have you activated your [employer] account? If not, what are your barriers?  Yes     Patient Care Team:  Nirmal Salinas MD as PCP - General (Emergency Medicine)    Medical History Review  Past Medical, Family, and Social History reviewed and does not contribute to the patient presenting condition    Health Maintenance   Topic Date Due    Pneumococcal 0-64 years Vaccine (1 - PCV) Never done    DTaP/Tdap/Td vaccine (1 - Tdap) Never done    Cervical cancer screen  Never done    Flu vaccine (1) 08/01/2022    COVID-19 Vaccine (3 - Booster for Kwong Peter series) 08/25/2022    Colorectal Cancer Screen  Never done    A1C test (Diabetic or Prediabetic)  01/07/2023    Depression Monitoring  07/25/2023    Lipids  06/14/2027    Hepatitis C screen  Completed    HIV screen  Completed    Hepatitis A vaccine  Aged Out    Hib vaccine  Aged Out    Meningococcal (ACWY) vaccine  Aged Out

## 2022-11-04 NOTE — PROGRESS NOTES
Attending Physician Statement  I have discussed the care of Anastacio Shaver, including pertinent history and exam findings,  with the resident. I have reviewed the key elements of all parts of the encounter with the resident. I agree with the assessment, plan and orders as documented by the resident.   (Kiersten Tolbert)    Jadyn Darby MD

## 2022-12-02 ENCOUNTER — OFFICE VISIT (OUTPATIENT)
Dept: FAMILY MEDICINE CLINIC | Age: 47
End: 2022-12-02
Payer: MEDICARE

## 2022-12-02 VITALS
HEART RATE: 88 BPM | DIASTOLIC BLOOD PRESSURE: 64 MMHG | OXYGEN SATURATION: 98 % | WEIGHT: 151.4 LBS | BODY MASS INDEX: 23.76 KG/M2 | SYSTOLIC BLOOD PRESSURE: 99 MMHG | HEIGHT: 67 IN

## 2022-12-02 DIAGNOSIS — E11.9 TYPE 2 DIABETES MELLITUS WITHOUT COMPLICATION, WITHOUT LONG-TERM CURRENT USE OF INSULIN (HCC): Primary | ICD-10-CM

## 2022-12-02 DIAGNOSIS — Z23 NEEDS FLU SHOT: ICD-10-CM

## 2022-12-02 PROCEDURE — 99213 OFFICE O/P EST LOW 20 MIN: CPT | Performed by: STUDENT IN AN ORGANIZED HEALTH CARE EDUCATION/TRAINING PROGRAM

## 2022-12-02 PROCEDURE — G8420 CALC BMI NORM PARAMETERS: HCPCS | Performed by: STUDENT IN AN ORGANIZED HEALTH CARE EDUCATION/TRAINING PROGRAM

## 2022-12-02 PROCEDURE — 4004F PT TOBACCO SCREEN RCVD TLK: CPT | Performed by: STUDENT IN AN ORGANIZED HEALTH CARE EDUCATION/TRAINING PROGRAM

## 2022-12-02 PROCEDURE — 2022F DILAT RTA XM EVC RTNOPTHY: CPT | Performed by: STUDENT IN AN ORGANIZED HEALTH CARE EDUCATION/TRAINING PROGRAM

## 2022-12-02 PROCEDURE — 3044F HG A1C LEVEL LT 7.0%: CPT | Performed by: STUDENT IN AN ORGANIZED HEALTH CARE EDUCATION/TRAINING PROGRAM

## 2022-12-02 PROCEDURE — G8482 FLU IMMUNIZE ORDER/ADMIN: HCPCS | Performed by: STUDENT IN AN ORGANIZED HEALTH CARE EDUCATION/TRAINING PROGRAM

## 2022-12-02 PROCEDURE — G8427 DOCREV CUR MEDS BY ELIG CLIN: HCPCS | Performed by: STUDENT IN AN ORGANIZED HEALTH CARE EDUCATION/TRAINING PROGRAM

## 2022-12-02 PROCEDURE — G0008 ADMIN INFLUENZA VIRUS VAC: HCPCS | Performed by: STUDENT IN AN ORGANIZED HEALTH CARE EDUCATION/TRAINING PROGRAM

## 2022-12-02 ASSESSMENT — PATIENT HEALTH QUESTIONNAIRE - PHQ9
SUM OF ALL RESPONSES TO PHQ QUESTIONS 1-9: 7
6. FEELING BAD ABOUT YOURSELF - OR THAT YOU ARE A FAILURE OR HAVE LET YOURSELF OR YOUR FAMILY DOWN: 0
8. MOVING OR SPEAKING SO SLOWLY THAT OTHER PEOPLE COULD HAVE NOTICED. OR THE OPPOSITE, BEING SO FIGETY OR RESTLESS THAT YOU HAVE BEEN MOVING AROUND A LOT MORE THAN USUAL: 0
4. FEELING TIRED OR HAVING LITTLE ENERGY: 2
1. LITTLE INTEREST OR PLEASURE IN DOING THINGS: 1
SUM OF ALL RESPONSES TO PHQ QUESTIONS 1-9: 7
2. FEELING DOWN, DEPRESSED OR HOPELESS: 1
3. TROUBLE FALLING OR STAYING ASLEEP: 1
7. TROUBLE CONCENTRATING ON THINGS, SUCH AS READING THE NEWSPAPER OR WATCHING TELEVISION: 0
SUM OF ALL RESPONSES TO PHQ QUESTIONS 1-9: 7
SUM OF ALL RESPONSES TO PHQ9 QUESTIONS 1 & 2: 2
10. IF YOU CHECKED OFF ANY PROBLEMS, HOW DIFFICULT HAVE THESE PROBLEMS MADE IT FOR YOU TO DO YOUR WORK, TAKE CARE OF THINGS AT HOME, OR GET ALONG WITH OTHER PEOPLE: 0
5. POOR APPETITE OR OVEREATING: 2
9. THOUGHTS THAT YOU WOULD BE BETTER OFF DEAD, OR OF HURTING YOURSELF: 0
SUM OF ALL RESPONSES TO PHQ QUESTIONS 1-9: 7

## 2022-12-02 NOTE — PROGRESS NOTES
Visit Information    Have you changed or started any medications since your last visit including any over-the-counter medicines, vitamins, or herbal medicines? no   Are you having any side effects from any of your medications? -  no  Have you stopped taking any of your medications? Is so, why? -  no    Have you seen any other physician or provider since your last visit? Yes - Records Obtained, cardiologist  Have you had any other diagnostic tests since your last visit? No  Have you been seen in the emergency room and/or had an admission to a hospital since we last saw you? No  Have you had your routine dental cleaning in the past 6 months? no    Have you activated your [employer] account? If not, what are your barriers?  Yes     Patient Care Team:  Nati Darling MD as PCP - General (Emergency Medicine)    Medical History Review  Past Medical, Family, and Social History reviewed and does not contribute to the patient presenting condition    Health Maintenance   Topic Date Due    Pneumococcal 0-64 years Vaccine (1 - PCV) Never done    DTaP/Tdap/Td vaccine (1 - Tdap) Never done    Cervical cancer screen  Never done    Flu vaccine (1) 08/01/2022    COVID-19 Vaccine (3 - Booster for Pfizer series) 08/25/2022    Colorectal Cancer Screen  Never done    A1C test (Diabetic or Prediabetic)  02/04/2023    Lipids  06/14/2023    Depression Monitoring  11/04/2023    Hepatitis C screen  Completed    HIV screen  Completed    Hepatitis A vaccine  Aged Out    Hib vaccine  Aged Out    Meningococcal (ACWY) vaccine  Aged Out

## 2022-12-02 NOTE — PROGRESS NOTES
Attending Physician Statement  I  have discussed the care of [address] [place] [address] including pertinent history and exam findings with the resident. I agree with the assessment, plan and orders as documented by the resident. Here for f/u after covid infection  Hba1c today 6.5  Diagnosed as Diabetes today  Will treat with diet/exercise for now  Got flu shot today    BP 99/64 (Site: Right Upper Arm, Position: Sitting, Cuff Size: Medium Adult)   Pulse 88   Ht 5' 7\" (1.702 m)   Wt 151 lb 6.4 oz (68.7 kg)   SpO2 98%   BMI 23.71 kg/m²    BP Readings from Last 3 Encounters:   12/02/22 99/64   11/04/22 107/78   10/29/22 115/75     Wt Readings from Last 3 Encounters:   12/02/22 151 lb 6.4 oz (68.7 kg)   11/04/22 145 lb 3.2 oz (65.9 kg)   07/25/22 119 lb (54 kg)          Diagnosis Orders   1. Type 2 diabetes mellitus without complication, without long-term current use of insulin ([address]        2.  Needs flu shot  Influenza, AFLURIA, (age 1 y+), IM, Preservative Free, 0.5 mL          Claudio Everett MD 12/2/2022 2:27 PM

## 2022-12-02 NOTE — PATIENT INSTRUCTIONS
Thank you for letting us take care of you today. We hope all your questions were addressed. If a question was overlooked or something else comes to mind after you return home, please contact a member of your Care Team listed below. Your Care Team at [place] [address] is Team #3  Deshaun Hays MD (Faculty)  Quinn Gonzalez MD (Faculty  Mamadoubhupinder Florian MD (Resident)  Beti Singh (Resident)   Evangelista Schafer MD (Resident)  Lilian Gallo., KAMALA Elmore., KAMALA Watts., HESHAM Adams., Dacia Wallace., Susana Gloria ([facility])  Catalina Bocanegra, [facility] (Clinical Practice Manager)  Gunner Garcia, [address] [place] [address] (Clinical Pharmacist)     Office phone number: [phone]    If you need to get in right away due to illness, please be advised we have \"Same Day\" appointments available Monday-Friday. Please call us at [phone] option #3 to schedule your \"Same Day\" appointment.

## 2022-12-02 NOTE — PROGRESS NOTES
[facility] Medicine Residency Program - Outpatient Note      Subjective:      Kd Mckeon is a 41 y.o. female  presented to the office on 12/02/22 for routine follow-up. She had COVID infection 1 month ago, all other symptoms are resolved now. Her last A1c came back 6.5  She denies any vision problems or peripheral neuropathy. She is currently not on any medications for diabetes. She has history of CHF, mitral and aortic valve replacement, follows with cardiology. No symptoms at this time. Review of systems  Positive as mentioned above in subjective. All other systems negative. Objective:      Vitals:    12/02/22 0933   BP: 99/64   Pulse: 88   SpO2: 98%       General Appearance - Alert and oriented x 3  Lungs - Bilateral good air entry , no wheezes or rales  Cardiovascular - Regular rate and rhythm. No murmur  Abdomen - Soft and nontender  Neurologic - No new focal motor or sensory deficits  Skin - No bruising or bleeding on exposed skin area  MSK - No new joint/bone pains       Assessment:        ICD-10-CM    1. Type 2 diabetes mellitus without complication, without long-term current use of insulin (HCC)  E11.9       2. Needs flu shot  Z23 Influenza, AFLURIA, (age 1 y+), IM, Preservative Free, 0.5 mL          Plan:    Newly diagnosed diabetes mellitus-discussed about diet control and exercise. Flu shot given. Return in about 6 weeks (around 1/13/2023) for PAP smear. Requested Prescriptions      No prescriptions requested or ordered in this encounter       There are no discontinued medications. Discussed use, benefit, and side effects of prescribed medications. Barriers to medication compliance addressed. All patient questions answered. Pt voiced understanding. Disclaimer: Some orall of this note was transcribed using voice-recognition software. This may cause typographical errors occasionally.  Although all effort is made to fix these errors, please do not hesitate to contact our office if there [address] concern with the understanding of this note.     Livan Lopes MD  Family Medicine PGY-3  12/02/22 at 12:52 PM

## 2022-12-06 DIAGNOSIS — E87.6 HYPOKALEMIA: ICD-10-CM

## 2022-12-06 DIAGNOSIS — Z95.2 S/P MITRAL VALVE REPLACEMENT: Chronic | ICD-10-CM

## 2022-12-06 RX ORDER — POTASSIUM CHLORIDE 20 MEQ/1
TABLET, EXTENDED RELEASE ORAL
Qty: 180 TABLET | Refills: 10 | Status: SHIPPED | OUTPATIENT
Start: 2022-12-06

## 2022-12-06 RX ORDER — FUROSEMIDE 20 MG/1
TABLET ORAL
Qty: 60 TABLET | Refills: 10 | Status: SHIPPED | OUTPATIENT
Start: 2022-12-06

## 2022-12-06 NOTE — TELEPHONE ENCOUNTER
Please address the medication refill and close the encounter. If I can be of assistance, please route to the applicable pool. Thank you.       Last visit: 12-2-22  Last Med refill: 5-12-22  Does patient have enough medication for 72 hours: No    Next Visit Date:  Future Appointments   Date Time Provider Vega Tinoco   1/17/2023  1:30 PM James Panchal MD [address] [place] [address] Maintenance   Topic Date Due    Pneumococcal 0-64 years Vaccine (1 - PCV) Never done    Diabetic foot exam  Never done    Diabetic microalbuminuria test  Never done    Diabetic retinal exam  Never done    Hepatitis B vaccine (1 of 3 - Risk 3-dose series) Never done    DTaP/Tdap/Td vaccine (1 - Tdap) Never done    Cervical cancer screen  Never done    COVID-19 Vaccine (3 - Booster for Pfizer series) 08/25/2022    Colorectal Cancer Screen  Never done    Lipids  06/14/2023    A1C test (Diabetic or Prediabetic)  11/04/2023    Depression Monitoring  12/02/2023    Flu vaccine  Completed    Hepatitis C screen  Completed    HIV screen  Completed    Hepatitis A vaccine  Aged Out    Hib vaccine  Aged Out    Meningococcal (ACWY) vaccine  Aged Out       Hemoglobin A1C (%)   Date Value   11/04/2022 6.5 (H)   01/07/2022 5.8   12/31/2020 6.2 (H)             ( goal A1C is < 7)   No results found for: LABMICR  LDL Cholesterol (mg/dL)   Date Value   06/14/2022 68   01/05/2021 97       (goal LDL is <100)   AST (U/L)   Date Value   05/16/2022 28     ALT (U/L)   Date Value   05/16/2022 38 (H)     BUN (mg/dL)   Date Value   11/04/2022 13     BP Readings from Last 3 Encounters:   12/02/22 99/64   11/04/22 107/78   10/29/22 115/75          (goal 120/80)    All Future Testing planned in [facility]  Lab Frequency Next Occurrence   Hemoglobin and Hematocrit, Blood, Post Transfusion POST TRANSFUSION    Hemoglobin and Hematocrit, Blood, Post Transfusion POST TRANSFUSION                Patient Active Problem List:     Pulmonary edema, acute, with congestive heart failure (HCC)     Anxiety     Microcytic anemia     History of drug abuse (HCC)     Chronic diastolic congestive heart failure (HCC)     Severe mitral valve regurgitation     Severe mitral regurgitation by prior echocardiogram     S/P mitral valve replacement     S/P left atrial appendage ligation     Pneumonia due to organism     Recurrent major depressive disorder, in partial remission (HCC)     Lump or mass in breast     Chronic systolic (congestive) heart failure     COVID-19     Elevated LDL cholesterol level     Prediabetes     Urinary frequency     Type 2 diabetes mellitus without complication, without long-term current use of insulin ([facility] [address]

## 2022-12-06 NOTE — TELEPHONE ENCOUNTER
Please address the medication refill and close the encounter. If I can be of assistance, please route to the applicable pool. Thank you.     Last visit: 12-2-22  Last Med refill: 8-10-22  Does patient have enough medication for 72 hours: No:     Next Visit Date:  Future Appointments   Date Time Provider Vega Tinoco   1/17/2023  1:30 PM Shelly Blackburn MD [address] [place] [address] Maintenance   Topic Date Due    Pneumococcal 0-64 years Vaccine (1 - PCV) Never done    Diabetic foot exam  Never done    Diabetic microalbuminuria test  Never done    Diabetic retinal exam  Never done    Hepatitis B vaccine (1 of 3 - Risk 3-dose series) Never done    DTaP/Tdap/Td vaccine (1 - Tdap) Never done    Cervical cancer screen  Never done    COVID-19 Vaccine (3 - Booster for Pfizer series) 08/25/2022    Colorectal Cancer Screen  Never done    Lipids  06/14/2023    A1C test (Diabetic or Prediabetic)  11/04/2023    Depression Monitoring  12/02/2023    Flu vaccine  Completed    Hepatitis C screen  Completed    HIV screen  Completed    Hepatitis A vaccine  Aged Out    Hib vaccine  Aged Out    Meningococcal (ACWY) vaccine  Aged Out       Hemoglobin A1C (%)   Date Value   11/04/2022 6.5 (H)   01/07/2022 5.8   12/31/2020 6.2 (H)             ( goal A1C is < 7)   No results found for: LABMICR  LDL Cholesterol (mg/dL)   Date Value   06/14/2022 68   01/05/2021 97       (goal LDL is <100)   AST (U/L)   Date Value   05/16/2022 28     ALT (U/L)   Date Value   05/16/2022 38 (H)     BUN (mg/dL)   Date Value   11/04/2022 13     BP Readings from Last 3 Encounters:   12/02/22 99/64   11/04/22 107/78   10/29/22 115/75          (goal 120/80)    All Future Testing planned in [facility]  Lab Frequency Next Occurrence   Hemoglobin and Hematocrit, Blood, Post Transfusion POST TRANSFUSION    Hemoglobin and Hematocrit, Blood, Post Transfusion POST TRANSFUSION                Patient Active Problem List:     Pulmonary edema, acute, with congestive heart failure (HCC)     Anxiety     Microcytic anemia     History of drug abuse (HCC)     Chronic diastolic congestive heart failure (HCC)     Severe mitral valve regurgitation     Severe mitral regurgitation by prior echocardiogram     S/P mitral valve replacement     S/P left atrial appendage ligation     Pneumonia due to organism     Recurrent major depressive disorder, in partial remission (HCC)     Lump or mass in breast     Chronic systolic (congestive) heart failure     COVID-19     Elevated LDL cholesterol level     Prediabetes     Urinary frequency     Type 2 diabetes mellitus without complication, without long-term current use of insulin ([facility] [address]

## 2023-01-26 ENCOUNTER — HOSPITAL ENCOUNTER (OUTPATIENT)
Age: 48
Setting detail: SPECIMEN
Discharge: HOME | End: 2023-01-26

## 2023-01-26 ENCOUNTER — OFFICE VISIT (OUTPATIENT)
Dept: FAMILY MEDICINE CLINIC | Age: 48
End: 2023-01-26

## 2023-01-26 VITALS
SYSTOLIC BLOOD PRESSURE: 114 MMHG | DIASTOLIC BLOOD PRESSURE: 70 MMHG | WEIGHT: 164 LBS | HEART RATE: 85 BPM | TEMPERATURE: 98.3 F | BODY MASS INDEX: 25.69 KG/M2

## 2023-01-26 DIAGNOSIS — Z12.4 CERVICAL CANCER SCREENING: ICD-10-CM

## 2023-01-26 DIAGNOSIS — N89.8 VAGINAL DISCHARGE: Primary | ICD-10-CM

## 2023-01-26 DIAGNOSIS — N89.8 VAGINAL DISCHARGE: ICD-10-CM

## 2023-01-26 LAB
CANDIDA SPECIES, DNA PROBE: NEGATIVE
GARDNERELLA VAGINALIS, DNA PROBE: NEGATIVE
SOURCE: NORMAL
TRICHOMONAS VAGINALIS DNA: NEGATIVE

## 2023-01-26 ASSESSMENT — PATIENT HEALTH QUESTIONNAIRE - PHQ9
9. THOUGHTS THAT YOU WOULD BE BETTER OFF DEAD, OR OF HURTING YOURSELF: 0
SUM OF ALL RESPONSES TO PHQ QUESTIONS 1-9: 3
3. TROUBLE FALLING OR STAYING ASLEEP: 0
5. POOR APPETITE OR OVEREATING: 0
4. FEELING TIRED OR HAVING LITTLE ENERGY: 2
10. IF YOU CHECKED OFF ANY PROBLEMS, HOW DIFFICULT HAVE THESE PROBLEMS MADE IT FOR YOU TO DO YOUR WORK, TAKE CARE OF THINGS AT HOME, OR GET ALONG WITH OTHER PEOPLE: 0
SUM OF ALL RESPONSES TO PHQ QUESTIONS 1-9: 3
7. TROUBLE CONCENTRATING ON THINGS, SUCH AS READING THE NEWSPAPER OR WATCHING TELEVISION: 1
2. FEELING DOWN, DEPRESSED OR HOPELESS: 0
8. MOVING OR SPEAKING SO SLOWLY THAT OTHER PEOPLE COULD HAVE NOTICED. OR THE OPPOSITE, BEING SO FIGETY OR RESTLESS THAT YOU HAVE BEEN MOVING AROUND A LOT MORE THAN USUAL: 0
6. FEELING BAD ABOUT YOURSELF - OR THAT YOU ARE A FAILURE OR HAVE LET YOURSELF OR YOUR FAMILY DOWN: 0

## 2023-01-26 NOTE — PROGRESS NOTES
Attending Physician Statement  I have discussed the care of [facility], 41 y.o. female,including pertinent history and exam findings,  with the resident Dr. Diane Damian MD.  History:  Chief Complaint   Patient presents with    Gynecologic Exam     Patient states she have a little discharge, odor for about 3 weeks    Vaginal Discharge     I have reviewed the key elements of the encounter with the resident. Examination was done by resident as documented in residents note. BP Readings from Last 3 Encounters:   01/26/23 114/70   12/02/22 99/64   11/04/22 107/78     /70 (Site: Left Upper Arm, Position: Sitting, Cuff Size: Medium Adult)   Pulse 85   Temp 98.3 °F (36.8 °C) (Oral)   Wt 164 lb (74.4 kg)   LMP 01/12/2023   BMI 25.69 kg/m²   Lab Results   Component Value Date    WBC 4.7 05/16/2022    HGB 13.6 05/16/2022    HCT 40.4 05/16/2022     05/16/2022    CHOL 191 06/14/2022    TRIG 91 06/14/2022     06/14/2022    ALT 38 (H) 05/16/2022    AST 28 05/16/2022     11/04/2022    K 5.3 11/04/2022     11/04/2022    CREATININE 0.80 11/04/2022    BUN 13 11/04/2022    CO2 27 11/04/2022    TSH 0.83 05/16/2022    INR 1.3 09/28/2021    LABA1C 6.5 (H) 11/04/2022     Lab Results   Component Value Date    CALCIUM 8.8 11/04/2022    PHOS 3.7 11/08/2021     Lab Results   Component Value Date    LDLCHOLESTEROL 68 06/14/2022     I agree with the assessment, plan and diagnosis of    Diagnosis Orders   1. Vaginal discharge  Vaginitis DNA Probe      2. Cervical cancer screening  PAP Smear        I agree with  orders as documented by the resident. Recommendations: Resident team to discuss sexual history and possible further testing. Agree with resident A&P. Return if symptoms worsen or fail to improve.    (Mami Gonzales ) Dr. Neli Can MD

## 2023-01-26 NOTE — PATIENT INSTRUCTIONS
Thank you for letting us take care of you today. We hope all your questions were addressed. If a question was overlooked or something else comes to mind after you return home, please contact a member of your Care Team listed below. Your Care Team at [place] [address] is Team #3  Kathie Garibay MD (Faculty)  Christel Parekh MD (Faculty  Dipti Joseph MD (Resident)  Shay Winston (Resident)   Audrey Kwong MD (Resident)  HESHAM Adames., KAMALA Stanley., KAMALA Valerio ([address] [facility])  Bowen Reyes, [address] [facility] ([address] [facility])    Sandoval Caldera [facility] (Clinical Pharmacist)     Office phone number: [phone]    If you need to get in right away due to illness, please be advised we have \"Same Day\" appointments available Monday-Friday. Please call us at [phone] option #3 to schedule your \"Same Day\" appointment.

## 2023-01-26 NOTE — PROGRESS NOTES
[address] [facility] Medicine Residency Program - Outpatient Note      Subjective:      Panchito Nagel is a 41 y.o. female  presented to the office on 01/26/23 for vaginal discharge. Going on for last 3 weeks  Thick whitish discharge, associated with foul smell. Currently sexually active. Denies abdominal pain, fever or chills. Denies vaginal bleeding. Denies any other concerns. Review of systems  Positive as mentioned above in subjective. All other systems negative. Objective:      Vitals:    01/26/23 1543   BP: 114/70   Pulse: 85   Temp: 98.3 °F (36.8 °C)       General Appearance - Alert and oriented x 3  Abdomen - Soft and nontender  Genitourinary - Introitus intact. Cervix with no lesion. PAP and cultures obtained. Thick whitish vaginal discharge noted. No erythema or lesions noted. No CMT. Assessment:        ICD-10-CM    1. Vaginal discharge  N89.8 Vaginitis DNA Probe     Chlamydia/GC DNA, Thin Prep      2. Cervical cancer screening  Z12.4 PAP Smear          Plan:    Vaginitis and GC chlamydia cultures collected for vaginal discharge. Will call patient and update on results, start treatment as needed. Pap and HPV testing sent for cervical cancer screening. Return if symptoms worsen or fail to improve. Requested Prescriptions      No prescriptions requested or ordered in this encounter       There are no discontinued medications. Discussed use, benefit, and side effects of prescribed medications. Barriers to medication compliance addressed. All patient questions answered. Pt voiced understanding. Disclaimer: Some orall of this note was transcribed using voice-recognition software. This may cause typographical errors occasionally. Although all effort is made to fix these errors, please do not hesitate to contact our office if there Destini Varela concern with the understanding of this note.     Cally Tee MD  Family Medicine PGY-3  01/26/23 at 4:53 PM

## 2023-01-26 NOTE — PROGRESS NOTES
Visit Information    Have you changed or started any medications since your last visit including any over-the-counter medicines, vitamins, or herbal medicines? no   Have you stopped taking any of your medications? Is so, why? -  no  Are you having any side effects from any of your medications? - no    Have you seen any other physician or provider since your last visit?  no   Have you had any other diagnostic tests since your last visit?  no   Have you been seen in the emergency room and/or had an admission in a hospital since we last saw you?  no   Have you had your routine dental cleaning in the past 6 months?  no     Do you have an active MyChart account? If no, what is the barrier?   No:     Patient Care Team:  Tashia Piper MD as PCP - General (Emergency Medicine)    Medical History Review  Past Medical, Family, and Social History reviewed and does not contribute to the patient presenting condition    Health Maintenance   Topic Date Due    Pneumococcal 0-64 years Vaccine (1 - PCV) Never done    Diabetic foot exam  Never done    Diabetic Alb to Cr ratio (uACR) test  Never done    Diabetic retinal exam  Never done    Hepatitis B vaccine (1 of 3 - Risk 3-dose series) Never done    DTaP/Tdap/Td vaccine (1 - Tdap) Never done    Cervical cancer screen  Never done    COVID-19 Vaccine (3 - Booster for Pfizer series) 08/25/2022    Colorectal Cancer Screen  Never done    Lipids  06/14/2023    A1C test (Diabetic or Prediabetic)  11/04/2023    GFR test (Diabetes, CKD 3-4, OR last GFR 15-59)  11/04/2023    Depression Monitoring  12/02/2023    Flu vaccine  Completed    Hepatitis C screen  Completed    HIV screen  Completed    Hepatitis A vaccine  Aged Out    Hib vaccine  Aged Out    Meningococcal (ACWY) vaccine  Aged Out

## 2023-01-27 LAB
C TRACH DNA GENITAL QL NAA+PROBE: NEGATIVE
N. GONORRHOEAE DNA: NEGATIVE
SPECIMEN DESCRIPTION: NORMAL

## 2023-01-28 LAB
HPV SAMPLE: NORMAL
HPV, GENOTYPE 16: NOT DETECTED
HPV, GENOTYPE 18: NOT DETECTED
HPV, HIGH RISK OTHER: NOT DETECTED
HPV, INTERPRETATION: NORMAL
SPECIMEN DESCRIPTION: NORMAL

## 2023-02-25 PROBLEM — Z12.4 CERVICAL CANCER SCREENING: Status: RESOLVED | Noted: 2023-01-26 | Resolved: 2023-02-25

## 2023-03-08 ENCOUNTER — TELEPHONE (OUTPATIENT)
Dept: FAMILY MEDICINE CLINIC | Age: 48
End: 2023-03-08

## 2023-03-08 NOTE — TELEPHONE ENCOUNTER
Form that was sent before not readable, will fax new form to be completed by provider, which Medical Assistant Avelina Tatum did received and place in provider Dr. Orlando Pollock for completion.

## 2023-03-15 DIAGNOSIS — E78.00 ELEVATED LDL CHOLESTEROL LEVEL: ICD-10-CM

## 2023-03-15 DIAGNOSIS — R73.03 PREDIABETES: ICD-10-CM

## 2023-03-15 DIAGNOSIS — I50.32 CHRONIC DIASTOLIC CONGESTIVE HEART FAILURE (HCC): ICD-10-CM

## 2023-03-15 DIAGNOSIS — I50.22 CHRONIC SYSTOLIC (CONGESTIVE) HEART FAILURE (HCC): ICD-10-CM

## 2023-03-15 RX ORDER — ATORVASTATIN CALCIUM 20 MG/1
20 TABLET, FILM COATED ORAL DAILY
Qty: 30 TABLET | Refills: 4 | Status: SHIPPED | OUTPATIENT
Start: 2023-03-15

## 2023-03-21 ENCOUNTER — HOSPITAL ENCOUNTER (OUTPATIENT)
Dept: MAMMOGRAPHY | Age: 48
Discharge: HOME | End: 2023-03-23
Payer: MEDICARE

## 2023-03-21 DIAGNOSIS — Z12.31 VISIT FOR SCREENING MAMMOGRAM: ICD-10-CM

## 2023-03-21 PROCEDURE — 77063 BREAST TOMOSYNTHESIS BI: CPT

## 2023-03-23 DIAGNOSIS — R92.8 ABNORMAL SCREENING MAMMOGRAM: Primary | ICD-10-CM

## 2023-04-06 DIAGNOSIS — Z76.0 MEDICATION REFILL: ICD-10-CM

## 2023-04-06 RX ORDER — ONDANSETRON 4 MG/1
4 TABLET, ORALLY DISINTEGRATING ORAL EVERY 8 HOURS PRN
Qty: 20 TABLET | Refills: 0 | Status: SHIPPED | OUTPATIENT
Start: 2023-04-06

## 2023-04-17 ENCOUNTER — HOSPITAL ENCOUNTER (OUTPATIENT)
Dept: MAMMOGRAPHY | Age: 48
Discharge: HOME | End: 2023-04-19
Payer: MEDICARE

## 2023-04-17 ENCOUNTER — HOSPITAL ENCOUNTER (OUTPATIENT)
Dept: ULTRASOUND IMAGING | Age: 48
Discharge: HOME | End: 2023-04-19
Payer: MEDICARE

## 2023-04-17 DIAGNOSIS — R92.8 ABNORMAL SCREENING MAMMOGRAM: ICD-10-CM

## 2023-04-17 PROCEDURE — 77066 DX MAMMO INCL CAD BI: CPT

## 2023-04-17 PROCEDURE — 76642 ULTRASOUND BREAST LIMITED: CPT

## 2023-05-25 ENCOUNTER — HOSPITAL ENCOUNTER (OUTPATIENT)
Age: 48
Setting detail: SPECIMEN
Discharge: HOME | End: 2023-05-25

## 2023-05-25 ENCOUNTER — OFFICE VISIT (OUTPATIENT)
Dept: FAMILY MEDICINE CLINIC | Age: 48
End: 2023-05-25
Payer: MEDICARE

## 2023-05-25 VITALS
HEART RATE: 117 BPM | SYSTOLIC BLOOD PRESSURE: 128 MMHG | HEIGHT: 67 IN | DIASTOLIC BLOOD PRESSURE: 88 MMHG | WEIGHT: 164 LBS | BODY MASS INDEX: 25.74 KG/M2

## 2023-05-25 DIAGNOSIS — N30.00 ACUTE CYSTITIS WITHOUT HEMATURIA: Primary | ICD-10-CM

## 2023-05-25 DIAGNOSIS — R00.0 TACHYCARDIA: ICD-10-CM

## 2023-05-25 DIAGNOSIS — Z71.6 ENCOUNTER FOR SMOKING CESSATION COUNSELING: ICD-10-CM

## 2023-05-25 DIAGNOSIS — N30.00 ACUTE CYSTITIS WITHOUT HEMATURIA: ICD-10-CM

## 2023-05-25 LAB
BILIRUBIN, POC: NEGATIVE
BLOOD URINE, POC: ABNORMAL
CLARITY, POC: ABNORMAL
COLOR, POC: YELLOW
GLUCOSE URINE, POC: NEGATIVE
KETONES, POC: NEGATIVE
LEUKOCYTE EST, POC: ABNORMAL
NITRITE, POC: POSITIVE
PH, POC: 6
PROTEIN, POC: NEGATIVE
SPECIFIC GRAVITY, POC: 1.01
UROBILINOGEN, POC: 0.2

## 2023-05-25 PROCEDURE — 1036F TOBACCO NON-USER: CPT | Performed by: STUDENT IN AN ORGANIZED HEALTH CARE EDUCATION/TRAINING PROGRAM

## 2023-05-25 PROCEDURE — G8427 DOCREV CUR MEDS BY ELIG CLIN: HCPCS | Performed by: STUDENT IN AN ORGANIZED HEALTH CARE EDUCATION/TRAINING PROGRAM

## 2023-05-25 PROCEDURE — 99213 OFFICE O/P EST LOW 20 MIN: CPT | Performed by: STUDENT IN AN ORGANIZED HEALTH CARE EDUCATION/TRAINING PROGRAM

## 2023-05-25 PROCEDURE — 81002 URINALYSIS NONAUTO W/O SCOPE: CPT | Performed by: STUDENT IN AN ORGANIZED HEALTH CARE EDUCATION/TRAINING PROGRAM

## 2023-05-25 PROCEDURE — G8419 CALC BMI OUT NRM PARAM NOF/U: HCPCS | Performed by: STUDENT IN AN ORGANIZED HEALTH CARE EDUCATION/TRAINING PROGRAM

## 2023-05-25 RX ORDER — METOPROLOL SUCCINATE 50 MG/1
TABLET, EXTENDED RELEASE ORAL
COMMUNITY
Start: 2023-05-17

## 2023-05-25 RX ORDER — CYCLOBENZAPRINE HCL 10 MG
TABLET ORAL
COMMUNITY
Start: 2023-05-17

## 2023-05-25 RX ORDER — ARIPIPRAZOLE 300 MG
KIT INTRAMUSCULAR
COMMUNITY
Start: 2023-05-06

## 2023-05-25 RX ORDER — ASPIRIN 325 MG
325 TABLET ORAL DAILY
COMMUNITY
Start: 2023-05-08

## 2023-05-25 RX ORDER — BUSPIRONE HYDROCHLORIDE 10 MG/1
TABLET ORAL
COMMUNITY
Start: 2023-05-19

## 2023-05-25 RX ORDER — VARENICLINE TARTRATE 0.5 MG/1
.5-1 TABLET, FILM COATED ORAL SEE ADMIN INSTRUCTIONS
Qty: 57 TABLET | Refills: 0 | Status: SHIPPED | OUTPATIENT
Start: 2023-05-25

## 2023-05-25 RX ORDER — CIPROFLOXACIN 250 MG/1
250 TABLET, FILM COATED ORAL 2 TIMES DAILY
Qty: 6 TABLET | Refills: 0 | Status: SHIPPED | OUTPATIENT
Start: 2023-05-25 | End: 2023-05-28

## 2023-05-25 SDOH — ECONOMIC STABILITY: INCOME INSECURITY: HOW HARD IS IT FOR YOU TO PAY FOR THE VERY BASICS LIKE FOOD, HOUSING, MEDICAL CARE, AND HEATING?: NOT HARD AT ALL

## 2023-05-25 SDOH — ECONOMIC STABILITY: HOUSING INSECURITY
IN THE LAST 12 MONTHS, WAS THERE A TIME WHEN YOU DID NOT HAVE A STEADY PLACE TO SLEEP OR SLEPT IN A SHELTER (INCLUDING NOW)?: NO

## 2023-05-25 SDOH — ECONOMIC STABILITY: FOOD INSECURITY: WITHIN THE PAST 12 MONTHS, YOU WORRIED THAT YOUR FOOD WOULD RUN OUT BEFORE YOU GOT MONEY TO BUY MORE.: NEVER TRUE

## 2023-05-25 SDOH — ECONOMIC STABILITY: FOOD INSECURITY: WITHIN THE PAST 12 MONTHS, THE FOOD YOU BOUGHT JUST DIDN'T LAST AND YOU DIDN'T HAVE MONEY TO GET MORE.: NEVER TRUE

## 2023-05-25 ASSESSMENT — ENCOUNTER SYMPTOMS
ABDOMINAL PAIN: 0
SHORTNESS OF BREATH: 0
VOMITING: 0
WHEEZING: 0
NAUSEA: 0

## 2023-05-28 LAB
MICROORGANISM SPEC CULT: ABNORMAL
SPECIMEN DESCRIPTION: ABNORMAL

## 2023-05-31 ENCOUNTER — TELEPHONE (OUTPATIENT)
Dept: FAMILY MEDICINE CLINIC | Age: 48
End: 2023-05-31

## 2023-05-31 DIAGNOSIS — N30.00 ACUTE CYSTITIS WITHOUT HEMATURIA: Primary | ICD-10-CM

## 2023-05-31 RX ORDER — CEPHALEXIN 500 MG/1
500 CAPSULE ORAL 2 TIMES DAILY
Qty: 14 CAPSULE | Refills: 0 | Status: SHIPPED | OUTPATIENT
Start: 2023-05-31 | End: 2023-06-07

## 2023-05-31 NOTE — TELEPHONE ENCOUNTER
Patient's urine culture came back containing E. Coli which is resistant to fluoroquinolones. We will notify patient and prescribe Keflex.       Electronically signed by Carolyn Hull MD on 5/31/2023 at 1:20 PM

## 2023-06-05 ENCOUNTER — TELEPHONE (OUTPATIENT)
Dept: FAMILY MEDICINE CLINIC | Age: 48
End: 2023-06-05

## 2023-06-05 NOTE — TELEPHONE ENCOUNTER
----- Message from Fredirick Spurling, MD sent at 5/31/2023  1:19 PM EDT -----  Please let patient know that I will send in another prescription for antibiotic covering her UTI. The last medication that was sent appears to be resistant.   Thanks

## 2023-06-05 NOTE — TELEPHONE ENCOUNTER
Writer spoke to pt and update that per provider pt has UTI and medication sent to pharmacy.  Pt has already picked up medication,

## 2023-06-13 ENCOUNTER — HOSPITAL ENCOUNTER (EMERGENCY)
Age: 48
Discharge: HOME | End: 2023-06-13
Attending: EMERGENCY MEDICINE
Payer: MEDICARE

## 2023-06-13 ENCOUNTER — APPOINTMENT (OUTPATIENT)
Dept: CT IMAGING | Age: 48
End: 2023-06-13
Payer: MEDICARE

## 2023-06-13 VITALS
SYSTOLIC BLOOD PRESSURE: 121 MMHG | OXYGEN SATURATION: 100 % | BODY MASS INDEX: 29.62 KG/M2 | RESPIRATION RATE: 17 BRPM | HEART RATE: 88 BPM | HEIGHT: 67 IN | DIASTOLIC BLOOD PRESSURE: 81 MMHG | TEMPERATURE: 98.8 F | WEIGHT: 188.71 LBS

## 2023-06-13 DIAGNOSIS — M54.50 ACUTE LOW BACK PAIN WITHOUT SCIATICA, UNSPECIFIED BACK PAIN LATERALITY: ICD-10-CM

## 2023-06-13 DIAGNOSIS — R07.9 CHEST PAIN, UNSPECIFIED TYPE: Primary | ICD-10-CM

## 2023-06-13 LAB
ANION GAP SERPL CALCULATED.3IONS-SCNC: 10 MMOL/L (ref 9–17)
BASOPHILS # BLD: 0.08 K/UL (ref 0–0.2)
BASOPHILS NFR BLD: 1 % (ref 0–2)
BILIRUB UR QL STRIP: NEGATIVE
BUN SERPL-MCNC: 10 MG/DL (ref 6–20)
CALCIUM SERPL-MCNC: 8.6 MG/DL (ref 8.6–10.4)
CHLORIDE SERPL-SCNC: 105 MMOL/L (ref 98–107)
CLARITY UR: CLEAR
CO2 SERPL-SCNC: 22 MMOL/L (ref 20–31)
COLOR UR: YELLOW
CREAT SERPL-MCNC: 0.65 MG/DL (ref 0.5–0.9)
D DIMER PPP FEU-MCNC: 0.68 UG/ML FEU (ref 0–0.57)
EOSINOPHIL # BLD: 0.06 K/UL (ref 0–0.44)
EOSINOPHILS RELATIVE PERCENT: 1 % (ref 1–4)
EPI CELLS #/AREA URNS HPF: NORMAL /HPF (ref 0–5)
ERYTHROCYTE [DISTWIDTH] IN BLOOD BY AUTOMATED COUNT: 14.6 % (ref 11.8–14.4)
GFR SERPL CREATININE-BSD FRML MDRD: >60 ML/MIN/1.73M2
GLUCOSE SERPL-MCNC: 78 MG/DL (ref 70–99)
GLUCOSE UR STRIP-MCNC: NEGATIVE MG/DL
HCG SERPL QL: NEGATIVE
HCT VFR BLD AUTO: 39.3 % (ref 36.3–47.1)
HGB BLD-MCNC: 12.7 G/DL (ref 11.9–15.1)
HGB UR QL STRIP.AUTO: NEGATIVE
IMM GRANULOCYTES # BLD AUTO: 0.05 K/UL (ref 0–0.3)
IMM GRANULOCYTES NFR BLD: 1 %
KETONES UR STRIP-MCNC: NEGATIVE MG/DL
LEUKOCYTE ESTERASE UR QL STRIP: NEGATIVE
LYMPHOCYTES # BLD: 33 % (ref 24–43)
LYMPHOCYTES NFR BLD: 2.07 K/UL (ref 1.1–3.7)
MCH RBC QN AUTO: 27.2 PG (ref 25.2–33.5)
MCHC RBC AUTO-ENTMCNC: 32.3 G/DL (ref 28.4–34.8)
MCV RBC AUTO: 84.2 FL (ref 82.6–102.9)
MONOCYTES NFR BLD: 0.4 K/UL (ref 0.1–1.2)
MONOCYTES NFR BLD: 7 % (ref 3–12)
NEUTROPHILS NFR BLD: 57 % (ref 36–65)
NEUTS SEG NFR BLD: 3.53 K/UL (ref 1.5–8.1)
NITRITE UR QL STRIP: NEGATIVE
NRBC AUTOMATED: 0 PER 100 WBC
PH UR STRIP: 6 [PH] (ref 5–8)
PLATELET # BLD AUTO: 332 K/UL (ref 138–453)
PMV BLD AUTO: 9.5 FL (ref 8.1–13.5)
POTASSIUM SERPL-SCNC: 4.2 MMOL/L (ref 3.7–5.3)
PROT UR STRIP-MCNC: NEGATIVE MG/DL
RBC # BLD AUTO: 4.67 M/UL (ref 3.95–5.11)
RBC # BLD: ABNORMAL 10*6/UL
RBC #/AREA URNS HPF: NORMAL /HPF (ref 0–4)
SODIUM SERPL-SCNC: 137 MMOL/L (ref 135–144)
SP GR UR STRIP: 1.01 (ref 1–1.03)
TROPONIN I SERPL HS-MCNC: <6 NG/L (ref 0–14)
UROBILINOGEN UR STRIP-ACNC: NORMAL
WBC #/AREA URNS HPF: NORMAL /HPF (ref 0–5)
WBC OTHER # BLD: 6.2 K/UL (ref 3.5–11.3)

## 2023-06-13 PROCEDURE — 6360000004 HC RX CONTRAST MEDICATION: Performed by: STUDENT IN AN ORGANIZED HEALTH CARE EDUCATION/TRAINING PROGRAM

## 2023-06-13 PROCEDURE — 6370000000 HC RX 637 (ALT 250 FOR IP): Performed by: STUDENT IN AN ORGANIZED HEALTH CARE EDUCATION/TRAINING PROGRAM

## 2023-06-13 PROCEDURE — 96374 THER/PROPH/DIAG INJ IV PUSH: CPT

## 2023-06-13 PROCEDURE — 87086 URINE CULTURE/COLONY COUNT: CPT

## 2023-06-13 PROCEDURE — 81001 URINALYSIS AUTO W/SCOPE: CPT

## 2023-06-13 PROCEDURE — 72131 CT LUMBAR SPINE W/O DYE: CPT

## 2023-06-13 PROCEDURE — 71260 CT THORAX DX C+: CPT

## 2023-06-13 PROCEDURE — 93970 EXTREMITY STUDY: CPT

## 2023-06-13 PROCEDURE — 84484 ASSAY OF TROPONIN QUANT: CPT

## 2023-06-13 PROCEDURE — 6360000002 HC RX W HCPCS: Performed by: STUDENT IN AN ORGANIZED HEALTH CARE EDUCATION/TRAINING PROGRAM

## 2023-06-13 PROCEDURE — 85379 FIBRIN DEGRADATION QUANT: CPT

## 2023-06-13 PROCEDURE — 85027 COMPLETE CBC AUTOMATED: CPT

## 2023-06-13 PROCEDURE — 99285 EMERGENCY DEPT VISIT HI MDM: CPT

## 2023-06-13 PROCEDURE — 80048 BASIC METABOLIC PNL TOTAL CA: CPT

## 2023-06-13 PROCEDURE — 93005 ELECTROCARDIOGRAM TRACING: CPT | Performed by: STUDENT IN AN ORGANIZED HEALTH CARE EDUCATION/TRAINING PROGRAM

## 2023-06-13 PROCEDURE — 84703 CHORIONIC GONADOTROPIN ASSAY: CPT

## 2023-06-13 RX ORDER — LIDOCAINE 4 G/G
1 PATCH TOPICAL DAILY
Status: DISCONTINUED | OUTPATIENT
Start: 2023-06-13 | End: 2023-06-14 | Stop reason: HOSPADM

## 2023-06-13 RX ORDER — METHOCARBAMOL 500 MG/1
1000 TABLET, FILM COATED ORAL ONCE
Status: COMPLETED | OUTPATIENT
Start: 2023-06-13 | End: 2023-06-13

## 2023-06-13 RX ORDER — LIDOCAINE 50 MG/G
1 PATCH TOPICAL DAILY
Qty: 30 PATCH | Refills: 0 | Status: SHIPPED | OUTPATIENT
Start: 2023-06-13

## 2023-06-13 RX ORDER — KETOROLAC TROMETHAMINE 30 MG/ML
30 INJECTION, SOLUTION INTRAMUSCULAR; INTRAVENOUS ONCE
Status: COMPLETED | OUTPATIENT
Start: 2023-06-13 | End: 2023-06-13

## 2023-06-13 RX ORDER — METHOCARBAMOL 750 MG/1
750 TABLET, FILM COATED ORAL 3 TIMES DAILY
Qty: 21 TABLET | Refills: 0 | Status: SHIPPED | OUTPATIENT
Start: 2023-06-13 | End: 2023-06-20

## 2023-06-13 RX ADMIN — KETOROLAC TROMETHAMINE 30 MG: 30 INJECTION, SOLUTION INTRAMUSCULAR; INTRAVENOUS at 14:10

## 2023-06-13 RX ADMIN — IOPAMIDOL 75 ML: 755 INJECTION, SOLUTION INTRAVENOUS at 18:20

## 2023-06-13 RX ADMIN — METHOCARBAMOL 1000 MG: 500 TABLET ORAL at 14:09

## 2023-06-13 ASSESSMENT — PAIN SCALES - GENERAL
PAINLEVEL_OUTOF10: 9
PAINLEVEL_OUTOF10: 8

## 2023-06-13 ASSESSMENT — ENCOUNTER SYMPTOMS
COUGH: 0
ABDOMINAL PAIN: 0
NAUSEA: 0
BACK PAIN: 1
DIARRHEA: 0
RHINORRHEA: 0
VOMITING: 0
SHORTNESS OF BREATH: 1

## 2023-06-13 ASSESSMENT — PAIN DESCRIPTION - LOCATION: LOCATION: BACK

## 2023-06-13 ASSESSMENT — PAIN - FUNCTIONAL ASSESSMENT: PAIN_FUNCTIONAL_ASSESSMENT: 0-10

## 2023-06-13 NOTE — ED TRIAGE NOTES
Pt to ED with 2 week history of mid upper to lower upper back pain. No recent injury. Was seen and treated for UTI, finished antibiotic last night. Pain still continues to mid to upper back. Denies chest pain. Does have some SOB, diaphoretic and nausea when pain gets really bad. Does have history of Mitral valve replacement x 2. First was mechanical valve with failure/infection/myocarditis then removed and pig valve placed.

## 2023-06-13 NOTE — ED NOTES
Writer assumed care of pt at this time. Pt resting in NAD at this time. Results of blood work updated.  No further needs at this time      Carolin Harada, RN  06/13/23 1944

## 2023-06-13 NOTE — ED PROVIDER NOTES
[address] HANDOFF       Handoff taken on the following patient from prior Attending Physician:  Pt Name: [place]  PCP:  She Noel MD    Attestation  I was available and discussed any additional care issues that arose and coordinated the management plans with the resident(s) caring for the patient during my duty period. Any areas of disagreement with resident's documentation of care or procedures are noted on the chart. I was personally present for the key portions of any/all procedures during my duty period. I have documented in the chart those procedures where I was not present during the key portions.            Bull Card MD  06/13/23 8556
FACULTY SIGN-OUT  ADDENDUM     Care of this patient was assumed from previous attending physician. The patient's initial evaluation and plan have been discussed with the prior provider who initially evaluated the patient. Note Started: 3:48 PM EDT    Attestation  I was available and discussed any additional care issues that arose and coordinated the management plans with the resident(s) caring for the patient during my duty period. Any areas of disagreement with resident's documentation of care or procedures are noted on the chart. I was personally present for the key portions of any/all procedures, during my duty period. I have documented in the chart those procedures where I was not present during the key portions. ED COURSE      The patient was given the following medications:  Orders Placed This Encounter   Medications    methocarbamol (ROBAXIN) tablet 1,000 mg    ketorolac (TORADOL) injection 30 mg    lidocaine 4 % external patch 1 patch       RECENT VITALS:   Temp: 98.8 °F (37.1 °C), Pulse: 98, Respirations: 19, BP: 115/79    MEDICAL DECISION MAKING        Mark Sharp is a 41 y.o. female who presents to the Emergency Department with complaints of back and knee pain venous Doppler is pending at this time. Consider CT of lumbar as the patient fracture years ago. Georgette Knowles MD, MD, F.A.C.E.P.   Attending Emergency Physician    (Please note that portions of this note were completed with a voice recognition program.  Efforts were made to edit the dictations but occasionally words are mis-transcribed.)         Georgette Knowles MD  06/13/23 0224
[facility]  Emergency Department  Faculty Attestation     I performed a history and physical examination of the patient and discussed management with the resident. I reviewed the residents note and agree with the documented findings and plan of care. Any areas of disagreement are noted on the chart. I was personally present for the key portions of any procedures. I have documented in the chart those procedures where I was not present during the key portions. I have reviewed the emergency nurses triage note. I agree with the chief complaint, past medical history, past surgical history, allergies, medications, social and family history as documented unless otherwise noted below. For Physician Assistant/ Nurse Practitioner cases/documentation I have personally evaluated this patient and have completed at least one if not all key elements of the E/M (history, physical exam, and MDM). Additional findings are as noted. Preliminary note started at 1:29 PM EDT    Primary Care Physician:  She Noel MD    Screenings:  [unfilled]    CHIEF COMPLAINT       Chief Complaint   Patient presents with    Back Pain    Knee Pain       RECENT VITALS:   /86   Pulse (!) 101   Temp 98.8 °F (37.1 °C) (Oral)   Resp 18   Ht 5' 7\" (1.702 m)   Wt 188 lb 11.4 oz (85.6 kg)   LMP 05/05/2023 (Within Days)   SpO2 100%   BMI 29.56 kg/m²     LABS:  Labs Reviewed - No data to display    Radiology  No orders to display       CRITICAL CARE: There was a high probability of clinically significant/life threatening deterioration in this patient's condition which required my urgent intervention. Total critical care time was none minutes. This excludes any time for separately reportable procedures.      EKG:  EKG Interpretation    Interpreted by me    Rhythm: normal sinus   Rate: normal  Axis: normal  Ectopy: none  Conduction: normal  ST Segments: Subtle depression in V3 only  T Waves: no acute
----------------------------------------------------------------  Electronically signed by Kristopher Scott RVT, RDMS(Sonographer) on 06/13/2023 04:15 PM  ----------------------------------------------------------------   ----------------------------------------------------------------  Electronically signed by Hildegard Lento Reyes,Arthur(Interpreting  physician) on 06/13/2023 04:24 PM  ----------------------------------------------------------------  Findings:   Right Impression:                    Left Impression:  The common femoral, femoral,         The common femoral, femoral,  popliteal and tibial veins           popliteal and tibial veins  demonstrate normal compressibility   demonstrate normal compressibility  and augmentation. and augmentation. Normal compressibility of the great  Normal compressibility of the great  saphenous vein. saphenous vein. Normal compressibility of the small  Normal compressibility of the small  saphenous vein. saphenous vein. Risk Factors History +------------------------------------------------------------+----+--------+ ! Diagnosis                                                   ! Date! Comments! +------------------------------------------------------------+----+--------+ ! CHF                                                         !    !        ! +------------------------------------------------------------+----+--------+ ! Pulmonary Embolism                                          !    !        ! +------------------------------------------------------------+----+--------+   - The patient's risk factor(s) include: arterial hypertension. Allergies   - Allergy:*Unlisted(Drug).  Comments:Antihystamines, Hydroxyzine Velocities are measured in cm/s ; Diameters are measured in cm Right Lower Extremities DVT Study Measurements Right 2D Measurements +------------------------------------+----------+---------------+----------+

## 2023-06-13 NOTE — ED NOTES
The following labs were labeled with appropriate pt sticker and tubed to lab:     [] Blue     [] Lavender   [] on ice  [] Green/yellow  [] Green/black [] on ice  [] Garrett Quijano  [] on ice  [] Yellow  [] Red  [] Type/ Screen  [] ABG  [] VBG    [] COVID-19 swab    [] Rapid  [] PCR  [] Flu swab  [] Peds Viral Panel     [x] Urine Sample  [] Fecal Sample  [] Pelvic Cultures  [] Blood Cultures  [] X 2  [] STREP Cultures      Germania Ayoub, RAOUL  06/13/23 9739

## 2023-06-14 LAB
MICROORGANISM SPEC CULT: NO GROWTH
SPECIMEN DESCRIPTION: NORMAL

## 2023-06-14 NOTE — DISCHARGE INSTRUCTIONS
Thank you for visiting [facility] Emergency Department. For your symptoms we checked your blood sugar, blood counts, electrolytes and kidney function. These were all normal. For your heart we checked labs called a troponin which look for signs of a heart attack as well as an EKG. Both of these were normal.  Your urine showed no signs of infection. Your CT scan of your back was normal.  The lab called a D-dimer which looks for signs of a blood clot came up positive, however your ultrasound of your leg did not show any blood clot and your CT scan of your chest did not show any blood clot    However, your CT scan of your chest showed a possible abnormality of one of the wires related to your previous surgery. The radiologist was concerned that it may be a sternotomy wire which is one of the outside wires coming loose and going near your heart. Because of this we called the cardiothoracic surgeons at [facility]. They thought that this was less likely and more likely I am pacemaker wire which they cut and leave them after surgery. They thought this may be normal ingesta. Not on the scan, however they want to follow-up with you closely in their office to make sure. They want to see you in our office tomorrow morning. They will give you a call between 8 and 9 in the morning to schedule an appointment, however if they do not please call them at the number provided above. Please return to the emergency department for any worsening symptoms or if you are unable to get in to the cardiothoracic surgeons    For back pain, you can take Tylenol 500 mg every 6 hours as needed. We have also prescribed you Robaxin which is a muscle relaxant. You can take this every 8 hours as needed for back pain. You can also use a lidocaine patch which you place directly over the area. You wear it all day long and then take it off at night. You place a new patch in the morning.     If you develop any worsening of your

## 2023-06-15 ENCOUNTER — TELEPHONE (OUTPATIENT)
Dept: FAMILY MEDICINE CLINIC | Age: 48
End: 2023-06-15

## 2023-06-15 LAB
EKG ATRIAL RATE: 94 BPM
EKG P AXIS: 56 DEGREES
EKG P-R INTERVAL: 136 MS
EKG Q-T INTERVAL: 368 MS
EKG QRS DURATION: 58 MS
EKG QTC CALCULATION (BAZETT): 460 MS
EKG R AXIS: 16 DEGREES
EKG T AXIS: 42 DEGREES
EKG VENTRICULAR RATE: 94 BPM

## 2023-06-15 PROCEDURE — 93010 ELECTROCARDIOGRAM REPORT: CPT | Performed by: INTERNAL MEDICINE

## 2023-06-28 ENCOUNTER — OFFICE VISIT (OUTPATIENT)
Dept: CARDIOTHORACIC SURGERY | Age: 48
End: 2023-06-28

## 2023-06-28 ENCOUNTER — OFFICE VISIT (OUTPATIENT)
Dept: FAMILY MEDICINE CLINIC | Age: 48
End: 2023-06-28
Payer: MEDICARE

## 2023-06-28 VITALS
DIASTOLIC BLOOD PRESSURE: 83 MMHG | WEIGHT: 183.8 LBS | SYSTOLIC BLOOD PRESSURE: 116 MMHG | TEMPERATURE: 98.7 F | HEART RATE: 101 BPM | BODY MASS INDEX: 28.85 KG/M2 | HEIGHT: 67 IN

## 2023-06-28 VITALS
HEART RATE: 100 BPM | SYSTOLIC BLOOD PRESSURE: 121 MMHG | WEIGHT: 184 LBS | DIASTOLIC BLOOD PRESSURE: 88 MMHG | HEIGHT: 67 IN | RESPIRATION RATE: 17 BRPM | TEMPERATURE: 98.8 F | BODY MASS INDEX: 28.88 KG/M2 | OXYGEN SATURATION: 99 %

## 2023-06-28 DIAGNOSIS — K20.80 PILL ESOPHAGITIS DUE TO POTASSIUM CHLORIDE: ICD-10-CM

## 2023-06-28 DIAGNOSIS — Z76.0 MEDICATION REFILL: ICD-10-CM

## 2023-06-28 DIAGNOSIS — G89.18 POST-OP PAIN: Primary | ICD-10-CM

## 2023-06-28 DIAGNOSIS — T50.3X5A PILL ESOPHAGITIS DUE TO POTASSIUM CHLORIDE: ICD-10-CM

## 2023-06-28 DIAGNOSIS — R73.03 PREDIABETES: Primary | ICD-10-CM

## 2023-06-28 LAB — HBA1C MFR BLD: 6.2 %

## 2023-06-28 PROCEDURE — 99024 POSTOP FOLLOW-UP VISIT: CPT | Performed by: NURSE PRACTITIONER

## 2023-06-28 PROCEDURE — G8427 DOCREV CUR MEDS BY ELIG CLIN: HCPCS | Performed by: STUDENT IN AN ORGANIZED HEALTH CARE EDUCATION/TRAINING PROGRAM

## 2023-06-28 PROCEDURE — G8419 CALC BMI OUT NRM PARAM NOF/U: HCPCS | Performed by: STUDENT IN AN ORGANIZED HEALTH CARE EDUCATION/TRAINING PROGRAM

## 2023-06-28 PROCEDURE — 1036F TOBACCO NON-USER: CPT | Performed by: STUDENT IN AN ORGANIZED HEALTH CARE EDUCATION/TRAINING PROGRAM

## 2023-06-28 PROCEDURE — 99213 OFFICE O/P EST LOW 20 MIN: CPT | Performed by: STUDENT IN AN ORGANIZED HEALTH CARE EDUCATION/TRAINING PROGRAM

## 2023-06-28 RX ORDER — OMEPRAZOLE 40 MG/1
40 CAPSULE, DELAYED RELEASE ORAL DAILY
Qty: 30 CAPSULE | Refills: 2 | Status: SHIPPED | OUTPATIENT
Start: 2023-06-28

## 2023-06-28 ASSESSMENT — PATIENT HEALTH QUESTIONNAIRE - PHQ9
6. FEELING BAD ABOUT YOURSELF - OR THAT YOU ARE A FAILURE OR HAVE LET YOURSELF OR YOUR FAMILY DOWN: 0
SUM OF ALL RESPONSES TO PHQ QUESTIONS 1-9: 14
7. TROUBLE CONCENTRATING ON THINGS, SUCH AS READING THE NEWSPAPER OR WATCHING TELEVISION: 2
10. IF YOU CHECKED OFF ANY PROBLEMS, HOW DIFFICULT HAVE THESE PROBLEMS MADE IT FOR YOU TO DO YOUR WORK, TAKE CARE OF THINGS AT HOME, OR GET ALONG WITH OTHER PEOPLE: 1
SUM OF ALL RESPONSES TO PHQ QUESTIONS 1-9: 14
2. FEELING DOWN, DEPRESSED OR HOPELESS: 2
4. FEELING TIRED OR HAVING LITTLE ENERGY: 2
9. THOUGHTS THAT YOU WOULD BE BETTER OFF DEAD, OR OF HURTING YOURSELF: 0
1. LITTLE INTEREST OR PLEASURE IN DOING THINGS: 2
SUM OF ALL RESPONSES TO PHQ QUESTIONS 1-9: 14
8. MOVING OR SPEAKING SO SLOWLY THAT OTHER PEOPLE COULD HAVE NOTICED. OR THE OPPOSITE, BEING SO FIGETY OR RESTLESS THAT YOU HAVE BEEN MOVING AROUND A LOT MORE THAN USUAL: 2
3. TROUBLE FALLING OR STAYING ASLEEP: 2
SUM OF ALL RESPONSES TO PHQ9 QUESTIONS 1 & 2: 4
SUM OF ALL RESPONSES TO PHQ QUESTIONS 1-9: 14
5. POOR APPETITE OR OVEREATING: 2

## 2023-07-07 DIAGNOSIS — I50.32 CHRONIC DIASTOLIC CONGESTIVE HEART FAILURE (HCC): ICD-10-CM

## 2023-07-07 DIAGNOSIS — E78.00 ELEVATED LDL CHOLESTEROL LEVEL: ICD-10-CM

## 2023-07-07 DIAGNOSIS — R73.03 PREDIABETES: ICD-10-CM

## 2023-07-07 DIAGNOSIS — I50.22 CHRONIC SYSTOLIC (CONGESTIVE) HEART FAILURE (HCC): ICD-10-CM

## 2023-07-10 RX ORDER — ATORVASTATIN CALCIUM 20 MG/1
20 TABLET, FILM COATED ORAL DAILY
Qty: 30 TABLET | Refills: 10 | Status: SHIPPED | OUTPATIENT
Start: 2023-07-10

## 2023-07-10 NOTE — TELEPHONE ENCOUNTER
E-scribe request for LIPITOR 20 MG. Please review and e-scribe if applicable.      Last Visit Date:  6/28/2023  Next Visit Date:  12/29/2023    Hemoglobin A1C (%)   Date Value   06/28/2023 6.2   11/04/2022 6.5 (H)   01/07/2022 5.8             ( goal A1C is < 7)   No results found for: LABMICR  LDL Cholesterol (mg/dL)   Date Value   06/14/2022 68       (goal LDL is <100)   AST (U/L)   Date Value   05/16/2022 28     ALT (U/L)   Date Value   05/16/2022 38 (H)     BUN (mg/dL)   Date Value   06/13/2023 10     BP Readings from Last 3 Encounters:   06/28/23 116/83   06/28/23 121/88   06/13/23 121/81          (goal 120/80)        Patient Active Problem List:     Pulmonary edema, acute, with congestive heart failure (HCC)     Anxiety     Microcytic anemia     History of drug abuse (HCC)     Chronic diastolic congestive heart failure (HCC)     Severe mitral valve regurgitation     Severe mitral regurgitation by prior echocardiogram     S/P mitral valve replacement     S/P left atrial appendage ligation     Pneumonia due to organism     Recurrent major depressive disorder, in partial remission (HCC)     Lump or mass in breast     Chronic systolic (congestive) heart failure     COVID-19     Elevated LDL cholesterol level     Prediabetes     Urinary frequency     Type 2 diabetes mellitus without complication, without long-term current use of insulin ([address])     Vaginal discharge     Abnormal screening mammogram      ----JF

## 2023-08-02 ENCOUNTER — TELEPHONE (OUTPATIENT)
Dept: FAMILY MEDICINE CLINIC | Age: 48
End: 2023-08-02

## 2023-08-02 NOTE — TELEPHONE ENCOUNTER
Patient called office asking if office received form from [facility] for patient's dental cleaning. No paperwork was found. Writer attempted to contact dentist to ask exactly what was needed, and office is closed until Thursday. Will call them at [phone] to find out what is needed from our office.

## 2023-08-14 NOTE — TELEPHONE ENCOUNTER
Patient called this morning about her clearance forms. Writer spoke with MA from other message and MA is going to call and check on the clearance for patient.

## 2023-09-18 DIAGNOSIS — K20.80 PILL ESOPHAGITIS DUE TO POTASSIUM CHLORIDE: ICD-10-CM

## 2023-09-18 DIAGNOSIS — Z76.0 MEDICATION REFILL: ICD-10-CM

## 2023-09-18 DIAGNOSIS — T50.3X5A PILL ESOPHAGITIS DUE TO POTASSIUM CHLORIDE: ICD-10-CM

## 2023-09-18 RX ORDER — OMEPRAZOLE 40 MG/1
40 CAPSULE, DELAYED RELEASE ORAL DAILY
Qty: 30 CAPSULE | Refills: 1 | Status: SHIPPED | OUTPATIENT
Start: 2023-09-18

## 2023-09-18 NOTE — TELEPHONE ENCOUNTER
[facility]            Patient Active Problem List:     Pulmonary edema, acute, with congestive heart failure (HCC)     Anxiety     Microcytic anemia     History of drug abuse (HCC)     Chronic diastolic congestive heart failure (HCC)     Severe mitral valve regurgitation     Severe mitral regurgitation by prior echocardiogram     S/P mitral valve replacement     S/P left atrial appendage ligation     Pneumonia due to organism     Recurrent major depressive disorder, in partial remission ([address])     Lump or mass in breast     Chronic systolic (congestive) heart failure     COVID-19     Elevated LDL cholesterol level     Prediabetes     Urinary frequency     Type 2 diabetes mellitus without complication, without long-term current use of insulin ([address])     Vaginal discharge     Abnormal screening mammogram

## 2023-10-06 ENCOUNTER — OFFICE VISIT (OUTPATIENT)
Dept: FAMILY MEDICINE CLINIC | Age: 48
End: 2023-10-06
Payer: MEDICARE

## 2023-10-06 VITALS
DIASTOLIC BLOOD PRESSURE: 81 MMHG | HEART RATE: 114 BPM | HEIGHT: 67 IN | TEMPERATURE: 99.2 F | SYSTOLIC BLOOD PRESSURE: 128 MMHG | WEIGHT: 181.7 LBS | BODY MASS INDEX: 28.52 KG/M2 | OXYGEN SATURATION: 98 %

## 2023-10-06 DIAGNOSIS — B96.89 ACUTE BACTERIAL SINUSITIS: ICD-10-CM

## 2023-10-06 DIAGNOSIS — R00.0 TACHYCARDIA WITH HEART RATE 100-120 BEATS PER MINUTE: Primary | ICD-10-CM

## 2023-10-06 DIAGNOSIS — J01.90 ACUTE BACTERIAL SINUSITIS: ICD-10-CM

## 2023-10-06 PROBLEM — K21.9 GASTROESOPHAGEAL REFLUX DISEASE: Status: ACTIVE | Noted: 2021-12-20

## 2023-10-06 PROBLEM — R07.9 CHEST PAIN, UNSPECIFIED: Status: ACTIVE | Noted: 2023-05-14

## 2023-10-06 PROCEDURE — 99213 OFFICE O/P EST LOW 20 MIN: CPT

## 2023-10-06 PROCEDURE — G8427 DOCREV CUR MEDS BY ELIG CLIN: HCPCS

## 2023-10-06 PROCEDURE — 1036F TOBACCO NON-USER: CPT

## 2023-10-06 PROCEDURE — G8419 CALC BMI OUT NRM PARAM NOF/U: HCPCS

## 2023-10-06 PROCEDURE — G8484 FLU IMMUNIZE NO ADMIN: HCPCS

## 2023-10-06 RX ORDER — AMOXICILLIN 500 MG/1
CAPSULE ORAL
COMMUNITY
Start: 2023-10-05

## 2023-10-06 RX ORDER — ARIPIPRAZOLE 400 MG
KIT INTRAMUSCULAR
COMMUNITY
Start: 2023-10-03

## 2023-10-06 RX ORDER — BUSPIRONE HYDROCHLORIDE 15 MG/1
15 TABLET ORAL 3 TIMES DAILY
COMMUNITY
Start: 2023-10-03

## 2023-10-06 ASSESSMENT — ENCOUNTER SYMPTOMS
ABDOMINAL PAIN: 0
ABDOMINAL DISTENTION: 0
SORE THROAT: 0
SINUS PAIN: 1
RHINORRHEA: 0
WHEEZING: 0
CHEST TIGHTNESS: 0
COUGH: 1
SHORTNESS OF BREATH: 0
SINUS PRESSURE: 1

## 2023-10-09 ENCOUNTER — TELEPHONE (OUTPATIENT)
Dept: FAMILY MEDICINE CLINIC | Age: 48
End: 2023-10-09

## 2023-10-09 NOTE — TELEPHONE ENCOUNTER
----- Message from Lore Cotton MD sent at 10/6/2023  5:26 PM EDT -----  Please call patient and schedule follow up in the clinic sometime next week for her tachycardia.  Thank you

## 2023-10-10 ENCOUNTER — OFFICE VISIT (OUTPATIENT)
Dept: FAMILY MEDICINE CLINIC | Age: 48
End: 2023-10-10
Payer: MEDICARE

## 2023-10-10 ENCOUNTER — HOSPITAL ENCOUNTER (OUTPATIENT)
Age: 48
Setting detail: SPECIMEN
Discharge: HOME | End: 2023-10-10

## 2023-10-10 VITALS
DIASTOLIC BLOOD PRESSURE: 80 MMHG | BODY MASS INDEX: 28.72 KG/M2 | TEMPERATURE: 98 F | SYSTOLIC BLOOD PRESSURE: 113 MMHG | HEIGHT: 67 IN | HEART RATE: 101 BPM | WEIGHT: 183 LBS

## 2023-10-10 DIAGNOSIS — R30.0 DYSURIA: Primary | ICD-10-CM

## 2023-10-10 DIAGNOSIS — Z71.6 ENCOUNTER FOR SMOKING CESSATION COUNSELING: ICD-10-CM

## 2023-10-10 DIAGNOSIS — R30.0 DYSURIA: ICD-10-CM

## 2023-10-10 DIAGNOSIS — I50.32 CHRONIC DIASTOLIC CONGESTIVE HEART FAILURE (HCC): ICD-10-CM

## 2023-10-10 PROCEDURE — 1036F TOBACCO NON-USER: CPT

## 2023-10-10 PROCEDURE — G8419 CALC BMI OUT NRM PARAM NOF/U: HCPCS

## 2023-10-10 PROCEDURE — G8484 FLU IMMUNIZE NO ADMIN: HCPCS

## 2023-10-10 PROCEDURE — G8427 DOCREV CUR MEDS BY ELIG CLIN: HCPCS

## 2023-10-10 PROCEDURE — 99213 OFFICE O/P EST LOW 20 MIN: CPT

## 2023-10-10 RX ORDER — CEPHALEXIN 500 MG/1
500 CAPSULE ORAL 2 TIMES DAILY
Qty: 14 CAPSULE | Refills: 0 | Status: SHIPPED | OUTPATIENT
Start: 2023-10-10 | End: 2023-10-17

## 2023-10-10 RX ORDER — ASPIRIN 325 MG
325 TABLET ORAL DAILY
Qty: 30 TABLET | Refills: 1 | Status: SHIPPED | OUTPATIENT
Start: 2023-10-10

## 2023-10-10 RX ORDER — VARENICLINE TARTRATE 0.5 MG/1
.5-1 TABLET, FILM COATED ORAL SEE ADMIN INSTRUCTIONS
Qty: 57 TABLET | Refills: 0 | Status: SHIPPED | OUTPATIENT
Start: 2023-10-10

## 2023-10-10 ASSESSMENT — ENCOUNTER SYMPTOMS
CONSTIPATION: 0
ABDOMINAL PAIN: 0
VOMITING: 0
COUGH: 0
SORE THROAT: 0
SHORTNESS OF BREATH: 0
DIARRHEA: 0
NAUSEA: 0

## 2023-10-10 NOTE — PATIENT INSTRUCTIONS
Thank you for letting us take care of you today. We hope all your questions were addressed. If a question was overlooked or something else comes to mind after you return home, please contact a member of your Care Team listed below. Your Care Team at [address] [place] [address] is Team #  Katrin Welch, (Faculty)  Deepak Jacques (Resident)  Marylou Marques (Resident)  Carol Mcfarlane (Resident)   Darryl Toure (Resident)  Ranjeet Wiggins (Resident)  Delroy Combs., [facility]  Neela Ryan., [facility], [facility]  Hilda Hardwick, [facility]  Elizabeth Holden, [facility]  Connor Munoz, [facility]  LaJoyce Zetta Felty) [place], [place] (Clinical Practice Manager)  Lupis Goldstein, [address] [place] [address] (Clinical Pharmacist)     Office phone number: [phone]    If you need to get in right away due to illness, please be advised we have \"Same Day\" appointments available Monday-Friday. Please call us at [phone] option #3 to schedule your \"Same Day\" appointment.

## 2023-10-10 NOTE — PROGRESS NOTES
Attending Physician Statement  I have discussed the care of [facility], including pertinent history and exam findings,  with the resident. I have seen and examined the patient and the key elements of all parts of the encounter have been performed by me. I agree with the assessment, plan and orders as documented by the resident.   (Daniel De Paz)    Laveda Gowers, MD

## 2023-10-10 NOTE — PROGRESS NOTES
Visit Information    Have you changed or started any medications since your last visit including any over-the-counter medicines, vitamins, or herbal medicines? no   Have you stopped taking any of your medications? Is so, why? -  no  Are you having any side effects from any of your medications? - no    Have you seen any other physician or provider since your last visit?  no   Have you had any other diagnostic tests since your last visit? yes - XR   Have you been seen in the emergency room and/or had an admission in a hospital since we last saw you?  yes - [facility]   Have you had your routine dental cleaning in the past 6 months?  no     Do you have an active MyChart account? If no, what is the barrier?   yes    Patient Care Team:  Luiz Hinkle MD as PCP - General (Family Medicine)    Medical History Review  Past Medical, Family, and Social History reviewed and does contribute to the patient presenting condition    Health Maintenance   Topic Date Due    Hepatitis B vaccine (1 of 3 - 3-dose series) Never done    Pneumococcal 0-64 years Vaccine (1 - PCV) Never done    DTaP/Tdap/Td vaccine (1 - Tdap) Never done    COVID-19 Vaccine (3 - Pfizer series) 08/25/2022    Colorectal Cancer Screen  Never done    Lipids  06/14/2023    Flu vaccine (1) 08/01/2023    Diabetic foot exam  06/28/2024 (Originally 8/31/1987)    Diabetic Alb to Cr ratio (uACR) test  06/28/2024 (Originally 8/31/1995)    Diabetic retinal exam  07/07/2024 (Originally 8/31/1995)    GFR test (Diabetes, CKD 3-4, OR last GFR 15-59)  06/13/2024    A1C test (Diabetic or Prediabetic)  06/28/2024    Depression Monitoring  06/28/2024    Cervical cancer screen  01/26/2028    Hepatitis C screen  Completed    HIV screen  Completed    Hepatitis A vaccine  Aged Out    Hib vaccine  Aged Out    HPV vaccine  Aged Out    Meningococcal (ACWY) vaccine  Aged Out    Depression Screen  Discontinued

## 2023-10-10 NOTE — PROGRESS NOTES
Lo Castillo (:  1975) is a 57 y.o. female,Established patient, here for evaluation of the following chief complaint(s):  Follow-Up from Hospital (Follow up from hosptital  for  Pneumonia  )    ASSESSMENT/PLAN:    1. Dysuria  -     Culture, Urine; Future  -     cephALEXin (KEFLEX) 500 MG capsule; Take 1 capsule by mouth 2 times daily for 7 days, Disp-14 capsule, R-0Normal  2. Chronic diastolic congestive heart failure (HCC)  -     aspirin 325 MG tablet; Take 1 tablet by mouth daily, Disp-30 tablet, R-1Normal  3. Encounter for smoking cessation counseling  -     varenicline (CHANTIX) 0.5 MG tablet; Take 1-2 tablets by mouth See Admin Instructions 0.5mg DAILY for 3 days followed by 0.5mg TWICE DAILY for 4 days followed by 1mg TWICE DAILY, Disp-57 tablet, R-0Normal    Patient did have some tachycardia however this tachycardia is not new for patient. Patient is taking her blood pressure medication as prescribed blood pressure is 113/80. Patient denies any chest pain or shortness of breath. For patient's urinary tract infection we will send in Keflex and repeat a urine culture to obtain sensitivities. Return in about 4 weeks (around 2023), or if symptoms worsen or fail to improve, for UTI. Subjective     SUBJECTIVE/OBJECTIVE:    HPI    Mr. Cecy Piña, 45-year-old -American female with medical history of mitral valve regurgitation s/p valve replacement with subsequent development of endocarditis requiring additional valve replacement, h/o IVDA (reportedly last used 2 years ago), HFpEF (EF 50-55% 2022), DM, and HTN. Presents to the [address] [place] [address] medicine clinic today for pneumonia and UTI follow-up. Patient was recently seen in the ED. Had CT chest completed. Findings concerning for pneumonia. Was prescribed doxycycline. Patient has been taking. States that shortness of breath is improved. Denies any fevers or chills. Patient was also found to have UTI.   Patient

## 2023-10-11 ENCOUNTER — TELEPHONE (OUTPATIENT)
Dept: FAMILY MEDICINE CLINIC | Age: 48
End: 2023-10-11

## 2023-10-11 LAB
MICROORGANISM SPEC CULT: NO GROWTH
SPECIMEN DESCRIPTION: NORMAL

## 2023-10-11 NOTE — TELEPHONE ENCOUNTER
[facility] is calling stating [facility] and it says \"no growth\". Patient wants to know if she should still take the antibiotics. She hasn't picked up the medication yet.

## 2023-10-12 NOTE — TELEPHONE ENCOUNTER
Patient called in this morning about message below. Writer informed her of DR. Jeannette Graff message, patient states she understood.

## 2023-11-13 DIAGNOSIS — T50.3X5A PILL ESOPHAGITIS DUE TO POTASSIUM CHLORIDE: ICD-10-CM

## 2023-11-13 DIAGNOSIS — Z76.0 MEDICATION REFILL: ICD-10-CM

## 2023-11-13 DIAGNOSIS — K20.80 PILL ESOPHAGITIS DUE TO POTASSIUM CHLORIDE: ICD-10-CM

## 2023-11-13 RX ORDER — OMEPRAZOLE 40 MG/1
40 CAPSULE, DELAYED RELEASE ORAL DAILY
Qty: 30 CAPSULE | Refills: 0 | Status: SHIPPED | OUTPATIENT
Start: 2023-11-13 | End: 2023-12-11

## 2023-11-13 NOTE — TELEPHONE ENCOUNTER
E-scribe request for PRILOSE. Please review and e-scribe if applicable.     Last Visit Date:  10/10/2023  Next Visit Date:  12/1/2023    Hemoglobin A1C (%)   Date Value   06/28/2023 6.2   11/04/2022 6.5 (H)   01/07/2022 5.8             ( goal A1C is < 7)   No components found for: \"LABMICR\"  LDL Cholesterol (mg/dL)   Date Value   06/14/2022 68       (goal LDL is <100)   AST (U/L)   Date Value   05/16/2022 28     ALT (U/L)   Date Value   05/16/2022 38 (H)     BUN (mg/dL)   Date Value   06/13/2023 10     BP Readings from Last 3 Encounters:   10/10/23 113/80   10/06/23 128/81   06/28/23 116/83          (goal 120/80)        Patient Active Problem List:     Pulmonary edema, acute, with congestive heart failure (HCC)     Anxiety     Microcytic anemia     History of drug abuse (HCC)     Chronic diastolic congestive heart failure (HCC)     Severe mitral valve regurgitation     Severe mitral regurgitation by prior echocardiogram     S/P mitral valve replacement     S/P left atrial appendage ligation     Pneumonia due to organism     Recurrent major depressive disorder, in partial remission (HCC)     Lump or mass in breast     Chronic systolic (congestive) heart failure     COVID-19     Elevated LDL cholesterol level     Prediabetes     Urinary frequency     Type 2 diabetes mellitus without complication, without long-term current use of insulin (HCC)     Vaginal discharge     Abnormal screening mammogram     Gastroesophageal reflux disease     Chest pain, unspecified      ----JF

## 2023-11-15 DIAGNOSIS — E87.6 HYPOKALEMIA: ICD-10-CM

## 2023-11-15 DIAGNOSIS — Z95.2 S/P MITRAL VALVE REPLACEMENT: Chronic | ICD-10-CM

## 2023-11-15 RX ORDER — FUROSEMIDE 20 MG/1
TABLET ORAL
Qty: 60 TABLET | Refills: 0 | Status: SHIPPED | OUTPATIENT
Start: 2023-11-15

## 2023-11-15 RX ORDER — POTASSIUM CHLORIDE 20 MEQ/1
20 TABLET, EXTENDED RELEASE ORAL 2 TIMES DAILY
Qty: 180 TABLET | Refills: 0 | Status: SHIPPED | OUTPATIENT
Start: 2023-11-15

## 2023-11-15 NOTE — TELEPHONE ENCOUNTER
Last visit: 10/10/23  Last Med refill: 12/6/22  Does patient have enough medication for 72 hours: No:     Next Visit Date:  Future Appointments   Date Time Provider [address]   12/8/2023  8:30 AM Yelena Miller  [facility]- Maintenance   Topic Date Due    Hepatitis B vaccine (1 of 3 - 3-dose series) Never done    Pneumococcal 0-64 years Vaccine (1 - PCV) Never done    DTaP/Tdap/Td vaccine (1 - Tdap) Never done    Colorectal Cancer Screen  Never done    Lipids  06/14/2023    Flu vaccine (1) 08/01/2023    COVID-19 Vaccine (3 - 2023-24 season) 09/01/2023    Diabetic foot exam  06/28/2024 (Originally 8/31/1987)    Diabetic Alb to Cr ratio (uACR) test  06/28/2024 (Originally 8/31/1995)    Diabetic retinal exam  07/07/2024 (Originally 8/31/1995)    GFR test (Diabetes, CKD 3-4, OR last GFR 15-59)  06/13/2024    A1C test (Diabetic or Prediabetic)  06/28/2024    Depression Monitoring  06/28/2024    Cervical cancer screen  01/26/2028    Hepatitis C screen  Completed    HIV screen  Completed    Hepatitis A vaccine  Aged Out    Hib vaccine  Aged Out    HPV vaccine  Aged Out    Meningococcal (ACWY) vaccine  Aged Out    Depression Screen  Discontinued       Hemoglobin A1C (%)   Date Value   06/28/2023 6.2   11/04/2022 6.5 (H)   01/07/2022 5.8             ( goal A1C is < 7)   No components found for: \"LABMICR\"  LDL Cholesterol (mg/dL)   Date Value   06/14/2022 68   01/05/2021 97       (goal LDL is <100)   AST (U/L)   Date Value   05/16/2022 28     ALT (U/L)   Date Value   05/16/2022 38 (H)     BUN (mg/dL)   Date Value   06/13/2023 10     BP Readings from Last 3 Encounters:   10/10/23 113/80   10/06/23 128/81   06/28/23 116/83          (goal 120/80)    All Future Testing planned in [facility]  Lab Frequency Next Occurrence   PAP Smear Once 01/26/2023   Hemoglobin and Hematocrit, Blood, Post Transfusion POST TRANSFUSION    Hemoglobin and Hematocrit, Blood, Post Transfusion POST TRANSFUSION

## 2023-11-28 ENCOUNTER — APPOINTMENT (OUTPATIENT)
Dept: GENERAL RADIOLOGY | Age: 48
End: 2023-11-28
Payer: MEDICARE

## 2023-11-28 ENCOUNTER — APPOINTMENT (OUTPATIENT)
Dept: CT IMAGING | Age: 48
End: 2023-11-28
Payer: MEDICARE

## 2023-11-28 ENCOUNTER — HOSPITAL ENCOUNTER (EMERGENCY)
Age: 48
Discharge: HOME | End: 2023-11-28
Attending: EMERGENCY MEDICINE
Payer: MEDICARE

## 2023-11-28 VITALS
HEART RATE: 94 BPM | TEMPERATURE: 97.2 F | HEIGHT: 67 IN | OXYGEN SATURATION: 100 % | RESPIRATION RATE: 17 BRPM | WEIGHT: 188.93 LBS | BODY MASS INDEX: 29.65 KG/M2 | SYSTOLIC BLOOD PRESSURE: 123 MMHG | DIASTOLIC BLOOD PRESSURE: 87 MMHG

## 2023-11-28 DIAGNOSIS — R07.89 ATYPICAL CHEST PAIN: Primary | ICD-10-CM

## 2023-11-28 LAB
ANION GAP SERPL CALCULATED.3IONS-SCNC: 11 MMOL/L (ref 9–17)
BASOPHILS # BLD: 0.04 K/UL (ref 0–0.2)
BASOPHILS NFR BLD: 1 % (ref 0–2)
BNP SERPL-MCNC: 226 PG/ML
BUN SERPL-MCNC: 6 MG/DL (ref 6–20)
CALCIUM SERPL-MCNC: 8.4 MG/DL (ref 8.6–10.4)
CHLORIDE SERPL-SCNC: 106 MMOL/L (ref 98–107)
CO2 SERPL-SCNC: 19 MMOL/L (ref 20–31)
CREAT SERPL-MCNC: 0.7 MG/DL (ref 0.5–0.9)
D DIMER PPP FEU-MCNC: 0.63 UG/ML FEU (ref 0–0.57)
EOSINOPHIL # BLD: <0.03 K/UL (ref 0–0.44)
EOSINOPHILS RELATIVE PERCENT: 0 % (ref 1–4)
ERYTHROCYTE [DISTWIDTH] IN BLOOD BY AUTOMATED COUNT: 14.4 % (ref 11.8–14.4)
GFR SERPL CREATININE-BSD FRML MDRD: >60 ML/MIN/1.73M2
GLUCOSE SERPL-MCNC: 104 MG/DL (ref 70–99)
HCT VFR BLD AUTO: 38.1 % (ref 36.3–47.1)
HGB BLD-MCNC: 12.5 G/DL (ref 11.9–15.1)
IMM GRANULOCYTES # BLD AUTO: <0.03 K/UL (ref 0–0.3)
IMM GRANULOCYTES NFR BLD: 0 %
LYMPHOCYTES NFR BLD: 1.01 K/UL (ref 1.1–3.7)
LYMPHOCYTES RELATIVE PERCENT: 17 % (ref 24–43)
MCH RBC QN AUTO: 26.5 PG (ref 25.2–33.5)
MCHC RBC AUTO-ENTMCNC: 32.8 G/DL (ref 28.4–34.8)
MCV RBC AUTO: 80.7 FL (ref 82.6–102.9)
MONOCYTES NFR BLD: 0.22 K/UL (ref 0.1–1.2)
MONOCYTES NFR BLD: 4 % (ref 3–12)
NEUTROPHILS NFR BLD: 78 % (ref 36–65)
NEUTS SEG NFR BLD: 4.72 K/UL (ref 1.5–8.1)
NRBC BLD-RTO: 0 PER 100 WBC
PLATELET # BLD AUTO: 351 K/UL (ref 138–453)
PMV BLD AUTO: 9.2 FL (ref 8.1–13.5)
POTASSIUM SERPL-SCNC: 3.5 MMOL/L (ref 3.7–5.3)
RBC # BLD AUTO: 4.72 M/UL (ref 3.95–5.11)
RBC # BLD: ABNORMAL 10*6/UL
SODIUM SERPL-SCNC: 136 MMOL/L (ref 135–144)
TROPONIN I SERPL HS-MCNC: 7 NG/L (ref 0–14)
TROPONIN I SERPL HS-MCNC: 9 NG/L (ref 0–14)
WBC OTHER # BLD: 6 K/UL (ref 3.5–11.3)

## 2023-11-28 PROCEDURE — 85379 FIBRIN DEGRADATION QUANT: CPT

## 2023-11-28 PROCEDURE — 71045 X-RAY EXAM CHEST 1 VIEW: CPT

## 2023-11-28 PROCEDURE — 6370000000 HC RX 637 (ALT 250 FOR IP)

## 2023-11-28 PROCEDURE — 83880 ASSAY OF NATRIURETIC PEPTIDE: CPT

## 2023-11-28 PROCEDURE — 71260 CT THORAX DX C+: CPT

## 2023-11-28 PROCEDURE — 80048 BASIC METABOLIC PNL TOTAL CA: CPT

## 2023-11-28 PROCEDURE — 84484 ASSAY OF TROPONIN QUANT: CPT

## 2023-11-28 PROCEDURE — 6360000004 HC RX CONTRAST MEDICATION

## 2023-11-28 PROCEDURE — 85025 COMPLETE CBC W/AUTO DIFF WBC: CPT

## 2023-11-28 PROCEDURE — 99285 EMERGENCY DEPT VISIT HI MDM: CPT

## 2023-11-28 PROCEDURE — 93005 ELECTROCARDIOGRAM TRACING: CPT | Performed by: EMERGENCY MEDICINE

## 2023-11-28 RX ORDER — ASPIRIN 325 MG
325 TABLET ORAL ONCE
Status: COMPLETED | OUTPATIENT
Start: 2023-11-28 | End: 2023-11-28

## 2023-11-28 RX ADMIN — IOPAMIDOL 75 ML: 755 INJECTION, SOLUTION INTRAVENOUS at 18:19

## 2023-11-28 RX ADMIN — ASPIRIN 325 MG: 325 TABLET ORAL at 15:38

## 2023-11-28 ASSESSMENT — ENCOUNTER SYMPTOMS
ABDOMINAL PAIN: 0
CONSTIPATION: 0
NAUSEA: 0
SHORTNESS OF BREATH: 1
DIARRHEA: 0
COUGH: 0
VOMITING: 0
BACK PAIN: 0
WHEEZING: 0
TROUBLE SWALLOWING: 0

## 2023-11-28 ASSESSMENT — PAIN - FUNCTIONAL ASSESSMENT: PAIN_FUNCTIONAL_ASSESSMENT: 0-10

## 2023-11-28 ASSESSMENT — HEART SCORE: ECG: 0

## 2023-11-28 ASSESSMENT — PAIN SCALES - GENERAL: PAINLEVEL_OUTOF10: 5

## 2023-11-28 ASSESSMENT — PAIN DESCRIPTION - LOCATION: LOCATION: CHEST

## 2023-11-28 NOTE — ED NOTES
Patient presents to ED via triage with complaints of SOB that started this morning and chest pain that started yesterday. Patient states the pain is in her upper chest but does not radiate anywhere. Patient has hx of mitral valve replacement years ago. Patient states she is not on blood thinners and has not been taking her Metoprolol. Patient does not wear oxygen at home and is speaking  in complete sentences. Patient denies headache or abdominal pain. Patient is A&O x4 and connected to full cardiac monitor. IV placed, EKG done, call light within reach.      Domo Ceron, RN  11/28/23 1434 [facility], So Adler RN  11/28/23 4732

## 2023-11-29 LAB
EKG ATRIAL RATE: 109 BPM
EKG P AXIS: 70 DEGREES
EKG P-R INTERVAL: 136 MS
EKG Q-T INTERVAL: 348 MS
EKG QRS DURATION: 60 MS
EKG QTC CALCULATION (BAZETT): 468 MS
EKG R AXIS: 32 DEGREES
EKG T AXIS: 51 DEGREES
EKG VENTRICULAR RATE: 109 BPM

## 2023-11-29 PROCEDURE — 93010 ELECTROCARDIOGRAM REPORT: CPT | Performed by: INTERNAL MEDICINE

## 2023-12-11 ENCOUNTER — HOSPITAL ENCOUNTER (OUTPATIENT)
Age: 48
Setting detail: SPECIMEN
Discharge: HOME | End: 2023-12-11

## 2023-12-11 ENCOUNTER — OFFICE VISIT (OUTPATIENT)
Dept: FAMILY MEDICINE CLINIC | Age: 48
End: 2023-12-11
Payer: MEDICARE

## 2023-12-11 VITALS
DIASTOLIC BLOOD PRESSURE: 81 MMHG | BODY MASS INDEX: 28.51 KG/M2 | SYSTOLIC BLOOD PRESSURE: 123 MMHG | WEIGHT: 182 LBS | HEART RATE: 97 BPM

## 2023-12-11 DIAGNOSIS — Z12.11 ENCOUNTER FOR COLORECTAL CANCER SCREENING: ICD-10-CM

## 2023-12-11 DIAGNOSIS — Z76.0 MEDICATION REFILL: ICD-10-CM

## 2023-12-11 DIAGNOSIS — Z95.2 S/P MITRAL VALVE REPLACEMENT: ICD-10-CM

## 2023-12-11 DIAGNOSIS — Z12.12 ENCOUNTER FOR COLORECTAL CANCER SCREENING: ICD-10-CM

## 2023-12-11 DIAGNOSIS — K20.80 PILL ESOPHAGITIS DUE TO POTASSIUM CHLORIDE: ICD-10-CM

## 2023-12-11 DIAGNOSIS — Z23 NEED FOR INFLUENZA VACCINATION: ICD-10-CM

## 2023-12-11 DIAGNOSIS — T50.3X5A PILL ESOPHAGITIS DUE TO POTASSIUM CHLORIDE: ICD-10-CM

## 2023-12-11 DIAGNOSIS — I50.32 CHRONIC DIASTOLIC CONGESTIVE HEART FAILURE (HCC): ICD-10-CM

## 2023-12-11 DIAGNOSIS — Z09 HOSPITAL DISCHARGE FOLLOW-UP: Primary | ICD-10-CM

## 2023-12-11 DIAGNOSIS — E87.6 HYPOKALEMIA: ICD-10-CM

## 2023-12-11 PROCEDURE — 99213 OFFICE O/P EST LOW 20 MIN: CPT

## 2023-12-11 PROCEDURE — 90686 IIV4 VACC NO PRSV 0.5 ML IM: CPT

## 2023-12-11 RX ORDER — OMEPRAZOLE 40 MG/1
40 CAPSULE, DELAYED RELEASE ORAL DAILY
Qty: 30 CAPSULE | Refills: 0 | Status: SHIPPED | OUTPATIENT
Start: 2023-12-11 | End: 2024-01-09

## 2023-12-11 ASSESSMENT — ENCOUNTER SYMPTOMS
SHORTNESS OF BREATH: 0
ABDOMINAL PAIN: 0

## 2023-12-11 NOTE — PROGRESS NOTES
Subjective:    Phillip Olivia is a 57 y.o. female with  has a past medical history of Anxiety, CHF (congestive heart failure) ([address]), GERD (gastroesophageal reflux disease), History of chest pain, History of echocardiogram, Hypertension, Leg swelling, Migraine, Mitral regurgitation, Mitral valve disease, SOB (shortness of breath), Stomach ulcer, Wellness examination, and Wellness examination. Presented to the office today for:  Chief Complaint   Patient presents with    Orders     Needs an ECHO order, was told family physician had to order, has had 2 open heart surgery - was having chest pains recently     61-year-old female with history of MVR s/p valve replacement x 2, IV drug abuse, HFpEF, diabetes, and hypertension who presents for hospital follow-up. She was in the ED 2 weeks ago for chest pain, palpitations, and shortness of breath, had cardiac and PE workup which were negative. D-dimer at that time was slightly elevated, CT PE showed no evidence of PE. She was also hypokalemic with potassium of 3.5. Today she has no symptoms denies chest pain, SOB, lightheadedness, dizziness, abdominal pain. She does have chronic dyspnea on exertion. She has not seen her cardiologist in years, her last echo was done in February 2022. Unable to see the results of the echo in the chart, but based on other notes it seems her last EF was around 45%. Patient is interested in getting the flu vaccine today. Spoke with patient about colorectal cancer screening given her age, she is interested in getting a colonoscopy. She does have family history of colorectal cancer in her grandmother's sister. Patient says she does not need any medication refills today. Review of Systems   Constitutional:  Negative for chills and fever. Respiratory:  Negative for shortness of breath. Cardiovascular:  Positive for palpitations. Negative for chest pain and leg swelling. Gastrointestinal:  Negative for abdominal pain.

## 2023-12-11 NOTE — TELEPHONE ENCOUNTER
E-scribe request for PRILOSE. Please review and e-scribe if applicable.     Last Visit Date:  10/10/2023  Next Visit Date:  12/11/2023    Hemoglobin A1C (%)   Date Value   06/28/2023 6.2   11/04/2022 6.5 (H)   01/07/2022 5.8             ( goal A1C is < 7)   No components found for: \"LABMICR\"  LDL Cholesterol (mg/dL)   Date Value   06/14/2022 68       (goal LDL is <100)   AST (U/L)   Date Value   05/16/2022 28     ALT (U/L)   Date Value   05/16/2022 38 (H)     BUN (mg/dL)   Date Value   11/28/2023 6     BP Readings from Last 3 Encounters:   11/28/23 123/87   10/10/23 113/80   10/06/23 128/81          (goal 120/80)        Patient Active Problem List:     Pulmonary edema, acute, with congestive heart failure (HCC)     Anxiety     Microcytic anemia     History of drug abuse (HCC)     Chronic diastolic congestive heart failure (HCC)     Severe mitral valve regurgitation     Severe mitral regurgitation by prior echocardiogram     S/P mitral valve replacement     S/P left atrial appendage ligation     Pneumonia due to organism     Recurrent major depressive disorder, in partial remission (HCC)     Lump or mass in breast     Chronic systolic (congestive) heart failure     COVID-19     Elevated LDL cholesterol level     Prediabetes     Urinary frequency     Type 2 diabetes mellitus without complication, without long-term current use of insulin (HCC)     Vaginal discharge     Abnormal screening mammogram     Gastroesophageal reflux disease     Chest pain, unspecified      ----JF

## 2023-12-12 LAB
ANION GAP SERPL CALCULATED.3IONS-SCNC: 9 MMOL/L (ref 9–17)
BUN SERPL-MCNC: 9 MG/DL (ref 6–20)
CALCIUM SERPL-MCNC: 8.4 MG/DL (ref 8.6–10.4)
CHLORIDE SERPL-SCNC: 105 MMOL/L (ref 98–107)
CO2 SERPL-SCNC: 24 MMOL/L (ref 20–31)
CREAT SERPL-MCNC: 0.8 MG/DL (ref 0.5–0.9)
GFR SERPL CREATININE-BSD FRML MDRD: >60 ML/MIN/1.73M2
GLUCOSE SERPL-MCNC: 89 MG/DL (ref 70–99)
POTASSIUM SERPL-SCNC: 3.9 MMOL/L (ref 3.7–5.3)
SODIUM SERPL-SCNC: 138 MMOL/L (ref 135–144)

## 2023-12-15 ENCOUNTER — TELEPHONE (OUTPATIENT)
Dept: FAMILY MEDICINE CLINIC | Age: 48
End: 2023-12-15

## 2023-12-15 DIAGNOSIS — Z12.11 COLON CANCER SCREENING: Primary | ICD-10-CM

## 2023-12-15 NOTE — TELEPHONE ENCOUNTER
Patient called office stating she is unable to find someone to go to the hospital with her for her Colonoscopy. Patient has cancelled her colonoscopy and is asking for a Cologuard instead. Order pended and sent to Dr. Anselmo Andres to sign.

## 2023-12-23 DIAGNOSIS — T50.3X5A PILL ESOPHAGITIS DUE TO POTASSIUM CHLORIDE: ICD-10-CM

## 2023-12-23 DIAGNOSIS — K20.80 PILL ESOPHAGITIS DUE TO POTASSIUM CHLORIDE: ICD-10-CM

## 2023-12-23 DIAGNOSIS — Z76.0 MEDICATION REFILL: ICD-10-CM

## 2023-12-26 RX ORDER — OMEPRAZOLE 40 MG/1
40 CAPSULE, DELAYED RELEASE ORAL DAILY
Qty: 30 CAPSULE | Refills: 10 | OUTPATIENT
Start: 2023-12-26

## 2023-12-26 NOTE — TELEPHONE ENCOUNTER
E-scribe request for prilose. Please review and e-scribe if applicable.     Last Visit Date:  10/6/2023  Next Visit Date:  Visit date not found    Hemoglobin A1C (%)   Date Value   06/28/2023 6.2   11/04/2022 6.5 (H)   01/07/2022 5.8             ( goal A1C is < 7)   No components found for: \"LABMICR\"  LDL Cholesterol (mg/dL)   Date Value   06/14/2022 68       (goal LDL is <100)   AST (U/L)   Date Value   05/16/2022 28     ALT (U/L)   Date Value   05/16/2022 38 (H)     BUN (mg/dL)   Date Value   12/11/2023 9     BP Readings from Last 3 Encounters:   12/13/23 106/73   12/11/23 123/81   11/28/23 123/87          (goal 120/80)        Patient Active Problem List:     Pulmonary edema, acute, with congestive heart failure (HCC)     Anxiety     Microcytic anemia     History of drug abuse (HCC)     Chronic diastolic congestive heart failure (HCC)     Severe mitral valve regurgitation     Severe mitral regurgitation by prior echocardiogram     S/P mitral valve replacement     S/P left atrial appendage ligation     Pneumonia due to organism     Recurrent major depressive disorder, in partial remission (HCC)     Lump or mass in breast     Chronic systolic (congestive) heart failure     COVID-19     Elevated LDL cholesterol level     Prediabetes     Urinary frequency     Type 2 diabetes mellitus without complication, without long-term current use of insulin (HCC)     Vaginal discharge     Abnormal screening mammogram     Gastroesophageal reflux disease     Chest pain, unspecified      ----JF

## 2024-01-08 DIAGNOSIS — Z76.0 MEDICATION REFILL: ICD-10-CM

## 2024-01-08 DIAGNOSIS — T50.3X5A PILL ESOPHAGITIS DUE TO POTASSIUM CHLORIDE: ICD-10-CM

## 2024-01-08 DIAGNOSIS — K20.80 PILL ESOPHAGITIS DUE TO POTASSIUM CHLORIDE: ICD-10-CM

## 2024-01-09 RX ORDER — OMEPRAZOLE 40 MG/1
40 CAPSULE, DELAYED RELEASE ORAL DAILY
Qty: 30 CAPSULE | Refills: 0 | Status: SHIPPED | OUTPATIENT
Start: 2024-01-09 | End: 2024-02-05

## 2024-01-09 NOTE — TELEPHONE ENCOUNTER
Last visit:   Last Med refill:   Does patient have enough medication for 72 hours: No:     Next Visit Date:  Future Appointments   Date Time Provider Department Center   3/8/2024  8:30 AM Yves Mendes DO [facility]       Health Maintenance   Topic Date Due    Hepatitis B vaccine (1 of 3 - 3-dose series) Never done    Pneumococcal 0-64 years Vaccine (1 - PCV) Never done    DTaP/Tdap/Td vaccine (1 - Tdap) Never done    Colorectal Cancer Screen  Never done    Lipids  06/14/2023    Annual Wellness Visit (Medicare Advantage)  Never done    Diabetic foot exam  06/28/2024 (Originally 8/31/1987)    Diabetic Alb to Cr ratio (uACR) test  06/28/2024 (Originally 8/31/1995)    Diabetic retinal exam  07/07/2024 (Originally 8/31/1995)    A1C test (Diabetic or Prediabetic)  06/28/2024    Depression Monitoring  06/28/2024    GFR test (Diabetes, CKD 3-4, OR last GFR 15-59)  12/11/2024    Cervical cancer screen  01/26/2028    Flu vaccine  Completed    COVID-19 Vaccine  Completed    Hepatitis C screen  Completed    HIV screen  Completed    Hepatitis A vaccine  Aged Out    Hib vaccine  Aged Out    HPV vaccine  Aged Out    Polio vaccine  Aged Out    Meningococcal (ACWY) vaccine  Aged Out    Depression Screen  Discontinued       Hemoglobin A1C (%)   Date Value   06/28/2023 6.2   11/04/2022 6.5 (H)   01/07/2022 5.8             ( goal A1C is < 7)   No components found for: \"LABMICR\"  LDL Cholesterol (mg/dL)   Date Value   06/14/2022 68   01/05/2021 97       (goal LDL is <100)   AST (U/L)   Date Value   05/16/2022 28     ALT (U/L)   Date Value   05/16/2022 38 (H)     BUN (mg/dL)   Date Value   12/11/2023 9     BP Readings from Last 3 Encounters:   12/13/23 106/73   12/11/23 123/81   11/28/23 123/87          (goal 120/80)    All Future Testing planned in [facility]  Lab Frequency Next Occurrence   PAP Smear Once 01/26/2023   Hemoglobin and Hematocrit, Blood, Post Transfusion POST TRANSFUSION    Hemoglobin and Hematocrit, Blood,  Rotation Flap Text: The defect edges were debeveled with a #15 scalpel blade.  Given the location of the defect, shape of the defect and the proximity to free margins a rotation flap was deemed most appropriate.  Using a sterile surgical marker, an appropriate rotation flap was drawn incorporating the defect and placing the expected incisions within the relaxed skin tension lines where possible.    The area thus outlined was incised deep to adipose tissue with a #15 scalpel blade.  The skin margins were undermined to an appropriate distance in all directions utilizing iris scissors.

## 2024-01-11 ENCOUNTER — OFFICE VISIT (OUTPATIENT)
Dept: FAMILY MEDICINE CLINIC | Age: 49
End: 2024-01-11
Payer: MEDICARE

## 2024-01-11 VITALS
HEIGHT: 67 IN | TEMPERATURE: 97.5 F | HEART RATE: 92 BPM | SYSTOLIC BLOOD PRESSURE: 110 MMHG | BODY MASS INDEX: 28.25 KG/M2 | OXYGEN SATURATION: 97 % | WEIGHT: 180 LBS | DIASTOLIC BLOOD PRESSURE: 74 MMHG

## 2024-01-11 DIAGNOSIS — J01.90 ACUTE VIRAL SINUSITIS: Primary | ICD-10-CM

## 2024-01-11 DIAGNOSIS — B97.89 ACUTE VIRAL SINUSITIS: Primary | ICD-10-CM

## 2024-01-11 PROCEDURE — G8482 FLU IMMUNIZE ORDER/ADMIN: HCPCS

## 2024-01-11 PROCEDURE — G8427 DOCREV CUR MEDS BY ELIG CLIN: HCPCS

## 2024-01-11 PROCEDURE — 99213 OFFICE O/P EST LOW 20 MIN: CPT

## 2024-01-11 PROCEDURE — 1036F TOBACCO NON-USER: CPT

## 2024-01-11 PROCEDURE — G8419 CALC BMI OUT NRM PARAM NOF/U: HCPCS

## 2024-01-11 RX ORDER — FLUTICASONE PROPIONATE 50 MCG
2 SPRAY, SUSPENSION (ML) NASAL DAILY
Qty: 16 G | Refills: 0 | Status: SHIPPED | OUTPATIENT
Start: 2024-01-11 | End: 2024-01-11

## 2024-01-11 RX ORDER — FLUTICASONE PROPIONATE 50 MCG
2 SPRAY, SUSPENSION (ML) NASAL DAILY
Qty: 16 G | Refills: 0 | Status: SHIPPED | OUTPATIENT
Start: 2024-01-11

## 2024-01-11 RX ORDER — BENZONATATE 100 MG/1
100 CAPSULE ORAL 3 TIMES DAILY PRN
Qty: 30 CAPSULE | Refills: 0 | Status: SHIPPED | OUTPATIENT
Start: 2024-01-11 | End: 2024-01-11

## 2024-01-11 RX ORDER — BENZONATATE 100 MG/1
100 CAPSULE ORAL 3 TIMES DAILY PRN
Qty: 30 CAPSULE | Refills: 0 | Status: SHIPPED | OUTPATIENT
Start: 2024-01-11 | End: 2024-01-21

## 2024-01-11 ASSESSMENT — PATIENT HEALTH QUESTIONNAIRE - PHQ9
2. FEELING DOWN, DEPRESSED OR HOPELESS: 1
5. POOR APPETITE OR OVEREATING: 1
SUM OF ALL RESPONSES TO PHQ9 QUESTIONS 1 & 2: 2
SUM OF ALL RESPONSES TO PHQ QUESTIONS 1-9: 8
8. MOVING OR SPEAKING SO SLOWLY THAT OTHER PEOPLE COULD HAVE NOTICED. OR THE OPPOSITE, BEING SO FIGETY OR RESTLESS THAT YOU HAVE BEEN MOVING AROUND A LOT MORE THAN USUAL: 0
9. THOUGHTS THAT YOU WOULD BE BETTER OFF DEAD, OR OF HURTING YOURSELF: 0
3. TROUBLE FALLING OR STAYING ASLEEP: 2
6. FEELING BAD ABOUT YOURSELF - OR THAT YOU ARE A FAILURE OR HAVE LET YOURSELF OR YOUR FAMILY DOWN: 1
7. TROUBLE CONCENTRATING ON THINGS, SUCH AS READING THE NEWSPAPER OR WATCHING TELEVISION: 0
SUM OF ALL RESPONSES TO PHQ QUESTIONS 1-9: 8
4. FEELING TIRED OR HAVING LITTLE ENERGY: 2
SUM OF ALL RESPONSES TO PHQ QUESTIONS 1-9: 8
1. LITTLE INTEREST OR PLEASURE IN DOING THINGS: 1
SUM OF ALL RESPONSES TO PHQ QUESTIONS 1-9: 8

## 2024-01-11 ASSESSMENT — ENCOUNTER SYMPTOMS
WHEEZING: 1
SINUS PRESSURE: 1
SHORTNESS OF BREATH: 1
COUGH: 1
SORE THROAT: 1
SINUS PAIN: 1
RHINORRHEA: 1
ABDOMINAL PAIN: 0

## 2024-01-11 NOTE — PROGRESS NOTES
Attending Physician Statement  I have discussed the care of Mony Castillo, 48 y.o. female,including pertinent history and exam findings,  with the resident Charlie Lopez DO.  History:  Chief Complaint   Patient presents with    Sinus Problem     Patient has been having sinus infection for 3 days      Resident team states that patient does not have any wheezing or abnormal lung sounds and does not have any signs  of heart failure with no lower extremity edema, etc. Resident team that patient states that they are at baseline breathing since their heart surgery.    I have reviewed the key elements of the encounter with the resident. Examination was done by resident as documented in residents note.  BP Readings from Last 3 Encounters:   01/11/24 110/74   12/13/23 106/73   12/11/23 123/81     /74 (Site: Left Upper Arm, Position: Sitting, Cuff Size: Medium Adult)   Pulse 92   Temp 97.5 °F (36.4 °C)   Ht 1.702 m (5' 7\")   Wt 81.6 kg (180 lb)   LMP 12/21/2023   SpO2 97%   BMI 28.19 kg/m²   Lab Results   Component Value Date    WBC 6.0 11/28/2023    HGB 12.5 11/28/2023    HCT 38.1 11/28/2023     11/28/2023    CHOL 191 06/14/2022    TRIG 91 06/14/2022     06/14/2022    ALT 38 (H) 05/16/2022    AST 28 05/16/2022     12/11/2023    K 3.9 12/11/2023     12/11/2023    CREATININE 0.8 12/11/2023    BUN 9 12/11/2023    CO2 24 12/11/2023    TSH 0.83 05/16/2022    INR 1.3 09/28/2021    LABA1C 6.2 06/28/2023     Lab Results   Component Value Date    CALCIUM 8.4 (L) 12/11/2023    PHOS 3.7 11/08/2021     Lab Results   Component Value Date    LDLCHOLESTEROL 68 06/14/2022     I agree with the assessment, plan and diagnosis of    Diagnosis Orders   1. Acute viral sinusitis  fluticasone (FLONASE) 50 MCG/ACT nasal spray    benzonatate (TESSALON) 100 MG capsule    DISCONTINUED: benzonatate (TESSALON) 100 MG capsule    DISCONTINUED: fluticasone (FLONASE) 50 MCG/ACT nasal spray        I agree with

## 2024-01-11 NOTE — PROGRESS NOTES
[facility] - Outpatient Note      Subjective:    Mony Castillo is a 48 y.o. female with  has a past medical history of Anxiety, CHF (congestive heart failure) ([facility]), GERD (gastroesophageal reflux disease), History of chest pain, History of echocardiogram, Hypertension, Leg swelling, Migraine, Mitral regurgitation, Mitral valve disease, SOB (shortness of breath), Stomach ulcer, Wellness examination, and Wellness examination.    Presented to the office today for:  Chief Complaint   Patient presents with    Sinus Problem     Patient has been having sinus infection for 3 days        Patient is a 48-year-old female presenting today for acute sinusitis.  Patient states that she has had sinus symptoms for the past 3 days.  She states that symptoms are improving.  She has been taking ibuprofen which has helped relieve her headache and subjective fever.  Patient states that she has been having drainage in the back of her throat which has led to sore throat.  She has had no drainage from the nostrils or any drainage that is colored.  Patient states that she had pneumonia and a sinus affection back in November.  Patient has a positive review of systems for sinus pressure, sinus pain, postnasal drip, congestion, pharyngitis, and headache.  Patient states that she does have cough, wheezing, and shortness of breath due to her past surgical history of 2 open heart operations.  Patient does not use any tobacco products.              Review of Systems   Constitutional:  Positive for fever. Negative for chills and diaphoresis.   HENT:  Positive for congestion, postnasal drip, rhinorrhea, sinus pressure, sinus pain, sneezing and sore throat. Negative for ear discharge and ear pain.    Respiratory:  Positive for cough, shortness of breath (hx of open heart surgery) and wheezing.    Cardiovascular:  Negative for chest pain and palpitations.   Gastrointestinal:  Negative for

## 2024-01-11 NOTE — PROGRESS NOTES
Visit Information    Have you changed or started any medications since your last visit including any over-the-counter medicines, vitamins, or herbal medicines? no   Have you stopped taking any of your medications? Is so, why? -  no  Are you having any side effects from any of your medications? - no    Have you seen any other physician or provider since your last visit?  yes - General Surgery , Cardiology ,    Have you had any other diagnostic tests since your last visit?  yes - Labs , echo    Have you been seen in the emergency room and/or had an admission in a hospital since we last saw you?  yes - [facility]   Have you had your routine dental cleaning in the past 6 months?  no     Do you have an active MyChart account? If no, what is the barrier?  Yes    Patient Care Team:  Alex Loyd MD as PCP - General (Family Medicine)    Medical History Review  Past Medical, Family, and Social History reviewed and does not contribute to the patient presenting condition    Health Maintenance   Topic Date Due    Hepatitis B vaccine (1 of 3 - 3-dose series) Never done    Pneumococcal 0-64 years Vaccine (1 - PCV) Never done    DTaP/Tdap/Td vaccine (1 - Tdap) Never done    Colorectal Cancer Screen  Never done    Lipids  06/14/2023    Annual Wellness Visit (Medicare Advantage)  Never done    Diabetic foot exam  06/28/2024 (Originally 8/31/1987)    Diabetic Alb to Cr ratio (uACR) test  06/28/2024 (Originally 8/31/1995)    Diabetic retinal exam  07/07/2024 (Originally 8/31/1995)    A1C test (Diabetic or Prediabetic)  06/28/2024    Depression Monitoring  06/28/2024    GFR test (Diabetes, CKD 3-4, OR last GFR 15-59)  12/11/2024    Cervical cancer screen  01/26/2028    Flu vaccine  Completed    COVID-19 Vaccine  Completed    Hepatitis C screen  Completed    HIV screen  Completed    Hepatitis A vaccine  Aged Out    Hib vaccine  Aged Out    HPV vaccine  Aged Out    Polio vaccine  Aged Out    Meningococcal (ACWY) vaccine  Aged

## 2024-01-11 NOTE — PATIENT INSTRUCTIONS
Thank you for letting us take care of you today. We hope all your questions were addressed. If a question was overlooked or something else comes to mind after you return home, please contact a member of your Care Team listed below.      Your Care Team at [facility] is Team #1  Myrtle Meek, Faculty Advisor  Addy Guzmán, Resident Physician  Frederick Goodson, Resident Physician  Laura Leung, Resident Physician  Lydia Rogers, [facility]  Maritza Navarro, [facility]  Channing Campos, [facility]  Genesis Guerra, [facility]  Marci Lopez, [facility]  Umm Avilez, [facility]  Mony Benedict, [facility]  Rosemarie Kearns, [facility]  Isabel (LJ) Errol,   Christine Sinha [facility] (Clinical Pharmacist)     Office phone number: [phone]    If you need to get in right away due to illness, please be advised we have \"Same Day\" appointments available Monday-Friday. Please call us at [phone] option #3 to schedule your \"Same Day\" appointment.

## 2024-02-05 DIAGNOSIS — T50.3X5A PILL ESOPHAGITIS DUE TO POTASSIUM CHLORIDE: ICD-10-CM

## 2024-02-05 DIAGNOSIS — Z76.0 MEDICATION REFILL: ICD-10-CM

## 2024-02-05 DIAGNOSIS — K20.80 PILL ESOPHAGITIS DUE TO POTASSIUM CHLORIDE: ICD-10-CM

## 2024-02-05 RX ORDER — OMEPRAZOLE 40 MG/1
40 CAPSULE, DELAYED RELEASE ORAL DAILY
Qty: 30 CAPSULE | Refills: 0 | Status: SHIPPED | OUTPATIENT
Start: 2024-02-05 | End: 2024-03-04 | Stop reason: SDUPTHER

## 2024-02-08 DIAGNOSIS — Z95.2 S/P MITRAL VALVE REPLACEMENT: Chronic | ICD-10-CM

## 2024-02-08 RX ORDER — FUROSEMIDE 20 MG/1
TABLET ORAL
Qty: 60 TABLET | Refills: 0 | Status: SHIPPED | OUTPATIENT
Start: 2024-02-08 | End: 2024-03-04 | Stop reason: SDUPTHER

## 2024-02-08 NOTE — TELEPHONE ENCOUNTER
Transfusion POST TRANSFUSION                Patient Active Problem List:     Pulmonary edema, acute, with congestive heart failure (HCC)     Anxiety     Microcytic anemia     History of drug abuse (HCC)     Chronic diastolic congestive heart failure (HCC)     Severe mitral valve regurgitation     Severe mitral regurgitation by prior echocardiogram     S/P mitral valve replacement     S/P left atrial appendage ligation     Pneumonia due to organism     Recurrent major depressive disorder, in partial remission ([facility])     Lump or mass in breast     Chronic systolic (congestive) heart failure     COVID-19     Elevated LDL cholesterol level     Prediabetes     Urinary frequency     Type 2 diabetes mellitus without complication, without long-term current use of insulin ([facility])     Vaginal discharge     Abnormal screening mammogram     Gastroesophageal reflux disease     Chest pain, unspecified

## 2024-03-04 DIAGNOSIS — Z76.0 MEDICATION REFILL: ICD-10-CM

## 2024-03-04 DIAGNOSIS — K20.80 PILL ESOPHAGITIS DUE TO POTASSIUM CHLORIDE: ICD-10-CM

## 2024-03-04 DIAGNOSIS — T50.3X5A PILL ESOPHAGITIS DUE TO POTASSIUM CHLORIDE: ICD-10-CM

## 2024-03-04 DIAGNOSIS — E87.6 HYPOKALEMIA: ICD-10-CM

## 2024-03-04 DIAGNOSIS — Z95.2 S/P MITRAL VALVE REPLACEMENT: Chronic | ICD-10-CM

## 2024-03-04 RX ORDER — POTASSIUM CHLORIDE 20 MEQ/1
20 TABLET, EXTENDED RELEASE ORAL 2 TIMES DAILY
Qty: 180 TABLET | Refills: 10 | Status: SHIPPED | OUTPATIENT
Start: 2024-03-04

## 2024-03-04 RX ORDER — FUROSEMIDE 20 MG/1
TABLET ORAL
Qty: 60 TABLET | Refills: 10 | Status: SHIPPED | OUTPATIENT
Start: 2024-03-04

## 2024-03-04 RX ORDER — OMEPRAZOLE 40 MG/1
40 CAPSULE, DELAYED RELEASE ORAL DAILY
Qty: 30 CAPSULE | Refills: 10 | Status: SHIPPED | OUTPATIENT
Start: 2024-03-04

## 2024-03-04 NOTE — TELEPHONE ENCOUNTER
Post Transfusion POST TRANSFUSION    Hemoglobin and Hematocrit, Blood, Post Transfusion POST TRANSFUSION                Patient Active Problem List:     Pulmonary edema, acute, with congestive heart failure (HCC)     Anxiety     Microcytic anemia     History of drug abuse (HCC)     Chronic diastolic congestive heart failure (HCC)     Severe mitral valve regurgitation     Severe mitral regurgitation by prior echocardiogram     S/P mitral valve replacement     S/P left atrial appendage ligation     Pneumonia due to organism     Recurrent major depressive disorder, in partial remission (HCC)     Lump or mass in breast     Chronic systolic (congestive) heart failure     COVID-19     Elevated LDL cholesterol level     Prediabetes     Urinary frequency     Type 2 diabetes mellitus without complication, without long-term current use of insulin (HCC)     Vaginal discharge     Abnormal screening mammogram     Gastroesophageal reflux disease     Chest pain, unspecified

## 2024-03-27 ENCOUNTER — HOSPITAL ENCOUNTER (OUTPATIENT)
Age: 49
Setting detail: SPECIMEN
Discharge: HOME | End: 2024-03-27

## 2024-03-27 ENCOUNTER — OFFICE VISIT (OUTPATIENT)
Dept: FAMILY MEDICINE CLINIC | Age: 49
End: 2024-03-27
Payer: MEDICARE

## 2024-03-27 VITALS
WEIGHT: 174.2 LBS | OXYGEN SATURATION: 98 % | SYSTOLIC BLOOD PRESSURE: 116 MMHG | TEMPERATURE: 97.6 F | HEIGHT: 65 IN | HEART RATE: 104 BPM | BODY MASS INDEX: 29.02 KG/M2 | DIASTOLIC BLOOD PRESSURE: 76 MMHG

## 2024-03-27 DIAGNOSIS — D50.0 IRON DEFICIENCY ANEMIA DUE TO CHRONIC BLOOD LOSS: ICD-10-CM

## 2024-03-27 DIAGNOSIS — R53.83 OTHER FATIGUE: Primary | ICD-10-CM

## 2024-03-27 DIAGNOSIS — Z23 NEED FOR PNEUMOCOCCAL VACCINE: ICD-10-CM

## 2024-03-27 DIAGNOSIS — Z13.0 SCREENING FOR DEFICIENCY ANEMIA: ICD-10-CM

## 2024-03-27 DIAGNOSIS — R53.83 OTHER FATIGUE: ICD-10-CM

## 2024-03-27 DIAGNOSIS — E55.9 VITAMIN D DEFICIENCY: ICD-10-CM

## 2024-03-27 DIAGNOSIS — E11.9 TYPE 2 DIABETES MELLITUS WITHOUT COMPLICATION, WITHOUT LONG-TERM CURRENT USE OF INSULIN (HCC): ICD-10-CM

## 2024-03-27 DIAGNOSIS — Z13.220 SCREENING FOR LIPID DISORDERS: ICD-10-CM

## 2024-03-27 DIAGNOSIS — Z76.0 MEDICATION REFILL: ICD-10-CM

## 2024-03-27 DIAGNOSIS — Z13.29 SCREENING FOR THYROID DISORDER: ICD-10-CM

## 2024-03-27 DIAGNOSIS — Z13.1 SCREENING FOR DIABETES MELLITUS: ICD-10-CM

## 2024-03-27 LAB
25(OH)D3 SERPL-MCNC: 16.3 NG/ML (ref 30–100)
ALBUMIN SERPL-MCNC: 4.2 G/DL (ref 3.5–5.2)
ALBUMIN/GLOB SERPL: 1 {RATIO} (ref 1–2.5)
ALP SERPL-CCNC: 60 U/L (ref 35–104)
ALT SERPL-CCNC: 7 U/L (ref 10–35)
ANION GAP SERPL CALCULATED.3IONS-SCNC: 12 MMOL/L (ref 9–16)
AST SERPL-CCNC: 18 U/L (ref 10–35)
BASOPHILS # BLD: 0.07 K/UL (ref 0–0.2)
BASOPHILS NFR BLD: 1 % (ref 0–2)
BILIRUB SERPL-MCNC: 0.6 MG/DL (ref 0–1.2)
BUN SERPL-MCNC: 6 MG/DL (ref 6–20)
CALCIUM SERPL-MCNC: 8.9 MG/DL (ref 8.6–10.4)
CHLORIDE SERPL-SCNC: 106 MMOL/L (ref 98–107)
CHOLEST SERPL-MCNC: 200 MG/DL (ref 0–199)
CHOLESTEROL/HDL RATIO: 4
CO2 SERPL-SCNC: 23 MMOL/L (ref 20–31)
CREAT SERPL-MCNC: 0.8 MG/DL (ref 0.5–0.9)
EOSINOPHIL # BLD: 0.04 K/UL (ref 0–0.44)
EOSINOPHILS RELATIVE PERCENT: 1 % (ref 1–4)
ERYTHROCYTE [DISTWIDTH] IN BLOOD BY AUTOMATED COUNT: 14.7 % (ref 11.8–14.4)
EST. AVERAGE GLUCOSE BLD GHB EST-MCNC: 137 MG/DL
FERRITIN SERPL-MCNC: 48 NG/ML (ref 13–150)
FOLATE SERPL-MCNC: 6.3 NG/ML (ref 4.8–24.2)
GFR SERPL CREATININE-BSD FRML MDRD: 87 ML/MIN/1.73M2
GLUCOSE SERPL-MCNC: 102 MG/DL (ref 74–99)
HBA1C MFR BLD: 6.4 % (ref 4–6)
HCT VFR BLD AUTO: 43.3 % (ref 36.3–47.1)
HDLC SERPL-MCNC: 49 MG/DL
HGB BLD-MCNC: 13.7 G/DL (ref 11.9–15.1)
IMM GRANULOCYTES # BLD AUTO: <0.03 K/UL (ref 0–0.3)
IMM GRANULOCYTES NFR BLD: 0 %
IRON SATN MFR SERPL: 30 % (ref 20–55)
IRON SERPL-MCNC: 89 UG/DL (ref 37–145)
LDLC SERPL CALC-MCNC: 130 MG/DL (ref 0–100)
LYMPHOCYTES NFR BLD: 1.78 K/UL (ref 1.1–3.7)
LYMPHOCYTES RELATIVE PERCENT: 35 % (ref 24–43)
MCH RBC QN AUTO: 25.6 PG (ref 25.2–33.5)
MCHC RBC AUTO-ENTMCNC: 31.6 G/DL (ref 28.4–34.8)
MCV RBC AUTO: 80.8 FL (ref 82.6–102.9)
MONOCYTES NFR BLD: 0.32 K/UL (ref 0.1–1.2)
MONOCYTES NFR BLD: 6 % (ref 3–12)
NEUTROPHILS NFR BLD: 57 % (ref 36–65)
NEUTS SEG NFR BLD: 2.83 K/UL (ref 1.5–8.1)
NRBC BLD-RTO: 0 PER 100 WBC
PLATELET # BLD AUTO: 386 K/UL (ref 138–453)
PMV BLD AUTO: 10.1 FL (ref 8.1–13.5)
POTASSIUM SERPL-SCNC: 3.9 MMOL/L (ref 3.7–5.3)
PROT SERPL-MCNC: 7.6 G/DL (ref 6.6–8.7)
RBC # BLD AUTO: 5.36 M/UL (ref 3.95–5.11)
RBC # BLD: ABNORMAL 10*6/UL
SODIUM SERPL-SCNC: 141 MMOL/L (ref 136–145)
TIBC SERPL-MCNC: 299 UG/DL (ref 250–450)
TRIGL SERPL-MCNC: 106 MG/DL (ref 0–149)
UNSATURATED IRON BINDING CAPACITY: 210 UG/DL (ref 112–347)
VIT B12 SERPL-MCNC: 484 PG/ML (ref 232–1245)
VLDLC SERPL CALC-MCNC: 21 MG/DL
WBC OTHER # BLD: 5 K/UL (ref 3.5–11.3)

## 2024-03-27 PROCEDURE — 99204 OFFICE O/P NEW MOD 45 MIN: CPT | Performed by: STUDENT IN AN ORGANIZED HEALTH CARE EDUCATION/TRAINING PROGRAM

## 2024-03-27 PROCEDURE — 2022F DILAT RTA XM EVC RTNOPTHY: CPT | Performed by: STUDENT IN AN ORGANIZED HEALTH CARE EDUCATION/TRAINING PROGRAM

## 2024-03-27 PROCEDURE — 3044F HG A1C LEVEL LT 7.0%: CPT | Performed by: STUDENT IN AN ORGANIZED HEALTH CARE EDUCATION/TRAINING PROGRAM

## 2024-03-27 PROCEDURE — G8419 CALC BMI OUT NRM PARAM NOF/U: HCPCS | Performed by: STUDENT IN AN ORGANIZED HEALTH CARE EDUCATION/TRAINING PROGRAM

## 2024-03-27 PROCEDURE — G8427 DOCREV CUR MEDS BY ELIG CLIN: HCPCS | Performed by: STUDENT IN AN ORGANIZED HEALTH CARE EDUCATION/TRAINING PROGRAM

## 2024-03-27 PROCEDURE — 1036F TOBACCO NON-USER: CPT | Performed by: STUDENT IN AN ORGANIZED HEALTH CARE EDUCATION/TRAINING PROGRAM

## 2024-03-27 PROCEDURE — 90677 PCV20 VACCINE IM: CPT | Performed by: STUDENT IN AN ORGANIZED HEALTH CARE EDUCATION/TRAINING PROGRAM

## 2024-03-27 PROCEDURE — G8482 FLU IMMUNIZE ORDER/ADMIN: HCPCS | Performed by: STUDENT IN AN ORGANIZED HEALTH CARE EDUCATION/TRAINING PROGRAM

## 2024-03-27 PROCEDURE — G0009 ADMIN PNEUMOCOCCAL VACCINE: HCPCS | Performed by: STUDENT IN AN ORGANIZED HEALTH CARE EDUCATION/TRAINING PROGRAM

## 2024-03-27 RX ORDER — ERGOCALCIFEROL 1.25 MG/1
CAPSULE ORAL
Qty: 4 CAPSULE | Status: CANCELLED | OUTPATIENT
Start: 2024-03-27

## 2024-03-27 RX ORDER — FERROUS SULFATE 325(65) MG
TABLET ORAL
Qty: 30 TABLET | Refills: 2 | Status: CANCELLED | OUTPATIENT
Start: 2024-03-27

## 2024-03-27 ASSESSMENT — ANXIETY QUESTIONNAIRES
GAD7 TOTAL SCORE: 14
2. NOT BEING ABLE TO STOP OR CONTROL WORRYING: MORE THAN HALF THE DAYS
1. FEELING NERVOUS, ANXIOUS, OR ON EDGE: MORE THAN HALF THE DAYS
5. BEING SO RESTLESS THAT IT IS HARD TO SIT STILL: MORE THAN HALF THE DAYS
4. TROUBLE RELAXING: MORE THAN HALF THE DAYS
IF YOU CHECKED OFF ANY PROBLEMS ON THIS QUESTIONNAIRE, HOW DIFFICULT HAVE THESE PROBLEMS MADE IT FOR YOU TO DO YOUR WORK, TAKE CARE OF THINGS AT HOME, OR GET ALONG WITH OTHER PEOPLE: SOMEWHAT DIFFICULT
6. BECOMING EASILY ANNOYED OR IRRITABLE: MORE THAN HALF THE DAYS
3. WORRYING TOO MUCH ABOUT DIFFERENT THINGS: MORE THAN HALF THE DAYS
7. FEELING AFRAID AS IF SOMETHING AWFUL MIGHT HAPPEN: MORE THAN HALF THE DAYS

## 2024-03-27 ASSESSMENT — PATIENT HEALTH QUESTIONNAIRE - PHQ9
SUM OF ALL RESPONSES TO PHQ QUESTIONS 1-9: 11
4. FEELING TIRED OR HAVING LITTLE ENERGY: NEARLY EVERY DAY
2. FEELING DOWN, DEPRESSED OR HOPELESS: NEARLY EVERY DAY
9. THOUGHTS THAT YOU WOULD BE BETTER OFF DEAD, OR OF HURTING YOURSELF: NOT AT ALL
5. POOR APPETITE OR OVEREATING: SEVERAL DAYS
10. IF YOU CHECKED OFF ANY PROBLEMS, HOW DIFFICULT HAVE THESE PROBLEMS MADE IT FOR YOU TO DO YOUR WORK, TAKE CARE OF THINGS AT HOME, OR GET ALONG WITH OTHER PEOPLE: VERY DIFFICULT
SUM OF ALL RESPONSES TO PHQ QUESTIONS 1-9: 11
3. TROUBLE FALLING OR STAYING ASLEEP: MORE THAN HALF THE DAYS
SUM OF ALL RESPONSES TO PHQ QUESTIONS 1-9: 11
7. TROUBLE CONCENTRATING ON THINGS, SUCH AS READING THE NEWSPAPER OR WATCHING TELEVISION: MORE THAN HALF THE DAYS
SUM OF ALL RESPONSES TO PHQ QUESTIONS 1-9: 11
6. FEELING BAD ABOUT YOURSELF - OR THAT YOU ARE A FAILURE OR HAVE LET YOURSELF OR YOUR FAMILY DOWN: NOT AT ALL
8. MOVING OR SPEAKING SO SLOWLY THAT OTHER PEOPLE COULD HAVE NOTICED. OR THE OPPOSITE, BEING SO FIGETY OR RESTLESS THAT YOU HAVE BEEN MOVING AROUND A LOT MORE THAN USUAL: NOT AT ALL

## 2024-03-27 NOTE — ASSESSMENT & PLAN NOTE
A/P: Uncontrolled  - At this time we will get a vitamin D and iron as well as a TSH.  Will get electrolytes and hemoglobin A1c.  Patient does admit to being more thirsty recently.

## 2024-03-27 NOTE — PROGRESS NOTES
[facility]  [facility]  [address]  [facility] [address]     Date of Visit:  3/28/2024  Patient Name: Mony Castillo   Patient :  1975     CHIEF COMPLAINT:     Mony Castillo is a 48 y.o. female who presents today for an general visit to be evaluated for the following condition(s):  Chief Complaint   Patient presents with    New Patient     Rach Tarif    Fatigue       REVIEW OF SYSTEM      Review of Systems    HISTORY OF PRESENT ILLNESS     HPI  Patient is a 48-year-old female with a past medical history of diastolic cardiomyopathy, severe mitral regurg, recurrent depression and anxiety, history of drug abuse/alcohol and type 2 diabetes presents to the office to establish care.  Fatigue:  Patient states that she has been feeling fatigued for the past month.  She denies any changes in medications, diet, sleep.  She would like her vitamin D rechecked she has not been taking her vitamin D as it was not refilled per patient.  Patient also has not been taking her iron and would like to get her levels checked.    REVIEWED INFORMATION      Allergies   Allergen Reactions    Antihistamines, Chlorpheniramine-Type Rash    Hydroxyzine Anxiety       Patient Active Problem List   Diagnosis    Pulmonary edema, acute, with congestive heart failure (HCC)    Anxiety    Microcytic anemia    History of drug abuse (HCC)    Chronic diastolic congestive heart failure (HCC)    Severe mitral valve regurgitation    Severe mitral regurgitation by prior echocardiogram    S/P mitral valve replacement    S/P left atrial appendage ligation    Pneumonia due to organism    Recurrent major depressive disorder, in partial remission (HCC)    Lump or mass in breast    Chronic systolic (congestive) heart failure    COVID-19    Elevated LDL cholesterol level    Prediabetes    Urinary frequency    Type 2 diabetes mellitus without complication, without long-term current use of insulin (HCC)    Vaginal discharge

## 2024-03-28 PROBLEM — D50.0 IRON DEFICIENCY ANEMIA DUE TO CHRONIC BLOOD LOSS: Status: ACTIVE | Noted: 2024-03-28

## 2024-03-28 PROBLEM — E55.9 VITAMIN D DEFICIENCY: Status: ACTIVE | Noted: 2024-03-28

## 2024-03-29 NOTE — ASSESSMENT & PLAN NOTE
A/P unclear control  - patient admits to increased urination  -will get a repeat hgba1c  -advise patient to follow up with opthomalogist

## 2024-05-02 ENCOUNTER — OFFICE VISIT (OUTPATIENT)
Dept: FAMILY MEDICINE CLINIC | Age: 49
End: 2024-05-02

## 2024-05-02 VITALS
HEART RATE: 88 BPM | BODY MASS INDEX: 30.02 KG/M2 | TEMPERATURE: 97.6 F | WEIGHT: 180.2 LBS | DIASTOLIC BLOOD PRESSURE: 68 MMHG | SYSTOLIC BLOOD PRESSURE: 108 MMHG | OXYGEN SATURATION: 100 % | HEIGHT: 65 IN

## 2024-05-02 DIAGNOSIS — Z12.11 SCREENING FOR COLON CANCER: ICD-10-CM

## 2024-05-02 DIAGNOSIS — G89.29 CHRONIC LEFT-SIDED LOW BACK PAIN WITH LEFT-SIDED SCIATICA: ICD-10-CM

## 2024-05-02 DIAGNOSIS — G89.29 CHRONIC RIGHT SHOULDER PAIN: ICD-10-CM

## 2024-05-02 DIAGNOSIS — M54.42 CHRONIC LEFT-SIDED LOW BACK PAIN WITH LEFT-SIDED SCIATICA: ICD-10-CM

## 2024-05-02 DIAGNOSIS — E61.1 IRON DEFICIENCY: ICD-10-CM

## 2024-05-02 DIAGNOSIS — M25.511 CHRONIC RIGHT SHOULDER PAIN: ICD-10-CM

## 2024-05-02 DIAGNOSIS — E55.9 VITAMIN D DEFICIENCY: ICD-10-CM

## 2024-05-02 DIAGNOSIS — Z00.00 WELCOME TO MEDICARE PREVENTIVE VISIT: Primary | ICD-10-CM

## 2024-05-02 DIAGNOSIS — G47.30 SLEEP APNEA, UNSPECIFIED TYPE: ICD-10-CM

## 2024-05-02 RX ORDER — CHOLECALCIFEROL (VITAMIN D3) 50 MCG
2000 TABLET ORAL DAILY
Qty: 90 TABLET | Refills: 1 | Status: SHIPPED | OUTPATIENT
Start: 2024-05-02 | End: 2024-07-31

## 2024-05-02 RX ORDER — FERROUS SULFATE 325(65) MG
325 TABLET ORAL
Qty: 90 TABLET | Refills: 0 | Status: SHIPPED | OUTPATIENT
Start: 2024-05-02

## 2024-05-02 ASSESSMENT — PATIENT HEALTH QUESTIONNAIRE - PHQ9
6. FEELING BAD ABOUT YOURSELF - OR THAT YOU ARE A FAILURE OR HAVE LET YOURSELF OR YOUR FAMILY DOWN: NOT AT ALL
3. TROUBLE FALLING OR STAYING ASLEEP: NEARLY EVERY DAY
7. TROUBLE CONCENTRATING ON THINGS, SUCH AS READING THE NEWSPAPER OR WATCHING TELEVISION: NEARLY EVERY DAY
8. MOVING OR SPEAKING SO SLOWLY THAT OTHER PEOPLE COULD HAVE NOTICED. OR THE OPPOSITE, BEING SO FIGETY OR RESTLESS THAT YOU HAVE BEEN MOVING AROUND A LOT MORE THAN USUAL: NOT AT ALL
SUM OF ALL RESPONSES TO PHQ QUESTIONS 1-9: 16
SUM OF ALL RESPONSES TO PHQ QUESTIONS 1-9: 16
9. THOUGHTS THAT YOU WOULD BE BETTER OFF DEAD, OR OF HURTING YOURSELF: NOT AT ALL
5. POOR APPETITE OR OVEREATING: NEARLY EVERY DAY
10. IF YOU CHECKED OFF ANY PROBLEMS, HOW DIFFICULT HAVE THESE PROBLEMS MADE IT FOR YOU TO DO YOUR WORK, TAKE CARE OF THINGS AT HOME, OR GET ALONG WITH OTHER PEOPLE: SOMEWHAT DIFFICULT
4. FEELING TIRED OR HAVING LITTLE ENERGY: NEARLY EVERY DAY
1. LITTLE INTEREST OR PLEASURE IN DOING THINGS: MORE THAN HALF THE DAYS
SUM OF ALL RESPONSES TO PHQ QUESTIONS 1-9: 16
SUM OF ALL RESPONSES TO PHQ QUESTIONS 1-9: 16
SUM OF ALL RESPONSES TO PHQ9 QUESTIONS 1 & 2: 4
2. FEELING DOWN, DEPRESSED OR HOPELESS: MORE THAN HALF THE DAYS

## 2024-05-02 ASSESSMENT — LIFESTYLE VARIABLES
HOW OFTEN DO YOU HAVE A DRINK CONTAINING ALCOHOL: NEVER
HOW MANY STANDARD DRINKS CONTAINING ALCOHOL DO YOU HAVE ON A TYPICAL DAY: PATIENT DOES NOT DRINK

## 2024-05-02 NOTE — PATIENT INSTRUCTIONS
change them any time your wishes change.  Living will.  This form tells your family and your doctor your wishes about life support and other treatment. The form is also called a declaration.  Medical power of .  This form lets you name a person to make treatment decisions for you when you can't speak for yourself. This person is called a health care agent (health care proxy, health care surrogate). The form is also called a durable power of  for health care.  If you do not have an advance directive, decisions about your medical care may be made by a family member, or by a doctor or a  who doesn't know you.  It may help to think of an advance directive as a gift to the people who care for you. If you have one, they won't have to make tough decisions by themselves.  For more information, including forms for your state, see the [employer] website (www.caringinfo.org/planning/advance-directives/).  Follow-up care is a key part of your treatment and safety. Be sure to make and go to all appointments, and call your doctor if you are having problems. It's also a good idea to know your test results and keep a list of the medicines you take.  What should you include in an advance directive?  Many states have a unique advance directive form. (It may ask you to address specific issues.) Or you might use a universal form that's approved by many states.  If your form doesn't tell you what to address, it may be hard to know what to include in your advance directive. Use the questions below to help you get started.  Who do you want to make decisions about your medical care if you are not able to?  What life-support measures do you want if you have a serious illness that gets worse over time or can't be cured?  What are you most afraid of that might happen? (Maybe you're afraid of having pain, losing your independence, or being kept alive by machines.)  Where would you prefer to die? (Your home? A hospital? A

## 2024-05-02 NOTE — PROGRESS NOTES
Medicare Annual Wellness Visit    Mony Castillo is here for Medicare AWV    Assessment & Plan   Welcome to Medicare preventive visit  Vitamin D deficiency  Assessment & Plan:  A/P: uncontrolled  -Vit d prescribed  -follow up in a few months.   Orders:  -     vitamin D (CHOLECALCIFEROL) 50 MCG (2000 UT) TABS tablet; Take 1 tablet by mouth daily, Disp-90 tablet, R-1Normal  Iron deficiency  Assessment & Plan:  A/P: borderline controlled  -MCV low, iron borderline low, not anemic  -will replace and recheck in a few months.   Orders:  -     ferrous sulfate (IRON 325) 325 (65 Fe) MG tablet; Take 1 tablet by mouth daily (with breakfast), Disp-90 tablet, R-0Normal  Chronic right shoulder pain  Assessment & Plan:   A/P: uncontrolled  - Neers and Arcs test positive  -will get xrays  -not interested in physical therapy at this time.   -will discuss at next visit  Orders:  -     XR SHOULDER RIGHT (MIN 2 VIEWS); Future  Chronic left-sided low back pain with left-sided sciatica  Assessment & Plan:   A/P; uncontrolled  -palpable muscle knot felt on physical exam  -Advised physical therapy however patient states that she does not have the time  -tens units advised.  Orders:  -     Tens Unit MISC; Starting Thu 5/2/2024, Disp-1 each, R-0, Print  Sleep apnea, unspecified type  Assessment & Plan:  A/P: uncontrolled  -Patient complianing of snoring, apenic episodes waking her up at night, morning headaches, restlessness at nighttime.  She has a history of chronic systolic heart failure and diabetes.        5/3/2024    12:58 PM   Sleep Medicine   Sitting and reading 3   Watching TV 3   Sitting, inactive in a public place (e.g. a theatre or a meeting) 3   As a passenger in a car for an hour without a break 0   Lying down to rest in the afternoon when circumstances permit 1   Sitting and talking to someone 0   Sitting quietly after a lunch without alcohol 3   In a car, while stopped for a few minutes in traffic 0   [place] Sleepiness

## 2024-05-03 ASSESSMENT — SLEEP AND FATIGUE QUESTIONNAIRES
HOW LIKELY ARE YOU TO NOD OFF OR FALL ASLEEP WHEN YOU ARE A PASSENGER IN A CAR FOR AN HOUR WITHOUT A BREAK: WOULD NEVER DOZE
ESS TOTAL SCORE: 13
NECK CIRCUMFERENCE (INCHES): 12
HOW LIKELY ARE YOU TO NOD OFF OR FALL ASLEEP WHILE WATCHING TV: HIGH CHANCE OF DOZING
HOW LIKELY ARE YOU TO NOD OFF OR FALL ASLEEP IN A CAR, WHILE STOPPED FOR A FEW MINUTES IN TRAFFIC: WOULD NEVER DOZE
HOW LIKELY ARE YOU TO NOD OFF OR FALL ASLEEP WHILE SITTING INACTIVE IN A PUBLIC PLACE: HIGH CHANCE OF DOZING
HOW LIKELY ARE YOU TO NOD OFF OR FALL ASLEEP WHILE LYING DOWN TO REST IN THE AFTERNOON WHEN CIRCUMSTANCES PERMIT: SLIGHT CHANCE OF DOZING
HOW LIKELY ARE YOU TO NOD OFF OR FALL ASLEEP WHILE SITTING QUIETLY AFTER LUNCH WITHOUT ALCOHOL: HIGH CHANCE OF DOZING
HOW LIKELY ARE YOU TO NOD OFF OR FALL ASLEEP WHILE SITTING AND TALKING TO SOMEONE: WOULD NEVER DOZE
HOW LIKELY ARE YOU TO NOD OFF OR FALL ASLEEP WHILE SITTING AND READING: HIGH CHANCE OF DOZING

## 2024-05-03 NOTE — ASSESSMENT & PLAN NOTE
A/P: uncontrolled  -Patient complianing of snoring, apenic episodes waking her up at night, morning headaches, restlessness at nighttime.  She has a history of chronic systolic heart failure and diabetes.        5/3/2024    12:58 PM   Sleep Medicine   Sitting and reading 3   Watching TV 3   Sitting, inactive in a public place (e.g. a theatre or a meeting) 3   As a passenger in a car for an hour without a break 0   Lying down to rest in the afternoon when circumstances permit 1   Sitting and talking to someone 0   Sitting quietly after a lunch without alcohol 3   In a car, while stopped for a few minutes in traffic 0   [place] Sleepiness Score 13   Neck circumference (Inches) 12       -will order sleep study.

## 2024-05-04 PROBLEM — M25.511 CHRONIC RIGHT SHOULDER PAIN: Status: ACTIVE | Noted: 2024-05-04

## 2024-05-04 PROBLEM — M54.42 CHRONIC LEFT-SIDED LOW BACK PAIN WITH LEFT-SIDED SCIATICA: Status: ACTIVE | Noted: 2024-05-04

## 2024-05-04 PROBLEM — G89.29 CHRONIC RIGHT SHOULDER PAIN: Status: ACTIVE | Noted: 2024-05-04

## 2024-05-04 PROBLEM — G89.29 CHRONIC LEFT-SIDED LOW BACK PAIN WITH LEFT-SIDED SCIATICA: Status: ACTIVE | Noted: 2024-05-04

## 2024-05-04 PROBLEM — E61.1 IRON DEFICIENCY: Status: ACTIVE | Noted: 2024-05-04

## 2024-05-05 DIAGNOSIS — I50.32 CHRONIC DIASTOLIC CONGESTIVE HEART FAILURE (HCC): ICD-10-CM

## 2024-05-06 ENCOUNTER — TELEPHONE (OUTPATIENT)
Dept: FAMILY MEDICINE CLINIC | Age: 49
End: 2024-05-06

## 2024-05-06 RX ORDER — ASPIRIN 325 MG
325 TABLET ORAL DAILY
Qty: 30 TABLET | Refills: 0 | Status: SHIPPED | OUTPATIENT
Start: 2024-05-06

## 2024-05-06 RX ORDER — FLUCONAZOLE 150 MG/1
150 TABLET ORAL ONCE
Qty: 1 TABLET | Refills: 0 | Status: SHIPPED | OUTPATIENT
Start: 2024-05-06 | End: 2024-05-06

## 2024-05-06 NOTE — TELEPHONE ENCOUNTER
SUBJECT:  Yeast infection    PATIENT/CALLER: Patient    CURRENT SYMPTOMS: Patient states she has a yeast infection. She has been using OTC medication since 5/01 and it is not getting better. Patient is asking for RX of diflucan to sent to pharmacy.     ONSET: 05/01/2024     WHAT HAS BEEN TRIED:OTC Monistat

## 2024-05-06 NOTE — TELEPHONE ENCOUNTER
Last visit: 05/02/2024  Last Med refill: 10/10/2023  Does patient have enough medication for 72 hours: No:     Next Visit Date:  Future Appointments   Date Time Provider Department Center   5/6/2024  1:00 PM [facility]   7/12/2024 10:00 AM Yves Mendes DO [facility]   8/6/2024  9:00 AM Irma Coleman MD [facility]       Health Maintenance   Topic Date Due    Colorectal Cancer Screen  Never done    Diabetic foot exam  06/28/2024 (Originally 8/31/1987)    Diabetic Alb to Cr ratio (uACR) test  06/28/2024 (Originally 8/31/1995)    Diabetic retinal exam  07/07/2024 (Originally 8/31/1995)    Hepatitis B vaccine (1 of 3 - 3-dose series) 03/27/2025 (Originally 1975)    DTaP/Tdap/Td vaccine (1 - Tdap) 03/27/2025 (Originally 8/31/1996)    A1C test (Diabetic or Prediabetic)  03/27/2025    Lipids  03/27/2025    GFR test (Diabetes, CKD 3-4, OR last GFR 15-59)  03/27/2025    Depression Monitoring  05/02/2025    Annual Wellness Visit (Medicare)  05/03/2025    Cervical cancer screen  01/26/2028    Flu vaccine  Completed    Pneumococcal 0-64 years Vaccine  Completed    COVID-19 Vaccine  Completed    Hepatitis C screen  Completed    HIV screen  Completed    Hepatitis A vaccine  Aged Out    Hib vaccine  Aged Out    HPV vaccine  Aged Out    Polio vaccine  Aged Out    Meningococcal (ACWY) vaccine  Aged Out    Depression Screen  Discontinued       Hemoglobin A1C (%)   Date Value   03/27/2024 6.4 (H)   06/28/2023 6.2   11/04/2022 6.5 (H)             ( goal A1C is < 7)   No components found for: \"LABMICR\"  No components found for: \"LDLCHOLESTEROL\", \"LDLCALC\"    (goal LDL is <100)   AST (U/L)   Date Value   03/27/2024 18     ALT (U/L)   Date Value   03/27/2024 7 (L)     BUN (mg/dL)   Date Value   03/27/2024 6     BP Readings from Last 3 Encounters:   05/02/24 108/68   03/27/24 116/76   03/08/24 112/70          (goal 120/80)    All Future Testing planned in [facility]  Lab Frequency

## 2024-05-09 ENCOUNTER — HOSPITAL ENCOUNTER (OUTPATIENT)
Dept: SLEEP CENTER | Age: 49
Discharge: HOME | End: 2024-05-11
Attending: STUDENT IN AN ORGANIZED HEALTH CARE EDUCATION/TRAINING PROGRAM
Payer: MEDICARE

## 2024-05-09 DIAGNOSIS — G47.30 SLEEP APNEA, UNSPECIFIED TYPE: ICD-10-CM

## 2024-05-09 PROCEDURE — G0399 HOME SLEEP TEST/TYPE 3 PORTA: HCPCS

## 2024-05-13 ENCOUNTER — HOSPITAL ENCOUNTER (OUTPATIENT)
Age: 49
Discharge: HOME | End: 2024-05-15
Payer: MEDICARE

## 2024-05-13 ENCOUNTER — HOSPITAL ENCOUNTER (OUTPATIENT)
Dept: GENERAL RADIOLOGY | Age: 49
Discharge: HOME | End: 2024-05-15
Payer: MEDICARE

## 2024-05-13 ENCOUNTER — TELEPHONE (OUTPATIENT)
Dept: GASTROENTEROLOGY | Age: 49
End: 2024-05-13

## 2024-05-13 DIAGNOSIS — M25.511 CHRONIC RIGHT SHOULDER PAIN: ICD-10-CM

## 2024-05-13 DIAGNOSIS — G89.29 CHRONIC RIGHT SHOULDER PAIN: ICD-10-CM

## 2024-05-13 PROCEDURE — 73030 X-RAY EXAM OF SHOULDER: CPT

## 2024-05-14 DIAGNOSIS — I50.32 CHRONIC DIASTOLIC CONGESTIVE HEART FAILURE (HCC): ICD-10-CM

## 2024-05-14 RX ORDER — FLUCONAZOLE 150 MG/1
150 TABLET ORAL ONCE
Qty: 1 TABLET | Refills: 10 | OUTPATIENT
Start: 2024-05-14 | End: 2024-05-14

## 2024-05-14 RX ORDER — ASPIRIN 325 MG
325 TABLET ORAL DAILY
Qty: 30 TABLET | Refills: 10 | OUTPATIENT
Start: 2024-05-14

## 2024-05-21 ENCOUNTER — TELEPHONE (OUTPATIENT)
Dept: GASTROENTEROLOGY | Age: 49
End: 2024-05-21

## 2024-05-21 LAB — STATUS: NORMAL

## 2024-05-24 DIAGNOSIS — I50.32 CHRONIC DIASTOLIC CONGESTIVE HEART FAILURE (HCC): ICD-10-CM

## 2024-05-24 RX ORDER — ASPIRIN 325 MG
325 TABLET ORAL DAILY
Qty: 30 TABLET | Refills: 1 | OUTPATIENT
Start: 2024-05-24

## 2024-05-28 DIAGNOSIS — E78.00 ELEVATED LDL CHOLESTEROL LEVEL: ICD-10-CM

## 2024-05-28 DIAGNOSIS — I50.22 CHRONIC SYSTOLIC (CONGESTIVE) HEART FAILURE (HCC): ICD-10-CM

## 2024-05-28 DIAGNOSIS — R73.03 PREDIABETES: ICD-10-CM

## 2024-05-28 DIAGNOSIS — I50.32 CHRONIC DIASTOLIC CONGESTIVE HEART FAILURE (HCC): ICD-10-CM

## 2024-05-28 RX ORDER — ATORVASTATIN CALCIUM 20 MG/1
20 TABLET, FILM COATED ORAL DAILY
Qty: 30 TABLET | Refills: 10 | Status: SHIPPED | OUTPATIENT
Start: 2024-05-28

## 2024-05-28 NOTE — TELEPHONE ENCOUNTER
Mony Castillo is calling to request a refill on the following medication(s):    Medication Request:  Requested Prescriptions     Pending Prescriptions Disp Refills    atorvastatin (LIPITOR) 20 MG tablet 30 tablet 10     Sig: Take 1 tablet by mouth daily       Last Visit Date (If Applicable):  5/2/2024    Next Visit Date:    8/6/2024

## 2024-06-03 DIAGNOSIS — E61.1 IRON DEFICIENCY: ICD-10-CM

## 2024-06-03 RX ORDER — FERROUS SULFATE 325(65) MG
TABLET ORAL
Qty: 90 TABLET | Refills: 0 | OUTPATIENT
Start: 2024-06-03

## 2024-08-08 ENCOUNTER — OFFICE VISIT (OUTPATIENT)
Dept: FAMILY MEDICINE CLINIC | Age: 49
End: 2024-08-08
Payer: MEDICARE

## 2024-08-08 VITALS
WEIGHT: 181.2 LBS | TEMPERATURE: 97.2 F | BODY MASS INDEX: 30.15 KG/M2 | HEART RATE: 94 BPM | OXYGEN SATURATION: 99 % | SYSTOLIC BLOOD PRESSURE: 104 MMHG | DIASTOLIC BLOOD PRESSURE: 68 MMHG

## 2024-08-08 DIAGNOSIS — F43.10 PTSD (POST-TRAUMATIC STRESS DISORDER): ICD-10-CM

## 2024-08-08 DIAGNOSIS — E55.9 VITAMIN D DEFICIENCY: ICD-10-CM

## 2024-08-08 DIAGNOSIS — E61.1 IRON DEFICIENCY: ICD-10-CM

## 2024-08-08 DIAGNOSIS — F31.81 BIPOLAR 2 DISORDER (HCC): ICD-10-CM

## 2024-08-08 DIAGNOSIS — E11.9 TYPE 2 DIABETES MELLITUS WITHOUT COMPLICATION, WITHOUT LONG-TERM CURRENT USE OF INSULIN (HCC): Primary | ICD-10-CM

## 2024-08-08 LAB — HBA1C MFR BLD: 6.5 %

## 2024-08-08 PROCEDURE — 99214 OFFICE O/P EST MOD 30 MIN: CPT | Performed by: STUDENT IN AN ORGANIZED HEALTH CARE EDUCATION/TRAINING PROGRAM

## 2024-08-08 PROCEDURE — 2022F DILAT RTA XM EVC RTNOPTHY: CPT | Performed by: STUDENT IN AN ORGANIZED HEALTH CARE EDUCATION/TRAINING PROGRAM

## 2024-08-08 PROCEDURE — 1036F TOBACCO NON-USER: CPT | Performed by: STUDENT IN AN ORGANIZED HEALTH CARE EDUCATION/TRAINING PROGRAM

## 2024-08-08 PROCEDURE — G8427 DOCREV CUR MEDS BY ELIG CLIN: HCPCS | Performed by: STUDENT IN AN ORGANIZED HEALTH CARE EDUCATION/TRAINING PROGRAM

## 2024-08-08 PROCEDURE — G8417 CALC BMI ABV UP PARAM F/U: HCPCS | Performed by: STUDENT IN AN ORGANIZED HEALTH CARE EDUCATION/TRAINING PROGRAM

## 2024-08-08 PROCEDURE — 3044F HG A1C LEVEL LT 7.0%: CPT | Performed by: STUDENT IN AN ORGANIZED HEALTH CARE EDUCATION/TRAINING PROGRAM

## 2024-08-08 PROCEDURE — 83036 HEMOGLOBIN GLYCOSYLATED A1C: CPT | Performed by: STUDENT IN AN ORGANIZED HEALTH CARE EDUCATION/TRAINING PROGRAM

## 2024-08-08 RX ORDER — LORAZEPAM 0.5 MG/1
0.5 TABLET ORAL EVERY 6 HOURS PRN
COMMUNITY
Start: 2024-08-06

## 2024-08-08 SDOH — ECONOMIC STABILITY: FOOD INSECURITY: WITHIN THE PAST 12 MONTHS, THE FOOD YOU BOUGHT JUST DIDN'T LAST AND YOU DIDN'T HAVE MONEY TO GET MORE.: NEVER TRUE

## 2024-08-08 SDOH — ECONOMIC STABILITY: FOOD INSECURITY: WITHIN THE PAST 12 MONTHS, YOU WORRIED THAT YOUR FOOD WOULD RUN OUT BEFORE YOU GOT MONEY TO BUY MORE.: NEVER TRUE

## 2024-08-08 SDOH — ECONOMIC STABILITY: INCOME INSECURITY: HOW HARD IS IT FOR YOU TO PAY FOR THE VERY BASICS LIKE FOOD, HOUSING, MEDICAL CARE, AND HEATING?: NOT HARD AT ALL

## 2024-08-08 ASSESSMENT — PATIENT HEALTH QUESTIONNAIRE - PHQ9
10. IF YOU CHECKED OFF ANY PROBLEMS, HOW DIFFICULT HAVE THESE PROBLEMS MADE IT FOR YOU TO DO YOUR WORK, TAKE CARE OF THINGS AT HOME, OR GET ALONG WITH OTHER PEOPLE: NOT DIFFICULT AT ALL
SUM OF ALL RESPONSES TO PHQ QUESTIONS 1-9: 0
1. LITTLE INTEREST OR PLEASURE IN DOING THINGS: NOT AT ALL
4. FEELING TIRED OR HAVING LITTLE ENERGY: NOT AT ALL
SUM OF ALL RESPONSES TO PHQ QUESTIONS 1-9: 0
SUM OF ALL RESPONSES TO PHQ QUESTIONS 1-9: 0
2. FEELING DOWN, DEPRESSED OR HOPELESS: NOT AT ALL
5. POOR APPETITE OR OVEREATING: NOT AT ALL
6. FEELING BAD ABOUT YOURSELF - OR THAT YOU ARE A FAILURE OR HAVE LET YOURSELF OR YOUR FAMILY DOWN: NOT AT ALL
9. THOUGHTS THAT YOU WOULD BE BETTER OFF DEAD, OR OF HURTING YOURSELF: NOT AT ALL
SUM OF ALL RESPONSES TO PHQ9 QUESTIONS 1 & 2: 0
8. MOVING OR SPEAKING SO SLOWLY THAT OTHER PEOPLE COULD HAVE NOTICED. OR THE OPPOSITE, BEING SO FIGETY OR RESTLESS THAT YOU HAVE BEEN MOVING AROUND A LOT MORE THAN USUAL: NOT AT ALL
7. TROUBLE CONCENTRATING ON THINGS, SUCH AS READING THE NEWSPAPER OR WATCHING TELEVISION: NOT AT ALL
SUM OF ALL RESPONSES TO PHQ QUESTIONS 1-9: 0
3. TROUBLE FALLING OR STAYING ASLEEP: NOT AT ALL

## 2024-08-08 ASSESSMENT — ANXIETY QUESTIONNAIRES
6. BECOMING EASILY ANNOYED OR IRRITABLE: SEVERAL DAYS
GAD7 TOTAL SCORE: 6
5. BEING SO RESTLESS THAT IT IS HARD TO SIT STILL: NOT AT ALL
7. FEELING AFRAID AS IF SOMETHING AWFUL MIGHT HAPPEN: SEVERAL DAYS
IF YOU CHECKED OFF ANY PROBLEMS ON THIS QUESTIONNAIRE, HOW DIFFICULT HAVE THESE PROBLEMS MADE IT FOR YOU TO DO YOUR WORK, TAKE CARE OF THINGS AT HOME, OR GET ALONG WITH OTHER PEOPLE: NOT DIFFICULT AT ALL
2. NOT BEING ABLE TO STOP OR CONTROL WORRYING: SEVERAL DAYS
1. FEELING NERVOUS, ANXIOUS, OR ON EDGE: SEVERAL DAYS
3. WORRYING TOO MUCH ABOUT DIFFERENT THINGS: SEVERAL DAYS
4. TROUBLE RELAXING: SEVERAL DAYS

## 2024-08-08 NOTE — ASSESSMENT & PLAN NOTE
A/P: Controlled  # Continue vitamin D pills  - Will follow-up in few months with repeat vitamin D level

## 2024-08-08 NOTE — PROGRESS NOTES
[facility]  [facility]  [address]  [facility] [address]     Date of Visit:  2024  Patient Name: Mony Castillo   Patient :  1975     CHIEF COMPLAINT:     Mony Castillo is a 48 y.o. female who presents today for an general visit to be evaluated for the following condition(s):  Chief Complaint   Patient presents with    Diabetes    Arthritis       REVIEW OF SYSTEM      Review of Systems   Musculoskeletal:  Positive for arthritis.       HISTORY OF PRESENT ILLNESS     Diabetes    Arthritis      Patient is a 48-year-old male who presents to the office for follow-up on her right shoulder pain and back pain.  Patient states that her shoulder pain has significantly improved.  Patient also states that she no longer feels tired.  She has been taking her iron pills and vitamin D pills.  She would like to wait a few months before rechecking her iron and vitamin D.    DIABETES MELLITUS Type 2 follow up:    Hemoglobin A1C   Date Value Ref Range Status   2024 6.5 % Final     Current medication compliance: Not on medication/ diet controlled  Insulin: no  Glucose range: N/A  Hypoglycemia since last visit: No  Interval history:  Diet controlled    Opthalmologist:Yes  Podiatrist:No  Last foot exam:     No results found for: \"GLU\", \"LIPID\"            REVIEWED INFORMATION      Allergies   Allergen Reactions    Antihistamines, Chlorpheniramine-Type Rash    Hydroxyzine Anxiety       Patient Active Problem List   Diagnosis    Pulmonary edema, acute, with congestive heart failure (HCC)    Anxiety    Microcytic anemia    History of drug abuse (HCC)    Chronic diastolic congestive heart failure (HCC)    Severe mitral valve regurgitation    Severe mitral regurgitation by prior echocardiogram    S/P mitral valve replacement    S/P left atrial appendage ligation    Pneumonia due to organism    Recurrent major depressive disorder, in partial remission (HCC)    Lump or mass in breast    Chronic

## 2024-08-08 NOTE — ASSESSMENT & PLAN NOTE
A/P: Controlled  - Sees Dr. Ivonne Haq.  Currently on Remeron for the past year as well as 0.5mgof Ativan and Abilify.  Patient states that her medication keeps her mood and depression well-controlled.

## 2024-09-18 ENCOUNTER — OFFICE VISIT (OUTPATIENT)
Dept: GASTROENTEROLOGY | Age: 49
End: 2024-09-18

## 2024-09-18 ENCOUNTER — TELEPHONE (OUTPATIENT)
Dept: GASTROENTEROLOGY | Age: 49
End: 2024-09-18

## 2024-09-18 VITALS
TEMPERATURE: 98.1 F | WEIGHT: 181 LBS | HEIGHT: 67 IN | DIASTOLIC BLOOD PRESSURE: 75 MMHG | SYSTOLIC BLOOD PRESSURE: 110 MMHG | HEART RATE: 95 BPM | BODY MASS INDEX: 28.41 KG/M2

## 2024-09-18 DIAGNOSIS — Z12.11 ENCOUNTER FOR SCREENING FOR COLORECTAL CANCER IN HIGH RISK PATIENT: Primary | ICD-10-CM

## 2024-09-18 DIAGNOSIS — Z12.12 ENCOUNTER FOR SCREENING FOR COLORECTAL CANCER IN HIGH RISK PATIENT: Primary | ICD-10-CM

## 2024-09-18 DIAGNOSIS — Z91.89 ENCOUNTER FOR SCREENING FOR COLORECTAL CANCER IN HIGH RISK PATIENT: Primary | ICD-10-CM

## 2024-09-18 PROCEDURE — 90000 NO LOS: CPT | Performed by: INTERNAL MEDICINE

## 2024-09-18 RX ORDER — POLYETHYLENE GLYCOL 3350 17 G/17G
POWDER, FOR SOLUTION ORAL
Qty: 255 G | Refills: 0 | Status: SHIPPED | OUTPATIENT
Start: 2024-09-18

## 2024-10-08 ENCOUNTER — PATIENT MESSAGE (OUTPATIENT)
Dept: GASTROENTEROLOGY | Age: 49
End: 2024-10-08

## 2024-11-09 ENCOUNTER — APPOINTMENT (OUTPATIENT)
Dept: GENERAL RADIOLOGY | Age: 49
End: 2024-11-09
Payer: COMMERCIAL

## 2024-11-09 ENCOUNTER — HOSPITAL ENCOUNTER (OUTPATIENT)
Age: 49
Setting detail: OBSERVATION
Discharge: HOME | End: 2024-11-10
Attending: EMERGENCY MEDICINE | Admitting: EMERGENCY MEDICINE
Payer: COMMERCIAL

## 2024-11-09 DIAGNOSIS — I50.9 CHRONIC CONGESTIVE HEART FAILURE, UNSPECIFIED HEART FAILURE TYPE (HCC): ICD-10-CM

## 2024-11-09 DIAGNOSIS — R07.9 CHEST PAIN, UNSPECIFIED TYPE: Primary | ICD-10-CM

## 2024-11-09 LAB
ANION GAP SERPL CALCULATED.3IONS-SCNC: 13 MMOL/L (ref 9–16)
BASOPHILS # BLD: 0.06 K/UL (ref 0–0.2)
BASOPHILS NFR BLD: 1 % (ref 0–2)
BNP SERPL-MCNC: 44 PG/ML (ref 0–125)
BUN SERPL-MCNC: 7 MG/DL (ref 6–20)
CALCIUM SERPL-MCNC: 8.6 MG/DL (ref 8.6–10.4)
CHLORIDE SERPL-SCNC: 104 MMOL/L (ref 98–107)
CO2 SERPL-SCNC: 19 MMOL/L (ref 20–31)
CREAT SERPL-MCNC: 0.9 MG/DL (ref 0.6–0.9)
D DIMER PPP FEU-MCNC: 0.57 UG/ML FEU (ref 0–0.57)
EOSINOPHIL # BLD: 0.04 K/UL (ref 0–0.44)
EOSINOPHILS RELATIVE PERCENT: 0 % (ref 1–4)
ERYTHROCYTE [DISTWIDTH] IN BLOOD BY AUTOMATED COUNT: 14.8 % (ref 11.8–14.4)
GFR, ESTIMATED: 79 ML/MIN/1.73M2
GLUCOSE SERPL-MCNC: 101 MG/DL (ref 74–99)
HCT VFR BLD AUTO: 40.9 % (ref 36.3–47.1)
HGB BLD-MCNC: 13.5 G/DL (ref 11.9–15.1)
IMM GRANULOCYTES # BLD AUTO: <0.03 K/UL (ref 0–0.3)
IMM GRANULOCYTES NFR BLD: 0 %
LYMPHOCYTES NFR BLD: 2.69 K/UL (ref 1.1–3.7)
LYMPHOCYTES RELATIVE PERCENT: 30 % (ref 24–43)
MCH RBC QN AUTO: 26.4 PG (ref 25.2–33.5)
MCHC RBC AUTO-ENTMCNC: 33 G/DL (ref 28.4–34.8)
MCV RBC AUTO: 79.9 FL (ref 82.6–102.9)
MONOCYTES NFR BLD: 0.41 K/UL (ref 0.1–1.2)
MONOCYTES NFR BLD: 5 % (ref 3–12)
NEUTROPHILS NFR BLD: 64 % (ref 36–65)
NEUTS SEG NFR BLD: 5.77 K/UL (ref 1.5–8.1)
NRBC BLD-RTO: 0 PER 100 WBC
PLATELET # BLD AUTO: 376 K/UL (ref 138–453)
PMV BLD AUTO: 9.2 FL (ref 8.1–13.5)
POTASSIUM SERPL-SCNC: 4 MMOL/L (ref 3.7–5.3)
RBC # BLD AUTO: 5.12 M/UL (ref 3.95–5.11)
RBC # BLD: ABNORMAL 10*6/UL
SODIUM SERPL-SCNC: 136 MMOL/L (ref 136–145)
TROPONIN I SERPL HS-MCNC: 8 NG/L (ref 0–14)
TROPONIN I SERPL HS-MCNC: <6 NG/L (ref 0–14)
WBC OTHER # BLD: 9 K/UL (ref 3.5–11.3)

## 2024-11-09 PROCEDURE — 83880 ASSAY OF NATRIURETIC PEPTIDE: CPT

## 2024-11-09 PROCEDURE — 84484 ASSAY OF TROPONIN QUANT: CPT

## 2024-11-09 PROCEDURE — 2580000003 HC RX 258

## 2024-11-09 PROCEDURE — 6370000000 HC RX 637 (ALT 250 FOR IP)

## 2024-11-09 PROCEDURE — G0378 HOSPITAL OBSERVATION PER HR: HCPCS

## 2024-11-09 PROCEDURE — 71046 X-RAY EXAM CHEST 2 VIEWS: CPT

## 2024-11-09 PROCEDURE — 80048 BASIC METABOLIC PNL TOTAL CA: CPT

## 2024-11-09 PROCEDURE — 6370000000 HC RX 637 (ALT 250 FOR IP): Performed by: STUDENT IN AN ORGANIZED HEALTH CARE EDUCATION/TRAINING PROGRAM

## 2024-11-09 PROCEDURE — 85379 FIBRIN DEGRADATION QUANT: CPT

## 2024-11-09 PROCEDURE — 99285 EMERGENCY DEPT VISIT HI MDM: CPT

## 2024-11-09 PROCEDURE — 93005 ELECTROCARDIOGRAM TRACING: CPT

## 2024-11-09 PROCEDURE — 85025 COMPLETE CBC W/AUTO DIFF WBC: CPT

## 2024-11-09 RX ORDER — SODIUM CHLORIDE 0.9 % (FLUSH) 0.9 %
5-40 SYRINGE (ML) INJECTION EVERY 12 HOURS SCHEDULED
Status: DISCONTINUED | OUTPATIENT
Start: 2024-11-09 | End: 2024-11-10 | Stop reason: HOSPADM

## 2024-11-09 RX ORDER — POLYETHYLENE GLYCOL 3350 17 G/17G
17 POWDER, FOR SOLUTION ORAL DAILY
Status: DISCONTINUED | OUTPATIENT
Start: 2024-11-09 | End: 2024-11-09

## 2024-11-09 RX ORDER — MIRTAZAPINE 15 MG/1
15 TABLET, FILM COATED ORAL NIGHTLY
Status: DISCONTINUED | OUTPATIENT
Start: 2024-11-09 | End: 2024-11-10 | Stop reason: HOSPADM

## 2024-11-09 RX ORDER — SODIUM CHLORIDE 9 MG/ML
INJECTION, SOLUTION INTRAVENOUS PRN
Status: DISCONTINUED | OUTPATIENT
Start: 2024-11-09 | End: 2024-11-10 | Stop reason: HOSPADM

## 2024-11-09 RX ORDER — LORAZEPAM 0.5 MG/1
0.5 TABLET ORAL EVERY 6 HOURS PRN
Status: DISCONTINUED | OUTPATIENT
Start: 2024-11-09 | End: 2024-11-10 | Stop reason: HOSPADM

## 2024-11-09 RX ORDER — CITALOPRAM HYDROBROMIDE 20 MG/1
20 TABLET ORAL DAILY
COMMUNITY
Start: 2024-10-29

## 2024-11-09 RX ORDER — ONDANSETRON 4 MG/1
4 TABLET, ORALLY DISINTEGRATING ORAL EVERY 8 HOURS PRN
Status: DISCONTINUED | OUTPATIENT
Start: 2024-11-09 | End: 2024-11-10 | Stop reason: HOSPADM

## 2024-11-09 RX ORDER — SODIUM CHLORIDE 0.9 % (FLUSH) 0.9 %
5-40 SYRINGE (ML) INJECTION PRN
Status: DISCONTINUED | OUTPATIENT
Start: 2024-11-09 | End: 2024-11-10 | Stop reason: HOSPADM

## 2024-11-09 RX ORDER — CITALOPRAM HYDROBROMIDE 20 MG/1
20 TABLET ORAL DAILY
Status: DISCONTINUED | OUTPATIENT
Start: 2024-11-09 | End: 2024-11-10 | Stop reason: HOSPADM

## 2024-11-09 RX ORDER — ACETAMINOPHEN 325 MG/1
650 TABLET ORAL EVERY 4 HOURS PRN
Status: DISCONTINUED | OUTPATIENT
Start: 2024-11-09 | End: 2024-11-10 | Stop reason: HOSPADM

## 2024-11-09 RX ORDER — PANTOPRAZOLE SODIUM 40 MG/1
40 TABLET, DELAYED RELEASE ORAL
Status: DISCONTINUED | OUTPATIENT
Start: 2024-11-10 | End: 2024-11-10 | Stop reason: HOSPADM

## 2024-11-09 RX ORDER — METOPROLOL SUCCINATE 25 MG/1
100 TABLET, EXTENDED RELEASE ORAL DAILY
Status: DISCONTINUED | OUTPATIENT
Start: 2024-11-09 | End: 2024-11-10 | Stop reason: HOSPADM

## 2024-11-09 RX ORDER — ATORVASTATIN CALCIUM 20 MG/1
20 TABLET, FILM COATED ORAL DAILY
Status: DISCONTINUED | OUTPATIENT
Start: 2024-11-09 | End: 2024-11-10 | Stop reason: HOSPADM

## 2024-11-09 RX ORDER — CITALOPRAM HYDROBROMIDE 20 MG/1
20 TABLET ORAL DAILY
Status: CANCELLED | OUTPATIENT
Start: 2024-11-09

## 2024-11-09 RX ORDER — POLYETHYLENE GLYCOL 3350 17 G/17G
17 POWDER, FOR SOLUTION ORAL DAILY
Status: DISCONTINUED | OUTPATIENT
Start: 2024-11-09 | End: 2024-11-10 | Stop reason: HOSPADM

## 2024-11-09 RX ORDER — ASPIRIN 325 MG
325 TABLET ORAL DAILY
Status: DISCONTINUED | OUTPATIENT
Start: 2024-11-09 | End: 2024-11-10 | Stop reason: HOSPADM

## 2024-11-09 RX ORDER — ASPIRIN 81 MG/1
324 TABLET, CHEWABLE ORAL ONCE
Status: COMPLETED | OUTPATIENT
Start: 2024-11-09 | End: 2024-11-09

## 2024-11-09 RX ORDER — ONDANSETRON 2 MG/ML
4 INJECTION INTRAMUSCULAR; INTRAVENOUS EVERY 6 HOURS PRN
Status: DISCONTINUED | OUTPATIENT
Start: 2024-11-09 | End: 2024-11-10 | Stop reason: HOSPADM

## 2024-11-09 RX ADMIN — LORAZEPAM 0.5 MG: 0.5 TABLET ORAL at 22:04

## 2024-11-09 RX ADMIN — ASPIRIN 81 MG 324 MG: 81 TABLET ORAL at 14:57

## 2024-11-09 RX ADMIN — SODIUM CHLORIDE, PRESERVATIVE FREE 10 ML: 5 INJECTION INTRAVENOUS at 20:06

## 2024-11-09 RX ADMIN — MIRTAZAPINE 15 MG: 15 TABLET, FILM COATED ORAL at 20:40

## 2024-11-09 ASSESSMENT — PAIN SCALES - GENERAL
PAINLEVEL_OUTOF10: 0
PAINLEVEL_OUTOF10: 8

## 2024-11-09 ASSESSMENT — LIFESTYLE VARIABLES
HOW MANY STANDARD DRINKS CONTAINING ALCOHOL DO YOU HAVE ON A TYPICAL DAY: PATIENT DOES NOT DRINK
HOW OFTEN DO YOU HAVE A DRINK CONTAINING ALCOHOL: NEVER

## 2024-11-09 ASSESSMENT — PAIN DESCRIPTION - LOCATION: LOCATION: CHEST

## 2024-11-09 ASSESSMENT — PAIN - FUNCTIONAL ASSESSMENT: PAIN_FUNCTIONAL_ASSESSMENT: 0-10

## 2024-11-09 NOTE — ED PROVIDER NOTES
[facility] ED     Emergency Department     Faculty Attestation        I performed a history and physical examination of the patient and discussed management with the resident. I reviewed the resident’s note and agree with the documented findings and plan of care. Any areas of disagreement are noted on the chart. I was personally present for the key portions of any procedures. I have documented in the chart those procedures where I was not present during the key portions. I have reviewed the emergency nurses triage note. I agree with the chief complaint, past medical history, past surgical history, allergies, medications, social and family history as documented unless otherwise noted below.  For Physician Assistant/ Nurse Practitioner cases/documentation I have personally evaluated this patient and have completed at least one if not all key elements of the E/M (history, physical exam, and MDM). Additional findings are as noted.      Vital Signs: BP: (!) 129/99  Pulse: (!) 111  Respirations: 18  Temp: 97.9 °F (36.6 °C) SpO2: 100 %  PCP:  Irma Coleman MD  Note Started: 11/9/24, 2:52 PM EST    Pertinent Comments:     Patient 49-year-old female with history of nonischemic cardiomyopathy with last EF of 42% 1 year ago.    Patient has had several chest pain workups in the past that have been negative but D-dimer is positive and then CT chest negative for at least PE.   Also history of mitral valve repair.    Currently with some chest pain as well.   Initially recorded here as a low-grade fever of 38.1 °C however patient denies any fevers and immediately retaken upon coming to the back of the ER and is afebrile at 36.6 °C.   Likely error on first temperature.    Here lungs are clear to auscultation bilateral with midline trachea heart regular rate and rhythm and abdomen is soft/nontender.   No obvious swelling in the lower extremities or calf pain.   Patient has 
 Note Started: 4:34 PM EST     Faculty Sign-Out Attestation  Handoff taken on the following patient from prior Attending Physician at 1600: Or[place]    I was available and discussed any additional care issues that arose and coordinated the management plans with the resident(s) caring for the patient during my duty period. Any areas of disagreement with resident’s documentation of care or procedures are noted on the chart. I was personally present for the key portions of any/all procedures during my duty period. I have documented in the chart those procedures where I was not present during the key portions.    49-year-old female with history of nonischemic cardiomyopathy, EF of approximately 40% presenting with left lateral chest pain.  Troponin x 2 negative.  However recent cardiology note recommended echocardiogram, will place in observation for further cardiac testing    Hamida Lozano MD  Attending Physician       Hamida Lozano MD  11/09/24 9114    
dictations but occasionally words are mis-transcribed.)

## 2024-11-09 NOTE — ED NOTES
ED to inpatient nurses report      Chief Complaint:  Chief Complaint   Patient presents with    Chest Pain     Been going on for an hour     Present to ED from: home    MOA:     LOC: alert and orientated to name, place, date  Mobility: Independent  Oxygen Baseline: RA    Current needs required: RA   Pending ED orders: None  Present condition: Stable    Why did the patient come to the ED? Chest pain  What is the plan? Admit  Any procedures or intervention occur? Labs, Imaging, Meds.  Any safety concerns?? None    Mental Status:  Level of Consciousness: Alert (0)    Psych Assessment:   Psychosocial  Psychosocial (WDL): Within Defined Limits  Vital signs   Vitals:    11/09/24 1500 11/09/24 1507 11/09/24 1515 11/09/24 1545   BP: 123/79  111/77 118/77   Pulse: (!) 101 94 96 96   Resp: 12 17 13 17   Temp:       TempSrc:       SpO2: 97% 98% 98% 100%   Weight:       Height:            Vitals:  Patient Vitals for the past 24 hrs:   BP Temp Temp src Pulse Resp SpO2 Height Weight   11/09/24 1545 118/77 -- -- 96 17 100 % -- --   11/09/24 1515 111/77 -- -- 96 13 98 % -- --   11/09/24 1507 -- -- -- 94 17 98 % -- --   11/09/24 1500 123/79 -- -- (!) 101 12 97 % -- --   11/09/24 1448 (!) 129/99 97.9 °F (36.6 °C) -- (!) 111 -- -- -- --   11/09/24 1437 (!) 135/90 (!) 100.5 °F (38.1 °C) Oral (!) 115 18 100 % 1.702 m (5' 7\") 81.6 kg (179 lb 14.3 oz)      Visit Vitals  /77   Pulse 96   Temp 97.9 °F (36.6 °C)   Resp 17   Ht 1.702 m (5' 7\")   Wt 81.6 kg (179 lb 14.3 oz)   SpO2 100%   BMI 28.18 kg/m²        LDAs:      Ambulatory Status:  Presents to emergency department  because of falls (Syncope, seizure, or loss of consciousness): No, Age > 70: No, Altered Mental Status, Intoxication with alcohol or substance confusion (Disorientation, impaired judgment, poor safety awaremess, or inability to follow instructions): No, Impaired Mobility: Ambulates or transfers with assistive devices or assistance; Unable to ambulate or transer.: No,

## 2024-11-09 NOTE — ED TRIAGE NOTES
Patient presents to the ED via triage. Patient states that she was treated for Mitral valve repair in 2020, and was experiencing acute chest pain without radiation in the center of her chest for for the past day, bringing her to the ED. Patient was placed on bedside monitor and IV established. Patient is in NAD, respirations are even and unlabored, bed in lowest position and call light with in reach. Resident is bedside for evaluation. Writer will continue with plan of care.

## 2024-11-10 VITALS
BODY MASS INDEX: 28.24 KG/M2 | HEIGHT: 67 IN | TEMPERATURE: 97.5 F | WEIGHT: 179.9 LBS | RESPIRATION RATE: 18 BRPM | DIASTOLIC BLOOD PRESSURE: 83 MMHG | SYSTOLIC BLOOD PRESSURE: 113 MMHG | HEART RATE: 91 BPM | OXYGEN SATURATION: 97 %

## 2024-11-10 LAB
EKG ATRIAL RATE: 108 BPM
EKG P AXIS: 75 DEGREES
EKG P-R INTERVAL: 128 MS
EKG Q-T INTERVAL: 334 MS
EKG QRS DURATION: 60 MS
EKG QTC CALCULATION (BAZETT): 447 MS
EKG R AXIS: 49 DEGREES
EKG T AXIS: 54 DEGREES
EKG VENTRICULAR RATE: 108 BPM

## 2024-11-10 PROCEDURE — 6370000000 HC RX 637 (ALT 250 FOR IP): Performed by: STUDENT IN AN ORGANIZED HEALTH CARE EDUCATION/TRAINING PROGRAM

## 2024-11-10 PROCEDURE — 6370000000 HC RX 637 (ALT 250 FOR IP)

## 2024-11-10 PROCEDURE — 2580000003 HC RX 258

## 2024-11-10 PROCEDURE — 93010 ELECTROCARDIOGRAM REPORT: CPT | Performed by: INTERNAL MEDICINE

## 2024-11-10 PROCEDURE — 93005 ELECTROCARDIOGRAM TRACING: CPT | Performed by: EMERGENCY MEDICINE

## 2024-11-10 PROCEDURE — 99223 1ST HOSP IP/OBS HIGH 75: CPT | Performed by: INTERNAL MEDICINE

## 2024-11-10 PROCEDURE — G0378 HOSPITAL OBSERVATION PER HR: HCPCS

## 2024-11-10 RX ADMIN — PANTOPRAZOLE SODIUM 40 MG: 40 TABLET, DELAYED RELEASE ORAL at 06:28

## 2024-11-10 RX ADMIN — SODIUM CHLORIDE, PRESERVATIVE FREE 10 ML: 5 INJECTION INTRAVENOUS at 09:44

## 2024-11-10 RX ADMIN — EMPAGLIFLOZIN 10 MG: 10 TABLET, FILM COATED ORAL at 09:42

## 2024-11-10 RX ADMIN — ATORVASTATIN CALCIUM 20 MG: 20 TABLET, FILM COATED ORAL at 09:43

## 2024-11-10 RX ADMIN — ASPIRIN 325 MG: 325 TABLET ORAL at 09:43

## 2024-11-10 RX ADMIN — LORAZEPAM 0.5 MG: 0.5 TABLET ORAL at 09:42

## 2024-11-10 RX ADMIN — CITALOPRAM HYDROBROMIDE 20 MG: 20 TABLET ORAL at 11:35

## 2024-11-10 RX ADMIN — METOPROLOL SUCCINATE 100 MG: 25 TABLET, EXTENDED RELEASE ORAL at 09:44

## 2024-11-10 RX ADMIN — SACUBITRIL AND VALSARTAN 1 TABLET: 24; 26 TABLET, FILM COATED ORAL at 09:43

## 2024-11-10 ASSESSMENT — PAIN SCALES - GENERAL
PAINLEVEL_OUTOF10: 0

## 2024-11-10 NOTE — CONSULTS
[facility]    Consult                        Today's Date: 11/10/2024  Patient Name: Mony Castillo  Date of admission: 11/9/2024  2:42 PM  Patient's age: 49 y.o., 1975  Admission Dx: Chest pain [R07.9]  Chest pain, unspecified type [R07.9]    Reason for Consult: Chest pain    Requesting Physician: Ryan Mcfadden MD    CHIEF COMPLAINT: Chest pain    History Obtained From:  patient    HISTORY OF PRESENT ILLNESS:      The patient is a 49 y.o.  female who is admitted to the hospital for chest pain  The patient has been complaining of chest pain.  For several years.  Not exertional.  No exertional chest pain or shortness of breath.  This chest pain last for around 2 hours with no definite precipitating or relieving factor.  No dizziness no lightheadedness no syncope.  She had workup done before which was negative for coronary ischemia    Past Medical History:   has a past medical history of Anxiety, CHF (congestive heart failure) (HCC), GERD (gastroesophageal reflux disease), History of chest pain, History of echocardiogram, Hypertension, Leg swelling, Migraine, Mitral regurgitation, Mitral valve disease, SOB (shortness of breath), Stomach ulcer, Wellness examination, and Wellness examination.    Past Surgical History:   has a past surgical history that includes hernia repair; transesophageal echocardiogram (01/04/2021); Cardiac catheterization (01/05/2021); Endoscopy, colon, diagnostic; Cosmetic surgery; Mitral valve repair (N/A, 2/26/2021); and Breast biopsy.     Home Medications:    Prior to Admission medications    Medication Sig Start Date End Date Taking? Authorizing Provider   citalopram (CELEXA) 20 MG tablet Take 1 tablet by mouth daily 10/29/24  Yes Provider, MD Romy   clindamycin (CLEOCIN) 300 MG capsule Take 2 capsules by mouth See Admin Instructions for 1 dose Take 2 capsules by mouth 30-60 minutes prior to dental work. 9/24/24  Yes Malinda Wright, APRN - NP

## 2024-11-10 NOTE — DISCHARGE INSTRUCTIONS
You were seen in the emergency department and admitted to the observation unit for continued care.  During your time here, you are seen by the cardiology team, recommend continued outpatient follow-up, with no need for acute inpatient interventions.  As we discussed, an echocardiogram has been ordered for you for the outpatient setting to help you obtain that sooner than December.    Please call [phone] from [phone] Monday-Friday to schedule your echocardiogram at a location that suits your needs.

## 2024-11-10 NOTE — H&P
[facility]  CDU / OBSERVATION ENCOUNTER  Physician NOTE     Pt Name: Mony Castillo  MRN: [id number]  Birthdate 1975  Date of evaluation: 11/10/24  Patient's PCP is :  Irma Coleman MD    CHIEF COMPLAINT       Chief Complaint   Patient presents with    Chest Pain     Been going on for an hour       HISTORY OF PRESENT ILLNESS    Mony Castillo is a 49 y.o. female with CHF, HTN, and a previous mitral valve replacement x2 who presents with acute onset left-sided chest pain and shortness of breath for roughly one hour prior to presenting to the ED.    Location/Symptom: L-sided chest pain  Timing/Onset: Acute, shortly before coming to the ED  Provocation: Unknown  Quality: Pain/twinge  Radiation: Denies  Severity: Severe  Timing/Duration: Persistent  Modifying Factors: Unknown    History was obtained in part through review of the ED chart. When possible, a direct discussion was had with ED nurses, residents, and attendings  REVIEW OF SYSTEMS       General ROS - No fevers, No malaise   Ophthalmic ROS - No discharge, No changes in vision  ENT ROS -  No sore throat, No rhinorrhea,   Respiratory ROS - shortness of breath, no cough, no  wheezing  Cardiovascular ROS - chest pain, no dyspnea on exertion  Gastrointestinal ROS - No abdominal pain, no nausea or vomiting, no change in bowel habits, no black or bloody stools  Genito-Urinary ROS - No dysuria, trouble voiding, or hematuria  Musculoskeletal ROS - No myalgias, No arthalgias  Neurological ROS - No headache, no dizziness/lightheadedness, No focal weakness, no loss of sensation  Dermatological ROS - No lesions, No rash     (PQRS) Advance directives on face sheet per hospital policy. No change unless specifically mentioned in chart    PAST MEDICAL HISTORY    has a past medical history of Anxiety, CHF (congestive heart failure) (HCC), GERD (gastroesophageal reflux disease), History of chest pain, History of echocardiogram, Hypertension, Leg

## 2024-11-10 NOTE — DISCHARGE SUMMARY
CDU Discharge Summary        Patient:  Mony Castillo  YOB: 1975    MRN: [id number]   Acct: [id number]    Primary Care Physician: Irma Coleman MD    Admit date:  11/9/2024  2:42 PM  Discharge date: 11/10/2024  2:38 PM    Discharge Diagnoses:     1.)  Patient had chest pain and shortness of breath with acute onset due to suspected cardiac etiology.  Treated with cardiology evaluation, symptom management, and serial exams.  Patient's symptoms are resolved with the plan to obtain an outpatient echocardiogram sooner than her previously scheduled 1, and follow-up with her cardiology team.    Follow-up:  Call today/tomorrow for a follow up appointment with Irma Coleman MD , or return to the Emergency Room with worsening symptoms    Stressed to patient the importance of following up with primary care doctor for further workup/management of symptoms.  Pt verbalizes understanding and agrees with plan.    Discharge Medication Changes:       Medication List        CHANGE how you take these medications      Abilify Maintena 300 MG Prsy prefilled syringe  Generic drug: ARIPiprazole er  What changed: Another medication with the same name was removed. Continue taking this medication, and follow the directions you see here.     furosemide 20 MG tablet  Commonly known as: LASIX  TAKE ONE TABLET BY MOUTH EVERY DAY IN THE EVENING  What changed: See the new instructions.            CONTINUE taking these medications      atorvastatin 20 MG tablet  Commonly known as: LIPITOR  Take 1 tablet by mouth daily     citalopram 20 MG tablet  Commonly known as: CELEXA     clindamycin 300 MG capsule  Commonly known as: CLEOCIN  Take 2 capsules by mouth See Admin Instructions for 1 dose Take 2 capsules by mouth 30-60 minutes prior to dental work.     empagliflozin 10 MG tablet  Commonly known as: Jardiance  Take 1 tablet by mouth daily     ferrous sulfate 325 (65 Fe) MG tablet  Commonly known as: IRON 325  Take 1 tablet by mouth

## 2024-11-11 ENCOUNTER — CARE COORDINATION (OUTPATIENT)
Dept: CARE COORDINATION | Age: 49
End: 2024-11-11

## 2024-11-11 DIAGNOSIS — R07.9 CHEST PAIN, UNSPECIFIED TYPE: Primary | ICD-10-CM

## 2024-11-11 NOTE — CARE COORDINATION
Care Transitions Note    Initial Call - Call within 2 business days of discharge: Yes    Patient Current Location:  Home: [facility]  [place] [address]    Care Transition Nurse contacted the patient by telephone to perform post hospital discharge assessment, verified name and  as identifiers. Provided introduction to self, and explanation of the Care Transition Nurse role.     Patient: Mony Castillo    Patient : 1975   MRN: [id number]    Reason for Admission: chest pain  Discharge Date: 11/10/24  RURS: No data recorded    Last Discharge Facility       Date Complaint Diagnosis Description Type Department Provider    24 Chest Pain Chest pain, unspecified type ... ED to Hosp-Admission (Discharged) (ADMITTED) [facility] Ryan Mcfadden MD; Lita Yu...            Was this an external facility discharge? No    Additional needs identified to be addressed with provider   High priority: Per AVS, care team to clarify if patient should take  mg daily, ergocalciferol [id number] and chantix 0.5 mg. Patient states she is not taking any of these medications. I have left  for cardiology to clarify on ASA. Thank you.             Method of communication with provider: chart routing.    Patients top risk factors for readmission: medical condition-CHF, MV replacement, DM    Interventions to address risk factors:   Education: when to seek emergency care  Review of patient management of conditions/medications: symptoms, HFU appts, medications  Communication with providers: medication clarification    Care Summary Note: Patient reached for AMAN call. States doing well after IP stay for chest pain. States doing fine now. Confirms has AVS, States has current medications. Reviewed Aspirin 325 mg, Vitamin D 96400y, Chantix, states is not taking these. CTN will request clarification from providers. PCP HFU scheduled for  @ 11:20 AM. Patient states will call to scheduled cardiology appt. Reviewed order for

## 2024-11-11 NOTE — PROGRESS NOTES
[facility]  CDU / OBSERVATION ENCOUNTER  ATTENDING NOTE         I performed a history and physical examination of the patient and discussed management with the resident or midlevel provider. I reviewed the resident or midlevel provider's note and agree with the documented findings and plan of care. Any areas of disagreement are noted on the chart. I was personally present for the key portions of any procedures. I have documented in the chart those procedures where I was not present during the key portions. I have reviewed the nurses notes. I agree with the chief complaint, past medical history, past surgical history, allergies, medications, social and family history as documented unless otherwise noted below.    The Family history, social history, and ROS are effectively unchanged since admission unless noted elsewhere in the chart.     This patient was placed in the observation unit for reevaluation for possible admission to the hospital     Patient significantly improved.  No symptoms currently.  Patient seen by cardiology with no further workup required.  Patient does have outpatient echo scheduled.  Discussed with patient moving echo up.  Patient comfortable with discharge plan and cardiology reevaluation as outpatient.  Patient symptoms manageable and patient appropriate for outpatient reassessment.       Ryan Mcfadden MD  Attending Emergency  Physician

## 2024-11-11 NOTE — CARE COORDINATION
Return call from [facility] with [facility], reviewed CTN request for clarification on  mg. States this was originally ordered by PCP, will defer. CTN will notify PCP Ofc. States will reach out to patient re: HFU scheduling.

## 2024-11-12 LAB
EKG ATRIAL RATE: 93 BPM
EKG P AXIS: 61 DEGREES
EKG P-R INTERVAL: 144 MS
EKG Q-T INTERVAL: 360 MS
EKG QRS DURATION: 56 MS
EKG QTC CALCULATION (BAZETT): 447 MS
EKG R AXIS: 27 DEGREES
EKG T AXIS: 53 DEGREES
EKG VENTRICULAR RATE: 93 BPM

## 2024-11-19 ENCOUNTER — CARE COORDINATION (OUTPATIENT)
Dept: CARE COORDINATION | Age: 49
End: 2024-11-19

## 2024-11-19 NOTE — CARE COORDINATION
Care Transitions Note    Follow Up Call     Patient Current Location:  Home: [address] [place] [address]    Care Transition Nurse contacted the patient by telephone. Verified name and  as identifiers.    Additional needs identified to be addressed with provider   High priority: Patient cardiologist office followed up and is deferring clarification of Aspirin 325 mg daily to PCP, please advise. Thank you                 Method of communication with provider: chart routing.    Care Summary Note: Patient reached for follow up call. States doing well without chest pain, other cardiac symptoms. She has cardiology and echo scheduled for 24. She has cancelled PCP HFU. [facility] concerns about medication coverage with insurance, states is currently on [employer], educated that dual Medicare/ Medicaid should not have medication coverage gap. [facility] she is waiting for call from insurance , advised to ensure pharmacy has updated insurance information, contact CTN back if told medications not covered or rejected. Verbalizes understanding.     Plan of care updates since last contact:  Review of patient management of conditions/medications       Advance Care Planning:   Does patient have an Advance Directive: not on file; education provided -and referral to internal ACP facilitator.    Medication Review:  Full medication reconciliation completed during previous call.    Remote Patient Monitoring:  Offered patient enrollment in the Remote Patient Monitoring (RPM) program for in-home monitoring: Yes, but did not enroll at this time: already monitoring with home equipment.    Assessments:  Care Transitions Subsequent and Final Call    Schedule Follow Up Appointment with PCP: Declined  Subsequent and Final Calls  Do you have any ongoing symptoms?: No  Have your medications changed?: No  Do you have any questions related to your medications?: No  Do you currently have any active services?: No  Do you have

## 2024-11-20 ENCOUNTER — CARE COORDINATION (OUTPATIENT)
Dept: CARE COORDINATION | Age: 49
End: 2024-11-20

## 2024-11-25 ENCOUNTER — HOSPITAL ENCOUNTER (OUTPATIENT)
Dept: PREADMISSION TESTING | Age: 49
Discharge: HOME | End: 2024-11-29

## 2024-11-25 VITALS — WEIGHT: 180 LBS | HEIGHT: 67 IN | BODY MASS INDEX: 28.25 KG/M2

## 2024-11-25 DIAGNOSIS — E61.1 IRON DEFICIENCY: ICD-10-CM

## 2024-11-25 DIAGNOSIS — Z76.0 MEDICATION REFILL: ICD-10-CM

## 2024-11-25 DIAGNOSIS — T50.3X5A PILL ESOPHAGITIS DUE TO POTASSIUM CHLORIDE: ICD-10-CM

## 2024-11-25 DIAGNOSIS — K20.80 PILL ESOPHAGITIS DUE TO POTASSIUM CHLORIDE: ICD-10-CM

## 2024-11-25 RX ORDER — FERROUS SULFATE 325(65) MG
TABLET ORAL
Qty: 90 TABLET | Refills: 10 | Status: SHIPPED | OUTPATIENT
Start: 2024-11-25

## 2024-11-25 RX ORDER — OMEPRAZOLE 40 MG/1
40 CAPSULE, DELAYED RELEASE ORAL DAILY
Qty: 30 CAPSULE | Refills: 10 | OUTPATIENT
Start: 2024-11-25

## 2024-11-25 NOTE — TELEPHONE ENCOUNTER
Mony Castillo is calling to request a refill on the following medication(s):    Medication Request:  Requested Prescriptions     Pending Prescriptions Disp Refills    FEROSUL 325 (65 Fe) MG tablet [Pharmacy Med Name: FEROSUL 325 (65 FE) MG ORAL TABLET] 90 tablet 10     Sig: TAKE ONE TABLET BY MOUTH EVERY DAY WITH BREAKFAST       Last Visit Date (If Applicable):  8/8/2024    Next Visit Date:    2/10/2025

## 2024-11-25 NOTE — PROGRESS NOTES
Pre-op Instructions For Out-Patient Endoscopy Surgery    Medication Instructions:  Please stop herbs and any supplements now (includes vitamins and minerals).    For these medications:  Dulaglutide (Trulicity), Exenatide (Byetta and Bydureon, Liraglutide (Victoza), Lixisenatide (Adlyxin), Semaglutide (Ozempic and Rybelsus), Tirzepatide (Mounjaro)- Stop 1 week prior if taking weekly or 1 day prior if taking every 12 hours or daily. NA    Please contact your surgeon and prescribing physician for pre-op instructions for any blood thinners. aspirin    If you have inhalers/aerosol treatments at home, please use them the morning of your surgery and bring the inhalers with you to the hospital. NA    Please take the following medications the morning of your surgery with a sip of water:    Lorazepam and Omeprazole if desired, metoprolol for sure    Surgery Instructions:  After midnight before surgery:  Do not eat or drink anything, including water, mints, gum, and hard candy.  You may brush your teeth without swallowing.  No smoking, chewing tobacco, or street drugs.    Please shower or bathe before surgery.       Please do not wear any cologne, lotion, powder, jewelry, piercings, perfume, makeup, nail polish, hair accessories, or hair spray on the day of surgery.  Wear loose comfortable clothing.    Leave your valuables at home but bring a payment source for any after-surgery prescriptions you plan to fill at [facility].  Bring a storage case for any glasses/contacts.    An adult who is responsible for you MUST drive you home and should be with you for the first 24 hours after surgery.     The Day of Surgery:  Arrive at [facility] Surgery Entrance at the time directed by your surgeon and check in at the desk.     If you have a living will or healthcare power of , please bring a copy.    You will be taken to the pre-op holding area where you will be prepared for surgery.  A physical

## 2024-11-26 ENCOUNTER — CARE COORDINATION (OUTPATIENT)
Dept: CARE COORDINATION | Age: 49
End: 2024-11-26

## 2024-11-26 NOTE — CARE COORDINATION
Care Transitions Note    Follow Up Call     Patient Current Location:  [place]    Care Transition Nurse contacted the patient by telephone. Verified name and  as identifiers.    Additional needs identified to be addressed with provider   No needs identified                 Method of communication with provider: none.    Care Summary Note: patient reached for follow up, [facility] due to concern re: loss of medication coverage, per Member : patient has dual coverage, effective 24, confirms [facility] is in network. Call dropped, call back to continue inquiry. ID card to be sent out. Updated on insurance ID and PBM information. Advised to provide information to home pharmacy. No other concerns noted at this time, encouraged to have home pharmacy run test claim. States medication supply is sufficient at this time. Educated on program MSW and pharmacist, if needed for further resources. Verbalizes understanding, agrees to follow up next week.     Plan of care updates since last contact:  Review of patient management of conditions/medications: med coverage concerns.       Advance Care Planning:   Does patient have an Advance Directive: reviewed during previous call, see note. .    Medication Review:  Full medication reconciliation completed during previous call.    Remote Patient Monitoring:  Offered patient enrollment in the Remote Patient Monitoring (RPM) program for in-home monitoring: Yes, but did not enroll at this time: already monitoring with home equipment.    Assessments:  Care Transitions Subsequent and Final Call    Schedule Follow Up Appointment with PCP: Completed  Subsequent and Final Calls  Do you have any ongoing symptoms?: No  Have your medications changed?: No  Do you have any questions related to your medications?: No  Do you currently have any active services?: No  Do you have any needs or concerns that I can assist you with?:

## 2024-11-29 NOTE — PRE-PROCEDURE INSTRUCTIONS
Have you received your Prep? Any questions with prep instructions?   Only Clear Liquid Diet day before.  Nothing to eat after midnight day before procedure.  Are you taking any blood thinners? If so, you need to Stop.  Remove any jewelry and body piercings.  Do you wear glasses? If so, please bring a case to store them in.  Are you having any Covid symptoms?  Do you have any new rashes, infections, etc. that we should be aware of?  Do you have a ride home the day of surgery? It cannot be a cab or medical transportation.  Verify surgery time/date and what time to arrive at hospital.  NO ANSWER, VM LEFT TO ARRIVE AT 0830

## 2024-12-02 ENCOUNTER — ANESTHESIA EVENT (OUTPATIENT)
Dept: ENDOSCOPY | Age: 49
End: 2024-12-02
Payer: COMMERCIAL

## 2024-12-03 ENCOUNTER — ANESTHESIA (OUTPATIENT)
Dept: ENDOSCOPY | Age: 49
End: 2024-12-03
Payer: COMMERCIAL

## 2024-12-03 ENCOUNTER — HOSPITAL ENCOUNTER (OUTPATIENT)
Age: 49
Setting detail: OUTPATIENT SURGERY
Discharge: HOME | End: 2024-12-03
Attending: INTERNAL MEDICINE | Admitting: INTERNAL MEDICINE
Payer: COMMERCIAL

## 2024-12-03 VITALS
SYSTOLIC BLOOD PRESSURE: 125 MMHG | HEIGHT: 67 IN | HEART RATE: 79 BPM | WEIGHT: 180 LBS | RESPIRATION RATE: 21 BRPM | DIASTOLIC BLOOD PRESSURE: 85 MMHG | TEMPERATURE: 97.2 F | OXYGEN SATURATION: 96 % | BODY MASS INDEX: 28.25 KG/M2

## 2024-12-03 DIAGNOSIS — Z91.89 ENCOUNTER FOR SCREENING FOR COLORECTAL CANCER IN HIGH RISK PATIENT: ICD-10-CM

## 2024-12-03 DIAGNOSIS — Z12.11 ENCOUNTER FOR SCREENING FOR COLORECTAL CANCER IN HIGH RISK PATIENT: ICD-10-CM

## 2024-12-03 DIAGNOSIS — Z12.12 ENCOUNTER FOR SCREENING FOR COLORECTAL CANCER IN HIGH RISK PATIENT: ICD-10-CM

## 2024-12-03 LAB
GLUCOSE BLD-MCNC: 100 MG/DL (ref 65–105)
HCG, PREGNANCY URINE (POC): NEGATIVE

## 2024-12-03 PROCEDURE — 2580000003 HC RX 258: Performed by: ANESTHESIOLOGY

## 2024-12-03 PROCEDURE — 7100000011 HC PHASE II RECOVERY - ADDTL 15 MIN: Performed by: INTERNAL MEDICINE

## 2024-12-03 PROCEDURE — 7100000010 HC PHASE II RECOVERY - FIRST 15 MIN: Performed by: INTERNAL MEDICINE

## 2024-12-03 PROCEDURE — 45385 COLONOSCOPY W/LESION REMOVAL: CPT | Performed by: INTERNAL MEDICINE

## 2024-12-03 PROCEDURE — 2580000003 HC RX 258: Performed by: NURSE ANESTHETIST, CERTIFIED REGISTERED

## 2024-12-03 PROCEDURE — 3609010600 HC COLONOSCOPY POLYPECTOMY SNARE/COLD BIOPSY: Performed by: INTERNAL MEDICINE

## 2024-12-03 PROCEDURE — 3700000001 HC ADD 15 MINUTES (ANESTHESIA): Performed by: INTERNAL MEDICINE

## 2024-12-03 PROCEDURE — 2709999900 HC NON-CHARGEABLE SUPPLY: Performed by: INTERNAL MEDICINE

## 2024-12-03 PROCEDURE — 81025 URINE PREGNANCY TEST: CPT

## 2024-12-03 PROCEDURE — 88305 TISSUE EXAM BY PATHOLOGIST: CPT

## 2024-12-03 PROCEDURE — 6360000002 HC RX W HCPCS: Performed by: NURSE ANESTHETIST, CERTIFIED REGISTERED

## 2024-12-03 PROCEDURE — 82947 ASSAY GLUCOSE BLOOD QUANT: CPT

## 2024-12-03 PROCEDURE — 3700000000 HC ANESTHESIA ATTENDED CARE: Performed by: INTERNAL MEDICINE

## 2024-12-03 RX ORDER — LIDOCAINE HYDROCHLORIDE 20 MG/ML
INJECTION, SOLUTION INFILTRATION; PERINEURAL
Status: DISCONTINUED | OUTPATIENT
Start: 2024-12-03 | End: 2024-12-03 | Stop reason: SDUPTHER

## 2024-12-03 RX ORDER — SODIUM CHLORIDE 0.9 % (FLUSH) 0.9 %
5-40 SYRINGE (ML) INJECTION PRN
Status: DISCONTINUED | OUTPATIENT
Start: 2024-12-03 | End: 2024-12-03 | Stop reason: HOSPADM

## 2024-12-03 RX ORDER — LIDOCAINE HYDROCHLORIDE 10 MG/ML
1 INJECTION, SOLUTION EPIDURAL; INFILTRATION; INTRACAUDAL; PERINEURAL
Status: DISCONTINUED | OUTPATIENT
Start: 2024-12-03 | End: 2024-12-03 | Stop reason: HOSPADM

## 2024-12-03 RX ORDER — SODIUM CHLORIDE 9 MG/ML
INJECTION, SOLUTION INTRAVENOUS
Status: DISCONTINUED | OUTPATIENT
Start: 2024-12-03 | End: 2024-12-03 | Stop reason: SDUPTHER

## 2024-12-03 RX ORDER — SODIUM CHLORIDE 0.9 % (FLUSH) 0.9 %
5-40 SYRINGE (ML) INJECTION EVERY 12 HOURS SCHEDULED
Status: DISCONTINUED | OUTPATIENT
Start: 2024-12-03 | End: 2024-12-03 | Stop reason: HOSPADM

## 2024-12-03 RX ORDER — PROPOFOL 10 MG/ML
INJECTION, EMULSION INTRAVENOUS
Status: DISCONTINUED | OUTPATIENT
Start: 2024-12-03 | End: 2024-12-03 | Stop reason: SDUPTHER

## 2024-12-03 RX ORDER — SODIUM CHLORIDE 9 MG/ML
INJECTION, SOLUTION INTRAVENOUS PRN
Status: DISCONTINUED | OUTPATIENT
Start: 2024-12-03 | End: 2024-12-03 | Stop reason: HOSPADM

## 2024-12-03 RX ORDER — SODIUM CHLORIDE 9 MG/ML
INJECTION, SOLUTION INTRAVENOUS CONTINUOUS
Status: DISCONTINUED | OUTPATIENT
Start: 2024-12-03 | End: 2024-12-03 | Stop reason: HOSPADM

## 2024-12-03 RX ADMIN — PROPOFOL 50 MG: 10 INJECTION, EMULSION INTRAVENOUS at 10:22

## 2024-12-03 RX ADMIN — SODIUM CHLORIDE: 9 INJECTION, SOLUTION INTRAVENOUS at 10:13

## 2024-12-03 RX ADMIN — PROPOFOL 50 MG: 10 INJECTION, EMULSION INTRAVENOUS at 10:26

## 2024-12-03 RX ADMIN — PROPOFOL 100 MG: 10 INJECTION, EMULSION INTRAVENOUS at 10:18

## 2024-12-03 RX ADMIN — LIDOCAINE HYDROCHLORIDE 50 MG: 20 INJECTION, SOLUTION INFILTRATION; PERINEURAL at 10:18

## 2024-12-03 RX ADMIN — SODIUM CHLORIDE, PRESERVATIVE FREE 10 ML: 5 INJECTION INTRAVENOUS at 09:44

## 2024-12-03 ASSESSMENT — ENCOUNTER SYMPTOMS
NAUSEA: 0
SHORTNESS OF BREATH: 1
ABDOMINAL PAIN: 0
SHORTNESS OF BREATH: 0
SINUS PRESSURE: 0

## 2024-12-03 ASSESSMENT — PAIN - FUNCTIONAL ASSESSMENT: PAIN_FUNCTIONAL_ASSESSMENT: 0-10

## 2024-12-03 NOTE — DISCHARGE INSTRUCTIONS
Colonoscopy: What to Expect at Home  Your Recovery  Your doctor will talk to you about when you will need your next colonoscopy. The results of your test and your risk for colorectal cancer will help your doctor decide how often you need to be checked.  After the test, you may be bloated or have gas pains. You may need to pass gas. If a biopsy was done or a polyp was removed, you may have streaks of blood in your stool (feces) for a few days.    How can you care for yourself at home?  Activity  Rest as much as you need to after you go home.  You should be able to go back to your usual activities the day after the test.  Diet  Follow your doctor’s directions for eating.  Drink plenty of fluids (unless your doctor has told you not to) to replace the fluids that were lost during the colon prep.  Medicines  If polyps were removed or a biopsy was done during the test, your doctor may tell you not to take aspirin or other anti-inflammatory medicines, such as ibuprofen (Advil, Motrin) and naproxen (Aleve), for a few days.  Follow-up care is a key part of your treatment and safety. Be sure to make and go to all appointments, and call your doctor if you are having problems.  When should you call for help?  Call 911 anytime you think you may need emergency care. For example, call if:  You passed out (lost consciousness).  You pass maroon or bloody stools.  You have severe belly pain.  Call your doctor now or seek immediate medical care if:  Your stools are black and tarlike.  Your stools have streaks of blood, but you did not have a biopsy or any polyps removed.  You have belly pain, or your belly is swollen and firm.  You vomit.  You have a fever.  You are very dizzy.  Watch closely for changes in your health, and be sure to contact your doctor if you have any problems.   Where can you learn more?   Go to [email or web address] and sign in to your [employer] account. Enter E264 in the Search Informous

## 2024-12-03 NOTE — H&P
HISTORY and PHYSICAL  [facility]       NAME:  Mony Castillo  MRN: [id number]   YOB: 1975   Date: 12/3/2024   Age: 49 y.o.  Gender: female       COMPLAINT AND PRESENT HISTORY:     Mony Castillo is 49 y.o.,   female, having a Screening Colonoscopy.      Patient is being seen for hx of : Pre-Op Diagnosis Codes:      * Encounter for screening for colorectal cancer in high risk patient     This is patient's first Colonoscopy.     Pt denies abdominal pain including bloating/gas.    No changes in bowel habits.  No diarrhea, constipation, blood in stools, black tarry stools.       No changes in appetite and unintended weight loss. Denies any nausea or vomiting. No  heartburn, indigestion or acid reflux.  Pt is taking Prilosec.    Family Hx of colon cancer in maternal aunt x2, and uncle. And paternal aunt.     Medical history reviewed: CHF, mitral regurg,  mitral valve disease, hx of chest pain, SOB . 2d echo-mod-severe MR; mild-mod TR; EF 50-55% .     Patient voices feeling well today. Denies any recent fever or chills, chest pain/pressure/palpitations.       Pt reports no SOB.     Patient has been NPO since midnight. No blood thinners in the past  5-7 days. (Aspirin) .   Pt took metoprolol   this am.     Patient states  has completed and followed prescribed prep with clear outcome.      Patient denies any personal or family problems with anesthesia     RECENT LABS, IMAGING AND TESTING     Lab Results   Component Value Date    WBC 6.27 11/26/2024    RBC 4.66 11/26/2024    HGB 12.5 11/26/2024    HCT 37.4 11/26/2024    MCV 80.3 (L) 11/26/2024    MCH 26.8 (L) 11/26/2024    MCHC 33.4 11/26/2024    RDW 14.6 11/26/2024     11/26/2024    MPV 9.2 11/09/2024        Lab Results   Component Value Date     11/26/2024    K 3.2 (L) 11/26/2024     11/26/2024    CO2 27 11/26/2024    BUN 7 11/09/2024    CREATININE 0.87 11/26/2024    GLUCOSE 131 (H) 11/26/2024    CALCIUM 8.2 (L)  disease 12/20/2021    Pneumonia due to organism 05/21/2021    S/P mitral valve replacement 03/17/2021    S/P left atrial appendage ligation 03/17/2021    Severe mitral regurgitation by prior echocardiogram 02/26/2021    Severe mitral valve regurgitation 01/05/2021    Chronic diastolic congestive heart failure (HCC)     Pulmonary edema, acute, with congestive heart failure (HCC) 12/31/2020    Anxiety 12/31/2020    Microcytic anemia 12/31/2020    History of drug abuse ([facility]) 12/31/2020           EVERETT MARTINS, APRN - CNP on 12/3/2024 at 8:58 AM

## 2024-12-03 NOTE — ANESTHESIA POSTPROCEDURE EVALUATION
Department of Anesthesiology  Postprocedure Note    Patient: Mony Castillo  MRN: [id number]  YOB: 1975  Date of evaluation: 12/3/2024    Procedure Summary       Date: 12/03/24 Room / Location: [place] [address] / [facility]    Anesthesia Start: 1015 Anesthesia Stop: 1037    Procedure: COLONOSCOPY POLYPECTOMY SNARE Diagnosis:       Encounter for screening for colorectal cancer in high risk patient      (Encounter for screening for colorectal cancer in high risk patient [Z12.11, Z91.89, Z12.12])    Surgeons: Jessica Witt MD Responsible Provider: Valeria Hull MD    Anesthesia Type: General ASA Status: 3            Anesthesia Type: General    Maribeth Phase I:      Maribeth Phase II: Maribeth Score: 10    Anesthesia Post Evaluation    Comments: POST- ANESTHESIA EVALUATION       Pt Name: oMny Castillo  MRN: [id number]  YOB: 1975  Date of evaluation: 12/3/2024  Time:  11:49 AM      /85   Pulse 79   Temp 97.2 °F (36.2 °C)   Resp 21   Ht 1.702 m (5' 7\")   Wt 81.6 kg (180 lb)   LMP 11/18/2024 Comment: HCG urine negative  SpO2 96%   BMI 28.19 kg/m²      Consciousness Level  Awake  Cardiopulmonary Status  Stable  Pain Adequately Treated YES  Nausea / Vomiting  NO  Adequate Hydration  YES  Anesthesia Related Complications NONE      Electronically signed by Valeria Hull MD on 12/3/2024 at 11:49 AM           No notable events documented.

## 2024-12-03 NOTE — OP NOTE
PROCEDURE NOTE    DATE OF PROCEDURE: 12/3/2024    SURGEON: Jessica Witt MD  Facility : [facility]  ASSISTANT: None  Anesthesia: Monitored anesthesia care  PREOPERATIVE DIAGNOSIS: Screening for colon cancer (average risk)      POSTOPERATIVE DIAGNOSIS: as described below    OPERATION: Total colonoscopy     ANESTHESIA: Moderate Sedation    ESTIMATED BLOOD LOSS: less than 50     COMPLICATIONS: None.     SPECIMENS:  Was Obtained: colon polyp    HISTORY: The patient is a 49 y.o. year old female with history of above preop diagnosis.  I recommended colonoscopy with possible biopsy or polypectomy and I explained the risk, benefits, expected outcome, and alternatives to the procedure.  Risks included but are not limited to bleeding, infection, respiratory distress, hypotension, and perforation of the colon and possibility of missing a lesion.  The patient understands and is in agreement.        PROCEDURE: The patient was given IV conscious sedation.  The patient's SPO2 remained above 90% throughout the procedure.     Digital rectal exam- normal    The colonoscope was inserted per rectum and advanced under direct vision to the cecum without difficulty.      Post sedation note :The patient's SPO2 remained above 90% throughout the procedure.the vital signs remained stable , and no immediate complication form the procedure noted, patient will be ready for d/c when criteria is met .        The prep was excellent.      Findings:  Terminal ileum: normal    Cecum/Ascending colon: normal    Transverse colon: normal    Descending/Sigmoid colon: abnormal: 5 mm polyp in descending colon removed with cold snare    Rectum/Anus: examined in normal and retroflexed positions and was normal    Withdrawal Time was (minutes): 9    Diverticulosis: none    The colon was decompressed and the scope was removed.  The patient tolerated the procedure well.     Impression: Descending colon polyp S/P polypectomy    Recommendations/Plan:

## 2024-12-03 NOTE — ANESTHESIA PRE PROCEDURE
regurgitation       Social History:    Social History     Tobacco Use   • Smoking status: Former     Current packs/day: 0.00     Types: Cigarettes     Quit date: 2023     Years since quittin.5   • Smokeless tobacco: Never   Substance Use Topics   • Alcohol use: Not Currently     Comment: Hx of heavy use, none since                                 Counseling given: Not Answered      Vital Signs (Current):   Vitals:    24 0914   BP: 116/71   Pulse: 89   Resp: 18   Temp: 98.2 °F (36.8 °C)   TempSrc: Infrared   SpO2: 99%   Weight: 81.6 kg (180 lb)   Height: 1.702 m (5' 7\")                                              BP Readings from Last 3 Encounters:   24 116/71   11/10/24 113/83   24 110/75       NPO Status: Time of last liquid consumption: 0300                        Time of last solid consumption:                         Date of last liquid consumption: 24                        Date of last solid food consumption: 24    BMI:   Wt Readings from Last 3 Encounters:   24 81.6 kg (180 lb)   24 81.6 kg (180 lb)   24 81.6 kg (179 lb 14.3 oz)     Body mass index is 28.19 kg/m².    CBC:   Lab Results   Component Value Date/Time    WBC 6.27 2024 06:56 PM    RBC 4.66 2024 06:56 PM    HGB 12.5 2024 06:56 PM    HCT 37.4 2024 06:56 PM    MCV 80.3 2024 06:56 PM    RDW 14.6 2024 06:56 PM     2024 06:56 PM       CMP:   Lab Results   Component Value Date/Time     2024 06:56 PM    K 3.2 2024 06:56 PM     2024 06:56 PM    CO2 27 2024 06:56 PM    BUN 7 2024 02:48 PM    CREATININE 0.87 2024 06:56 PM    GFRAA >60 2022 11:03 AM    LABGLOM 82.6 2024 06:56 PM    LABGLOM 87 2024 09:27 AM    GLUCOSE 131 2024 06:56 PM    CALCIUM 8.2 2024 06:56 PM    BILITOT 0.4 2024 06:56 PM    ALKPHOS 51 2024 06:56 PM    AST 11 2024 06:56 PM    ALT 9

## 2024-12-04 LAB — SURGICAL PATHOLOGY REPORT: NORMAL

## 2024-12-05 ENCOUNTER — CARE COORDINATION (OUTPATIENT)
Dept: CARE COORDINATION | Age: 49
End: 2024-12-05

## 2024-12-05 NOTE — CARE COORDINATION
Care Transitions Note    Follow Up Call     Attempted to reach patient for transitions of care follow up.  Unable to reach patient.      Outreach Attempts:   HIPAA compliant voicemail left for patient.     Care Summary Note: Left VM    Follow Up Appointment:   Future Appointments         Provider Specialty Dept Phone    12/9/2024 9:45 AM JOHN, AFL [facility] Cardiology [phone]    1/3/2025 1:00 PM Effie Gudino, APRN - CNP Cardiology [phone]    2/10/2025 9:40 AM Irma Coleman MD Family Medicine [phone]            Plan for follow-up call in 2-5 days based on severity of symptoms and risk factors. Plan for next call:  symptom management-any chest pain?   self management-is ASA clarified, any medication barriers?      Marilin Velazquez, RAOUL

## 2024-12-10 ENCOUNTER — TELEPHONE (OUTPATIENT)
Dept: FAMILY MEDICINE CLINIC | Age: 49
End: 2024-12-10

## 2024-12-10 ENCOUNTER — CARE COORDINATION (OUTPATIENT)
Dept: CARE COORDINATION | Age: 49
End: 2024-12-10

## 2024-12-10 NOTE — CARE COORDINATION
Care Transitions Note    Final Call     Patient Current Location:  Home: [address] [place] [address]    Care Transition Nurse contacted the patient by telephone. Verified name and  as identifiers.    Patient graduated from the Care Transitions program on 12/10/24.  Patient/family verbalizes confidence in the ability to self-manage at this time. has the ability to self manage at this time..      Advance Care Planning:   Does patient have an Advance Directive: patient declined education.    Handoff:   Patient was not referred to the ACM team due to patient declined services.      Care Summary Note: Patient reached for final call. States medication barriers have been resolved; has current coverage and no further concerns. She has completed echocardiogram, per notes heart function is stable and she has been notified. She will see cardiology 1/3/25. Discussed heart failure management. Patient states is confident with self-management, understands to monitor weight, shortness of breath above baseline, new cardiac symptoms, swelling, report to MD. She declines further care management.     Assessments:  Care Transitions Subsequent and Final Call    Schedule Follow Up Appointment with PCP: Completed  Subsequent and Final Calls  Do you have any ongoing symptoms?: No  Have your medications changed?: No  Do you have any questions related to your medications?: No  Do you currently have any active services?: No  Do you have any needs or concerns that I can assist you with?: No  Identified Barriers: None  Care Transitions Interventions  Other Interventions:              Upcoming Appointments:    Future Appointments         Provider Specialty Dept Phone    1/3/2025 1:00 PM Effie Gudino, APRN - CNP Cardiology [phone]    2/10/2025 9:40 AM Irma Coleman MD Family Medicine [phone]            Patient has agreed to contact primary care provider and/or specialist for any further questions, concerns, or

## 2024-12-10 NOTE — TELEPHONE ENCOUNTER
Marilin is a care manage with [facility] and they would like clarification on how the patient should take the prescribed  ASA. The hospital AVS states to ask your medical team for guidance on the medications. She contacted cardiology but was told to ask PCP.

## 2024-12-11 DIAGNOSIS — Z12.11 ENCOUNTER FOR SCREENING FOR COLORECTAL CANCER IN HIGH RISK PATIENT: ICD-10-CM

## 2024-12-11 DIAGNOSIS — Z91.89 ENCOUNTER FOR SCREENING FOR COLORECTAL CANCER IN HIGH RISK PATIENT: ICD-10-CM

## 2024-12-11 DIAGNOSIS — Z12.12 ENCOUNTER FOR SCREENING FOR COLORECTAL CANCER IN HIGH RISK PATIENT: ICD-10-CM

## 2024-12-18 NOTE — TELEPHONE ENCOUNTER
Attempt 1 lvm for pt to schedule colonoscopy with Sylwia  Attempt 2 letter mailed   PAST SURGICAL HISTORY:  No significant past surgical history

## 2024-12-22 DIAGNOSIS — T50.3X5A PILL ESOPHAGITIS DUE TO POTASSIUM CHLORIDE: ICD-10-CM

## 2024-12-22 DIAGNOSIS — Z76.0 MEDICATION REFILL: ICD-10-CM

## 2024-12-22 DIAGNOSIS — K20.80 PILL ESOPHAGITIS DUE TO POTASSIUM CHLORIDE: ICD-10-CM

## 2024-12-23 RX ORDER — OMEPRAZOLE 40 MG/1
40 CAPSULE, DELAYED RELEASE ORAL DAILY
Qty: 30 CAPSULE | Refills: 10 | OUTPATIENT
Start: 2024-12-23

## 2024-12-31 DIAGNOSIS — Z76.0 MEDICATION REFILL: ICD-10-CM

## 2024-12-31 DIAGNOSIS — T50.3X5A PILL ESOPHAGITIS DUE TO POTASSIUM CHLORIDE: ICD-10-CM

## 2024-12-31 DIAGNOSIS — K20.80 PILL ESOPHAGITIS DUE TO POTASSIUM CHLORIDE: ICD-10-CM

## 2024-12-31 RX ORDER — OMEPRAZOLE 40 MG/1
40 CAPSULE, DELAYED RELEASE ORAL DAILY
Qty: 30 CAPSULE | Refills: 10 | OUTPATIENT
Start: 2024-12-31

## 2025-01-02 RX ORDER — OMEPRAZOLE 40 MG/1
40 CAPSULE, DELAYED RELEASE ORAL DAILY
Qty: 30 CAPSULE | Refills: 10 | Status: SHIPPED | OUTPATIENT
Start: 2025-01-02

## 2025-02-10 ENCOUNTER — OFFICE VISIT (OUTPATIENT)
Dept: FAMILY MEDICINE CLINIC | Age: 50
End: 2025-02-10

## 2025-02-10 ENCOUNTER — HOSPITAL ENCOUNTER (OUTPATIENT)
Age: 50
Setting detail: SPECIMEN
Discharge: HOME | End: 2025-02-10

## 2025-02-10 VITALS
DIASTOLIC BLOOD PRESSURE: 86 MMHG | OXYGEN SATURATION: 98 % | WEIGHT: 168.8 LBS | TEMPERATURE: 97.6 F | SYSTOLIC BLOOD PRESSURE: 118 MMHG | HEART RATE: 98 BPM | BODY MASS INDEX: 26.44 KG/M2

## 2025-02-10 DIAGNOSIS — Z13.29 SCREENING FOR THYROID DISORDER: ICD-10-CM

## 2025-02-10 DIAGNOSIS — L84 CALLUS: ICD-10-CM

## 2025-02-10 DIAGNOSIS — F19.11 HISTORY OF DRUG ABUSE (HCC): ICD-10-CM

## 2025-02-10 DIAGNOSIS — E55.9 VITAMIN D DEFICIENCY: ICD-10-CM

## 2025-02-10 DIAGNOSIS — E11.9 TYPE 2 DIABETES MELLITUS WITHOUT COMPLICATION, WITHOUT LONG-TERM CURRENT USE OF INSULIN (HCC): ICD-10-CM

## 2025-02-10 DIAGNOSIS — Z13.220 SCREENING FOR LIPID DISORDERS: ICD-10-CM

## 2025-02-10 DIAGNOSIS — Z13.0 SCREENING FOR DEFICIENCY ANEMIA: ICD-10-CM

## 2025-02-10 DIAGNOSIS — Z12.31 ENCOUNTER FOR SCREENING MAMMOGRAM FOR MALIGNANT NEOPLASM OF BREAST: ICD-10-CM

## 2025-02-10 DIAGNOSIS — R23.2 HOT FLASHES: ICD-10-CM

## 2025-02-10 DIAGNOSIS — F31.81 BIPOLAR 2 DISORDER (HCC): ICD-10-CM

## 2025-02-10 DIAGNOSIS — Z23 IMMUNIZATION DUE: Primary | ICD-10-CM

## 2025-02-10 DIAGNOSIS — Z13.1 SCREENING FOR DIABETES MELLITUS: ICD-10-CM

## 2025-02-10 DIAGNOSIS — Z13.21 ENCOUNTER FOR VITAMIN DEFICIENCY SCREENING: ICD-10-CM

## 2025-02-10 LAB
CREAT UR-MCNC: 234 MG/DL (ref 28–217)
HBA1C MFR BLD: 6.7 %
MICROALBUMIN UR-MCNC: 20 MG/L (ref 0–20)
MICROALBUMIN/CREAT UR-RTO: 9 MCG/MG CREAT (ref 0–25)

## 2025-02-10 ASSESSMENT — PATIENT HEALTH QUESTIONNAIRE - PHQ9
SUM OF ALL RESPONSES TO PHQ QUESTIONS 1-9: 5
5. POOR APPETITE OR OVEREATING: SEVERAL DAYS
9. THOUGHTS THAT YOU WOULD BE BETTER OFF DEAD, OR OF HURTING YOURSELF: NOT AT ALL
1. LITTLE INTEREST OR PLEASURE IN DOING THINGS: NOT AT ALL
7. TROUBLE CONCENTRATING ON THINGS, SUCH AS READING THE NEWSPAPER OR WATCHING TELEVISION: NOT AT ALL
SUM OF ALL RESPONSES TO PHQ QUESTIONS 1-9: 5
10. IF YOU CHECKED OFF ANY PROBLEMS, HOW DIFFICULT HAVE THESE PROBLEMS MADE IT FOR YOU TO DO YOUR WORK, TAKE CARE OF THINGS AT HOME, OR GET ALONG WITH OTHER PEOPLE: NOT DIFFICULT AT ALL
4. FEELING TIRED OR HAVING LITTLE ENERGY: NEARLY EVERY DAY
8. MOVING OR SPEAKING SO SLOWLY THAT OTHER PEOPLE COULD HAVE NOTICED. OR THE OPPOSITE, BEING SO FIGETY OR RESTLESS THAT YOU HAVE BEEN MOVING AROUND A LOT MORE THAN USUAL: NOT AT ALL
SUM OF ALL RESPONSES TO PHQ QUESTIONS 1-9: 5
SUM OF ALL RESPONSES TO PHQ QUESTIONS 1-9: 5
3. TROUBLE FALLING OR STAYING ASLEEP: SEVERAL DAYS
6. FEELING BAD ABOUT YOURSELF - OR THAT YOU ARE A FAILURE OR HAVE LET YOURSELF OR YOUR FAMILY DOWN: NOT AT ALL

## 2025-02-10 NOTE — PROGRESS NOTES
[facility]  [facility]  [address] [facility] [address]     Date of Visit:  2/10/2025  Patient Name: Mony Castillo   Patient :  1975     CHIEF COMPLAINT:     Mony Castillo is a 49 y.o. female who presents today for an general visit to be evaluated for the following condition(s):  Chief Complaint   Patient presents with    Discuss Labs    Diabetes       REVIEW OF SYSTEM      Review of Systems    HISTORY OF PRESENT ILLNESS     History of Present Illness  The patient presents for evaluation of diabetes mellitus, PCOS, and callus on the foot.    She has been under the care of several specialists recently. She is due for a mammogram, with her last one conducted in 2023. She has not yet consulted an ophthalmologist. She reports no chest pain and has not required emergency room visits.    She has a history of polycystic ovary syndrome (PCOS) and experiences discomfort during menstruation. She also reports experiencing hot flashes. She has not previously consulted a gynecologist, with her Pap smears typically performed by a physician. However, she now expresses a desire to seek gynecological care.    She has a callus on the bottom of her foot and uses a pad, but it does not work. Her feet are always cold.    Supplemental Information  She had a head cold, which is resolving.    IMMUNIZATIONS  She is due for influenza vaccine.      REVIEWED INFORMATION      Allergies   Allergen Reactions    Antihistamines, Chlorpheniramine-Type Rash    Hydroxyzine Anxiety       Patient Active Problem List   Diagnosis    Pulmonary edema, acute, with congestive heart failure (HCC)    Anxiety    Microcytic anemia    History of drug abuse (HCC)    Chronic diastolic congestive heart failure (HCC)    Severe mitral valve regurgitation    Severe mitral regurgitation by prior echocardiogram    S/P mitral valve replacement    S/P left atrial appendage ligation    Pneumonia due to organism    Recurrent

## 2025-03-07 DIAGNOSIS — I50.32 CHRONIC DIASTOLIC CONGESTIVE HEART FAILURE (HCC): ICD-10-CM

## 2025-03-07 DIAGNOSIS — E55.9 VITAMIN D DEFICIENCY: ICD-10-CM

## 2025-03-07 DIAGNOSIS — E78.00 ELEVATED LDL CHOLESTEROL LEVEL: ICD-10-CM

## 2025-03-07 DIAGNOSIS — I50.22 CHRONIC SYSTOLIC (CONGESTIVE) HEART FAILURE (HCC): ICD-10-CM

## 2025-03-07 DIAGNOSIS — R73.03 PREDIABETES: ICD-10-CM

## 2025-03-07 RX ORDER — CHOLECALCIFEROL (VITAMIN D3) 50 MCG
2000 TABLET ORAL DAILY
Qty: 90 TABLET | Refills: 0 | Status: SHIPPED | OUTPATIENT
Start: 2025-03-07 | End: 2025-06-05

## 2025-03-07 RX ORDER — ATORVASTATIN CALCIUM 20 MG/1
20 TABLET, FILM COATED ORAL DAILY
Qty: 90 TABLET | Refills: 0 | Status: SHIPPED | OUTPATIENT
Start: 2025-03-07

## 2025-03-07 NOTE — TELEPHONE ENCOUNTER
Mony Castillo is calling to request a refill on the following medication(s):    Medication Request:  Requested Prescriptions     Pending Prescriptions Disp Refills    atorvastatin (LIPITOR) 20 MG tablet [Pharmacy Med Name: ATORVASTATIN CALCIUM 20 MG ORAL TABLET] 30 tablet 10     Sig: TAKE 1 TABLET BY MOUTH DAILY    vitamin D (CHOLECALCIFEROL) 50 MCG (2000 UT) TABS tablet [Pharmacy Med Name: VITAMIN D 50 MCG (2000 UT) ORAL TABLET] 90 tablet 10     Sig: TAKE 1 TABLET BY MOUTH DAILY       Last Visit Date (If Applicable):  2/10/2025    Next Visit Date:    5/13/2025    Last refills 5/2 and 5/28/24. Prescription pending.

## 2025-03-10 DIAGNOSIS — I50.22 CHRONIC SYSTOLIC (CONGESTIVE) HEART FAILURE (HCC): ICD-10-CM

## 2025-03-10 DIAGNOSIS — E55.9 VITAMIN D DEFICIENCY: ICD-10-CM

## 2025-03-10 DIAGNOSIS — E78.00 ELEVATED LDL CHOLESTEROL LEVEL: ICD-10-CM

## 2025-03-10 DIAGNOSIS — I50.32 CHRONIC DIASTOLIC CONGESTIVE HEART FAILURE (HCC): ICD-10-CM

## 2025-03-10 DIAGNOSIS — R73.03 PREDIABETES: ICD-10-CM

## 2025-03-10 RX ORDER — ATORVASTATIN CALCIUM 20 MG/1
20 TABLET, FILM COATED ORAL DAILY
Qty: 30 TABLET | OUTPATIENT
Start: 2025-03-10

## 2025-03-10 RX ORDER — CHOLECALCIFEROL (VITAMIN D3) 50 MCG
2000 TABLET ORAL DAILY
Qty: 90 TABLET | Refills: 0 | OUTPATIENT
Start: 2025-03-10 | End: 2025-06-08

## 2025-03-12 DIAGNOSIS — Z95.2 S/P MITRAL VALVE REPLACEMENT: Chronic | ICD-10-CM

## 2025-03-12 RX ORDER — FUROSEMIDE 20 MG/1
TABLET ORAL
Qty: 60 TABLET | Refills: 10 | OUTPATIENT
Start: 2025-03-12

## 2025-04-03 ENCOUNTER — TELEPHONE (OUTPATIENT)
Dept: FAMILY MEDICINE CLINIC | Age: 50
End: 2025-04-03

## 2025-04-03 NOTE — TELEPHONE ENCOUNTER
Patient calling to get an appointment for a HFU,([facility] notes in epic)   ok to schedule with Deanne

## 2025-04-04 ENCOUNTER — OFFICE VISIT (OUTPATIENT)
Dept: FAMILY MEDICINE CLINIC | Age: 50
End: 2025-04-04

## 2025-04-04 VITALS
OXYGEN SATURATION: 100 % | DIASTOLIC BLOOD PRESSURE: 82 MMHG | SYSTOLIC BLOOD PRESSURE: 120 MMHG | WEIGHT: 166.8 LBS | TEMPERATURE: 97.3 F | BODY MASS INDEX: 26.18 KG/M2 | HEIGHT: 67 IN | HEART RATE: 111 BPM | RESPIRATION RATE: 16 BRPM

## 2025-04-04 DIAGNOSIS — Z76.0 MEDICATION REFILL: ICD-10-CM

## 2025-04-04 DIAGNOSIS — R20.2 NUMBNESS AND TINGLING OF BOTH LOWER EXTREMITIES: ICD-10-CM

## 2025-04-04 DIAGNOSIS — G61.0 GUILLAIN-BARRE SYNDROME: ICD-10-CM

## 2025-04-04 DIAGNOSIS — Z09 HOSPITAL DISCHARGE FOLLOW-UP: Primary | ICD-10-CM

## 2025-04-04 DIAGNOSIS — R29.898 WEAKNESS OF BOTH LOWER EXTREMITIES: ICD-10-CM

## 2025-04-04 DIAGNOSIS — R20.0 NUMBNESS AND TINGLING OF BOTH LOWER EXTREMITIES: ICD-10-CM

## 2025-04-04 PROBLEM — E78.5 HLD (HYPERLIPIDEMIA): Status: ACTIVE | Noted: 2025-03-18

## 2025-04-04 RX ORDER — GABAPENTIN 100 MG/1
100 CAPSULE ORAL NIGHTLY
Qty: 30 CAPSULE | Refills: 0 | Status: SHIPPED | OUTPATIENT
Start: 2025-04-04 | End: 2025-05-04

## 2025-04-04 RX ORDER — ONDANSETRON 4 MG/1
4 TABLET, ORALLY DISINTEGRATING ORAL EVERY 8 HOURS PRN
Qty: 20 TABLET | Refills: 0 | Status: SHIPPED | OUTPATIENT
Start: 2025-04-04

## 2025-04-04 ASSESSMENT — ENCOUNTER SYMPTOMS
CHEST TIGHTNESS: 0
EYES NEGATIVE: 1
ALLERGIC/IMMUNOLOGIC NEGATIVE: 1
GASTROINTESTINAL NEGATIVE: 1
SHORTNESS OF BREATH: 0
RESPIRATORY NEGATIVE: 1

## 2025-04-04 NOTE — PROGRESS NOTES
Post-Discharge Transitional Care Follow Up    Mony Castillo   YOB: 1975    Date of Office Visit:  4/4/2025  Date of Hospital Admission: 3/17/25  Date of Hospital Discharge: 3/24/25    Care management risk score Rising risk (score 2-5) and Complex Care (Scores >=6): No Risk Score On File     Non face to face  following discharge, date last encounter closed (first attempt may have been earlier): *No documented post hospital discharge outreach found in the last 14 days     Call initiated 2 business days of discharge: *No response recorded in the last 14 days    ASSESSMENT/PLAN:   Hospital discharge follow-up  -     MI DISCHARGE MEDS RECONCILED W/ CURRENT OUTPATIENT MED LIST  Guillain-[place] syndrome  -     gabapentin (NEURONTIN) 100 MG capsule; Take 1 capsule by mouth nightly for 30 days., Disp-30 capsule, R-0Normal  Weakness of both lower extremities  Numbness and tingling of both lower extremities  -     gabapentin (NEURONTIN) 100 MG capsule; Take 1 capsule by mouth nightly for 30 days., Disp-30 capsule, R-0Normal  Medication refill  -     ondansetron (ZOFRAN ODT) 4 MG disintegrating tablet; Take 1 tablet by mouth every 8 hours as needed for Nausea, Disp-20 tablet, R-0Normal    Take gabapentin as prescribed for pain, numbness, and tingling in lower extremities.  Continue to monitor BP at home. Discussed checking blood pressure prior to taking BP medications to avoid hypotensive symptoms/episodes until discussed with cardiology. Follow up as scheduled with specialists and PCP.  If symptoms worsen return to office or ED for further evaluation.        Medical Decision Making: moderate complexity  Return in about 6 weeks (around 5/13/2025), or as scheduled, for physical.    On this date 4/4/2025 I have spent 35 minutes reviewing previous notes, test results and face to face with the patient discussing the diagnosis and importance of compliance with the treatment plan as well as documenting on the day of

## 2025-04-10 ENCOUNTER — TELEPHONE (OUTPATIENT)
Dept: FAMILY MEDICINE CLINIC | Age: 50
End: 2025-04-10

## 2025-04-10 DIAGNOSIS — R20.2 NUMBNESS AND TINGLING OF BOTH LOWER EXTREMITIES: Primary | ICD-10-CM

## 2025-04-10 DIAGNOSIS — R20.0 NUMBNESS AND TINGLING OF BOTH LOWER EXTREMITIES: Primary | ICD-10-CM

## 2025-04-10 NOTE — TELEPHONE ENCOUNTER
SUBJECT: numbness in feet  PATIENT/CALLER: Mony  CURRENT SYMPTOMS: numbness in feet moving up to knees  ONSET:  PAIN LEVEL:  TEMP:   WHAT HAS BEEN TRIED: patient states that she was just in and saw Deanne and was given gabapentin 100 mg at night. She said that she has been taking 3 tablet qd to help with the numbness and had some relief. Would like to know if she could take 300mg twice a day? Also she does have a neurology appointment on the 14th of this month. Says that she is in more pain this morning and does not know what to do. Please advise.

## 2025-04-11 RX ORDER — GABAPENTIN 300 MG/1
300 CAPSULE ORAL 2 TIMES DAILY
Qty: 180 CAPSULE | Refills: 1 | Status: SHIPPED | OUTPATIENT
Start: 2025-04-11 | End: 2025-10-08

## 2025-05-12 ENCOUNTER — HOSPITAL ENCOUNTER (OUTPATIENT)
Age: 50
Setting detail: SPECIMEN
Discharge: HOME | End: 2025-05-12

## 2025-05-12 DIAGNOSIS — Z13.0 SCREENING FOR DEFICIENCY ANEMIA: ICD-10-CM

## 2025-05-12 DIAGNOSIS — Z13.29 SCREENING FOR THYROID DISORDER: ICD-10-CM

## 2025-05-12 DIAGNOSIS — Z13.21 ENCOUNTER FOR VITAMIN DEFICIENCY SCREENING: ICD-10-CM

## 2025-05-12 DIAGNOSIS — E55.9 VITAMIN D DEFICIENCY: ICD-10-CM

## 2025-05-12 DIAGNOSIS — E11.9 TYPE 2 DIABETES MELLITUS WITHOUT COMPLICATION, WITHOUT LONG-TERM CURRENT USE OF INSULIN (HCC): ICD-10-CM

## 2025-05-12 DIAGNOSIS — Z13.220 SCREENING FOR LIPID DISORDERS: ICD-10-CM

## 2025-05-12 LAB
25(OH)D3 SERPL-MCNC: 12 NG/ML (ref 30–100)
ALBUMIN SERPL-MCNC: 4 G/DL (ref 3.5–5.2)
ALBUMIN/GLOB SERPL: 1.3 {RATIO} (ref 1–2.5)
ALP SERPL-CCNC: 62 U/L (ref 35–104)
ALT SERPL-CCNC: 7 U/L (ref 10–35)
ANION GAP SERPL CALCULATED.3IONS-SCNC: 10 MMOL/L (ref 9–16)
AST SERPL-CCNC: 13 U/L (ref 10–35)
BASOPHILS # BLD: 0.04 K/UL (ref 0–0.2)
BASOPHILS NFR BLD: 1 % (ref 0–2)
BILIRUB SERPL-MCNC: 0.3 MG/DL (ref 0–1.2)
BUN SERPL-MCNC: 7 MG/DL (ref 6–20)
CALCIUM SERPL-MCNC: 8.6 MG/DL (ref 8.6–10.4)
CHLORIDE SERPL-SCNC: 108 MMOL/L (ref 98–107)
CHOLEST SERPL-MCNC: 153 MG/DL (ref 0–199)
CHOLESTEROL/HDL RATIO: 2.9
CO2 SERPL-SCNC: 24 MMOL/L (ref 20–31)
CREAT SERPL-MCNC: 0.7 MG/DL (ref 0.6–0.9)
EOSINOPHIL # BLD: <0.03 K/UL (ref 0–0.44)
EOSINOPHILS RELATIVE PERCENT: 0 % (ref 1–4)
ERYTHROCYTE [DISTWIDTH] IN BLOOD BY AUTOMATED COUNT: 15.3 % (ref 11.8–14.4)
EST. AVERAGE GLUCOSE BLD GHB EST-MCNC: 134 MG/DL
FOLATE SERPL-MCNC: 3.5 NG/ML (ref 4.8–24.2)
GFR, ESTIMATED: >90 ML/MIN/1.73M2
GLUCOSE SERPL-MCNC: 100 MG/DL (ref 74–99)
HBA1C MFR BLD: 6.3 % (ref 4–6)
HCT VFR BLD AUTO: 36 % (ref 36.3–47.1)
HDLC SERPL-MCNC: 53 MG/DL
HGB BLD-MCNC: 11.8 G/DL (ref 11.9–15.1)
IMM GRANULOCYTES # BLD AUTO: <0.03 K/UL (ref 0–0.3)
IMM GRANULOCYTES NFR BLD: 0 %
LDLC SERPL CALC-MCNC: 91 MG/DL (ref 0–100)
LYMPHOCYTES NFR BLD: 2.02 K/UL (ref 1.1–3.7)
LYMPHOCYTES RELATIVE PERCENT: 30 % (ref 24–43)
MCH RBC QN AUTO: 26.6 PG (ref 25.2–33.5)
MCHC RBC AUTO-ENTMCNC: 32.8 G/DL (ref 28.4–34.8)
MCV RBC AUTO: 81.1 FL (ref 82.6–102.9)
MONOCYTES NFR BLD: 0.27 K/UL (ref 0.1–1.2)
MONOCYTES NFR BLD: 4 % (ref 3–12)
NEUTROPHILS NFR BLD: 65 % (ref 36–65)
NEUTS SEG NFR BLD: 4.42 K/UL (ref 1.5–8.1)
NRBC BLD-RTO: 0 PER 100 WBC
PLATELET # BLD AUTO: 368 K/UL (ref 138–453)
PMV BLD AUTO: 9.9 FL (ref 8.1–13.5)
POTASSIUM SERPL-SCNC: 3.4 MMOL/L (ref 3.7–5.3)
PROT SERPL-MCNC: 7 G/DL (ref 6.6–8.7)
RBC # BLD AUTO: 4.44 M/UL (ref 3.95–5.11)
RBC # BLD: ABNORMAL 10*6/UL
SODIUM SERPL-SCNC: 142 MMOL/L (ref 136–145)
TRIGL SERPL-MCNC: 45 MG/DL
TSH SERPL DL<=0.05 MIU/L-ACNC: 2.03 UIU/ML (ref 0.27–4.2)
VIT B12 SERPL-MCNC: 284 PG/ML (ref 232–1245)
VLDLC SERPL CALC-MCNC: 9 MG/DL (ref 1–30)
WBC OTHER # BLD: 6.8 K/UL (ref 3.5–11.3)

## 2025-05-13 ENCOUNTER — HOSPITAL ENCOUNTER (OUTPATIENT)
Age: 50
Setting detail: SPECIMEN
Discharge: HOME | End: 2025-05-13

## 2025-05-13 ENCOUNTER — OFFICE VISIT (OUTPATIENT)
Dept: FAMILY MEDICINE CLINIC | Age: 50
End: 2025-05-13
Payer: COMMERCIAL

## 2025-05-13 VITALS
BODY MASS INDEX: 25.74 KG/M2 | HEIGHT: 67 IN | OXYGEN SATURATION: 97 % | WEIGHT: 164 LBS | HEART RATE: 68 BPM | DIASTOLIC BLOOD PRESSURE: 68 MMHG | TEMPERATURE: 97.7 F | SYSTOLIC BLOOD PRESSURE: 110 MMHG

## 2025-05-13 DIAGNOSIS — E55.9 VITAMIN D DEFICIENCY: ICD-10-CM

## 2025-05-13 DIAGNOSIS — E87.6 HYPOKALEMIA: ICD-10-CM

## 2025-05-13 DIAGNOSIS — R07.9 CHEST PAIN, UNSPECIFIED TYPE: ICD-10-CM

## 2025-05-13 DIAGNOSIS — D50.9 IRON DEFICIENCY ANEMIA, UNSPECIFIED IRON DEFICIENCY ANEMIA TYPE: ICD-10-CM

## 2025-05-13 DIAGNOSIS — E53.8 FOLATE DEFICIENCY: ICD-10-CM

## 2025-05-13 DIAGNOSIS — Z00.00 INITIAL MEDICARE ANNUAL WELLNESS VISIT: Primary | ICD-10-CM

## 2025-05-13 PROCEDURE — 99214 OFFICE O/P EST MOD 30 MIN: CPT | Performed by: STUDENT IN AN ORGANIZED HEALTH CARE EDUCATION/TRAINING PROGRAM

## 2025-05-13 PROCEDURE — 93000 ELECTROCARDIOGRAM COMPLETE: CPT | Performed by: STUDENT IN AN ORGANIZED HEALTH CARE EDUCATION/TRAINING PROGRAM

## 2025-05-13 PROCEDURE — G0438 PPPS, INITIAL VISIT: HCPCS | Performed by: STUDENT IN AN ORGANIZED HEALTH CARE EDUCATION/TRAINING PROGRAM

## 2025-05-13 RX ORDER — POTASSIUM CHLORIDE 1500 MG/1
40 TABLET, EXTENDED RELEASE ORAL ONCE
Qty: 2 TABLET | Refills: 0 | Status: SHIPPED | OUTPATIENT
Start: 2025-05-13 | End: 2025-05-13

## 2025-05-13 RX ORDER — ERGOCALCIFEROL 1.25 MG/1
50000 CAPSULE, LIQUID FILLED ORAL WEEKLY
Qty: 12 CAPSULE | Refills: 1 | Status: SHIPPED | OUTPATIENT
Start: 2025-05-13

## 2025-05-13 RX ORDER — FOLIC ACID 1 MG/1
1 TABLET ORAL DAILY
Qty: 90 TABLET | Refills: 1 | Status: SHIPPED | OUTPATIENT
Start: 2025-05-13

## 2025-05-13 ASSESSMENT — PATIENT HEALTH QUESTIONNAIRE - PHQ9
2. FEELING DOWN, DEPRESSED OR HOPELESS: NOT AT ALL
SUM OF ALL RESPONSES TO PHQ QUESTIONS 1-9: 0
1. LITTLE INTEREST OR PLEASURE IN DOING THINGS: NOT AT ALL
8. MOVING OR SPEAKING SO SLOWLY THAT OTHER PEOPLE COULD HAVE NOTICED. OR THE OPPOSITE, BEING SO FIGETY OR RESTLESS THAT YOU HAVE BEEN MOVING AROUND A LOT MORE THAN USUAL: NOT AT ALL
SUM OF ALL RESPONSES TO PHQ QUESTIONS 1-9: 0
10. IF YOU CHECKED OFF ANY PROBLEMS, HOW DIFFICULT HAVE THESE PROBLEMS MADE IT FOR YOU TO DO YOUR WORK, TAKE CARE OF THINGS AT HOME, OR GET ALONG WITH OTHER PEOPLE: NOT DIFFICULT AT ALL
4. FEELING TIRED OR HAVING LITTLE ENERGY: NOT AT ALL
9. THOUGHTS THAT YOU WOULD BE BETTER OFF DEAD, OR OF HURTING YOURSELF: NOT AT ALL
3. TROUBLE FALLING OR STAYING ASLEEP: NOT AT ALL
5. POOR APPETITE OR OVEREATING: NOT AT ALL
7. TROUBLE CONCENTRATING ON THINGS, SUCH AS READING THE NEWSPAPER OR WATCHING TELEVISION: NOT AT ALL
6. FEELING BAD ABOUT YOURSELF - OR THAT YOU ARE A FAILURE OR HAVE LET YOURSELF OR YOUR FAMILY DOWN: NOT AT ALL

## 2025-05-13 NOTE — PROGRESS NOTES
tablet Take 1 tablet by mouth 2 times daily Yes Malinda Wright APRN - NP   gabapentin (NEURONTIN) 300 MG capsule Take 1 capsule by mouth 2 times daily for 180 days. Intended supply: 90 days  Patient taking differently: Take 2 capsules by mouth 3 times daily. Intended supply: 90 days Yes Irma Coleman MD   ondansetron (ZOFRAN ODT) 4 MG disintegrating tablet Take 1 tablet by mouth every 8 hours as needed for Nausea Yes Deanne Stone NP-C   atorvastatin (LIPITOR) 20 MG tablet TAKE 1 TABLET BY MOUTH DAILY Yes Irma Coleman MD   vitamin D (CHOLECALCIFEROL) 50 MCG (2000 UT) TABS tablet TAKE 1 TABLET BY MOUTH DAILY Yes Irma Coleman MD   JARDIANCE 10 MG tablet TAKE 1 TABLET BY MOUTH EVERY DAY Yes Yves Mendes DO   omeprazole (PRILOSEC) 40 MG delayed release capsule Take 1 capsule by mouth daily Yes Irma Coleman MD   aspirin 81 MG EC tablet Take 1 tablet by mouth daily Yes Wolf Louise MD   FEROSUL 325 (65 Fe) MG tablet TAKE ONE TABLET BY MOUTH EVERY DAY WITH BREAKFAST Yes Irma Coleman MD   clindamycin (CLEOCIN) 300 MG capsule Take 2 capsules by mouth See Admin Instructions for 1 dose Take 2 capsules by mouth 30-60 minutes prior to dental work. Yes Malinda Wright APRN - NP   LORazepam (ATIVAN) 0.5 MG tablet Take 1 tablet by mouth every 6 hours as needed for Anxiety. Yes Romy Ramos MD   furosemide (LASIX) 20 MG tablet TAKE ONE TABLET BY MOUTH EVERY DAY IN THE EVENING  Patient taking differently: as needed TAKE ONE TABLET BY MOUTH EVERY DAY IN THE EVENING Yes Alex Loyd MD   mirtazapine (REMERON) 15 MG tablet Take 1 tablet by mouth nightly Yes ProviderRomy MD       CareTeam (Including outside providers/suppliers regularly involved in providing care):   Patient Care Team:  Irma Coleman MD as PCP - General (Family Medicine)  Irma Coleman MD as PCP - Empaneled Provider     Recommendations for Preventive Services Due: see orders and patient instructions/AVS.  Recommended

## 2025-05-21 ENCOUNTER — OFFICE VISIT (OUTPATIENT)
Dept: PODIATRY | Age: 50
End: 2025-05-21
Payer: COMMERCIAL

## 2025-05-21 VITALS — WEIGHT: 164 LBS | BODY MASS INDEX: 25.74 KG/M2 | HEIGHT: 67 IN

## 2025-05-21 DIAGNOSIS — D23.72 BENIGN NEOPLASM OF SKIN OF LEFT FOOT: ICD-10-CM

## 2025-05-21 DIAGNOSIS — M79.604 PAIN IN BOTH LOWER EXTREMITIES: ICD-10-CM

## 2025-05-21 DIAGNOSIS — D23.71 BENIGN NEOPLASM OF SKIN OF RIGHT FOOT: ICD-10-CM

## 2025-05-21 DIAGNOSIS — M79.605 PAIN IN BOTH LOWER EXTREMITIES: ICD-10-CM

## 2025-05-21 DIAGNOSIS — L85.3 XEROSIS CUTIS: ICD-10-CM

## 2025-05-21 DIAGNOSIS — G61.0 GUILLAIN-BARRE SYNDROME: Primary | ICD-10-CM

## 2025-05-21 DIAGNOSIS — B35.1 DERMATOPHYTOSIS OF NAIL: ICD-10-CM

## 2025-05-21 PROCEDURE — 99203 OFFICE O/P NEW LOW 30 MIN: CPT | Performed by: PODIATRIST

## 2025-05-21 PROCEDURE — 17110 DESTRUCTION B9 LES UP TO 14: CPT | Performed by: PODIATRIST

## 2025-05-21 RX ORDER — AMMONIUM LACTATE 12 G/100G
LOTION TOPICAL
Qty: 225 G | Refills: 2 | Status: SHIPPED | OUTPATIENT
Start: 2025-05-21

## 2025-05-21 RX ORDER — CICLOPIROX 80 MG/ML
SOLUTION TOPICAL
Qty: 6 ML | Refills: 2 | Status: SHIPPED | OUTPATIENT
Start: 2025-05-21

## 2025-05-28 NOTE — PROGRESS NOTES
Quit date: 2023     Years since quittin.0    Smokeless tobacco: Never   Substance Use Topics    Alcohol use: Not Currently     Comment: Hx of heavy use, none since        Review of Systems    Review of Systems:   History obtained from chart review and the patient  General ROS: negative for - chills, fatigue, fever, night sweats or weight gain  Constitutional: Negative for chills, diaphoresis, fatigue, fever and unexpected weight change.  Musculoskeletal: Positive for arthralgias, gait problem and joint swelling.  Neurological ROS: negative for - behavioral changes, confusion, headaches or seizures. Negative for weakness and numbness.   Dermatological ROS: negative for - mole changes, rash  Cardiovascular: Negative for leg swelling.   Gastrointestinal: Negative for constipation, diarrhea, nausea and vomiting.               Lower Extremity Physical Examination:   Vitals: There were no vitals filed for this visit.  General: AAO x 3 in NAD.   Dermatologic Exam:  Soft tissue lesion to the plantar right and left foot with central core and petechiae. Pain on palpation of lesion.    Skin No rashes or nodules noted..   Skin is thin, with flaky sloughing skin as well as decreased hair growth to the lower leg  Small red hemosiderin deposits seen dorsal foot   Musculoskeletal:     1st MPJ ROM decreased, Bilateral.  Muscle strength 5/5, Bilateral.  Pain present upon palpation of toenails 1-5, Bilateral. decreased medial longitudinal arch, Bilateral.  Ankle ROM decreased,Bilateral.    Dorsally contracted digits present digits 2, Bilateral.     Vascular: DP pulses 1/4 bilateral.  PT pulses 0/4 bilateral.   CFT <5 seconds, Bilateral.  Hair growth absent to the level of the digits, Bilateral.  Edema present, Bilateral.  Varicosities absent, Bilateral. Erythema absent, Bilateral    Neurological: Sensation diminshed to light touch to level of digits, Bilateral.  Protective sensation intact 6/10 sites via 5.07/10g

## 2025-06-01 DIAGNOSIS — R73.03 PREDIABETES: ICD-10-CM

## 2025-06-01 DIAGNOSIS — I50.32 CHRONIC DIASTOLIC CONGESTIVE HEART FAILURE (HCC): ICD-10-CM

## 2025-06-01 DIAGNOSIS — E78.00 ELEVATED LDL CHOLESTEROL LEVEL: ICD-10-CM

## 2025-06-01 DIAGNOSIS — I50.22 CHRONIC SYSTOLIC (CONGESTIVE) HEART FAILURE (HCC): ICD-10-CM

## 2025-06-01 DIAGNOSIS — E55.9 VITAMIN D DEFICIENCY: ICD-10-CM

## 2025-06-02 RX ORDER — CHOLECALCIFEROL (VITAMIN D3) 50 MCG
2000 TABLET ORAL DAILY
Qty: 90 TABLET | Refills: 1 | Status: SHIPPED | OUTPATIENT
Start: 2025-06-02 | End: 2025-08-31

## 2025-06-02 RX ORDER — ATORVASTATIN CALCIUM 20 MG/1
20 TABLET, FILM COATED ORAL DAILY
Qty: 90 TABLET | Refills: 1 | Status: SHIPPED | OUTPATIENT
Start: 2025-06-02

## 2025-06-02 NOTE — TELEPHONE ENCOUNTER
Mony Castillo is calling to request a refill on the following medication(s):    Medication Request:  Requested Prescriptions     Pending Prescriptions Disp Refills    vitamin D (CHOLECALCIFEROL) 50 MCG (2000 UT) TABS tablet [Pharmacy Med Name: VITAMIN D 50 MCG (2000 UT) ORAL TABLET] 90 tablet 1     Sig: TAKE 1 TABLET BY MOUTH DAILY    atorvastatin (LIPITOR) 20 MG tablet [Pharmacy Med Name: ATORVASTATIN CALCIUM 20 MG ORAL TABLET] 90 tablet 1     Sig: TAKE 1 TABLET BY MOUTH DAILY       Last Visit Date (If Applicable):  5/13/2025    Next Visit Date:    8/13/2025

## 2025-06-24 DIAGNOSIS — E87.6 HYPOKALEMIA: ICD-10-CM

## 2025-06-24 RX ORDER — POTASSIUM CHLORIDE 1500 MG/1
40 TABLET, EXTENDED RELEASE ORAL ONCE
Qty: 2 TABLET | Refills: 10 | Status: SHIPPED | OUTPATIENT
Start: 2025-06-24 | End: 2025-06-24

## 2025-06-24 NOTE — TELEPHONE ENCOUNTER
Mony Castillo is calling to request a refill on the following medication(s):    Medication Request:  Requested Prescriptions     Pending Prescriptions Disp Refills    potassium chloride (KLOR-CON M) 20 MEQ extended release tablet [Pharmacy Med Name: POTASSIUM CHLORIDE LYNDA ER 20 MEQ ORAL TABLET EXTENDED RELEASE] 2 tablet 10     Sig: TAKE 2 TABLETS BY MOUTH ONCE FOR 1 DOSE       Last Visit Date (If Applicable):  5/13/2025    Next Visit Date:    8/13/2025

## 2025-08-11 ENCOUNTER — HOSPITAL ENCOUNTER (OUTPATIENT)
Age: 50
Setting detail: SPECIMEN
Discharge: HOME | End: 2025-08-11

## 2025-08-11 ENCOUNTER — OFFICE VISIT (OUTPATIENT)
Dept: OBGYN CLINIC | Age: 50
End: 2025-08-11
Payer: COMMERCIAL

## 2025-08-11 VITALS
DIASTOLIC BLOOD PRESSURE: 83 MMHG | BODY MASS INDEX: 24.45 KG/M2 | WEIGHT: 155.8 LBS | HEIGHT: 67 IN | SYSTOLIC BLOOD PRESSURE: 118 MMHG

## 2025-08-11 DIAGNOSIS — E53.8 FOLATE DEFICIENCY: ICD-10-CM

## 2025-08-11 DIAGNOSIS — Z12.31 ENCOUNTER FOR SCREENING MAMMOGRAM FOR BREAST CANCER: Primary | ICD-10-CM

## 2025-08-11 DIAGNOSIS — Z01.419 ENCOUNTER FOR BREAST AND PELVIC EXAMINATION: ICD-10-CM

## 2025-08-11 DIAGNOSIS — E55.9 VITAMIN D DEFICIENCY: ICD-10-CM

## 2025-08-11 DIAGNOSIS — D50.9 IRON DEFICIENCY ANEMIA, UNSPECIFIED IRON DEFICIENCY ANEMIA TYPE: ICD-10-CM

## 2025-08-11 DIAGNOSIS — Z12.4 CERVICAL CANCER SCREENING: ICD-10-CM

## 2025-08-11 LAB
25(OH)D3 SERPL-MCNC: 24.9 NG/ML (ref 30–100)
FOLATE SERPL-MCNC: 12.9 NG/ML (ref 4.8–24.2)
IRON SATN MFR SERPL: 20 % (ref 20–55)
IRON SERPL-MCNC: 53 UG/DL (ref 37–145)
TIBC SERPL-MCNC: 263 UG/DL (ref 250–450)
TROPONIN I SERPL HS-MCNC: <6 NG/L (ref 0–14)
UNSATURATED IRON BINDING CAPACITY: 210 UG/DL (ref 112–347)
VIT B12 SERPL-MCNC: 292 PG/ML (ref 232–1245)

## 2025-08-11 PROCEDURE — G0101 CA SCREEN;PELVIC/BREAST EXAM: HCPCS | Performed by: NURSE PRACTITIONER

## 2025-08-13 ENCOUNTER — OFFICE VISIT (OUTPATIENT)
Dept: FAMILY MEDICINE CLINIC | Age: 50
End: 2025-08-13

## 2025-08-13 VITALS
TEMPERATURE: 97.6 F | SYSTOLIC BLOOD PRESSURE: 110 MMHG | HEART RATE: 66 BPM | DIASTOLIC BLOOD PRESSURE: 72 MMHG | WEIGHT: 154 LBS | BODY MASS INDEX: 24.12 KG/M2 | OXYGEN SATURATION: 100 %

## 2025-08-13 DIAGNOSIS — E11.9 TYPE 2 DIABETES MELLITUS WITHOUT COMPLICATION, WITHOUT LONG-TERM CURRENT USE OF INSULIN (HCC): Primary | ICD-10-CM

## 2025-08-13 DIAGNOSIS — L98.9 SKIN LESION: ICD-10-CM

## 2025-08-13 DIAGNOSIS — E55.9 VITAMIN D DEFICIENCY: ICD-10-CM

## 2025-08-13 LAB
HPV I/H RISK 4 DNA CVX QL NAA+PROBE: NOT DETECTED
HPV SAMPLE: NORMAL
HPV, INTERPRETATION: NORMAL
HPV16 DNA CVX QL NAA+PROBE: NOT DETECTED
HPV18 DNA CVX QL NAA+PROBE: NOT DETECTED
SPECIMEN DESCRIPTION: NORMAL

## 2025-08-13 RX ORDER — ONDANSETRON 4 MG/1
4 TABLET, FILM COATED ORAL EVERY 6 HOURS
COMMUNITY
Start: 2025-06-19

## 2025-08-14 ENCOUNTER — HOSPITAL ENCOUNTER (OUTPATIENT)
Age: 50
Setting detail: SPECIMEN
Discharge: HOME | End: 2025-08-14

## 2025-08-16 ENCOUNTER — OFFICE VISIT (OUTPATIENT)
Age: 50
End: 2025-08-16

## 2025-08-16 VITALS — DIASTOLIC BLOOD PRESSURE: 90 MMHG | OXYGEN SATURATION: 98 % | HEART RATE: 115 BPM | SYSTOLIC BLOOD PRESSURE: 129 MMHG

## 2025-08-16 DIAGNOSIS — J06.9 URI WITH COUGH AND CONGESTION: Primary | ICD-10-CM

## 2025-08-16 LAB
INFLUENZA A ANTIGEN, POC: NORMAL
INFLUENZA B ANTIGEN, POC: NORMAL
Lab: NORMAL
QC PASS/FAIL: NORMAL
SARS-COV-2, POC: NORMAL

## 2025-08-16 RX ORDER — BENZONATATE 100 MG/1
100-200 CAPSULE ORAL 3 TIMES DAILY PRN
Qty: 60 CAPSULE | Refills: 0 | Status: SHIPPED | OUTPATIENT
Start: 2025-08-16 | End: 2025-08-26

## 2025-08-16 RX ORDER — FLUTICASONE PROPIONATE 50 MCG
2 SPRAY, SUSPENSION (ML) NASAL DAILY
Qty: 16 G | Refills: 0 | Status: SHIPPED | OUTPATIENT
Start: 2025-08-16

## 2025-08-16 ASSESSMENT — ENCOUNTER SYMPTOMS
NAUSEA: 1
SINUS PRESSURE: 1
COUGH: 1
CHEST TIGHTNESS: 1
ALLERGIC/IMMUNOLOGIC NEGATIVE: 1
EYES NEGATIVE: 1
SORE THROAT: 1

## 2025-08-18 ENCOUNTER — OFFICE VISIT (OUTPATIENT)
Age: 50
End: 2025-08-18

## 2025-08-18 VITALS
DIASTOLIC BLOOD PRESSURE: 78 MMHG | SYSTOLIC BLOOD PRESSURE: 125 MMHG | HEART RATE: 104 BPM | OXYGEN SATURATION: 98 % | WEIGHT: 155 LBS | BODY MASS INDEX: 24.28 KG/M2

## 2025-08-18 DIAGNOSIS — B34.9 VIRAL SYNDROME: Primary | ICD-10-CM

## 2025-08-18 RX ORDER — DEXTROMETHORPHAN HYDROBROMIDE, GUAIFENESIN AND PSEUDOEPHEDRINE HYDROCHLORIDE 15; 400; 60 MG/1; MG/1; MG/1
1 TABLET ORAL 2 TIMES DAILY
Qty: 56 TABLET | Refills: 0 | Status: SHIPPED | OUTPATIENT
Start: 2025-08-18

## 2025-08-18 RX ORDER — DEXTROMETHORPHAN HYDROBROMIDE, GUAIFENESIN AND PSEUDOEPHEDRINE HYDROCHLORIDE 15; 400; 60 MG/1; MG/1; MG/1
1 TABLET ORAL 2 TIMES DAILY
Qty: 56 TABLET | Refills: 0 | Status: SHIPPED | OUTPATIENT
Start: 2025-08-18 | End: 2025-08-18

## 2025-08-18 ASSESSMENT — ENCOUNTER SYMPTOMS
GASTROINTESTINAL NEGATIVE: 1
COUGH: 1
EYES NEGATIVE: 1

## 2025-08-19 LAB — SURGICAL PATHOLOGY REPORT: NORMAL

## 2025-08-27 LAB — CYTOLOGY REPORT: NORMAL

## 2025-09-02 ENCOUNTER — HOSPITAL ENCOUNTER (OUTPATIENT)
Age: 50
Discharge: HOME | End: 2025-09-04
Payer: COMMERCIAL

## 2025-09-02 DIAGNOSIS — Z12.31 ENCOUNTER FOR SCREENING MAMMOGRAM FOR BREAST CANCER: ICD-10-CM

## 2025-09-02 PROCEDURE — 77063 BREAST TOMOSYNTHESIS BI: CPT

## 2025-09-03 RX ORDER — AMMONIUM LACTATE 12 G/100G
LOTION TOPICAL
Qty: 226 G | Refills: 2 | Status: SHIPPED | OUTPATIENT
Start: 2025-09-03

## (undated) DEVICE — ENDO KIT W/SYRINGE: Brand: MEDLINE INDUSTRIES, INC.

## (undated) DEVICE — GLOVE SURG SZ 65 L12IN FNGR THK79MIL GRN LTX FREE

## (undated) DEVICE — MPS® DELIVERY SET W/ARREST AGENT AND ADDITIVE CASSETTES, HEAT EXCHANGER & 10 FT. DELIVERY TUBING: Brand: MPS

## (undated) DEVICE — CATHETER,URETHRAL,REDRUBBER,STRL,18FR: Brand: MEDLINE

## (undated) DEVICE — COUNTER NDL 40 COUNT HLD 70 FOAM BLK ADH W/ MAG

## (undated) DEVICE — Z INACTIVE OBSOLETE PER MEDTRONIC CUSTOM PK HY8C67R11 VAVD PK

## (undated) DEVICE — GLOVE SURG SZ 7 L12IN FNGR THK79MIL GRN LTX FREE

## (undated) DEVICE — SUMP CANN PED 12FR 0.25IN CONN INTCARD MULTIPORT VENT TIP

## (undated) DEVICE — PLEDGET SURG W3.5XL7MM THK1.5MM WHT PTFE RECT FIRM TFE

## (undated) DEVICE — GEL US 20GM NONIRRITATING OVERWRAPPED FILE PCH TRNSMIT

## (undated) DEVICE — TUBING, SUCTION, 9/32" X 20', STRAIGHT: Brand: MEDLINE INDUSTRIES, INC.

## (undated) DEVICE — SUTURE VCRL + SZ 2-0 L27IN ABSRB CLR CT-1 1/2 CIR TAPERCUT VCP259H

## (undated) DEVICE — CONTAINER,SPECIMEN,4OZ,OR STRL: Brand: MEDLINE

## (undated) DEVICE — BLOOD TRANSFER PACK 600 CC W/ CPLR

## (undated) DEVICE — DEFENDO AIR WATER SUCTION AND BIOPSY VALVE KIT FOR  OLYMPUS: Brand: DEFENDO AIR/WATER/SUCTION AND BIOPSY VALVE

## (undated) DEVICE — GOWN,AURORA,NONREINFORCED,LARGE: Brand: MEDLINE

## (undated) DEVICE — GLOVE SURG SZ 8 L11.77IN FNGR THK9.8MIL STRW LTX POLYMER

## (undated) DEVICE — CONNECTOR PERF 1/4X1/4 STR

## (undated) DEVICE — CANNULA PERFUSION 5.5IN 9FR AORTIC ROOT

## (undated) DEVICE — SS SUTURE, 3 PER SLEEVE: Brand: MYO/WIRE II

## (undated) DEVICE — FOGARTY - HYDRAGRIP SURGICAL - CLAMP INSERTS: Brand: FOGARTY HYDRAJAW

## (undated) DEVICE — SUTURE ETHIB EXCL BR GRN TAPR PT 2-0 30 X563H X563H

## (undated) DEVICE — SENSOR OXMTR SM AD DISP FOR INVOS SYS

## (undated) DEVICE — ADAPTER PERF L7.5IN INLET LEG 3IN Y TYP VENT CLR CODE CLMP

## (undated) DEVICE — SUTURE SILK PERMAHAND PRECUT 6 X 30 IN SZ 1 BLK BRAID A307H

## (undated) DEVICE — TAPE MED W1/8XL30IN WHT POLY

## (undated) DEVICE — CONNECTOR HAD 1/2X1/2IN EQL STR

## (undated) DEVICE — 3M™ IOBAN™ 2 ANTIMICROBIAL INCISE DRAPE 6651EZ: Brand: IOBAN™ 2

## (undated) DEVICE — COR-KNOT MINI® COMBO KITBASE PACKAGE TYPE - KITEACH STERILE PACKAGE KIT CONTAINS (2) SINGLE PATIENT USE COR-KNOT MINI® DEVICES AND (12) COR-KNOT® QUICK LOADS®.: Brand: COR-KNOT MINI®

## (undated) DEVICE — PLATELET CONCENTRATION PACK PROC 14-20 ML SMARTPREP 2

## (undated) DEVICE — ADHESIVE SKIN CLSR 0.7ML TOP DERMBND ADV

## (undated) DEVICE — CONNECTOR TBNG WHT PLAS SUCT STR 5IN1 LTWT W/ M CONN

## (undated) DEVICE — GLOVE SURG SZ 65 CRM LTX FREE POLYISOPRENE POLYMER BEAD ANTI

## (undated) DEVICE — POLYP TRAP: Brand: TRAPEASE®

## (undated) DEVICE — LEAD PACE L475MM CHNL A OR V MYOCARDIAL STEROID ELUT SIL

## (undated) DEVICE — SUTURE PROL SZ 5-0 L30IN NONABSORBABLE BLU L13MM RB-2 1/2 8710H

## (undated) DEVICE — CLIP INT M L GRN TI TRNSVRS GRV CHEVRON SHP W/ PRECIS TIP

## (undated) DEVICE — SUTURE PDS II SZ 0 L27IN ABSRB VLT L36MM CT-1 1/2 CIR Z340H

## (undated) DEVICE — COVER,LIGHT HANDLE,FLX,2/PK: Brand: MEDLINE INDUSTRIES, INC.

## (undated) DEVICE — SNARE ENDOSCP L240CM LOOP W13MM DIA2.4MM SHT THROW SM OVL

## (undated) DEVICE — WAX SURG 2.5GM HEMSTAT BNE BEESWAX PARAFFIN ISO PALMITATE

## (undated) DEVICE — GOWN,SIRUS,NONRNF,SETINSLV,XL,20/CS: Brand: MEDLINE

## (undated) DEVICE — Device

## (undated) DEVICE — BRA COMPR LG NYL LYCRA SPANDEX WHT CURAD

## (undated) DEVICE — POSITIONER,HEAD,MULTIRING,36CS: Brand: MEDLINE

## (undated) DEVICE — SUTURE PERMA-HAND SZ 0 L18IN NONABSORBABLE BLK CT-2 L26MM C027D

## (undated) DEVICE — SUMP INTCARD W/ 1/4INCH CONN 20FRENCH 15INCH DLP

## (undated) DEVICE — GLOVE SURG SZ 8 CRM LTX FREE POLYISOPRENE POLYMER BEAD ANTI

## (undated) DEVICE — DRAIN,WOUND,ROUND,24FR,5/16",FULL-FLUTED: Brand: MEDLINE

## (undated) DEVICE — COR-KNOT® QUICK LOAD® SINGLES: Brand: COR-KNOT® QUICK LOAD®

## (undated) DEVICE — CANNULA PERF 18FR L12IN 1 PC ELONG HI FLO BVL SUT CLLR VENT

## (undated) DEVICE — INTENDED FOR TISSUE SEPARATION, AND OTHER PROCEDURES THAT REQUIRE A SHARP SURGICAL BLADE TO PUNCTURE OR CUT.: Brand: BARD-PARKER ® CARBON RIB-BACK BLADES

## (undated) DEVICE — APPLICATOR MEDICATED 10.5 CC SOLUTION HI LT ORNG CHLORAPREP

## (undated) DEVICE — SUTURE NONABSORBABLE BRAIDED 2-0 SH-2 1X30 IN ETHBND EXCEL PXX80

## (undated) DEVICE — GLOVE SURG SZ 7.5 L11.73IN FNGR THK9.8MIL STRW LTX POLYMER

## (undated) DEVICE — CANNULA TIP SPRY MAGELLAN

## (undated) DEVICE — CANNULA PERF 24FR CONN SITE 3/8IN SGL STG VEN W/ R ANG MTL

## (undated) DEVICE — KIT APPL 11:1 PROC W/ FIBRIJET MED CUP APPL TIP TY

## (undated) DEVICE — GLOVE SURG SZ 7 CRM LTX FREE POLYISOPRENE POLYMER BEAD ANTI

## (undated) DEVICE — SUTURE PROL SZ 4-0 L36IN NONABSORBABLE BLU L26MM SH 1/2 CIR 8521H

## (undated) DEVICE — BLADE ES L6IN ELASTOMERIC COAT EXT DURABLE BEND UPTO 90DEG

## (undated) DEVICE — SUTURE MCRYL SZ 4-0 L18IN ABSRB UD L19MM PS-2 3/8 CIR PRIM Y496G

## (undated) DEVICE — AGENT HEMSTAT W2XL4IN OXIDIZED REGENERATED CELOS ABSRB SFT

## (undated) DEVICE — CLIP LIG M BLU TI HRT SHP WIRE HORZ 180 PER BX

## (undated) DEVICE — PACK CARDPULM BYPS SYS VEN Y CUST

## (undated) DEVICE — MEDI-VAC NON-CONDUCTIVE SUCTION TUBING 7MM X 6.1M (20 FT.) L: Brand: CARDINAL HEALTH